# Patient Record
Sex: FEMALE | Race: WHITE | Employment: OTHER | URBAN - METROPOLITAN AREA
[De-identification: names, ages, dates, MRNs, and addresses within clinical notes are randomized per-mention and may not be internally consistent; named-entity substitution may affect disease eponyms.]

---

## 2017-09-09 ENCOUNTER — HOSPITAL ENCOUNTER (OUTPATIENT)
Dept: MRI IMAGING | Age: 82
Discharge: HOME | End: 2017-09-09
Attending: FAMILY MEDICINE
Payer: MEDICARE

## 2017-09-09 DIAGNOSIS — D49.6 BRAIN TUMOR (HCC): ICD-10-CM

## 2017-09-09 LAB — CREAT BLD-MCNC: 0.5 MG/DL (ref 0.8–1.5)

## 2017-09-09 PROCEDURE — A9577 INJ MULTIHANCE: HCPCS

## 2017-09-09 PROCEDURE — 70553 MRI BRAIN STEM W/O & W/DYE: CPT

## 2017-09-09 PROCEDURE — 82565 ASSAY OF CREATININE: CPT

## 2017-09-09 PROCEDURE — 74011250636 HC RX REV CODE- 250/636

## 2017-09-09 RX ORDER — SODIUM CHLORIDE 0.9 % (FLUSH) 0.9 %
10 SYRINGE (ML) INJECTION
Status: COMPLETED | OUTPATIENT
Start: 2017-09-09 | End: 2017-09-09

## 2017-09-09 RX ADMIN — Medication 10 ML: at 13:41

## 2017-09-09 RX ADMIN — GADOBENATE DIMEGLUMINE 20 ML: 529 INJECTION, SOLUTION INTRAVENOUS at 13:40

## 2017-09-10 NOTE — PROGRESS NOTES
No serious findings on the MRI other than brain shrinkage due to age and a tiny 9mm meningioma (benign).

## 2017-11-15 PROBLEM — F02.80 LATE ONSET ALZHEIMER'S DISEASE WITHOUT BEHAVIORAL DISTURBANCE (HCC): Chronic | Status: ACTIVE | Noted: 2017-11-15

## 2017-11-15 PROBLEM — G30.1 LATE ONSET ALZHEIMER'S DISEASE WITHOUT BEHAVIORAL DISTURBANCE (HCC): Chronic | Status: ACTIVE | Noted: 2017-11-15

## 2018-01-01 ENCOUNTER — APPOINTMENT (OUTPATIENT)
Dept: GENERAL RADIOLOGY | Age: 83
End: 2018-01-01
Attending: INTERNAL MEDICINE

## 2018-01-01 ENCOUNTER — APPOINTMENT (OUTPATIENT)
Dept: GENERAL RADIOLOGY | Age: 83
DRG: 871 | End: 2018-01-01
Attending: EMERGENCY MEDICINE
Payer: MEDICARE

## 2018-01-01 ENCOUNTER — HOSPITAL ENCOUNTER (OUTPATIENT)
Age: 83
Discharge: NURSING, REHAB OR LONG-TERM CARE | End: 2018-10-25
Attending: INTERNAL MEDICINE | Admitting: INTERNAL MEDICINE

## 2018-01-01 ENCOUNTER — HOSPICE ADMISSION (OUTPATIENT)
Dept: HOSPICE | Facility: HOSPICE | Age: 83
End: 2018-01-01

## 2018-01-01 ENCOUNTER — HOSPITAL ENCOUNTER (OUTPATIENT)
Dept: GENERAL RADIOLOGY | Age: 83
Discharge: HOME | End: 2018-10-22
Attending: INTERNAL MEDICINE

## 2018-01-01 ENCOUNTER — HOSPITAL ENCOUNTER (EMERGENCY)
Age: 83
Discharge: HOME | End: 2018-04-06
Attending: EMERGENCY MEDICINE
Payer: MEDICARE

## 2018-01-01 ENCOUNTER — HOSPITAL ENCOUNTER (OUTPATIENT)
Age: 83
Setting detail: OUTPATIENT SURGERY
Discharge: NURSING, REHAB OR LONG-TERM CARE | End: 2018-12-05
Attending: INTERNAL MEDICINE | Admitting: INTERNAL MEDICINE
Payer: OTHER MISCELLANEOUS

## 2018-01-01 ENCOUNTER — HOSPITAL ENCOUNTER (OUTPATIENT)
Age: 83
Setting detail: OBSERVATION
Discharge: NURSING, REHAB OR LONG-TERM CARE | End: 2018-11-19
Attending: EMERGENCY MEDICINE | Admitting: FAMILY MEDICINE
Payer: MEDICARE

## 2018-01-01 ENCOUNTER — HOSPITAL ENCOUNTER (OUTPATIENT)
Age: 83
Setting detail: OUTPATIENT SURGERY
Discharge: NURSING, REHAB OR LONG-TERM CARE | End: 2018-11-16
Attending: INTERNAL MEDICINE | Admitting: INTERNAL MEDICINE
Payer: MEDICARE

## 2018-01-01 ENCOUNTER — APPOINTMENT (OUTPATIENT)
Dept: CT IMAGING | Age: 83
End: 2018-01-01
Attending: INTERNAL MEDICINE

## 2018-01-01 ENCOUNTER — APPOINTMENT (OUTPATIENT)
Dept: GENERAL RADIOLOGY | Age: 83
DRG: 177 | End: 2018-01-01
Attending: INTERNAL MEDICINE
Payer: MEDICARE

## 2018-01-01 ENCOUNTER — APPOINTMENT (OUTPATIENT)
Dept: GENERAL RADIOLOGY | Age: 83
DRG: 177 | End: 2018-01-01
Attending: EMERGENCY MEDICINE
Payer: MEDICARE

## 2018-01-01 ENCOUNTER — APPOINTMENT (OUTPATIENT)
Dept: GENERAL RADIOLOGY | Age: 83
DRG: 871 | End: 2018-01-01
Attending: INTERNAL MEDICINE
Payer: MEDICARE

## 2018-01-01 ENCOUNTER — HOSPITAL ENCOUNTER (INPATIENT)
Age: 83
LOS: 10 days | Discharge: NURSING, REHAB OR LONG-TERM CARE | DRG: 177 | End: 2018-10-04
Attending: EMERGENCY MEDICINE | Admitting: HOSPITALIST
Payer: MEDICARE

## 2018-01-01 ENCOUNTER — PATIENT OUTREACH (OUTPATIENT)
Dept: CASE MANAGEMENT | Age: 83
End: 2018-01-01

## 2018-01-01 ENCOUNTER — APPOINTMENT (OUTPATIENT)
Dept: CT IMAGING | Age: 83
DRG: 177 | End: 2018-01-01
Attending: INTERNAL MEDICINE
Payer: MEDICARE

## 2018-01-01 ENCOUNTER — APPOINTMENT (OUTPATIENT)
Dept: ULTRASOUND IMAGING | Age: 83
DRG: 177 | End: 2018-01-01
Attending: INTERNAL MEDICINE
Payer: MEDICARE

## 2018-01-01 ENCOUNTER — APPOINTMENT (OUTPATIENT)
Dept: CT IMAGING | Age: 83
End: 2018-01-01
Attending: EMERGENCY MEDICINE
Payer: MEDICARE

## 2018-01-01 ENCOUNTER — HOSPITAL ENCOUNTER (OUTPATIENT)
Dept: GENERAL RADIOLOGY | Age: 83
Discharge: HOME | End: 2018-10-08
Attending: INTERNAL MEDICINE

## 2018-01-01 ENCOUNTER — HOSPITAL ENCOUNTER (INPATIENT)
Age: 83
LOS: 5 days | DRG: 871 | End: 2018-12-22
Attending: EMERGENCY MEDICINE | Admitting: INTERNAL MEDICINE
Payer: MEDICARE

## 2018-01-01 VITALS
TEMPERATURE: 96.9 F | BODY MASS INDEX: 27.82 KG/M2 | HEIGHT: 63 IN | HEART RATE: 128 BPM | RESPIRATION RATE: 22 BRPM | DIASTOLIC BLOOD PRESSURE: 48 MMHG | WEIGHT: 157 LBS | SYSTOLIC BLOOD PRESSURE: 68 MMHG | OXYGEN SATURATION: 91 %

## 2018-01-01 VITALS
WEIGHT: 149 LBS | SYSTOLIC BLOOD PRESSURE: 113 MMHG | HEIGHT: 63 IN | BODY MASS INDEX: 26.4 KG/M2 | OXYGEN SATURATION: 92 % | DIASTOLIC BLOOD PRESSURE: 52 MMHG | RESPIRATION RATE: 14 BRPM | HEART RATE: 100 BPM

## 2018-01-01 VITALS
TEMPERATURE: 98 F | RESPIRATION RATE: 20 BRPM | SYSTOLIC BLOOD PRESSURE: 96 MMHG | OXYGEN SATURATION: 90 % | DIASTOLIC BLOOD PRESSURE: 52 MMHG | HEART RATE: 82 BPM | BODY MASS INDEX: 33.47 KG/M2 | HEIGHT: 59 IN | WEIGHT: 166 LBS

## 2018-01-01 VITALS
OXYGEN SATURATION: 98 % | TEMPERATURE: 98.3 F | WEIGHT: 149.91 LBS | DIASTOLIC BLOOD PRESSURE: 61 MMHG | HEIGHT: 63 IN | RESPIRATION RATE: 18 BRPM | SYSTOLIC BLOOD PRESSURE: 106 MMHG | BODY MASS INDEX: 26.56 KG/M2 | HEART RATE: 93 BPM

## 2018-01-01 VITALS
RESPIRATION RATE: 18 BRPM | BODY MASS INDEX: 28.49 KG/M2 | HEART RATE: 90 BPM | DIASTOLIC BLOOD PRESSURE: 76 MMHG | TEMPERATURE: 98.9 F | WEIGHT: 171 LBS | HEIGHT: 65 IN | OXYGEN SATURATION: 95 % | SYSTOLIC BLOOD PRESSURE: 194 MMHG

## 2018-01-01 VITALS
RESPIRATION RATE: 20 BRPM | HEIGHT: 63 IN | OXYGEN SATURATION: 98 % | WEIGHT: 150 LBS | HEART RATE: 97 BPM | SYSTOLIC BLOOD PRESSURE: 108 MMHG | DIASTOLIC BLOOD PRESSURE: 53 MMHG | TEMPERATURE: 97.8 F | BODY MASS INDEX: 26.58 KG/M2

## 2018-01-01 DIAGNOSIS — R09.02 HYPOXIA: Chronic | ICD-10-CM

## 2018-01-01 DIAGNOSIS — G93.40 ACUTE ENCEPHALOPATHY: Primary | ICD-10-CM

## 2018-01-01 DIAGNOSIS — F02.80 LATE ONSET ALZHEIMER'S DISEASE WITHOUT BEHAVIORAL DISTURBANCE (HCC): Chronic | ICD-10-CM

## 2018-01-01 DIAGNOSIS — J44.9 CHRONIC OBSTRUCTIVE PULMONARY DISEASE, UNSPECIFIED COPD TYPE (HCC): Chronic | ICD-10-CM

## 2018-01-01 DIAGNOSIS — J90 PLEURAL EFFUSION, BILATERAL: ICD-10-CM

## 2018-01-01 DIAGNOSIS — R13.19 OTHER DYSPHAGIA: ICD-10-CM

## 2018-01-01 DIAGNOSIS — R06.02 SHORTNESS OF BREATH: ICD-10-CM

## 2018-01-01 DIAGNOSIS — J90 RECURRENT PLEURAL EFFUSION ON LEFT: ICD-10-CM

## 2018-01-01 DIAGNOSIS — R53.81 DEBILITY: ICD-10-CM

## 2018-01-01 DIAGNOSIS — G30.1 LATE ONSET ALZHEIMER'S DISEASE WITHOUT BEHAVIORAL DISTURBANCE (HCC): Chronic | ICD-10-CM

## 2018-01-01 DIAGNOSIS — J84.9 ILD (INTERSTITIAL LUNG DISEASE) (HCC): Chronic | ICD-10-CM

## 2018-01-01 DIAGNOSIS — J69.0 ASPIRATION PNEUMONIA OF LEFT LOWER LOBE DUE TO VOMIT (HCC): ICD-10-CM

## 2018-01-01 DIAGNOSIS — J96.01 ACUTE RESPIRATORY FAILURE WITH HYPOXEMIA (HCC): ICD-10-CM

## 2018-01-01 DIAGNOSIS — J96.01 ACUTE RESPIRATORY FAILURE WITH HYPOXIA (HCC): ICD-10-CM

## 2018-01-01 DIAGNOSIS — N39.0 URINARY TRACT INFECTION WITHOUT HEMATURIA, SITE UNSPECIFIED: ICD-10-CM

## 2018-01-01 DIAGNOSIS — S01.01XA LACERATION OF SCALP, INITIAL ENCOUNTER: Primary | ICD-10-CM

## 2018-01-01 DIAGNOSIS — J96.11 CHRONIC RESPIRATORY FAILURE WITH HYPOXIA (HCC): Chronic | ICD-10-CM

## 2018-01-01 DIAGNOSIS — J90 PLEURAL EFFUSION, LEFT: ICD-10-CM

## 2018-01-01 DIAGNOSIS — J96.21 ACUTE ON CHRONIC RESPIRATORY FAILURE WITH HYPOXIA (HCC): ICD-10-CM

## 2018-01-01 DIAGNOSIS — Z51.5 ENCOUNTER FOR PALLIATIVE CARE: ICD-10-CM

## 2018-01-01 DIAGNOSIS — N17.9 ACUTE RENAL INJURY (HCC): ICD-10-CM

## 2018-01-01 DIAGNOSIS — J69.0 ASPIRATION PNEUMONIA, UNSPECIFIED ASPIRATION PNEUMONIA TYPE, UNSPECIFIED LATERALITY, UNSPECIFIED PART OF LUNG (HCC): Primary | ICD-10-CM

## 2018-01-01 DIAGNOSIS — E87.5 ACUTE HYPERKALEMIA: ICD-10-CM

## 2018-01-01 DIAGNOSIS — I35.0 NONRHEUMATIC AORTIC VALVE STENOSIS: ICD-10-CM

## 2018-01-01 DIAGNOSIS — J90 RECURRENT LEFT PLEURAL EFFUSION: Chronic | ICD-10-CM

## 2018-01-01 DIAGNOSIS — R60.9 EDEMA, UNSPECIFIED TYPE: ICD-10-CM

## 2018-01-01 DIAGNOSIS — R54 FRAILTY: ICD-10-CM

## 2018-01-01 DIAGNOSIS — N17.9 AKI (ACUTE KIDNEY INJURY) (HCC): ICD-10-CM

## 2018-01-01 DIAGNOSIS — R10.84 ABDOMINAL PAIN, GENERALIZED: Primary | ICD-10-CM

## 2018-01-01 LAB
ACID FAST STN SPEC: NEGATIVE
ACID FAST STN SPEC: NEGATIVE
ALBUMIN SERPL-MCNC: 2.3 G/DL (ref 3.2–4.6)
ALBUMIN SERPL-MCNC: 2.4 G/DL (ref 3.2–4.6)
ALBUMIN SERPL-MCNC: 2.6 G/DL (ref 3.2–4.6)
ALBUMIN SERPL-MCNC: 2.7 G/DL (ref 3.2–4.6)
ALBUMIN SERPL-MCNC: 2.9 G/DL (ref 3.2–4.6)
ALBUMIN SERPL-MCNC: 3 G/DL (ref 3.2–4.6)
ALBUMIN SERPL-MCNC: 3 G/DL (ref 3.2–4.6)
ALBUMIN SERPL-MCNC: 3.3 G/DL (ref 3.2–4.6)
ALBUMIN/GLOB SERPL: 0.5 {RATIO} (ref 1.2–3.5)
ALBUMIN/GLOB SERPL: 0.5 {RATIO} (ref 1.2–3.5)
ALBUMIN/GLOB SERPL: 0.6 {RATIO}
ALBUMIN/GLOB SERPL: 0.7 {RATIO}
ALBUMIN/GLOB SERPL: 0.7 {RATIO} (ref 1.2–3.5)
ALBUMIN/GLOB SERPL: 0.8 {RATIO} (ref 1.2–3.5)
ALBUMIN/GLOB SERPL: 0.8 {RATIO} (ref 1.2–3.5)
ALBUMIN/GLOB SERPL: 0.9 {RATIO}
ALP SERPL-CCNC: 122 U/L (ref 50–136)
ALP SERPL-CCNC: 140 U/L (ref 50–136)
ALP SERPL-CCNC: 149 U/L (ref 50–130)
ALP SERPL-CCNC: 57 U/L (ref 50–136)
ALP SERPL-CCNC: 77 U/L (ref 50–136)
ALP SERPL-CCNC: 82 U/L (ref 50–136)
ALP SERPL-CCNC: 85 U/L (ref 50–136)
ALP SERPL-CCNC: 89 U/L (ref 50–136)
ALT SERPL-CCNC: 15 U/L (ref 12–65)
ALT SERPL-CCNC: 15 U/L (ref 12–65)
ALT SERPL-CCNC: 19 U/L (ref 12–65)
ALT SERPL-CCNC: 20 U/L (ref 12–65)
ALT SERPL-CCNC: 21 U/L (ref 12–65)
ALT SERPL-CCNC: 30 U/L (ref 12–65)
ALT SERPL-CCNC: 36 U/L (ref 12–65)
ALT SERPL-CCNC: 99 U/L (ref 12–65)
AMIKACIN ISLT MIC: NORMAL
AMORPH CRY URNS QL MICRO: ABNORMAL
AMOXICILLIN+CLAV ISLT MIC: NORMAL
ANION GAP SERPL CALC-SCNC: 10 MMOL/L
ANION GAP SERPL CALC-SCNC: 11 MMOL/L
ANION GAP SERPL CALC-SCNC: 13 MMOL/L
ANION GAP SERPL CALC-SCNC: 16 MMOL/L
ANION GAP SERPL CALC-SCNC: 4 MMOL/L
ANION GAP SERPL CALC-SCNC: 4 MMOL/L
ANION GAP SERPL CALC-SCNC: 4 MMOL/L (ref 7–16)
ANION GAP SERPL CALC-SCNC: 5 MMOL/L
ANION GAP SERPL CALC-SCNC: 5 MMOL/L
ANION GAP SERPL CALC-SCNC: 5 MMOL/L (ref 7–16)
ANION GAP SERPL CALC-SCNC: 6 MMOL/L
ANION GAP SERPL CALC-SCNC: 6 MMOL/L
ANION GAP SERPL CALC-SCNC: 6 MMOL/L (ref 7–16)
ANION GAP SERPL CALC-SCNC: 7 MMOL/L
ANION GAP SERPL CALC-SCNC: 7 MMOL/L (ref 7–16)
ANION GAP SERPL CALC-SCNC: 8 MMOL/L
ANION GAP SERPL CALC-SCNC: 8 MMOL/L (ref 7–16)
ANION GAP SERPL CALC-SCNC: 9 MMOL/L
ANION GAP SERPL CALC-SCNC: 9 MMOL/L (ref 7–16)
ANION GAP SERPL CALC-SCNC: 9 MMOL/L (ref 7–16)
APPEARANCE FLD: NORMAL
APPEARANCE UR: ABNORMAL
APPEARANCE UR: ABNORMAL
ARTERIAL PATENCY WRIST A: POSITIVE
ARTERIAL PATENCY WRIST A: YES
AST SERPL-CCNC: 14 U/L (ref 15–37)
AST SERPL-CCNC: 16 U/L (ref 15–37)
AST SERPL-CCNC: 19 U/L (ref 15–37)
AST SERPL-CCNC: 25 U/L (ref 15–37)
AST SERPL-CCNC: 32 U/L (ref 15–37)
AST SERPL-CCNC: 37 U/L (ref 15–37)
AST SERPL-CCNC: 38 U/L (ref 15–37)
AST SERPL-CCNC: 39 U/L (ref 15–37)
ATRIAL RATE: 102 BPM
ATRIAL RATE: 105 BPM
ATRIAL RATE: 107 BPM
ATRIAL RATE: 110 BPM
BACTERIA SPEC CULT: ABNORMAL
BACTERIA SPEC CULT: NORMAL
BACTERIA URNS QL MICRO: 0 /HPF
BACTERIA URNS QL MICRO: 0 /HPF
BACTERIA URNS QL MICRO: ABNORMAL /HPF
BACTERIA URNS QL MICRO: NORMAL /HPF
BASE EXCESS BLD CALC-SCNC: 0 MMOL/L
BASE EXCESS BLDA CALC-SCNC: 0 MMOL/L (ref 0–3)
BASOPHILS # BLD: 0 K/UL (ref 0–0.2)
BASOPHILS # BLD: 0.1 K/UL (ref 0–0.2)
BASOPHILS NFR BLD: 0 % (ref 0–2)
BASOPHILS NFR BLD: 1 % (ref 0–2)
BDY SITE: ABNORMAL
BDY SITE: NORMAL
BILIRUB SERPL-MCNC: 0.2 MG/DL (ref 0.2–1.1)
BILIRUB SERPL-MCNC: 0.3 MG/DL (ref 0.2–1.1)
BILIRUB SERPL-MCNC: 0.4 MG/DL (ref 0.2–1.1)
BILIRUB UR QL: ABNORMAL
BILIRUB UR QL: NEGATIVE
BNP SERPL-MCNC: 406 PG/ML
BNP SERPL-MCNC: 61 PG/ML
BNP SERPL-MCNC: 83 PG/ML
BNP SERPL-MCNC: 94 PG/ML
BODY TEMPERATURE: 98.6
BUN SERPL-MCNC: 16 MG/DL (ref 8–23)
BUN SERPL-MCNC: 17 MG/DL (ref 8–23)
BUN SERPL-MCNC: 17 MG/DL (ref 8–23)
BUN SERPL-MCNC: 19 MG/DL (ref 8–23)
BUN SERPL-MCNC: 20 MG/DL (ref 8–23)
BUN SERPL-MCNC: 21 MG/DL (ref 8–23)
BUN SERPL-MCNC: 23 MG/DL (ref 8–23)
BUN SERPL-MCNC: 24 MG/DL (ref 8–23)
BUN SERPL-MCNC: 24 MG/DL (ref 8–23)
BUN SERPL-MCNC: 25 MG/DL (ref 8–23)
BUN SERPL-MCNC: 25 MG/DL (ref 8–23)
BUN SERPL-MCNC: 26 MG/DL (ref 8–23)
BUN SERPL-MCNC: 26 MG/DL (ref 8–23)
BUN SERPL-MCNC: 27 MG/DL (ref 8–23)
BUN SERPL-MCNC: 27 MG/DL (ref 8–23)
BUN SERPL-MCNC: 34 MG/DL (ref 8–23)
BUN SERPL-MCNC: 36 MG/DL (ref 8–23)
BUN SERPL-MCNC: 37 MG/DL (ref 8–23)
BUN SERPL-MCNC: 38 MG/DL (ref 8–23)
BUN SERPL-MCNC: 38 MG/DL (ref 8–23)
BUN SERPL-MCNC: 39 MG/DL (ref 8–23)
BUN SERPL-MCNC: 41 MG/DL (ref 8–23)
BUN SERPL-MCNC: 41 MG/DL (ref 8–23)
BUN SERPL-MCNC: 43 MG/DL (ref 8–23)
BUN SERPL-MCNC: 44 MG/DL (ref 8–23)
BUN SERPL-MCNC: 45 MG/DL (ref 8–23)
BUN SERPL-MCNC: 50 MG/DL (ref 8–23)
CA-I BLD-MCNC: 5.52 MG/DL (ref 4–5.2)
CALCIUM SERPL-MCNC: 10 MG/DL (ref 8.3–10.4)
CALCIUM SERPL-MCNC: 10.3 MG/DL (ref 8.3–10.4)
CALCIUM SERPL-MCNC: 7.8 MG/DL (ref 8.3–10.4)
CALCIUM SERPL-MCNC: 7.9 MG/DL (ref 8.3–10.4)
CALCIUM SERPL-MCNC: 8.2 MG/DL (ref 8.3–10.4)
CALCIUM SERPL-MCNC: 8.5 MG/DL (ref 8.3–10.4)
CALCIUM SERPL-MCNC: 8.7 MG/DL (ref 8.3–10.4)
CALCIUM SERPL-MCNC: 8.7 MG/DL (ref 8.3–10.4)
CALCIUM SERPL-MCNC: 8.8 MG/DL (ref 8.3–10.4)
CALCIUM SERPL-MCNC: 8.9 MG/DL (ref 8.3–10.4)
CALCIUM SERPL-MCNC: 8.9 MG/DL (ref 8.3–10.4)
CALCIUM SERPL-MCNC: 9.1 MG/DL (ref 8.3–10.4)
CALCIUM SERPL-MCNC: 9.2 MG/DL (ref 8.3–10.4)
CALCIUM SERPL-MCNC: 9.2 MG/DL (ref 8.3–10.4)
CALCIUM SERPL-MCNC: 9.3 MG/DL (ref 8.3–10.4)
CALCIUM SERPL-MCNC: 9.4 MG/DL (ref 8.3–10.4)
CALCIUM SERPL-MCNC: 9.5 MG/DL (ref 8.3–10.4)
CALCIUM SERPL-MCNC: 9.5 MG/DL (ref 8.3–10.4)
CALCIUM SERPL-MCNC: 9.6 MG/DL (ref 8.3–10.4)
CALCIUM SERPL-MCNC: 9.7 MG/DL (ref 8.3–10.4)
CALCIUM SERPL-MCNC: 9.8 MG/DL (ref 8.3–10.4)
CALCIUM SERPL-MCNC: 9.9 MG/DL (ref 8.3–10.4)
CALCULATED P AXIS, ECG09: 46 DEGREES
CALCULATED P AXIS, ECG09: 59 DEGREES
CALCULATED P AXIS, ECG09: 62 DEGREES
CALCULATED P AXIS, ECG09: 64 DEGREES
CALCULATED R AXIS, ECG10: 25 DEGREES
CALCULATED R AXIS, ECG10: 32 DEGREES
CALCULATED R AXIS, ECG10: 39 DEGREES
CALCULATED R AXIS, ECG10: 52 DEGREES
CALCULATED T AXIS, ECG11: 43 DEGREES
CALCULATED T AXIS, ECG11: 47 DEGREES
CALCULATED T AXIS, ECG11: 48 DEGREES
CALCULATED T AXIS, ECG11: 67 DEGREES
CASTS URNS QL MICRO: 0 /LPF
CASTS URNS QL MICRO: ABNORMAL /LPF
CASTS URNS QL MICRO: ABNORMAL /LPF
CEFTRIAXONE ISLT MIC: NORMAL
CHLORIDE SERPL-SCNC: 100 MMOL/L (ref 98–107)
CHLORIDE SERPL-SCNC: 101 MMOL/L (ref 98–107)
CHLORIDE SERPL-SCNC: 101 MMOL/L (ref 98–107)
CHLORIDE SERPL-SCNC: 104 MMOL/L (ref 98–107)
CHLORIDE SERPL-SCNC: 105 MMOL/L (ref 98–107)
CHLORIDE SERPL-SCNC: 106 MMOL/L (ref 98–107)
CHLORIDE SERPL-SCNC: 107 MMOL/L (ref 98–107)
CHLORIDE SERPL-SCNC: 107 MMOL/L (ref 98–107)
CHLORIDE SERPL-SCNC: 108 MMOL/L (ref 98–107)
CHLORIDE SERPL-SCNC: 109 MMOL/L (ref 98–107)
CHLORIDE SERPL-SCNC: 109 MMOL/L (ref 98–107)
CHLORIDE SERPL-SCNC: 110 MMOL/L (ref 98–107)
CHLORIDE SERPL-SCNC: 114 MMOL/L (ref 98–107)
CHLORIDE SERPL-SCNC: 116 MMOL/L (ref 98–107)
CHLORIDE SERPL-SCNC: 119 MMOL/L (ref 98–107)
CHLORIDE SERPL-SCNC: 122 MMOL/L (ref 98–107)
CHLORIDE SERPL-SCNC: 99 MMOL/L (ref 98–107)
CHLORIDE SERPL-SCNC: 99 MMOL/L (ref 98–107)
CIPROFLOXACIN ISLT MIC: NORMAL
CLARITHRO ISLT MIC: NORMAL
CO2 BLD-SCNC: 26 MMOL/L
CO2 SERPL-SCNC: 23 MMOL/L (ref 21–32)
CO2 SERPL-SCNC: 24 MMOL/L (ref 21–32)
CO2 SERPL-SCNC: 26 MMOL/L (ref 21–32)
CO2 SERPL-SCNC: 26 MMOL/L (ref 21–32)
CO2 SERPL-SCNC: 28 MMOL/L (ref 21–32)
CO2 SERPL-SCNC: 29 MMOL/L (ref 21–32)
CO2 SERPL-SCNC: 30 MMOL/L (ref 21–32)
CO2 SERPL-SCNC: 31 MMOL/L (ref 21–32)
CO2 SERPL-SCNC: 32 MMOL/L (ref 21–32)
CO2 SERPL-SCNC: 33 MMOL/L (ref 21–32)
CO2 SERPL-SCNC: 34 MMOL/L (ref 21–32)
CO2 SERPL-SCNC: 34 MMOL/L (ref 21–32)
CO2 SERPL-SCNC: 35 MMOL/L (ref 21–32)
CO2 SERPL-SCNC: 36 MMOL/L (ref 21–32)
CO2 SERPL-SCNC: 38 MMOL/L (ref 21–32)
CO2 SERPL-SCNC: 39 MMOL/L (ref 21–32)
CO2 SERPL-SCNC: 41 MMOL/L (ref 21–32)
COHGB MFR BLD: 0.3 % (ref 0.5–1.5)
COLLECT TIME,HTIME: 1533
COLOR FLD: NORMAL
COLOR UR: ABNORMAL
COLOR UR: YELLOW
CREAT SERPL-MCNC: 0.71 MG/DL (ref 0.6–1)
CREAT SERPL-MCNC: 0.72 MG/DL (ref 0.6–1)
CREAT SERPL-MCNC: 0.81 MG/DL (ref 0.6–1)
CREAT SERPL-MCNC: 0.83 MG/DL (ref 0.6–1)
CREAT SERPL-MCNC: 0.84 MG/DL (ref 0.6–1)
CREAT SERPL-MCNC: 0.84 MG/DL (ref 0.6–1)
CREAT SERPL-MCNC: 0.87 MG/DL (ref 0.6–1)
CREAT SERPL-MCNC: 0.88 MG/DL (ref 0.6–1)
CREAT SERPL-MCNC: 0.92 MG/DL (ref 0.6–1)
CREAT SERPL-MCNC: 1 MG/DL (ref 0.6–1)
CREAT SERPL-MCNC: 1 MG/DL (ref 0.6–1)
CREAT SERPL-MCNC: 1.02 MG/DL (ref 0.6–1)
CREAT SERPL-MCNC: 1.07 MG/DL (ref 0.6–1)
CREAT SERPL-MCNC: 1.08 MG/DL (ref 0.6–1)
CREAT SERPL-MCNC: 1.09 MG/DL (ref 0.6–1)
CREAT SERPL-MCNC: 1.19 MG/DL (ref 0.6–1)
CREAT SERPL-MCNC: 1.25 MG/DL (ref 0.6–1)
CREAT SERPL-MCNC: 1.27 MG/DL (ref 0.6–1)
CREAT SERPL-MCNC: 1.3 MG/DL (ref 0.6–1)
CREAT SERPL-MCNC: 1.32 MG/DL (ref 0.6–1)
CREAT SERPL-MCNC: 1.33 MG/DL (ref 0.6–1)
CREAT SERPL-MCNC: 1.4 MG/DL (ref 0.6–1)
CREAT SERPL-MCNC: 1.43 MG/DL (ref 0.6–1)
CREAT SERPL-MCNC: 1.43 MG/DL (ref 0.6–1)
CREAT SERPL-MCNC: 1.56 MG/DL (ref 0.6–1)
CREAT SERPL-MCNC: 1.97 MG/DL (ref 0.6–1)
CREAT SERPL-MCNC: 2.1 MG/DL (ref 0.6–1)
CREAT SERPL-MCNC: 2.42 MG/DL (ref 0.6–1)
CRYSTALS URNS QL MICRO: 0 /LPF
CRYSTALS URNS QL MICRO: 0 /LPF
CRYSTALS URNS QL MICRO: ABNORMAL /LPF
DIAGNOSIS, 93000: NORMAL
DIFFERENTIAL METHOD BLD: ABNORMAL
DO-HGB BLD-MCNC: 3 % (ref 0–5)
EOSINOPHIL # BLD: 0 K/UL (ref 0–0.8)
EOSINOPHIL # BLD: 0.1 K/UL (ref 0–0.8)
EOSINOPHIL # BLD: 0.2 K/UL (ref 0–0.8)
EOSINOPHIL # BLD: 0.3 K/UL (ref 0–0.8)
EOSINOPHIL NFR BLD: 0 % (ref 0.5–7.8)
EOSINOPHIL NFR BLD: 1 % (ref 0.5–7.8)
EOSINOPHIL NFR BLD: 2 % (ref 0.5–7.8)
EOSINOPHIL NFR BLD: 3 % (ref 0.5–7.8)
EOSINOPHIL NFR BLD: 3 % (ref 0.5–7.8)
EPI CELLS #/AREA URNS HPF: ABNORMAL /HPF
EPI CELLS #/AREA URNS HPF: ABNORMAL /HPF
EPI CELLS #/AREA URNS HPF: NORMAL /HPF
ERYTHROCYTE [DISTWIDTH] IN BLOOD BY AUTOMATED COUNT: 14.6 %
ERYTHROCYTE [DISTWIDTH] IN BLOOD BY AUTOMATED COUNT: 14.6 %
ERYTHROCYTE [DISTWIDTH] IN BLOOD BY AUTOMATED COUNT: 14.7 %
ERYTHROCYTE [DISTWIDTH] IN BLOOD BY AUTOMATED COUNT: 14.7 %
ERYTHROCYTE [DISTWIDTH] IN BLOOD BY AUTOMATED COUNT: 14.8 %
ERYTHROCYTE [DISTWIDTH] IN BLOOD BY AUTOMATED COUNT: 14.9 %
ERYTHROCYTE [DISTWIDTH] IN BLOOD BY AUTOMATED COUNT: 15 %
ERYTHROCYTE [DISTWIDTH] IN BLOOD BY AUTOMATED COUNT: 15.1 %
ERYTHROCYTE [DISTWIDTH] IN BLOOD BY AUTOMATED COUNT: 15.1 % (ref 11.9–14.6)
ERYTHROCYTE [DISTWIDTH] IN BLOOD BY AUTOMATED COUNT: 15.2 %
ERYTHROCYTE [DISTWIDTH] IN BLOOD BY AUTOMATED COUNT: 15.3 %
ERYTHROCYTE [DISTWIDTH] IN BLOOD BY AUTOMATED COUNT: 15.5 %
ERYTHROCYTE [DISTWIDTH] IN BLOOD BY AUTOMATED COUNT: 15.7 %
ERYTHROCYTE [DISTWIDTH] IN BLOOD BY AUTOMATED COUNT: 16.5 % (ref 11.9–14.6)
ERYTHROCYTE [DISTWIDTH] IN BLOOD BY AUTOMATED COUNT: 16.6 %
ERYTHROCYTE [DISTWIDTH] IN BLOOD BY AUTOMATED COUNT: 16.6 %
ERYTHROCYTE [DISTWIDTH] IN BLOOD BY AUTOMATED COUNT: 16.6 % (ref 11.9–14.6)
ERYTHROCYTE [DISTWIDTH] IN BLOOD BY AUTOMATED COUNT: 16.9 % (ref 11.9–14.6)
ERYTHROCYTE [DISTWIDTH] IN BLOOD BY AUTOMATED COUNT: 17.1 % (ref 11.9–14.6)
ERYTHROCYTE [DISTWIDTH] IN BLOOD BY AUTOMATED COUNT: 17.3 % (ref 11.9–14.6)
FLOW RATE ISTAT,IFRATE: 6 L/MIN
FLUID COMMENTS, FCOM: NORMAL
FLUID COMMENTS, FCOM: NORMAL
FUNGUS CULTURE, RFCO2T: NEGATIVE
FUNGUS CULTURE, RFCO2T: NEGATIVE
FUNGUS SMEAR, RFCO1T: NORMAL
FUNGUS SMEAR, RFCO1T: NORMAL
FUNGUS SPEC CULT: NORMAL
FUNGUS SPEC CULT: NORMAL
FUNGUS STAIN, 188244: NORMAL
FUNGUS STAIN, 188244: NORMAL
GAS FLOW.O2 O2 DELIVERY SYS: NORMAL L/MIN
GLOBULIN SER CALC-MCNC: 3.4 G/DL (ref 2.3–3.5)
GLOBULIN SER CALC-MCNC: 3.5 G/DL (ref 2.3–3.5)
GLOBULIN SER CALC-MCNC: 3.9 G/DL (ref 2.3–3.5)
GLOBULIN SER CALC-MCNC: 3.9 G/DL (ref 2.3–3.5)
GLOBULIN SER CALC-MCNC: 4.2 G/DL (ref 2.3–3.5)
GLOBULIN SER CALC-MCNC: 4.4 G/DL (ref 2.3–3.5)
GLOBULIN SER CALC-MCNC: 4.6 G/DL (ref 2.3–3.5)
GLOBULIN SER CALC-MCNC: 4.9 G/DL (ref 2.3–3.5)
GLUCOSE BLD STRIP.AUTO-MCNC: 83 MG/DL (ref 65–100)
GLUCOSE FLD-MCNC: 115 MG/DL
GLUCOSE FLD-MCNC: NORMAL MG/DL
GLUCOSE FLD-MCNC: NORMAL MG/DL
GLUCOSE SERPL-MCNC: 100 MG/DL (ref 65–100)
GLUCOSE SERPL-MCNC: 102 MG/DL (ref 65–100)
GLUCOSE SERPL-MCNC: 102 MG/DL (ref 65–100)
GLUCOSE SERPL-MCNC: 106 MG/DL (ref 65–100)
GLUCOSE SERPL-MCNC: 107 MG/DL (ref 65–100)
GLUCOSE SERPL-MCNC: 109 MG/DL (ref 65–100)
GLUCOSE SERPL-MCNC: 109 MG/DL (ref 65–100)
GLUCOSE SERPL-MCNC: 110 MG/DL (ref 65–100)
GLUCOSE SERPL-MCNC: 115 MG/DL (ref 65–100)
GLUCOSE SERPL-MCNC: 134 MG/DL (ref 65–100)
GLUCOSE SERPL-MCNC: 138 MG/DL (ref 65–100)
GLUCOSE SERPL-MCNC: 142 MG/DL (ref 65–100)
GLUCOSE SERPL-MCNC: 144 MG/DL (ref 65–100)
GLUCOSE SERPL-MCNC: 149 MG/DL (ref 65–100)
GLUCOSE SERPL-MCNC: 201 MG/DL (ref 65–100)
GLUCOSE SERPL-MCNC: 78 MG/DL (ref 65–100)
GLUCOSE SERPL-MCNC: 88 MG/DL (ref 65–100)
GLUCOSE SERPL-MCNC: 90 MG/DL (ref 65–100)
GLUCOSE SERPL-MCNC: 90 MG/DL (ref 65–100)
GLUCOSE SERPL-MCNC: 92 MG/DL (ref 65–100)
GLUCOSE SERPL-MCNC: 93 MG/DL (ref 65–100)
GLUCOSE SERPL-MCNC: 94 MG/DL (ref 65–100)
GLUCOSE SERPL-MCNC: 95 MG/DL (ref 65–100)
GLUCOSE SERPL-MCNC: 96 MG/DL (ref 65–100)
GLUCOSE SERPL-MCNC: 98 MG/DL (ref 65–100)
GLUCOSE SERPL-MCNC: 99 MG/DL (ref 65–100)
GLUCOSE SERPL-MCNC: 99 MG/DL (ref 65–100)
GLUCOSE UR STRIP.AUTO-MCNC: NEGATIVE MG/DL
GLUCOSE UR STRIP.AUTO-MCNC: NEGATIVE MG/DL
GRAM STN SPEC: NORMAL
HCO3 BLD-SCNC: 24.9 MMOL/L (ref 22–26)
HCO3 BLDA-SCNC: 27 MMOL/L (ref 22–26)
HCT VFR BLD AUTO: 25.8 % (ref 35.8–46.3)
HCT VFR BLD AUTO: 30.2 % (ref 35.8–46.3)
HCT VFR BLD AUTO: 32.7 % (ref 35.8–46.3)
HCT VFR BLD AUTO: 33.1 % (ref 35.8–46.3)
HCT VFR BLD AUTO: 33.2 % (ref 35.8–46.3)
HCT VFR BLD AUTO: 33.3 % (ref 35.8–46.3)
HCT VFR BLD AUTO: 33.3 % (ref 35.8–46.3)
HCT VFR BLD AUTO: 34.4 % (ref 35.8–46.3)
HCT VFR BLD AUTO: 34.5 % (ref 35.8–46.3)
HCT VFR BLD AUTO: 35 % (ref 35.8–46.3)
HCT VFR BLD AUTO: 35.1 % (ref 35.8–46.3)
HCT VFR BLD AUTO: 35.4 % (ref 35.8–46.3)
HCT VFR BLD AUTO: 36.3 % (ref 35.8–46.3)
HCT VFR BLD AUTO: 36.3 % (ref 35.8–46.3)
HCT VFR BLD AUTO: 36.5 % (ref 35.8–46.3)
HCT VFR BLD AUTO: 36.5 % (ref 35.8–46.3)
HCT VFR BLD AUTO: 36.6 % (ref 35.8–46.3)
HCT VFR BLD AUTO: 36.6 % (ref 35.8–46.3)
HCT VFR BLD AUTO: 36.8 % (ref 35.8–46.3)
HCT VFR BLD AUTO: 37.9 % (ref 35.8–46.3)
HCT VFR BLD AUTO: 38.5 % (ref 35.8–46.3)
HCT VFR BLD AUTO: 40.1 % (ref 35.8–46.3)
HCT VFR BLD AUTO: 40.3 % (ref 35.8–46.3)
HCT VFR BLD AUTO: 42.7 % (ref 35.8–46.3)
HCT VFR BLD AUTO: 43.2 % (ref 35.8–46.3)
HGB BLD-MCNC: 10 G/DL (ref 11.7–15.4)
HGB BLD-MCNC: 10.1 G/DL (ref 11.7–15.4)
HGB BLD-MCNC: 10.2 G/DL (ref 11.7–15.4)
HGB BLD-MCNC: 10.2 G/DL (ref 11.7–15.4)
HGB BLD-MCNC: 10.3 G/DL (ref 11.7–15.4)
HGB BLD-MCNC: 10.4 G/DL (ref 11.7–15.4)
HGB BLD-MCNC: 10.4 G/DL (ref 11.7–15.4)
HGB BLD-MCNC: 10.6 G/DL (ref 11.7–15.4)
HGB BLD-MCNC: 10.7 G/DL (ref 11.7–15.4)
HGB BLD-MCNC: 10.8 G/DL (ref 11.7–15.4)
HGB BLD-MCNC: 10.8 G/DL (ref 11.7–15.4)
HGB BLD-MCNC: 10.9 G/DL (ref 11.7–15.4)
HGB BLD-MCNC: 11.2 G/DL (ref 11.7–15.4)
HGB BLD-MCNC: 11.2 G/DL (ref 11.7–15.4)
HGB BLD-MCNC: 11.7 G/DL (ref 11.7–15.4)
HGB BLD-MCNC: 11.9 G/DL (ref 11.7–15.4)
HGB BLD-MCNC: 12.5 G/DL (ref 11.7–15.4)
HGB BLD-MCNC: 12.5 G/DL (ref 11.7–15.4)
HGB BLD-MCNC: 7.5 G/DL (ref 11.7–15.4)
HGB BLD-MCNC: 8.6 G/DL (ref 11.7–15.4)
HGB BLD-MCNC: 9.3 G/DL (ref 11.7–15.4)
HGB BLD-MCNC: 9.5 G/DL (ref 11.7–15.4)
HGB BLD-MCNC: 9.6 G/DL (ref 11.7–15.4)
HGB BLD-MCNC: 9.8 G/DL (ref 11.7–15.4)
HGB BLD-MCNC: 9.8 G/DL (ref 11.7–15.4)
HGB BLDMV-MCNC: 11.4 GM/DL (ref 11.7–15)
HGB UR QL STRIP: ABNORMAL
HGB UR QL STRIP: NEGATIVE
IMIPENEM ISLT MIC: NORMAL
IMM GRANULOCYTES # BLD: 0 K/UL (ref 0–0.5)
IMM GRANULOCYTES # BLD: 0.1 K/UL (ref 0–0.5)
IMM GRANULOCYTES # BLD: 0.2 K/UL (ref 0–0.5)
IMM GRANULOCYTES # BLD: 0.2 K/UL (ref 0–0.5)
IMM GRANULOCYTES NFR BLD AUTO: 0 % (ref 0–5)
IMM GRANULOCYTES NFR BLD AUTO: 1 % (ref 0–5)
KETONES UR QL STRIP.AUTO: 15 MG/DL
KETONES UR QL STRIP.AUTO: NEGATIVE MG/DL
LACTATE BLD-SCNC: 0.93 MMOL/L (ref 0.5–1.9)
LACTATE BLD-SCNC: 1.2 MMOL/L (ref 0.5–1.9)
LACTATE BLD-SCNC: 2.36 MMOL/L (ref 0.5–1.9)
LACTATE SERPL-SCNC: 1.2 MMOL/L (ref 0.4–2)
LACTATE SERPL-SCNC: 1.3 MMOL/L (ref 0.4–2)
LACTATE SERPL-SCNC: 2.3 MMOL/L (ref 0.4–2)
LDH FLD L TO P-CCNC: 196 U/L
LDH FLD L TO P-CCNC: NORMAL U/L
LDH FLD L TO P-CCNC: NORMAL U/L
LDH SERPL L TO P-CCNC: 211 U/L (ref 110–210)
LEUKOCYTE ESTERASE UR QL STRIP.AUTO: ABNORMAL
LEUKOCYTE ESTERASE UR QL STRIP.AUTO: ABNORMAL
LINEZOLID ISLT MIC: NORMAL
LIPASE SERPL-CCNC: 68 U/L (ref 73–393)
LYMPHOCYTES # BLD: 0.2 K/UL (ref 0.5–4.6)
LYMPHOCYTES # BLD: 0.3 K/UL (ref 0.5–4.6)
LYMPHOCYTES # BLD: 0.4 K/UL (ref 0.5–4.6)
LYMPHOCYTES # BLD: 0.8 K/UL (ref 0.5–4.6)
LYMPHOCYTES # BLD: 0.9 K/UL (ref 0.5–4.6)
LYMPHOCYTES # BLD: 1.1 K/UL (ref 0.5–4.6)
LYMPHOCYTES # BLD: 1.1 K/UL (ref 0.5–4.6)
LYMPHOCYTES # BLD: 1.2 K/UL (ref 0.5–4.6)
LYMPHOCYTES # BLD: 1.3 K/UL (ref 0.5–4.6)
LYMPHOCYTES # BLD: 1.5 K/UL (ref 0.5–4.6)
LYMPHOCYTES # BLD: 1.5 K/UL (ref 0.5–4.6)
LYMPHOCYTES # BLD: 1.8 K/UL (ref 0.5–4.6)
LYMPHOCYTES # BLD: 1.8 K/UL (ref 0.5–4.6)
LYMPHOCYTES # BLD: 3.1 K/UL (ref 0.5–4.6)
LYMPHOCYTES NFR BLD MANUAL: 16 % (ref 16–44)
LYMPHOCYTES NFR BLD: 10 % (ref 13–44)
LYMPHOCYTES NFR BLD: 11 % (ref 13–44)
LYMPHOCYTES NFR BLD: 12 % (ref 13–44)
LYMPHOCYTES NFR BLD: 13 % (ref 13–44)
LYMPHOCYTES NFR BLD: 14 % (ref 13–44)
LYMPHOCYTES NFR BLD: 14 % (ref 13–44)
LYMPHOCYTES NFR BLD: 17 % (ref 13–44)
LYMPHOCYTES NFR BLD: 2 % (ref 13–44)
LYMPHOCYTES NFR BLD: 3 % (ref 13–44)
LYMPHOCYTES NFR BLD: 5 % (ref 13–44)
LYMPHOCYTES NFR BLD: 8 % (ref 13–44)
LYMPHOCYTES NFR FLD: 0 %
LYMPHOCYTES NFR FLD: 91 %
LYMPHOCYTES NFR FLD: 95 %
MAGNESIUM SERPL-MCNC: 1.9 MG/DL (ref 1.8–2.4)
MAGNESIUM SERPL-MCNC: 1.9 MG/DL (ref 1.8–2.4)
MAGNESIUM SERPL-MCNC: 2 MG/DL (ref 1.8–2.4)
MAGNESIUM SERPL-MCNC: 2.2 MG/DL (ref 1.8–2.4)
MCH RBC QN AUTO: 26.3 PG (ref 26.1–32.9)
MCH RBC QN AUTO: 26.6 PG (ref 26.1–32.9)
MCH RBC QN AUTO: 26.7 PG (ref 26.1–32.9)
MCH RBC QN AUTO: 26.8 PG (ref 26.1–32.9)
MCH RBC QN AUTO: 27 PG (ref 26.1–32.9)
MCH RBC QN AUTO: 27 PG (ref 26.1–32.9)
MCH RBC QN AUTO: 27.1 PG (ref 26.1–32.9)
MCH RBC QN AUTO: 27.2 PG (ref 26.1–32.9)
MCH RBC QN AUTO: 27.2 PG (ref 26.1–32.9)
MCH RBC QN AUTO: 27.3 PG (ref 26.1–32.9)
MCH RBC QN AUTO: 27.4 PG (ref 26.1–32.9)
MCH RBC QN AUTO: 27.5 PG (ref 26.1–32.9)
MCH RBC QN AUTO: 27.5 PG (ref 26.1–32.9)
MCH RBC QN AUTO: 27.6 PG (ref 26.1–32.9)
MCH RBC QN AUTO: 27.7 PG (ref 26.1–32.9)
MCHC RBC AUTO-ENTMCNC: 28.1 G/DL (ref 31.4–35)
MCHC RBC AUTO-ENTMCNC: 28.4 G/DL (ref 31.4–35)
MCHC RBC AUTO-ENTMCNC: 28.5 G/DL (ref 31.4–35)
MCHC RBC AUTO-ENTMCNC: 28.7 G/DL (ref 31.4–35)
MCHC RBC AUTO-ENTMCNC: 28.8 G/DL (ref 31.4–35)
MCHC RBC AUTO-ENTMCNC: 28.9 G/DL (ref 31.4–35)
MCHC RBC AUTO-ENTMCNC: 28.9 G/DL (ref 31.4–35)
MCHC RBC AUTO-ENTMCNC: 29 G/DL (ref 31.4–35)
MCHC RBC AUTO-ENTMCNC: 29.1 G/DL (ref 31.4–35)
MCHC RBC AUTO-ENTMCNC: 29.2 G/DL (ref 31.4–35)
MCHC RBC AUTO-ENTMCNC: 29.3 G/DL (ref 31.4–35)
MCHC RBC AUTO-ENTMCNC: 29.4 G/DL (ref 31.4–35)
MCHC RBC AUTO-ENTMCNC: 29.5 G/DL (ref 31.4–35)
MCHC RBC AUTO-ENTMCNC: 29.5 G/DL (ref 31.4–35)
MCHC RBC AUTO-ENTMCNC: 29.6 G/DL (ref 31.4–35)
MCHC RBC AUTO-ENTMCNC: 29.8 G/DL (ref 31.4–35)
MCHC RBC AUTO-ENTMCNC: 30 G/DL (ref 31.4–35)
MCHC RBC AUTO-ENTMCNC: 30.4 G/DL (ref 31.4–35)
MCV RBC AUTO: 89 FL (ref 79.6–97.8)
MCV RBC AUTO: 89.3 FL (ref 79.6–97.8)
MCV RBC AUTO: 90.1 FL (ref 79.6–97.8)
MCV RBC AUTO: 90.8 FL (ref 79.6–97.8)
MCV RBC AUTO: 90.8 FL (ref 79.6–97.8)
MCV RBC AUTO: 91 FL (ref 79.6–97.8)
MCV RBC AUTO: 91.1 FL (ref 79.6–97.8)
MCV RBC AUTO: 91.6 FL (ref 79.6–97.8)
MCV RBC AUTO: 91.6 FL (ref 79.6–97.8)
MCV RBC AUTO: 91.7 FL (ref 79.6–97.8)
MCV RBC AUTO: 91.7 FL (ref 79.6–97.8)
MCV RBC AUTO: 92 FL (ref 79.6–97.8)
MCV RBC AUTO: 92.4 FL (ref 79.6–97.8)
MCV RBC AUTO: 92.4 FL (ref 79.6–97.8)
MCV RBC AUTO: 92.5 FL (ref 79.6–97.8)
MCV RBC AUTO: 92.7 FL (ref 79.6–97.8)
MCV RBC AUTO: 93.1 FL (ref 79.6–97.8)
MCV RBC AUTO: 93.4 FL (ref 79.6–97.8)
MCV RBC AUTO: 93.4 FL (ref 79.6–97.8)
MCV RBC AUTO: 93.8 FL (ref 79.6–97.8)
MCV RBC AUTO: 94.1 FL (ref 79.6–97.8)
MCV RBC AUTO: 94.3 FL (ref 79.6–97.8)
MCV RBC AUTO: 94.8 FL (ref 79.6–97.8)
MCV RBC AUTO: 96.5 FL (ref 79.6–97.8)
MCV RBC AUTO: 96.6 FL (ref 79.6–97.8)
METHGB MFR BLD: 0.3 % (ref 0–1.5)
MINOCYCLINE ISLT MIC: NORMAL
MM INDURATION POC: 0 MM (ref 0–5)
MM INDURATION POC: NORMAL MM (ref 0–5)
MONOCYTES # BLD: 0.2 K/UL (ref 0.1–1.3)
MONOCYTES # BLD: 0.4 K/UL (ref 0.1–1.3)
MONOCYTES # BLD: 0.5 K/UL (ref 0.1–1.3)
MONOCYTES # BLD: 0.7 K/UL (ref 0.1–1.3)
MONOCYTES # BLD: 0.7 K/UL (ref 0.1–1.3)
MONOCYTES # BLD: 0.8 K/UL (ref 0.1–1.3)
MONOCYTES # BLD: 0.9 K/UL (ref 0.1–1.3)
MONOCYTES # BLD: 0.9 K/UL (ref 0.1–1.3)
MONOCYTES # BLD: 1 K/UL (ref 0.1–1.3)
MONOCYTES # BLD: 1.1 K/UL (ref 0.1–1.3)
MONOCYTES # BLD: 1.1 K/UL (ref 0.1–1.3)
MONOCYTES # BLD: 1.2 K/UL (ref 0.1–1.3)
MONOCYTES # BLD: 1.2 K/UL (ref 0.1–1.3)
MONOCYTES # BLD: 1.4 K/UL (ref 0.1–1.3)
MONOCYTES # BLD: 1.7 K/UL (ref 0.1–1.3)
MONOCYTES NFR BLD MANUAL: 9 % (ref 3–9)
MONOCYTES NFR BLD: 10 % (ref 4–12)
MONOCYTES NFR BLD: 10 % (ref 4–12)
MONOCYTES NFR BLD: 11 % (ref 4–12)
MONOCYTES NFR BLD: 3 % (ref 4–12)
MONOCYTES NFR BLD: 5 % (ref 4–12)
MONOCYTES NFR BLD: 6 % (ref 4–12)
MONOCYTES NFR BLD: 7 % (ref 4–12)
MONOCYTES NFR BLD: 7 % (ref 4–12)
MONOCYTES NFR BLD: 8 % (ref 4–12)
MONOCYTES NFR BLD: 8 % (ref 4–12)
MONOCYTES NFR BLD: 9 % (ref 4–12)
MONOS+MACROS NFR FLD: 3 %
MONOS+MACROS NFR FLD: 9 %
MUCOUS THREADS URNS QL MICRO: ABNORMAL /LPF
MUCOUS THREADS URNS QL MICRO: NORMAL /LPF
MYCOBACTERIUM SPEC QL CULT: NEGATIVE
MYCOBACTERIUM SPEC QL CULT: POSITIVE
MYELOCYTES NFR BLD MANUAL: 3 %
NEUTROPHILS NFR FLD: 100 %
NEUTROPHILS NFR FLD: 2 %
NEUTS SEG # BLD: 10.3 K/UL (ref 1.7–8.2)
NEUTS SEG # BLD: 10.5 K/UL (ref 1.7–8.2)
NEUTS SEG # BLD: 11.3 K/UL (ref 1.7–8.2)
NEUTS SEG # BLD: 12 K/UL (ref 1.7–8.2)
NEUTS SEG # BLD: 13.7 K/UL (ref 1.7–8.2)
NEUTS SEG # BLD: 14.5 K/UL (ref 1.7–8.2)
NEUTS SEG # BLD: 21.2 K/UL (ref 1.7–8.2)
NEUTS SEG # BLD: 5.1 K/UL (ref 1.7–8.2)
NEUTS SEG # BLD: 6.5 K/UL (ref 1.7–8.2)
NEUTS SEG # BLD: 6.6 K/UL (ref 1.7–8.2)
NEUTS SEG # BLD: 6.6 K/UL (ref 1.7–8.2)
NEUTS SEG # BLD: 6.9 K/UL (ref 1.7–8.2)
NEUTS SEG # BLD: 7.2 K/UL (ref 1.7–8.2)
NEUTS SEG # BLD: 7.8 K/UL (ref 1.7–8.2)
NEUTS SEG # BLD: 8.4 K/UL (ref 1.7–8.2)
NEUTS SEG # BLD: 8.4 K/UL (ref 1.7–8.2)
NEUTS SEG # BLD: 8.5 K/UL (ref 1.7–8.2)
NEUTS SEG # BLD: 8.7 K/UL (ref 1.7–8.2)
NEUTS SEG # BLD: 9.1 K/UL (ref 1.7–8.2)
NEUTS SEG # BLD: 9.3 K/UL (ref 1.7–8.2)
NEUTS SEG NFR BLD MANUAL: 72 % (ref 47–75)
NEUTS SEG NFR BLD: 71 % (ref 43–78)
NEUTS SEG NFR BLD: 73 % (ref 43–78)
NEUTS SEG NFR BLD: 74 % (ref 43–78)
NEUTS SEG NFR BLD: 75 % (ref 43–78)
NEUTS SEG NFR BLD: 77 % (ref 43–78)
NEUTS SEG NFR BLD: 78 % (ref 43–78)
NEUTS SEG NFR BLD: 79 % (ref 43–78)
NEUTS SEG NFR BLD: 82 % (ref 43–78)
NEUTS SEG NFR BLD: 90 % (ref 43–78)
NEUTS SEG NFR BLD: 91 % (ref 43–78)
NEUTS SEG NFR BLD: 91 % (ref 43–78)
NEUTS SEG NFR BLD: 93 % (ref 43–78)
NEUTS SEG NFR BLD: 94 % (ref 43–78)
NITRITE UR QL STRIP.AUTO: NEGATIVE
NITRITE UR QL STRIP.AUTO: POSITIVE
NOCARDIA ISLT: NORMAL
NRBC # BLD: 0 K/UL (ref 0–0.2)
NRBC # BLD: 0.04 K/UL (ref 0–0.2)
NUC CELL # FLD: 1779 /CU MM
NUC CELL # FLD: 1857 /CU MM
NUC CELL # FLD: 774 /CU MM
O2/TOTAL GAS SETTING VFR VENT: 44 %
OTHER MICROORG DNA SPEC NAA+PROBE: NORMAL
OTHER OBSERVATIONS,UCOM: ABNORMAL
OTHER OBSERVATIONS,UCOM: ABNORMAL
OTHER OBSERVATIONS,UCOM: NORMAL
OXYHGB MFR BLDA: 96.2 % (ref 94–97)
P-R INTERVAL, ECG05: 128 MS
P-R INTERVAL, ECG05: 134 MS
P-R INTERVAL, ECG05: 164 MS
P-R INTERVAL, ECG05: 168 MS
PCO2 BLD: 42.5 MMHG (ref 35–45)
PCO2 BLDA: 51 MMHG (ref 35–45)
PH BLD: 7.38 [PH] (ref 7.35–7.45)
PH BLDA: 7.33 [PH] (ref 7.35–7.45)
PH UR STRIP: 5 [PH] (ref 5–9)
PH UR STRIP: 5 [PH] (ref 5–9)
PHOSPHATE SERPL-MCNC: 3.3 MG/DL (ref 2.3–3.7)
PHOSPHATE SERPL-MCNC: 4.3 MG/DL (ref 2.3–3.7)
PLATELET # BLD AUTO: 170 K/UL (ref 150–450)
PLATELET # BLD AUTO: 173 K/UL (ref 150–450)
PLATELET # BLD AUTO: 187 K/UL (ref 150–450)
PLATELET # BLD AUTO: 194 K/UL (ref 150–450)
PLATELET # BLD AUTO: 195 K/UL (ref 150–450)
PLATELET # BLD AUTO: 198 K/UL (ref 150–450)
PLATELET # BLD AUTO: 199 K/UL (ref 150–450)
PLATELET # BLD AUTO: 208 K/UL (ref 150–450)
PLATELET # BLD AUTO: 220 K/UL (ref 150–450)
PLATELET # BLD AUTO: 222 K/UL (ref 150–450)
PLATELET # BLD AUTO: 225 K/UL (ref 150–450)
PLATELET # BLD AUTO: 227 K/UL (ref 150–450)
PLATELET # BLD AUTO: 228 K/UL (ref 150–450)
PLATELET # BLD AUTO: 228 K/UL (ref 150–450)
PLATELET # BLD AUTO: 232 K/UL (ref 150–450)
PLATELET # BLD AUTO: 247 K/UL (ref 150–450)
PLATELET # BLD AUTO: 249 K/UL (ref 150–450)
PLATELET # BLD AUTO: 253 K/UL (ref 150–450)
PLATELET # BLD AUTO: 263 K/UL (ref 150–450)
PLATELET # BLD AUTO: 276 K/UL (ref 150–450)
PLATELET # BLD AUTO: 298 K/UL (ref 150–450)
PLATELET # BLD AUTO: 349 K/UL (ref 150–450)
PLATELET # BLD AUTO: 401 K/UL (ref 150–450)
PLATELET COMMENTS,PCOM: ABNORMAL
PLATELET COMMENTS,PCOM: ADEQUATE
PMV BLD AUTO: 11.3 FL (ref 9.4–12.3)
PMV BLD AUTO: 11.5 FL (ref 9.4–12.3)
PMV BLD AUTO: 11.5 FL (ref 9.4–12.3)
PMV BLD AUTO: 11.8 FL (ref 9.4–12.3)
PMV BLD AUTO: 11.8 FL (ref 9.4–12.3)
PMV BLD AUTO: 11.9 FL (ref 10.8–14.1)
PMV BLD AUTO: 11.9 FL (ref 9.4–12.3)
PMV BLD AUTO: 12 FL (ref 9.4–12.3)
PMV BLD AUTO: 12.1 FL (ref 9.4–12.3)
PMV BLD AUTO: 12.3 FL (ref 9.4–12.3)
PMV BLD AUTO: 12.3 FL (ref 9.4–12.3)
PMV BLD AUTO: 12.4 FL (ref 9.4–12.3)
PMV BLD AUTO: 12.6 FL (ref 9.4–12.3)
PMV BLD AUTO: 12.6 FL (ref 9.4–12.3)
PMV BLD AUTO: 12.8 FL (ref 9.4–12.3)
PMV BLD AUTO: 12.9 FL (ref 9.4–12.3)
PMV BLD AUTO: 13 FL (ref 9.4–12.3)
PMV BLD AUTO: 13 FL (ref 9.4–12.3)
PMV BLD AUTO: 14.1 FL (ref 9.4–12.3)
PO2 BLD: 79 MMHG (ref 75–100)
PO2 BLDA: 101 MMHG (ref 80–105)
POTASSIUM SERPL-SCNC: 3.2 MMOL/L (ref 3.5–5.1)
POTASSIUM SERPL-SCNC: 3.3 MMOL/L (ref 3.5–5.1)
POTASSIUM SERPL-SCNC: 3.4 MMOL/L (ref 3.5–5.1)
POTASSIUM SERPL-SCNC: 3.5 MMOL/L (ref 3.5–5.1)
POTASSIUM SERPL-SCNC: 3.5 MMOL/L (ref 3.5–5.1)
POTASSIUM SERPL-SCNC: 3.6 MMOL/L (ref 3.5–5.1)
POTASSIUM SERPL-SCNC: 3.8 MMOL/L (ref 3.5–5.1)
POTASSIUM SERPL-SCNC: 4 MMOL/L (ref 3.5–5.1)
POTASSIUM SERPL-SCNC: 4.1 MMOL/L (ref 3.5–5.1)
POTASSIUM SERPL-SCNC: 4.1 MMOL/L (ref 3.5–5.1)
POTASSIUM SERPL-SCNC: 4.3 MMOL/L (ref 3.5–5.1)
POTASSIUM SERPL-SCNC: 4.4 MMOL/L (ref 3.5–5.1)
POTASSIUM SERPL-SCNC: 4.5 MMOL/L (ref 3.5–5.1)
POTASSIUM SERPL-SCNC: 4.5 MMOL/L (ref 3.5–5.1)
POTASSIUM SERPL-SCNC: 4.7 MMOL/L (ref 3.5–5.1)
POTASSIUM SERPL-SCNC: 5 MMOL/L (ref 3.5–5.1)
POTASSIUM SERPL-SCNC: 5.5 MMOL/L (ref 3.5–5.1)
POTASSIUM SERPL-SCNC: 7 MMOL/L (ref 3.5–5.1)
PPD POC: 0 NEGATIVE
PPD POC: NEGATIVE NEGATIVE
PROCALCITONIN SERPL-MCNC: 1.2 NG/ML
PROCALCITONIN SERPL-MCNC: 1.6 NG/ML
PROCALCITONIN SERPL-MCNC: 2.6 NG/ML
PROT FLD-MCNC: 2.8 G/DL
PROT FLD-MCNC: NORMAL G/DL
PROT SERPL-MCNC: 6.1 G/DL (ref 6.3–8.2)
PROT SERPL-MCNC: 6.3 G/DL
PROT SERPL-MCNC: 6.5 G/DL (ref 6.3–8.2)
PROT SERPL-MCNC: 6.6 G/DL
PROT SERPL-MCNC: 6.8 G/DL
PROT SERPL-MCNC: 6.9 G/DL (ref 6.3–8.2)
PROT SERPL-MCNC: 7.3 G/DL (ref 6.3–8.2)
PROT SERPL-MCNC: 7.5 G/DL (ref 6.3–8.2)
PROT SERPL-MCNC: 7.6 G/DL
PROT UR STRIP-MCNC: 100 MG/DL
PROT UR STRIP-MCNC: NEGATIVE MG/DL
PTH-INTACT SERPL-MCNC: 61.7 PG/ML (ref 18.5–88)
Q-T INTERVAL, ECG07: 298 MS
Q-T INTERVAL, ECG07: 312 MS
Q-T INTERVAL, ECG07: 348 MS
Q-T INTERVAL, ECG07: 358 MS
QRS DURATION, ECG06: 72 MS
QRS DURATION, ECG06: 80 MS
QRS DURATION, ECG06: 80 MS
QRS DURATION, ECG06: 82 MS
QTC CALCULATION (BEZET), ECG08: 406 MS
QTC CALCULATION (BEZET), ECG08: 447 MS
QTC CALCULATION (BEZET), ECG08: 464 MS
QTC CALCULATION (BEZET), ECG08: 473 MS
RBC # BLD AUTO: 2.75 M/UL (ref 4.05–5.2)
RBC # BLD AUTO: 3.13 M/UL (ref 4.05–5.2)
RBC # BLD AUTO: 3.5 M/UL (ref 4.05–5.2)
RBC # BLD AUTO: 3.54 M/UL (ref 4.05–5.2)
RBC # BLD AUTO: 3.57 M/UL (ref 4.05–5.2)
RBC # BLD AUTO: 3.61 M/UL (ref 4.05–5.2)
RBC # BLD AUTO: 3.65 M/UL (ref 4.05–5.2)
RBC # BLD AUTO: 3.74 M/UL (ref 4.05–5.2)
RBC # BLD AUTO: 3.74 M/UL (ref 4.05–5.2)
RBC # BLD AUTO: 3.76 M/UL (ref 4.05–5.2)
RBC # BLD AUTO: 3.82 M/UL (ref 4.05–5.2)
RBC # BLD AUTO: 3.83 M/UL (ref 4.05–5.2)
RBC # BLD AUTO: 3.86 M/UL (ref 4.05–5.2)
RBC # BLD AUTO: 3.87 M/UL (ref 4.05–5.2)
RBC # BLD AUTO: 3.92 M/UL (ref 4.05–5.2)
RBC # BLD AUTO: 3.96 M/UL (ref 4.05–5.2)
RBC # BLD AUTO: 4 M/UL (ref 4.05–5.2)
RBC # BLD AUTO: 4.03 M/UL (ref 4.05–5.2)
RBC # BLD AUTO: 4.09 M/UL (ref 4.05–5.2)
RBC # BLD AUTO: 4.12 M/UL (ref 4.05–5.25)
RBC # BLD AUTO: 4.2 M/UL (ref 4.05–5.2)
RBC # BLD AUTO: 4.4 M/UL (ref 4.05–5.2)
RBC # BLD AUTO: 4.44 M/UL (ref 4.05–5.2)
RBC # BLD AUTO: 4.66 M/UL (ref 4.05–5.2)
RBC # BLD AUTO: 4.67 M/UL (ref 4.05–5.2)
RBC # FLD: NORMAL /CU MM
RBC #/AREA URNS HPF: 0 /HPF
RBC #/AREA URNS HPF: >100 /HPF
RBC #/AREA URNS HPF: ABNORMAL /HPF
RBC #/AREA URNS HPF: NORMAL /HPF
RBC MORPH BLD: ABNORMAL
REFLEX TO ID, RFCO3T: NORMAL
REFLEX TO ID, RFCO3T: NORMAL
RESP RATE: 21
SAO2 % BLD: 95 % (ref 95–98)
SAO2 % BLD: 97 % (ref 92–98.5)
SERVICE CMNT-IMP: ABNORMAL
SERVICE CMNT-IMP: ABNORMAL
SERVICE CMNT-IMP: NORMAL
SODIUM SERPL-SCNC: 138 MMOL/L (ref 136–145)
SODIUM SERPL-SCNC: 141 MMOL/L (ref 136–145)
SODIUM SERPL-SCNC: 141 MMOL/L (ref 136–145)
SODIUM SERPL-SCNC: 142 MMOL/L (ref 136–145)
SODIUM SERPL-SCNC: 143 MMOL/L (ref 136–145)
SODIUM SERPL-SCNC: 144 MMOL/L (ref 136–145)
SODIUM SERPL-SCNC: 145 MMOL/L (ref 136–145)
SODIUM SERPL-SCNC: 146 MMOL/L (ref 136–145)
SODIUM SERPL-SCNC: 147 MMOL/L (ref 136–145)
SODIUM SERPL-SCNC: 148 MMOL/L (ref 136–145)
SODIUM SERPL-SCNC: 149 MMOL/L (ref 136–145)
SODIUM SERPL-SCNC: 150 MMOL/L (ref 136–145)
SODIUM SERPL-SCNC: 155 MMOL/L (ref 136–145)
SODIUM SERPL-SCNC: 161 MMOL/L (ref 136–145)
SP GR UR REFRACTOMETRY: 1.01 (ref 1–1.02)
SP GR UR REFRACTOMETRY: 1.03 (ref 1–1.02)
SPECIMEN PREPARATION: ABNORMAL
SPECIMEN PREPARATION: NORMAL
SPECIMEN SOURCE FLD: NORMAL
SPECIMEN SOURCE: ABNORMAL
SPECIMEN SOURCE: NORMAL
SPECIMEN TYPE: NORMAL
SUSCEPTIBILITY TESTING, RSUS1T: NORMAL
TMP SMX ISLT MIC: NORMAL
TOBRAMYCIN ISLT MIC: NORMAL
TROPONIN I BLD-MCNC: 0.02 NG/ML (ref 0.02–0.05)
TROPONIN I BLD-MCNC: 0.03 NG/ML (ref 0.02–0.05)
TROPONIN I SERPL-MCNC: <0.02 NG/ML (ref 0.02–0.05)
UROBILINOGEN UR QL STRIP.AUTO: 0.2 EU/DL (ref 0.2–1)
UROBILINOGEN UR QL STRIP.AUTO: 1 EU/DL (ref 0.2–1)
VANCOMYCIN TROUGH SERPL-MCNC: 4.6 UG/ML (ref 5–20)
VENTILATION MODE VENT: ABNORMAL
VENTRICULAR RATE, ECG03: 102 BPM
VENTRICULAR RATE, ECG03: 105 BPM
VENTRICULAR RATE, ECG03: 107 BPM
VENTRICULAR RATE, ECG03: 135 BPM
VT SETTING VENT: 505 ML
WBC # BLD AUTO: 10.1 K/UL (ref 4.3–11.1)
WBC # BLD AUTO: 10.3 K/UL (ref 4.3–11.1)
WBC # BLD AUTO: 10.7 K/UL (ref 4.3–11.1)
WBC # BLD AUTO: 10.8 K/UL (ref 4.3–11.1)
WBC # BLD AUTO: 11.1 K/UL (ref 4.3–11.1)
WBC # BLD AUTO: 11.3 K/UL (ref 4.3–11.1)
WBC # BLD AUTO: 11.7 K/UL (ref 4.3–11.1)
WBC # BLD AUTO: 11.7 K/UL (ref 4.3–11.1)
WBC # BLD AUTO: 12.2 K/UL (ref 4.3–11.1)
WBC # BLD AUTO: 12.3 K/UL (ref 4.3–11.1)
WBC # BLD AUTO: 12.5 K/UL (ref 4.3–11.1)
WBC # BLD AUTO: 12.5 K/UL (ref 4.3–11.1)
WBC # BLD AUTO: 12.8 K/UL (ref 4.3–11.1)
WBC # BLD AUTO: 12.9 K/UL (ref 4.3–11.1)
WBC # BLD AUTO: 13.2 K/UL (ref 4.3–11.1)
WBC # BLD AUTO: 13.3 K/UL (ref 4.3–11.1)
WBC # BLD AUTO: 14.8 K/UL (ref 4.3–11.1)
WBC # BLD AUTO: 19.3 K/UL (ref 4.3–11.1)
WBC # BLD AUTO: 23.3 K/UL (ref 4.3–11.1)
WBC # BLD AUTO: 6.9 K/UL (ref 4.3–11.1)
WBC # BLD AUTO: 7.3 K/UL (ref 4.3–11.1)
WBC # BLD AUTO: 8.4 K/UL (ref 4.3–11.1)
WBC # BLD AUTO: 8.7 K/UL (ref 4.3–11.1)
WBC # BLD AUTO: 8.9 K/UL (ref 4.3–11.1)
WBC # BLD AUTO: 9.1 K/UL (ref 4.3–11.1)
WBC MORPH BLD: ABNORMAL
WBC URNS QL MICRO: ABNORMAL /HPF
WBC URNS QL MICRO: NORMAL /HPF

## 2018-01-01 PROCEDURE — 74011250637 HC RX REV CODE- 250/637: Performed by: FAMILY MEDICINE

## 2018-01-01 PROCEDURE — 99218 HC RM OBSERVATION: CPT

## 2018-01-01 PROCEDURE — 74011250636 HC RX REV CODE- 250/636: Performed by: INTERNAL MEDICINE

## 2018-01-01 PROCEDURE — 80048 BASIC METABOLIC PNL TOTAL CA: CPT

## 2018-01-01 PROCEDURE — 77010033678 HC OXYGEN DAILY

## 2018-01-01 PROCEDURE — 99285 EMERGENCY DEPT VISIT HI MDM: CPT | Performed by: EMERGENCY MEDICINE

## 2018-01-01 PROCEDURE — 74011000250 HC RX REV CODE- 250: Performed by: HOSPITALIST

## 2018-01-01 PROCEDURE — 74011250637 HC RX REV CODE- 250/637: Performed by: HOSPITALIST

## 2018-01-01 PROCEDURE — 84157 ASSAY OF PROTEIN OTHER: CPT

## 2018-01-01 PROCEDURE — 77030018846 HC SOL IRR STRL H20 ICUM -A

## 2018-01-01 PROCEDURE — 99233 SBSQ HOSP IP/OBS HIGH 50: CPT | Performed by: INTERNAL MEDICINE

## 2018-01-01 PROCEDURE — 74011000258 HC RX REV CODE- 258: Performed by: INTERNAL MEDICINE

## 2018-01-01 PROCEDURE — 87116 MYCOBACTERIA CULTURE: CPT

## 2018-01-01 PROCEDURE — 65660000000 HC RM CCU STEPDOWN

## 2018-01-01 PROCEDURE — 36415 COLL VENOUS BLD VENIPUNCTURE: CPT

## 2018-01-01 PROCEDURE — 94640 AIRWAY INHALATION TREATMENT: CPT

## 2018-01-01 PROCEDURE — 74011250637 HC RX REV CODE- 250/637: Performed by: INTERNAL MEDICINE

## 2018-01-01 PROCEDURE — 74011000258 HC RX REV CODE- 258: Performed by: EMERGENCY MEDICINE

## 2018-01-01 PROCEDURE — 85025 COMPLETE CBC W/AUTO DIFF WBC: CPT

## 2018-01-01 PROCEDURE — 76450000000

## 2018-01-01 PROCEDURE — C9113 INJ PANTOPRAZOLE SODIUM, VIA: HCPCS | Performed by: INTERNAL MEDICINE

## 2018-01-01 PROCEDURE — 93971 EXTREMITY STUDY: CPT

## 2018-01-01 PROCEDURE — 0W9B3ZX DRAINAGE OF LEFT PLEURAL CAVITY, PERCUTANEOUS APPROACH, DIAGNOSTIC: ICD-10-PCS | Performed by: INTERNAL MEDICINE

## 2018-01-01 PROCEDURE — A9270 NON-COVERED ITEM OR SERVICE: HCPCS | Performed by: FAMILY MEDICINE

## 2018-01-01 PROCEDURE — 65270000029 HC RM PRIVATE

## 2018-01-01 PROCEDURE — 88185 FLOWCYTOMETRY/TC ADD-ON: CPT

## 2018-01-01 PROCEDURE — 96366 THER/PROPH/DIAG IV INF ADDON: CPT

## 2018-01-01 PROCEDURE — 99232 SBSQ HOSP IP/OBS MODERATE 35: CPT | Performed by: INTERNAL MEDICINE

## 2018-01-01 PROCEDURE — 94760 N-INVAS EAR/PLS OXIMETRY 1: CPT

## 2018-01-01 PROCEDURE — 32555 ASPIRATE PLEURA W/ IMAGING: CPT

## 2018-01-01 PROCEDURE — 85027 COMPLETE CBC AUTOMATED: CPT

## 2018-01-01 PROCEDURE — 74011250636 HC RX REV CODE- 250/636: Performed by: HOSPITALIST

## 2018-01-01 PROCEDURE — 94762 N-INVAS EAR/PLS OXIMTRY CONT: CPT

## 2018-01-01 PROCEDURE — 74011000250 HC RX REV CODE- 250: Performed by: INTERNAL MEDICINE

## 2018-01-01 PROCEDURE — 80053 COMPREHEN METABOLIC PANEL: CPT | Performed by: EMERGENCY MEDICINE

## 2018-01-01 PROCEDURE — 84484 ASSAY OF TROPONIN QUANT: CPT

## 2018-01-01 PROCEDURE — 96361 HYDRATE IV INFUSION ADD-ON: CPT

## 2018-01-01 PROCEDURE — 77010033711 HC HIGH FLOW OXYGEN

## 2018-01-01 PROCEDURE — 74011000258 HC RX REV CODE- 258: Performed by: HOSPITALIST

## 2018-01-01 PROCEDURE — 74011000258 HC RX REV CODE- 258: Performed by: FAMILY MEDICINE

## 2018-01-01 PROCEDURE — 74011250636 HC RX REV CODE- 250/636: Performed by: EMERGENCY MEDICINE

## 2018-01-01 PROCEDURE — 71045 X-RAY EXAM CHEST 1 VIEW: CPT

## 2018-01-01 PROCEDURE — 96365 THER/PROPH/DIAG IV INF INIT: CPT | Performed by: EMERGENCY MEDICINE

## 2018-01-01 PROCEDURE — 74011636320 HC RX REV CODE- 636/320: Performed by: HOSPITALIST

## 2018-01-01 PROCEDURE — 83615 LACTATE (LD) (LDH) ENZYME: CPT

## 2018-01-01 PROCEDURE — 36600 WITHDRAWAL OF ARTERIAL BLOOD: CPT

## 2018-01-01 PROCEDURE — 84155 ASSAY OF PROTEIN SERUM: CPT

## 2018-01-01 PROCEDURE — 80053 COMPREHEN METABOLIC PANEL: CPT

## 2018-01-01 PROCEDURE — 89050 BODY FLUID CELL COUNT: CPT

## 2018-01-01 PROCEDURE — 0W993ZZ DRAINAGE OF RIGHT PLEURAL CAVITY, PERCUTANEOUS APPROACH: ICD-10-PCS | Performed by: INTERNAL MEDICINE

## 2018-01-01 PROCEDURE — 71260 CT THORAX DX C+: CPT

## 2018-01-01 PROCEDURE — 83605 ASSAY OF LACTIC ACID: CPT

## 2018-01-01 PROCEDURE — 87088 URINE BACTERIA CULTURE: CPT

## 2018-01-01 PROCEDURE — 97165 OT EVAL LOW COMPLEX 30 MIN: CPT

## 2018-01-01 PROCEDURE — 97161 PT EVAL LOW COMPLEX 20 MIN: CPT

## 2018-01-01 PROCEDURE — 87086 URINE CULTURE/COLONY COUNT: CPT

## 2018-01-01 PROCEDURE — 86580 TB INTRADERMAL TEST: CPT | Performed by: FAMILY MEDICINE

## 2018-01-01 PROCEDURE — 83970 ASSAY OF PARATHORMONE: CPT

## 2018-01-01 PROCEDURE — 97530 THERAPEUTIC ACTIVITIES: CPT

## 2018-01-01 PROCEDURE — 97110 THERAPEUTIC EXERCISES: CPT

## 2018-01-01 PROCEDURE — 92610 EVALUATE SWALLOWING FUNCTION: CPT

## 2018-01-01 PROCEDURE — C1729 CATH, DRAINAGE: HCPCS | Performed by: INTERNAL MEDICINE

## 2018-01-01 PROCEDURE — 87186 SC STD MICRODIL/AGAR DIL: CPT

## 2018-01-01 PROCEDURE — 81003 URINALYSIS AUTO W/O SCOPE: CPT | Performed by: EMERGENCY MEDICINE

## 2018-01-01 PROCEDURE — 74011000250 HC RX REV CODE- 250: Performed by: EMERGENCY MEDICINE

## 2018-01-01 PROCEDURE — C8929 TTE W OR WO FOL WCON,DOPPLER: HCPCS

## 2018-01-01 PROCEDURE — 82803 BLOOD GASES ANY COMBINATION: CPT

## 2018-01-01 PROCEDURE — 99223 1ST HOSP IP/OBS HIGH 75: CPT | Performed by: NURSE PRACTITIONER

## 2018-01-01 PROCEDURE — 77030011943

## 2018-01-01 PROCEDURE — 74011250636 HC RX REV CODE- 250/636: Performed by: FAMILY MEDICINE

## 2018-01-01 PROCEDURE — 76604 US EXAM CHEST: CPT | Performed by: INTERNAL MEDICINE

## 2018-01-01 PROCEDURE — 74011000250 HC RX REV CODE- 250: Performed by: FAMILY MEDICINE

## 2018-01-01 PROCEDURE — 94660 CPAP INITIATION&MGMT: CPT

## 2018-01-01 PROCEDURE — 83880 ASSAY OF NATRIURETIC PEPTIDE: CPT

## 2018-01-01 PROCEDURE — 86580 TB INTRADERMAL TEST: CPT | Performed by: HOSPITALIST

## 2018-01-01 PROCEDURE — 74011636637 HC RX REV CODE- 636/637: Performed by: FAMILY MEDICINE

## 2018-01-01 PROCEDURE — 99356 PR PROLONGED SVC I/P OR OBS SETTING 1ST HOUR: CPT | Performed by: NURSE PRACTITIONER

## 2018-01-01 PROCEDURE — 87205 SMEAR GRAM STAIN: CPT

## 2018-01-01 PROCEDURE — 96372 THER/PROPH/DIAG INJ SC/IM: CPT

## 2018-01-01 PROCEDURE — 97535 SELF CARE MNGMENT TRAINING: CPT

## 2018-01-01 PROCEDURE — 32555 ASPIRATE PLEURA W/ IMAGING: CPT | Performed by: INTERNAL MEDICINE

## 2018-01-01 PROCEDURE — 87040 BLOOD CULTURE FOR BACTERIA: CPT

## 2018-01-01 PROCEDURE — 87102 FUNGUS ISOLATION CULTURE: CPT

## 2018-01-01 PROCEDURE — 99284 EMERGENCY DEPT VISIT MOD MDM: CPT | Performed by: EMERGENCY MEDICINE

## 2018-01-01 PROCEDURE — 77030014143 HC TY PUNC LUMBR BD -A

## 2018-01-01 PROCEDURE — 84100 ASSAY OF PHOSPHORUS: CPT

## 2018-01-01 PROCEDURE — 83735 ASSAY OF MAGNESIUM: CPT

## 2018-01-01 PROCEDURE — 74220 X-RAY XM ESOPHAGUS 1CNTRST: CPT

## 2018-01-01 PROCEDURE — 93005 ELECTROCARDIOGRAM TRACING: CPT | Performed by: EMERGENCY MEDICINE

## 2018-01-01 PROCEDURE — 81001 URINALYSIS AUTO W/SCOPE: CPT

## 2018-01-01 PROCEDURE — 96367 TX/PROPH/DG ADDL SEQ IV INF: CPT | Performed by: EMERGENCY MEDICINE

## 2018-01-01 PROCEDURE — 84145 PROCALCITONIN (PCT): CPT

## 2018-01-01 PROCEDURE — 77030020263 HC SOL INJ SOD CL0.9% LFCR 1000ML

## 2018-01-01 PROCEDURE — 99223 1ST HOSP IP/OBS HIGH 75: CPT | Performed by: INTERNAL MEDICINE

## 2018-01-01 PROCEDURE — 65610000001 HC ROOM ICU GENERAL

## 2018-01-01 PROCEDURE — 77030020256 HC SOL INJ NACL 0.9%  500ML

## 2018-01-01 PROCEDURE — 77030021668 HC NEB PREFIL KT VYRM -A

## 2018-01-01 PROCEDURE — 82962 GLUCOSE BLOOD TEST: CPT

## 2018-01-01 PROCEDURE — 82945 GLUCOSE OTHER FLUID: CPT

## 2018-01-01 PROCEDURE — P9047 ALBUMIN (HUMAN), 25%, 50ML: HCPCS | Performed by: INTERNAL MEDICINE

## 2018-01-01 PROCEDURE — 74011636637 HC RX REV CODE- 636/637: Performed by: EMERGENCY MEDICINE

## 2018-01-01 PROCEDURE — 88305 TISSUE EXAM BY PATHOLOGIST: CPT

## 2018-01-01 PROCEDURE — 74011636320 HC RX REV CODE- 636/320: Performed by: INTERNAL MEDICINE

## 2018-01-01 PROCEDURE — 77030008462 HC STPLR SKN PROX J&J -A

## 2018-01-01 PROCEDURE — 82330 ASSAY OF CALCIUM: CPT

## 2018-01-01 PROCEDURE — 92526 ORAL FUNCTION THERAPY: CPT

## 2018-01-01 PROCEDURE — 71250 CT THORAX DX C-: CPT

## 2018-01-01 PROCEDURE — 77030020257 HC SOL INJ SOD CL 0.45% 1000ML BG

## 2018-01-01 PROCEDURE — 88112 CYTOPATH CELL ENHANCE TECH: CPT

## 2018-01-01 PROCEDURE — 75810000293 HC SIMP/SUPERF WND  RPR: Performed by: EMERGENCY MEDICINE

## 2018-01-01 PROCEDURE — 83690 ASSAY OF LIPASE: CPT | Performed by: EMERGENCY MEDICINE

## 2018-01-01 PROCEDURE — 81015 MICROSCOPIC EXAM OF URINE: CPT

## 2018-01-01 PROCEDURE — 74011000255 HC RX REV CODE- 255: Performed by: HOSPITALIST

## 2018-01-01 PROCEDURE — 81015 MICROSCOPIC EXAM OF URINE: CPT | Performed by: EMERGENCY MEDICINE

## 2018-01-01 PROCEDURE — 74011000302 HC RX REV CODE- 302: Performed by: FAMILY MEDICINE

## 2018-01-01 PROCEDURE — 88184 FLOWCYTOMETRY/ TC 1 MARKER: CPT

## 2018-01-01 PROCEDURE — 70450 CT HEAD/BRAIN W/O DYE: CPT

## 2018-01-01 PROCEDURE — 77030013140 HC MSK NEB VYRM -A

## 2018-01-01 PROCEDURE — 77030013032 HC MSK BPAP/CPAP FISP -B

## 2018-01-01 PROCEDURE — 80202 ASSAY OF VANCOMYCIN: CPT

## 2018-01-01 PROCEDURE — 77030011256 HC DRSG MEPILEX <16IN NO BORD MOLN -A

## 2018-01-01 PROCEDURE — 76040000019: Performed by: INTERNAL MEDICINE

## 2018-01-01 PROCEDURE — 74011000302 HC RX REV CODE- 302: Performed by: HOSPITALIST

## 2018-01-01 PROCEDURE — 77030012341 HC CHMB SPCR OPTC MDI VYRM -A

## 2018-01-01 PROCEDURE — 77030019605

## 2018-01-01 PROCEDURE — 87153 DNA/RNA SEQUENCING: CPT

## 2018-01-01 PROCEDURE — 85025 COMPLETE CBC W/AUTO DIFF WBC: CPT | Performed by: EMERGENCY MEDICINE

## 2018-01-01 PROCEDURE — 65610000006 HC RM INTENSIVE CARE

## 2018-01-01 PROCEDURE — 74011250636 HC RX REV CODE- 250/636

## 2018-01-01 PROCEDURE — 97116 GAIT TRAINING THERAPY: CPT

## 2018-01-01 RX ORDER — SODIUM CHLORIDE 0.9 % (FLUSH) 0.9 %
5-10 SYRINGE (ML) INJECTION AS NEEDED
Status: DISCONTINUED | OUTPATIENT
Start: 2018-01-01 | End: 2018-01-01 | Stop reason: HOSPADM

## 2018-01-01 RX ORDER — SODIUM CHLORIDE 9 MG/ML
75 INJECTION, SOLUTION INTRAVENOUS CONTINUOUS
Status: DISCONTINUED | OUTPATIENT
Start: 2018-01-01 | End: 2018-01-01

## 2018-01-01 RX ORDER — SAME BUTANEDISULFONATE/BETAINE 400-600 MG
250 POWDER IN PACKET (EA) ORAL 2 TIMES DAILY
Qty: 14 CAP | Refills: 0 | Status: SHIPPED | OUTPATIENT
Start: 2018-01-01 | End: 2018-01-01

## 2018-01-01 RX ORDER — HYDRALAZINE HYDROCHLORIDE 20 MG/ML
10 INJECTION INTRAMUSCULAR; INTRAVENOUS ONCE
Status: DISCONTINUED | OUTPATIENT
Start: 2018-01-01 | End: 2018-01-01

## 2018-01-01 RX ORDER — FENTANYL CITRATE 50 UG/ML
25-100 INJECTION, SOLUTION INTRAMUSCULAR; INTRAVENOUS
Status: DISCONTINUED | OUTPATIENT
Start: 2018-01-01 | End: 2018-01-01 | Stop reason: HOSPADM

## 2018-01-01 RX ORDER — PREDNISONE 10 MG/1
10 TABLET ORAL
Status: CANCELLED | OUTPATIENT
Start: 2018-01-01

## 2018-01-01 RX ORDER — BISACODYL 5 MG
5 TABLET, DELAYED RELEASE (ENTERIC COATED) ORAL DAILY PRN
Status: DISCONTINUED | OUTPATIENT
Start: 2018-01-01 | End: 2018-01-01 | Stop reason: HOSPADM

## 2018-01-01 RX ORDER — LISINOPRIL 20 MG/1
20 TABLET ORAL DAILY
Qty: 30 TAB | Refills: 0 | Status: SHIPPED | OUTPATIENT
Start: 2018-01-01

## 2018-01-01 RX ORDER — FUROSEMIDE 10 MG/ML
20 INJECTION INTRAMUSCULAR; INTRAVENOUS EVERY 12 HOURS
Status: DISCONTINUED | OUTPATIENT
Start: 2018-01-01 | End: 2018-01-01

## 2018-01-01 RX ORDER — BUDESONIDE 0.5 MG/2ML
500 INHALANT ORAL
Status: DISCONTINUED | OUTPATIENT
Start: 2018-01-01 | End: 2018-01-01 | Stop reason: HOSPADM

## 2018-01-01 RX ORDER — HALOPERIDOL 5 MG/ML
2 INJECTION INTRAMUSCULAR ONCE
Status: COMPLETED | OUTPATIENT
Start: 2018-01-01 | End: 2018-01-01

## 2018-01-01 RX ORDER — METOPROLOL TARTRATE 25 MG/1
12.5 TABLET, FILM COATED ORAL 2 TIMES DAILY
Qty: 30 TAB | Refills: 0 | Status: SHIPPED | OUTPATIENT
Start: 2018-01-01 | End: 2018-01-01 | Stop reason: ALTCHOICE

## 2018-01-01 RX ORDER — SODIUM CHLORIDE 9 MG/ML
50 INJECTION, SOLUTION INTRAVENOUS CONTINUOUS
Status: DISPENSED | OUTPATIENT
Start: 2018-01-01 | End: 2018-01-01

## 2018-01-01 RX ORDER — POLYETHYLENE GLYCOL 3350 17 G/17G
17 POWDER, FOR SOLUTION ORAL DAILY
Status: DISCONTINUED | OUTPATIENT
Start: 2018-01-01 | End: 2018-01-01 | Stop reason: HOSPADM

## 2018-01-01 RX ORDER — ALBUTEROL SULFATE 0.83 MG/ML
2.5 SOLUTION RESPIRATORY (INHALATION)
Status: DISCONTINUED | OUTPATIENT
Start: 2018-01-01 | End: 2018-01-01 | Stop reason: HOSPADM

## 2018-01-01 RX ORDER — HALOPERIDOL 5 MG/ML
4 INJECTION INTRAMUSCULAR ONCE
Status: COMPLETED | OUTPATIENT
Start: 2018-01-01 | End: 2018-01-01

## 2018-01-01 RX ORDER — NOREPINEPHRINE BITARTRATE/D5W 4MG/250ML
0-16 PLASTIC BAG, INJECTION (ML) INTRAVENOUS
Status: DISCONTINUED | OUTPATIENT
Start: 2018-01-01 | End: 2018-01-01

## 2018-01-01 RX ORDER — SERTRALINE HYDROCHLORIDE 50 MG/1
50 TABLET, FILM COATED ORAL
Status: DISCONTINUED | OUTPATIENT
Start: 2018-01-01 | End: 2018-01-01 | Stop reason: HOSPADM

## 2018-01-01 RX ORDER — FERROUS SULFATE, DRIED 160(50) MG
1 TABLET, EXTENDED RELEASE ORAL 2 TIMES DAILY
Status: DISCONTINUED | OUTPATIENT
Start: 2018-01-01 | End: 2018-01-01

## 2018-01-01 RX ORDER — BISACODYL 5 MG
5 TABLET, DELAYED RELEASE (ENTERIC COATED) ORAL
Status: CANCELLED | OUTPATIENT
Start: 2018-01-01

## 2018-01-01 RX ORDER — ENOXAPARIN SODIUM 100 MG/ML
40 INJECTION SUBCUTANEOUS EVERY 24 HOURS
Status: DISCONTINUED | OUTPATIENT
Start: 2018-01-01 | End: 2018-01-01

## 2018-01-01 RX ORDER — HALOPERIDOL 5 MG/ML
4 INJECTION INTRAMUSCULAR
Status: DISCONTINUED | OUTPATIENT
Start: 2018-01-01 | End: 2018-01-01

## 2018-01-01 RX ORDER — LORAZEPAM 2 MG/ML
1 INJECTION INTRAMUSCULAR
Status: COMPLETED | OUTPATIENT
Start: 2018-01-01 | End: 2018-01-01

## 2018-01-01 RX ORDER — ALBUTEROL SULFATE 0.83 MG/ML
2.5 SOLUTION RESPIRATORY (INHALATION)
Status: DISCONTINUED | OUTPATIENT
Start: 2018-01-01 | End: 2018-01-01

## 2018-01-01 RX ORDER — OLANZAPINE 2.5 MG/1
2.5 TABLET ORAL EVERY EVENING
Qty: 30 TAB | Refills: 0 | Status: SHIPPED | OUTPATIENT
Start: 2018-01-01 | End: 2018-01-01

## 2018-01-01 RX ORDER — ONDANSETRON 2 MG/ML
4 INJECTION INTRAMUSCULAR; INTRAVENOUS
Status: COMPLETED | OUTPATIENT
Start: 2018-01-01 | End: 2018-01-01

## 2018-01-01 RX ORDER — BISACODYL 5 MG
5 TABLET, DELAYED RELEASE (ENTERIC COATED) ORAL
Qty: 30 TAB | Refills: 0 | Status: SHIPPED
Start: 2018-01-01 | End: 2018-01-01

## 2018-01-01 RX ORDER — IPRATROPIUM BROMIDE AND ALBUTEROL SULFATE 2.5; .5 MG/3ML; MG/3ML
3 SOLUTION RESPIRATORY (INHALATION)
Status: DISCONTINUED | OUTPATIENT
Start: 2018-01-01 | End: 2018-01-01

## 2018-01-01 RX ORDER — SODIUM CHLORIDE 9 MG/ML
1 INJECTION, SOLUTION INTRAVENOUS AS NEEDED
Status: DISPENSED | OUTPATIENT
Start: 2018-01-01 | End: 2018-01-01

## 2018-01-01 RX ORDER — PRAVASTATIN SODIUM 20 MG/1
20 TABLET ORAL
Status: CANCELLED | OUTPATIENT
Start: 2018-01-01

## 2018-01-01 RX ORDER — ENOXAPARIN SODIUM 100 MG/ML
30 INJECTION SUBCUTANEOUS EVERY 24 HOURS
Qty: 30 SYRINGE | Refills: 0 | Status: SHIPPED | OUTPATIENT
Start: 2018-01-01 | End: 2018-01-01

## 2018-01-01 RX ORDER — DIVALPROEX SODIUM 125 MG/1
125 CAPSULE, COATED PELLETS ORAL 2 TIMES DAILY
Status: DISCONTINUED | OUTPATIENT
Start: 2018-01-01 | End: 2018-01-01 | Stop reason: HOSPADM

## 2018-01-01 RX ORDER — ALBUTEROL SULFATE 0.83 MG/ML
2.5 SOLUTION RESPIRATORY (INHALATION)
Status: CANCELLED | OUTPATIENT
Start: 2018-01-01

## 2018-01-01 RX ORDER — FLUTICASONE PROPIONATE 50 MCG
2 SPRAY, SUSPENSION (ML) NASAL DAILY
Status: DISCONTINUED | OUTPATIENT
Start: 2018-01-01 | End: 2018-01-01 | Stop reason: HOSPADM

## 2018-01-01 RX ORDER — ACETAMINOPHEN 325 MG/1
650 TABLET ORAL
Status: DISCONTINUED | OUTPATIENT
Start: 2018-01-01 | End: 2018-01-01 | Stop reason: HOSPADM

## 2018-01-01 RX ORDER — PANTOPRAZOLE SODIUM 40 MG/1
40 TABLET, DELAYED RELEASE ORAL DAILY
Status: DISCONTINUED | OUTPATIENT
Start: 2018-01-01 | End: 2018-01-01

## 2018-01-01 RX ORDER — AMOXICILLIN AND CLAVULANATE POTASSIUM 875; 125 MG/1; MG/1
1 TABLET, FILM COATED ORAL 2 TIMES DAILY WITH MEALS
Status: DISCONTINUED | OUTPATIENT
Start: 2018-01-01 | End: 2018-01-01 | Stop reason: DRUGHIGH

## 2018-01-01 RX ORDER — DONEPEZIL HYDROCHLORIDE 5 MG/1
5 TABLET, FILM COATED ORAL
Status: DISCONTINUED | OUTPATIENT
Start: 2018-01-01 | End: 2018-01-01 | Stop reason: HOSPADM

## 2018-01-01 RX ORDER — METOPROLOL TARTRATE 25 MG/1
12.5 TABLET, FILM COATED ORAL 2 TIMES DAILY
Status: DISCONTINUED | OUTPATIENT
Start: 2018-01-01 | End: 2018-01-01 | Stop reason: HOSPADM

## 2018-01-01 RX ORDER — PANTOPRAZOLE SODIUM 40 MG/1
40 TABLET, DELAYED RELEASE ORAL
Status: DISCONTINUED | OUTPATIENT
Start: 2018-01-01 | End: 2018-01-01 | Stop reason: HOSPADM

## 2018-01-01 RX ORDER — OLANZAPINE 2.5 MG/1
2.5 TABLET ORAL EVERY EVENING
Status: CANCELLED | OUTPATIENT
Start: 2018-01-01

## 2018-01-01 RX ORDER — DONEPEZIL HYDROCHLORIDE 5 MG/1
5 TABLET, FILM COATED ORAL
Status: CANCELLED | OUTPATIENT
Start: 2018-01-01

## 2018-01-01 RX ORDER — LISINOPRIL 5 MG/1
5 TABLET ORAL DAILY
Status: DISCONTINUED | OUTPATIENT
Start: 2018-01-01 | End: 2018-01-01

## 2018-01-01 RX ORDER — POTASSIUM CHLORIDE 20 MEQ/1
40 TABLET, EXTENDED RELEASE ORAL 2 TIMES DAILY
Status: COMPLETED | OUTPATIENT
Start: 2018-01-01 | End: 2018-01-01

## 2018-01-01 RX ORDER — FUROSEMIDE 10 MG/ML
20 INJECTION INTRAMUSCULAR; INTRAVENOUS DAILY
Status: DISCONTINUED | OUTPATIENT
Start: 2018-01-01 | End: 2018-01-01 | Stop reason: HOSPADM

## 2018-01-01 RX ORDER — HYDROCORTISONE SODIUM SUCCINATE 100 MG/2ML
50 INJECTION, POWDER, FOR SOLUTION INTRAMUSCULAR; INTRAVENOUS EVERY 8 HOURS
Status: DISCONTINUED | OUTPATIENT
Start: 2018-01-01 | End: 2018-01-01

## 2018-01-01 RX ORDER — KETOROLAC TROMETHAMINE 30 MG/ML
15 INJECTION, SOLUTION INTRAMUSCULAR; INTRAVENOUS
Status: COMPLETED | OUTPATIENT
Start: 2018-01-01 | End: 2018-01-01

## 2018-01-01 RX ORDER — HYDRALAZINE HYDROCHLORIDE 20 MG/ML
10 INJECTION INTRAMUSCULAR; INTRAVENOUS ONCE
Status: COMPLETED | OUTPATIENT
Start: 2018-01-01 | End: 2018-01-01

## 2018-01-01 RX ORDER — SODIUM CHLORIDE 0.9 % (FLUSH) 0.9 %
5-10 SYRINGE (ML) INJECTION EVERY 8 HOURS
Status: DISCONTINUED | OUTPATIENT
Start: 2018-01-01 | End: 2018-01-01 | Stop reason: HOSPADM

## 2018-01-01 RX ORDER — HYDROCORTISONE SODIUM SUCCINATE 100 MG/2ML
50 INJECTION, POWDER, FOR SOLUTION INTRAMUSCULAR; INTRAVENOUS EVERY 12 HOURS
Status: DISCONTINUED | OUTPATIENT
Start: 2018-01-01 | End: 2018-01-01

## 2018-01-01 RX ORDER — ALBUTEROL SULFATE 0.83 MG/ML
2.5 SOLUTION RESPIRATORY (INHALATION)
Qty: 24 EACH | Refills: 0 | Status: SHIPPED
Start: 2018-01-01

## 2018-01-01 RX ORDER — SODIUM CHLORIDE 0.9 % (FLUSH) 0.9 %
10 SYRINGE (ML) INJECTION
Status: COMPLETED | OUTPATIENT
Start: 2018-01-01 | End: 2018-01-01

## 2018-01-01 RX ORDER — LISINOPRIL 20 MG/1
20 TABLET ORAL DAILY
Status: DISCONTINUED | OUTPATIENT
Start: 2018-01-01 | End: 2018-01-01 | Stop reason: HOSPADM

## 2018-01-01 RX ORDER — ONDANSETRON 2 MG/ML
4 INJECTION INTRAMUSCULAR; INTRAVENOUS
Status: DISCONTINUED | OUTPATIENT
Start: 2018-01-01 | End: 2018-01-01 | Stop reason: HOSPADM

## 2018-01-01 RX ORDER — ENOXAPARIN SODIUM 100 MG/ML
30 INJECTION SUBCUTANEOUS EVERY 24 HOURS
Status: DISCONTINUED | OUTPATIENT
Start: 2018-01-01 | End: 2018-01-01 | Stop reason: HOSPADM

## 2018-01-01 RX ORDER — BUDESONIDE 0.5 MG/2ML
500 INHALANT ORAL
Status: DISCONTINUED | OUTPATIENT
Start: 2018-01-01 | End: 2018-01-01

## 2018-01-01 RX ORDER — ALBUTEROL SULFATE 0.83 MG/ML
2.5 SOLUTION RESPIRATORY (INHALATION) EVERY 4 HOURS
Status: DISCONTINUED | OUTPATIENT
Start: 2018-01-01 | End: 2018-01-01

## 2018-01-01 RX ORDER — ONDANSETRON 4 MG/1
4 TABLET, ORALLY DISINTEGRATING ORAL
Status: DISCONTINUED | OUTPATIENT
Start: 2018-01-01 | End: 2018-01-01 | Stop reason: HOSPADM

## 2018-01-01 RX ORDER — BUDESONIDE 0.5 MG/2ML
500 INHALANT ORAL 2 TIMES DAILY
Qty: 1 EACH | Refills: 1 | Status: SHIPPED | OUTPATIENT
Start: 2018-01-01

## 2018-01-01 RX ORDER — GUAIFENESIN/DEXTROMETHORPHAN 100-10MG/5
10 SYRUP ORAL
Status: DISCONTINUED | OUTPATIENT
Start: 2018-01-01 | End: 2018-01-01 | Stop reason: HOSPADM

## 2018-01-01 RX ORDER — LANOLIN ALCOHOL/MO/W.PET/CERES
325 CREAM (GRAM) TOPICAL
COMMUNITY

## 2018-01-01 RX ORDER — LISINOPRIL 5 MG/1
10 TABLET ORAL ONCE
Status: ACTIVE | OUTPATIENT
Start: 2018-01-01 | End: 2018-01-01

## 2018-01-01 RX ORDER — BUSPIRONE HYDROCHLORIDE 5 MG/1
10 TABLET ORAL 2 TIMES DAILY
Status: DISCONTINUED | OUTPATIENT
Start: 2018-01-01 | End: 2018-01-01 | Stop reason: SDUPTHER

## 2018-01-01 RX ORDER — VANCOMYCIN HYDROCHLORIDE
1250 ONCE
Status: COMPLETED | OUTPATIENT
Start: 2018-01-01 | End: 2018-01-01

## 2018-01-01 RX ORDER — SAME BUTANEDISULFONATE/BETAINE 400-600 MG
250 POWDER IN PACKET (EA) ORAL 2 TIMES DAILY
Status: DISCONTINUED | OUTPATIENT
Start: 2018-01-01 | End: 2018-01-01 | Stop reason: HOSPADM

## 2018-01-01 RX ORDER — SODIUM CHLORIDE 9 MG/ML
3 INJECTION, SOLUTION INTRAVENOUS ONCE
Status: COMPLETED | OUTPATIENT
Start: 2018-01-01 | End: 2018-01-01

## 2018-01-01 RX ORDER — ACETAMINOPHEN 325 MG/1
325 TABLET ORAL
Status: CANCELLED | OUTPATIENT
Start: 2018-01-01

## 2018-01-01 RX ORDER — MELATONIN
1000 DAILY
Status: DISCONTINUED | OUTPATIENT
Start: 2018-01-01 | End: 2018-01-01

## 2018-01-01 RX ORDER — PANTOPRAZOLE SODIUM 40 MG/1
40 TABLET, DELAYED RELEASE ORAL
Status: CANCELLED | OUTPATIENT
Start: 2018-01-01

## 2018-01-01 RX ORDER — DIVALPROEX SODIUM 125 MG/1
125 CAPSULE, COATED PELLETS ORAL 2 TIMES DAILY
Status: CANCELLED | OUTPATIENT
Start: 2018-01-01

## 2018-01-01 RX ORDER — POLYETHYLENE GLYCOL 3350 17 G/17G
17 POWDER, FOR SOLUTION ORAL DAILY
Qty: 1 PACKET | Refills: 0 | Status: SHIPPED | OUTPATIENT
Start: 2018-01-01

## 2018-01-01 RX ORDER — HALOPERIDOL 5 MG/ML
2 INJECTION INTRAMUSCULAR
Status: DISCONTINUED | OUTPATIENT
Start: 2018-01-01 | End: 2018-01-01 | Stop reason: HOSPADM

## 2018-01-01 RX ORDER — BUDESONIDE 0.5 MG/2ML
500 INHALANT ORAL 2 TIMES DAILY
Status: DISCONTINUED | OUTPATIENT
Start: 2018-01-01 | End: 2018-01-01

## 2018-01-01 RX ORDER — MIDAZOLAM HYDROCHLORIDE 1 MG/ML
.25-5 INJECTION, SOLUTION INTRAMUSCULAR; INTRAVENOUS
Status: DISCONTINUED | OUTPATIENT
Start: 2018-01-01 | End: 2018-01-01 | Stop reason: HOSPADM

## 2018-01-01 RX ORDER — BUDESONIDE 0.5 MG/2ML
500 INHALANT ORAL 2 TIMES DAILY
Status: DISCONTINUED | OUTPATIENT
Start: 2018-01-01 | End: 2018-01-01 | Stop reason: HOSPADM

## 2018-01-01 RX ORDER — ERYTHROMYCIN 5 MG/G
OINTMENT OPHTHALMIC
Qty: 1 TUBE | Refills: 0 | Status: SHIPPED
Start: 2018-01-01

## 2018-01-01 RX ORDER — DIVALPROEX SODIUM 125 MG/1
125 CAPSULE, COATED PELLETS ORAL 2 TIMES DAILY
Qty: 60 CAP | Refills: 0 | Status: SHIPPED | OUTPATIENT
Start: 2018-01-01 | End: 2018-01-01

## 2018-01-01 RX ORDER — MORPHINE SULFATE 10 MG/ML
5 INJECTION, SOLUTION INTRAMUSCULAR; INTRAVENOUS
Status: DISCONTINUED | OUTPATIENT
Start: 2018-01-01 | End: 2018-01-01 | Stop reason: HOSPADM

## 2018-01-01 RX ORDER — SODIUM CHLORIDE 9 MG/ML
1000 INJECTION, SOLUTION INTRAVENOUS CONTINUOUS
Status: DISCONTINUED | OUTPATIENT
Start: 2018-01-01 | End: 2018-01-01 | Stop reason: HOSPADM

## 2018-01-01 RX ORDER — PREDNISONE 10 MG/1
10 TABLET ORAL
Status: DISCONTINUED | OUTPATIENT
Start: 2018-01-01 | End: 2018-01-01 | Stop reason: HOSPADM

## 2018-01-01 RX ORDER — HALOPERIDOL 5 MG/ML
2 INJECTION INTRAMUSCULAR
Qty: 1 VIAL | Refills: 0 | Status: SHIPPED
Start: 2018-01-01 | End: 2018-01-01

## 2018-01-01 RX ORDER — SODIUM CHLORIDE 9 MG/ML
500 INJECTION, SOLUTION INTRAVENOUS ONCE
Status: COMPLETED | OUTPATIENT
Start: 2018-01-01 | End: 2018-01-01

## 2018-01-01 RX ORDER — DEXTROSE 50 % IN WATER (D50W) INTRAVENOUS SYRINGE
50
Status: COMPLETED | OUTPATIENT
Start: 2018-01-01 | End: 2018-01-01

## 2018-01-01 RX ORDER — ALBUTEROL SULFATE 90 UG/1
2 AEROSOL, METERED RESPIRATORY (INHALATION)
Status: CANCELLED | OUTPATIENT
Start: 2018-01-01

## 2018-01-01 RX ORDER — HYDRALAZINE HYDROCHLORIDE 20 MG/ML
10 INJECTION INTRAMUSCULAR; INTRAVENOUS
Qty: 10 ML | Refills: 1 | Status: SHIPPED | OUTPATIENT
Start: 2018-01-01 | End: 2018-01-01

## 2018-01-01 RX ORDER — ALBUMIN HUMAN 250 G/1000ML
12.5 SOLUTION INTRAVENOUS EVERY 12 HOURS
Status: DISCONTINUED | OUTPATIENT
Start: 2018-01-01 | End: 2018-01-01

## 2018-01-01 RX ORDER — HYDROCORTISONE SODIUM SUCCINATE 100 MG/2ML
25 INJECTION, POWDER, FOR SOLUTION INTRAMUSCULAR; INTRAVENOUS EVERY 12 HOURS
Status: DISCONTINUED | OUTPATIENT
Start: 2018-01-01 | End: 2018-01-01 | Stop reason: HOSPADM

## 2018-01-01 RX ORDER — SERTRALINE HYDROCHLORIDE 50 MG/1
100 TABLET, FILM COATED ORAL
Status: DISCONTINUED | OUTPATIENT
Start: 2018-01-01 | End: 2018-01-01

## 2018-01-01 RX ORDER — SODIUM CHLORIDE 0.9 % (FLUSH) 0.9 %
5 SYRINGE (ML) INJECTION EVERY 8 HOURS
Status: DISCONTINUED | OUTPATIENT
Start: 2018-01-01 | End: 2018-01-01 | Stop reason: HOSPADM

## 2018-01-01 RX ORDER — AZELASTINE HCL 205.5 UG/1
1 SPRAY NASAL 2 TIMES DAILY
Status: DISCONTINUED | OUTPATIENT
Start: 2018-01-01 | End: 2018-01-01 | Stop reason: HOSPADM

## 2018-01-01 RX ORDER — SODIUM CHLORIDE 9 MG/ML
75 INJECTION, SOLUTION INTRAVENOUS CONTINUOUS
Status: DISCONTINUED | OUTPATIENT
Start: 2018-01-01 | End: 2018-01-01 | Stop reason: HOSPADM

## 2018-01-01 RX ORDER — NALOXONE HYDROCHLORIDE 0.4 MG/ML
0.4 INJECTION, SOLUTION INTRAMUSCULAR; INTRAVENOUS; SUBCUTANEOUS AS NEEDED
Status: DISCONTINUED | OUTPATIENT
Start: 2018-01-01 | End: 2018-01-01 | Stop reason: HOSPADM

## 2018-01-01 RX ORDER — ACETAMINOPHEN 325 MG/1
325 TABLET ORAL
Status: DISCONTINUED | OUTPATIENT
Start: 2018-01-01 | End: 2018-01-01 | Stop reason: HOSPADM

## 2018-01-01 RX ORDER — SODIUM CHLORIDE 0.9 % (FLUSH) 0.9 %
5 SYRINGE (ML) INJECTION AS NEEDED
Status: DISCONTINUED | OUTPATIENT
Start: 2018-01-01 | End: 2018-01-01 | Stop reason: HOSPADM

## 2018-01-01 RX ORDER — MORPHINE SULFATE 2 MG/ML
1 INJECTION, SOLUTION INTRAMUSCULAR; INTRAVENOUS
Status: DISCONTINUED | OUTPATIENT
Start: 2018-01-01 | End: 2018-01-01 | Stop reason: HOSPADM

## 2018-01-01 RX ORDER — CALCIUM GLUCONATE 94 MG/ML
1 INJECTION, SOLUTION INTRAVENOUS
Status: DISCONTINUED | OUTPATIENT
Start: 2018-01-01 | End: 2018-01-01 | Stop reason: SDUPTHER

## 2018-01-01 RX ORDER — GUAIFENESIN/DEXTROMETHORPHAN 100-10MG/5
10 SYRUP ORAL
Qty: 1 BOTTLE | Refills: 1 | Status: SHIPPED | OUTPATIENT
Start: 2018-01-01 | End: 2018-01-01

## 2018-01-01 RX ORDER — METOPROLOL TARTRATE 25 MG/1
12.5 TABLET, FILM COATED ORAL 2 TIMES DAILY
Status: CANCELLED | OUTPATIENT
Start: 2018-01-01

## 2018-01-01 RX ORDER — BUDESONIDE 0.5 MG/2ML
500 INHALANT ORAL 2 TIMES DAILY
Status: CANCELLED | OUTPATIENT
Start: 2018-01-01

## 2018-01-01 RX ORDER — NITROFURANTOIN 25; 75 MG/1; MG/1
100 CAPSULE ORAL 2 TIMES DAILY
Qty: 6 CAP | Refills: 0 | Status: SHIPPED
Start: 2018-01-01 | End: 2018-01-01

## 2018-01-01 RX ORDER — FLUMAZENIL 0.1 MG/ML
0.2 INJECTION INTRAVENOUS
Status: DISCONTINUED | OUTPATIENT
Start: 2018-01-01 | End: 2018-01-01 | Stop reason: HOSPADM

## 2018-01-01 RX ORDER — LORAZEPAM 2 MG/ML
0.5 INJECTION INTRAMUSCULAR
Status: DISCONTINUED | OUTPATIENT
Start: 2018-01-01 | End: 2018-01-01 | Stop reason: HOSPADM

## 2018-01-01 RX ORDER — POTASSIUM CHLORIDE 14.9 MG/ML
20 INJECTION INTRAVENOUS
Status: COMPLETED | OUTPATIENT
Start: 2018-01-01 | End: 2018-01-01

## 2018-01-01 RX ORDER — BUDESONIDE 0.5 MG/2ML
500 INHALANT ORAL
Status: DISCONTINUED | OUTPATIENT
Start: 2018-01-01 | End: 2018-01-01 | Stop reason: ALTCHOICE

## 2018-01-01 RX ORDER — SODIUM CHLORIDE 0.9 % (FLUSH) 0.9 %
10 SYRINGE (ML) INJECTION
Status: ACTIVE | OUTPATIENT
Start: 2018-01-01 | End: 2018-01-01

## 2018-01-01 RX ORDER — AMOXICILLIN AND CLAVULANATE POTASSIUM 500; 125 MG/1; MG/1
1 TABLET, FILM COATED ORAL 2 TIMES DAILY WITH MEALS
Status: COMPLETED | OUTPATIENT
Start: 2018-01-01 | End: 2018-01-01

## 2018-01-01 RX ORDER — BUSPIRONE HYDROCHLORIDE 5 MG/1
10 TABLET ORAL 2 TIMES DAILY
Status: CANCELLED | OUTPATIENT
Start: 2018-01-01

## 2018-01-01 RX ORDER — ASCORBIC ACID 500 MG
500 TABLET ORAL DAILY
Status: DISCONTINUED | OUTPATIENT
Start: 2018-01-01 | End: 2018-01-01 | Stop reason: HOSPADM

## 2018-01-01 RX ORDER — OLANZAPINE 2.5 MG/1
2.5 TABLET ORAL EVERY EVENING
Status: DISCONTINUED | OUTPATIENT
Start: 2018-01-01 | End: 2018-01-01 | Stop reason: HOSPADM

## 2018-01-01 RX ORDER — SODIUM CHLORIDE 9 MG/ML
100 INJECTION, SOLUTION INTRAVENOUS CONTINUOUS
Status: DISCONTINUED | OUTPATIENT
Start: 2018-01-01 | End: 2018-01-01

## 2018-01-01 RX ORDER — HYDRALAZINE HYDROCHLORIDE 20 MG/ML
20 INJECTION INTRAMUSCULAR; INTRAVENOUS ONCE
Status: COMPLETED | OUTPATIENT
Start: 2018-01-01 | End: 2018-01-01

## 2018-01-01 RX ORDER — OLANZAPINE 5 MG/1
5 TABLET, ORALLY DISINTEGRATING ORAL
Status: DISCONTINUED | OUTPATIENT
Start: 2018-01-01 | End: 2018-01-01

## 2018-01-01 RX ORDER — ACETAMINOPHEN 325 MG/1
325 TABLET ORAL
COMMUNITY

## 2018-01-01 RX ORDER — PRAVASTATIN SODIUM 20 MG/1
20 TABLET ORAL
Status: DISCONTINUED | OUTPATIENT
Start: 2018-01-01 | End: 2018-01-01

## 2018-01-01 RX ORDER — BUSPIRONE HYDROCHLORIDE 10 MG/1
10 TABLET ORAL 2 TIMES DAILY
Status: DISCONTINUED | OUTPATIENT
Start: 2018-01-01 | End: 2018-01-01 | Stop reason: HOSPADM

## 2018-01-01 RX ORDER — HYDROCORTISONE SODIUM SUCCINATE 100 MG/2ML
100 INJECTION, POWDER, FOR SOLUTION INTRAMUSCULAR; INTRAVENOUS EVERY 8 HOURS
Status: DISCONTINUED | OUTPATIENT
Start: 2018-01-01 | End: 2018-01-01

## 2018-01-01 RX ORDER — SERTRALINE HYDROCHLORIDE 50 MG/1
100 TABLET, FILM COATED ORAL
Status: CANCELLED | OUTPATIENT
Start: 2018-01-01

## 2018-01-01 RX ORDER — PANTOPRAZOLE SODIUM 40 MG/1
40 TABLET, DELAYED RELEASE ORAL
Status: DISCONTINUED | OUTPATIENT
Start: 2018-01-01 | End: 2018-01-01

## 2018-01-01 RX ORDER — HEPARIN SODIUM 5000 [USP'U]/ML
5000 INJECTION, SOLUTION INTRAVENOUS; SUBCUTANEOUS EVERY 8 HOURS
Status: DISCONTINUED | OUTPATIENT
Start: 2018-01-01 | End: 2018-01-01 | Stop reason: HOSPADM

## 2018-01-01 RX ORDER — ALBUMIN HUMAN 250 G/1000ML
12.5 SOLUTION INTRAVENOUS EVERY 12 HOURS
Status: COMPLETED | OUTPATIENT
Start: 2018-01-01 | End: 2018-01-01

## 2018-01-01 RX ORDER — IPRATROPIUM BROMIDE AND ALBUTEROL SULFATE 2.5; .5 MG/3ML; MG/3ML
3 SOLUTION RESPIRATORY (INHALATION)
Status: COMPLETED | OUTPATIENT
Start: 2018-01-01 | End: 2018-01-01

## 2018-01-01 RX ORDER — TRAMADOL HYDROCHLORIDE 50 MG/1
50 TABLET ORAL
Status: DISCONTINUED | OUTPATIENT
Start: 2018-01-01 | End: 2018-01-01 | Stop reason: HOSPADM

## 2018-01-01 RX ORDER — FUROSEMIDE 10 MG/ML
40 INJECTION INTRAMUSCULAR; INTRAVENOUS EVERY 12 HOURS
Status: DISCONTINUED | OUTPATIENT
Start: 2018-01-01 | End: 2018-01-01

## 2018-01-01 RX ORDER — ONDANSETRON 2 MG/ML
4 INJECTION INTRAMUSCULAR; INTRAVENOUS
Qty: 30 VIAL | Refills: 0 | Status: SHIPPED | OUTPATIENT
Start: 2018-01-01 | End: 2018-01-01

## 2018-01-01 RX ORDER — ASCORBIC ACID 500 MG
500 TABLET ORAL DAILY
Status: CANCELLED | OUTPATIENT
Start: 2018-01-01

## 2018-01-01 RX ORDER — HYDRALAZINE HYDROCHLORIDE 20 MG/ML
10 INJECTION INTRAMUSCULAR; INTRAVENOUS
Status: DISCONTINUED | OUTPATIENT
Start: 2018-01-01 | End: 2018-01-01 | Stop reason: HOSPADM

## 2018-01-01 RX ORDER — NALOXONE HYDROCHLORIDE 0.4 MG/ML
0.4 INJECTION, SOLUTION INTRAMUSCULAR; INTRAVENOUS; SUBCUTANEOUS
Status: DISCONTINUED | OUTPATIENT
Start: 2018-01-01 | End: 2018-01-01 | Stop reason: HOSPADM

## 2018-01-01 RX ORDER — ENOXAPARIN SODIUM 100 MG/ML
30 INJECTION SUBCUTANEOUS EVERY 24 HOURS
Status: CANCELLED | OUTPATIENT
Start: 2018-01-01

## 2018-01-01 RX ADMIN — ALBUTEROL SULFATE 2.5 MG: 2.5 SOLUTION RESPIRATORY (INHALATION) at 08:08

## 2018-01-01 RX ADMIN — BUDESONIDE 500 MCG: 0.5 INHALANT RESPIRATORY (INHALATION) at 21:24

## 2018-01-01 RX ADMIN — PIPERACILLIN SODIUM,TAZOBACTAM SODIUM 4.5 G: 4; .5 INJECTION, POWDER, FOR SOLUTION INTRAVENOUS at 06:33

## 2018-01-01 RX ADMIN — LISINOPRIL 20 MG: 20 TABLET ORAL at 08:31

## 2018-01-01 RX ADMIN — SODIUM CHLORIDE 75 ML/HR: 900 INJECTION, SOLUTION INTRAVENOUS at 14:32

## 2018-01-01 RX ADMIN — ALBUTEROL SULFATE 2.5 MG: 2.5 SOLUTION RESPIRATORY (INHALATION) at 07:46

## 2018-01-01 RX ADMIN — ONDANSETRON 4 MG: 4 TABLET, ORALLY DISINTEGRATING ORAL at 22:55

## 2018-01-01 RX ADMIN — POTASSIUM CHLORIDE 40 MEQ: 20 TABLET, EXTENDED RELEASE ORAL at 16:52

## 2018-01-01 RX ADMIN — METHYLPREDNISOLONE SODIUM SUCCINATE 40 MG: 125 INJECTION, POWDER, FOR SOLUTION INTRAMUSCULAR; INTRAVENOUS at 21:34

## 2018-01-01 RX ADMIN — IOPAMIDOL 100 ML: 755 INJECTION, SOLUTION INTRAVENOUS at 03:12

## 2018-01-01 RX ADMIN — SODIUM CHLORIDE 100 ML/HR: 900 INJECTION, SOLUTION INTRAVENOUS at 13:32

## 2018-01-01 RX ADMIN — FUROSEMIDE 20 MG: 10 INJECTION, SOLUTION INTRAMUSCULAR; INTRAVENOUS at 08:47

## 2018-01-01 RX ADMIN — AMOXICILLIN AND CLAVULANATE POTASSIUM 1 TABLET: 875; 125 TABLET, FILM COATED ORAL at 09:19

## 2018-01-01 RX ADMIN — BUDESONIDE 500 MCG: 0.5 INHALANT RESPIRATORY (INHALATION) at 07:26

## 2018-01-01 RX ADMIN — METOPROLOL TARTRATE 12.5 MG: 25 TABLET, FILM COATED ORAL at 09:24

## 2018-01-01 RX ADMIN — HYDROCORTISONE SODIUM SUCCINATE 25 MG: 100 INJECTION, POWDER, FOR SOLUTION INTRAMUSCULAR; INTRAVENOUS at 08:51

## 2018-01-01 RX ADMIN — Medication 5 ML: at 14:59

## 2018-01-01 RX ADMIN — Medication 250 MG: at 21:18

## 2018-01-01 RX ADMIN — ENOXAPARIN SODIUM 40 MG: 40 INJECTION, SOLUTION INTRAVENOUS; SUBCUTANEOUS at 04:10

## 2018-01-01 RX ADMIN — OLANZAPINE 2.5 MG: 2.5 TABLET, FILM COATED ORAL at 21:18

## 2018-01-01 RX ADMIN — ALBUTEROL SULFATE 2.5 MG: 2.5 SOLUTION RESPIRATORY (INHALATION) at 12:00

## 2018-01-01 RX ADMIN — DONEPEZIL HYDROCHLORIDE 5 MG: 5 TABLET, FILM COATED ORAL at 21:16

## 2018-01-01 RX ADMIN — TRAMADOL HYDROCHLORIDE 50 MG: 50 TABLET, FILM COATED ORAL at 05:01

## 2018-01-01 RX ADMIN — SERTRALINE 50 MG: 50 TABLET, FILM COATED ORAL at 21:26

## 2018-01-01 RX ADMIN — ALBUTEROL SULFATE 2.5 MG: 2.5 SOLUTION RESPIRATORY (INHALATION) at 21:47

## 2018-01-01 RX ADMIN — ALBUTEROL SULFATE 2.5 MG: 2.5 SOLUTION RESPIRATORY (INHALATION) at 15:32

## 2018-01-01 RX ADMIN — DIVALPROEX SODIUM 125 MG: 125 CAPSULE, COATED PELLETS ORAL at 17:11

## 2018-01-01 RX ADMIN — Medication 5 ML: at 22:03

## 2018-01-01 RX ADMIN — METHYLPREDNISOLONE SODIUM SUCCINATE 20 MG: 40 INJECTION, POWDER, FOR SOLUTION INTRAMUSCULAR; INTRAVENOUS at 10:25

## 2018-01-01 RX ADMIN — BUDESONIDE 500 MCG: 0.5 INHALANT RESPIRATORY (INHALATION) at 07:45

## 2018-01-01 RX ADMIN — AMOXICILLIN AND CLAVULANATE POTASSIUM 1 TABLET: 500; 125 TABLET, FILM COATED ORAL at 16:55

## 2018-01-01 RX ADMIN — PIPERACILLIN SODIUM,TAZOBACTAM SODIUM 4.5 G: 4; .5 INJECTION, POWDER, FOR SOLUTION INTRAVENOUS at 20:38

## 2018-01-01 RX ADMIN — METOPROLOL TARTRATE 12.5 MG: 25 TABLET, FILM COATED ORAL at 12:06

## 2018-01-01 RX ADMIN — HYDROCORTISONE SODIUM SUCCINATE 50 MG: 100 INJECTION, POWDER, FOR SOLUTION INTRAMUSCULAR; INTRAVENOUS at 21:24

## 2018-01-01 RX ADMIN — METHYLPREDNISOLONE SODIUM SUCCINATE 40 MG: 125 INJECTION, POWDER, FOR SOLUTION INTRAMUSCULAR; INTRAVENOUS at 12:22

## 2018-01-01 RX ADMIN — DIVALPROEX SODIUM 125 MG: 125 CAPSULE, COATED PELLETS ORAL at 17:26

## 2018-01-01 RX ADMIN — DIVALPROEX SODIUM 125 MG: 125 CAPSULE, COATED PELLETS ORAL at 08:24

## 2018-01-01 RX ADMIN — AZELASTINE HCL 1 SPRAY: 205.5 SPRAY NASAL at 18:05

## 2018-01-01 RX ADMIN — ALBUTEROL SULFATE 2.5 MG: 2.5 SOLUTION RESPIRATORY (INHALATION) at 13:09

## 2018-01-01 RX ADMIN — PANTOPRAZOLE SODIUM 40 MG: 40 TABLET, DELAYED RELEASE ORAL at 08:24

## 2018-01-01 RX ADMIN — SODIUM CHLORIDE 100 ML/HR: 900 INJECTION, SOLUTION INTRAVENOUS at 03:41

## 2018-01-01 RX ADMIN — TUBERCULIN PURIFIED PROTEIN DERIVATIVE 5 UNITS: 5 INJECTION, SOLUTION INTRADERMAL at 12:23

## 2018-01-01 RX ADMIN — OLANZAPINE 2.5 MG: 2.5 TABLET, FILM COATED ORAL at 20:11

## 2018-01-01 RX ADMIN — AZELASTINE HCL 1 SPRAY: 205.5 SPRAY NASAL at 18:00

## 2018-01-01 RX ADMIN — FUROSEMIDE 20 MG: 10 INJECTION, SOLUTION INTRAMUSCULAR; INTRAVENOUS at 10:25

## 2018-01-01 RX ADMIN — BUSPIRONE HYDROCHLORIDE 10 MG: 10 TABLET ORAL at 16:53

## 2018-01-01 RX ADMIN — ALBUTEROL SULFATE 2.5 MG: 2.5 SOLUTION RESPIRATORY (INHALATION) at 11:10

## 2018-01-01 RX ADMIN — OLANZAPINE 5 MG: 5 TABLET, ORALLY DISINTEGRATING ORAL at 20:14

## 2018-01-01 RX ADMIN — BUDESONIDE 500 MCG: 0.5 INHALANT RESPIRATORY (INHALATION) at 07:44

## 2018-01-01 RX ADMIN — AMOXICILLIN AND CLAVULANATE POTASSIUM 1 TABLET: 875; 125 TABLET, FILM COATED ORAL at 17:28

## 2018-01-01 RX ADMIN — BUDESONIDE 500 MCG: 0.5 INHALANT RESPIRATORY (INHALATION) at 20:00

## 2018-01-01 RX ADMIN — DIVALPROEX SODIUM 125 MG: 125 CAPSULE, COATED PELLETS ORAL at 09:26

## 2018-01-01 RX ADMIN — PIPERACILLIN SODIUM,TAZOBACTAM SODIUM 4.5 G: 4; .5 INJECTION, POWDER, FOR SOLUTION INTRAVENOUS at 06:19

## 2018-01-01 RX ADMIN — SODIUM CHLORIDE 40 MG: 9 INJECTION, SOLUTION INTRAMUSCULAR; INTRAVENOUS; SUBCUTANEOUS at 08:26

## 2018-01-01 RX ADMIN — ALBUTEROL SULFATE 2.5 MG: 2.5 SOLUTION RESPIRATORY (INHALATION) at 21:08

## 2018-01-01 RX ADMIN — BUDESONIDE 500 MCG: 0.5 INHALANT RESPIRATORY (INHALATION) at 20:52

## 2018-01-01 RX ADMIN — BUDESONIDE 500 MCG: 0.5 INHALANT RESPIRATORY (INHALATION) at 08:34

## 2018-01-01 RX ADMIN — PANTOPRAZOLE SODIUM 40 MG: 40 TABLET, DELAYED RELEASE ORAL at 10:24

## 2018-01-01 RX ADMIN — BUDESONIDE 500 MCG: 0.5 INHALANT RESPIRATORY (INHALATION) at 21:34

## 2018-01-01 RX ADMIN — METOPROLOL TARTRATE 12.5 MG: 25 TABLET, FILM COATED ORAL at 09:10

## 2018-01-01 RX ADMIN — ALBUTEROL SULFATE 2.5 MG: 2.5 SOLUTION RESPIRATORY (INHALATION) at 07:29

## 2018-01-01 RX ADMIN — ENOXAPARIN SODIUM 40 MG: 40 INJECTION, SOLUTION INTRAVENOUS; SUBCUTANEOUS at 08:32

## 2018-01-01 RX ADMIN — ALBUTEROL SULFATE 2.5 MG: 2.5 SOLUTION RESPIRATORY (INHALATION) at 02:31

## 2018-01-01 RX ADMIN — METOPROLOL TARTRATE 12.5 MG: 25 TABLET, FILM COATED ORAL at 22:11

## 2018-01-01 RX ADMIN — ALBUTEROL SULFATE 2.5 MG: 2.5 SOLUTION RESPIRATORY (INHALATION) at 15:37

## 2018-01-01 RX ADMIN — METOPROLOL TARTRATE 12.5 MG: 25 TABLET, FILM COATED ORAL at 21:26

## 2018-01-01 RX ADMIN — VALPROATE SODIUM 250 MG: 100 INJECTION, SOLUTION INTRAVENOUS at 22:12

## 2018-01-01 RX ADMIN — ALBUTEROL SULFATE 2.5 MG: 2.5 SOLUTION RESPIRATORY (INHALATION) at 15:57

## 2018-01-01 RX ADMIN — SODIUM CHLORIDE 40 MG: 9 INJECTION, SOLUTION INTRAMUSCULAR; INTRAVENOUS; SUBCUTANEOUS at 10:13

## 2018-01-01 RX ADMIN — HYDROCORTISONE SODIUM SUCCINATE 25 MG: 100 INJECTION, POWDER, FOR SOLUTION INTRAMUSCULAR; INTRAVENOUS at 22:41

## 2018-01-01 RX ADMIN — Medication 10 ML: at 23:18

## 2018-01-01 RX ADMIN — POLYETHYLENE GLYCOL (3350) 17 G: 17 POWDER, FOR SOLUTION ORAL at 10:27

## 2018-01-01 RX ADMIN — ALBUTEROL SULFATE 2.5 MG: 2.5 SOLUTION RESPIRATORY (INHALATION) at 19:28

## 2018-01-01 RX ADMIN — FUROSEMIDE 20 MG: 10 INJECTION, SOLUTION INTRAMUSCULAR; INTRAVENOUS at 23:20

## 2018-01-01 RX ADMIN — ENOXAPARIN SODIUM 40 MG: 40 INJECTION, SOLUTION INTRAVENOUS; SUBCUTANEOUS at 09:09

## 2018-01-01 RX ADMIN — ALBUMIN (HUMAN) 12.5 G: 0.25 INJECTION, SOLUTION INTRAVENOUS at 01:23

## 2018-01-01 RX ADMIN — PIPERACILLIN SODIUM,TAZOBACTAM SODIUM 4.5 G: 4; .5 INJECTION, POWDER, FOR SOLUTION INTRAVENOUS at 13:21

## 2018-01-01 RX ADMIN — CEFTRIAXONE SODIUM 2 G: 2 INJECTION, POWDER, FOR SOLUTION INTRAMUSCULAR; INTRAVENOUS at 08:52

## 2018-01-01 RX ADMIN — SODIUM CHLORIDE 75 ML/HR: 900 INJECTION, SOLUTION INTRAVENOUS at 03:43

## 2018-01-01 RX ADMIN — POTASSIUM CHLORIDE 20 MEQ: 14.9 INJECTION, SOLUTION INTRAVENOUS at 08:49

## 2018-01-01 RX ADMIN — Medication 250 MG: at 10:24

## 2018-01-01 RX ADMIN — AMOXICILLIN AND CLAVULANATE POTASSIUM 1 TABLET: 875; 125 TABLET, FILM COATED ORAL at 09:04

## 2018-01-01 RX ADMIN — HYDROCORTISONE SODIUM SUCCINATE 25 MG: 100 INJECTION, POWDER, FOR SOLUTION INTRAMUSCULAR; INTRAVENOUS at 08:27

## 2018-01-01 RX ADMIN — CEFTRIAXONE 1 G: 1 INJECTION, POWDER, FOR SOLUTION INTRAMUSCULAR; INTRAVENOUS at 16:23

## 2018-01-01 RX ADMIN — ALBUTEROL SULFATE 2.5 MG: 2.5 SOLUTION RESPIRATORY (INHALATION) at 20:38

## 2018-01-01 RX ADMIN — METHYLPREDNISOLONE SODIUM SUCCINATE 40 MG: 125 INJECTION, POWDER, FOR SOLUTION INTRAMUSCULAR; INTRAVENOUS at 08:16

## 2018-01-01 RX ADMIN — VALPROATE SODIUM 250 MG: 100 INJECTION, SOLUTION INTRAVENOUS at 21:19

## 2018-01-01 RX ADMIN — TUBERCULIN PURIFIED PROTEIN DERIVATIVE 5 UNITS: 5 INJECTION INTRADERMAL at 15:00

## 2018-01-01 RX ADMIN — DONEPEZIL HYDROCHLORIDE 5 MG: 5 TABLET, FILM COATED ORAL at 22:12

## 2018-01-01 RX ADMIN — DIVALPROEX SODIUM 125 MG: 125 CAPSULE, COATED PELLETS ORAL at 17:28

## 2018-01-01 RX ADMIN — VALPROATE SODIUM 250 MG: 100 INJECTION, SOLUTION INTRAVENOUS at 05:17

## 2018-01-01 RX ADMIN — BUSPIRONE HYDROCHLORIDE 10 MG: 10 TABLET ORAL at 17:30

## 2018-01-01 RX ADMIN — FLUTICASONE PROPIONATE 2 SPRAY: 50 SPRAY, METERED NASAL at 09:21

## 2018-01-01 RX ADMIN — ENOXAPARIN SODIUM 30 MG: 30 INJECTION SUBCUTANEOUS at 10:26

## 2018-01-01 RX ADMIN — Medication: at 13:25

## 2018-01-01 RX ADMIN — BUDESONIDE 500 MCG: 0.5 INHALANT RESPIRATORY (INHALATION) at 20:38

## 2018-01-01 RX ADMIN — DONEPEZIL HYDROCHLORIDE 5 MG: 5 TABLET, FILM COATED ORAL at 21:26

## 2018-01-01 RX ADMIN — MORPHINE SULFATE 1 MG: 2 INJECTION, SOLUTION INTRAMUSCULAR; INTRAVENOUS at 10:04

## 2018-01-01 RX ADMIN — LORAZEPAM 0.5 MG: 2 INJECTION INTRAMUSCULAR; INTRAVENOUS at 19:38

## 2018-01-01 RX ADMIN — HYDRALAZINE HYDROCHLORIDE 20 MG: 20 INJECTION INTRAMUSCULAR; INTRAVENOUS at 10:32

## 2018-01-01 RX ADMIN — FLUTICASONE PROPIONATE 2 SPRAY: 50 SPRAY, METERED NASAL at 08:15

## 2018-01-01 RX ADMIN — ALBUTEROL SULFATE 2.5 MG: 2.5 SOLUTION RESPIRATORY (INHALATION) at 07:40

## 2018-01-01 RX ADMIN — ALBUTEROL SULFATE 2.5 MG: 2.5 SOLUTION RESPIRATORY (INHALATION) at 20:07

## 2018-01-01 RX ADMIN — FLUTICASONE PROPIONATE 2 SPRAY: 50 SPRAY, METERED NASAL at 09:09

## 2018-01-01 RX ADMIN — ENOXAPARIN SODIUM 40 MG: 40 INJECTION, SOLUTION INTRAVENOUS; SUBCUTANEOUS at 08:15

## 2018-01-01 RX ADMIN — PIPERACILLIN SODIUM,TAZOBACTAM SODIUM 4.5 G: 4; .5 INJECTION, POWDER, FOR SOLUTION INTRAVENOUS at 08:56

## 2018-01-01 RX ADMIN — BUDESONIDE 500 MCG: 0.5 INHALANT RESPIRATORY (INHALATION) at 07:52

## 2018-01-01 RX ADMIN — HALOPERIDOL LACTATE 2 MG: 5 INJECTION, SOLUTION INTRAMUSCULAR at 15:29

## 2018-01-01 RX ADMIN — BUDESONIDE 500 MCG: 0.5 INHALANT RESPIRATORY (INHALATION) at 07:40

## 2018-01-01 RX ADMIN — ENOXAPARIN SODIUM 30 MG: 30 INJECTION SUBCUTANEOUS at 08:22

## 2018-01-01 RX ADMIN — ACETAMINOPHEN 650 MG: 325 TABLET ORAL at 21:48

## 2018-01-01 RX ADMIN — ALBUTEROL SULFATE 2.5 MG: 2.5 SOLUTION RESPIRATORY (INHALATION) at 14:17

## 2018-01-01 RX ADMIN — FUROSEMIDE 20 MG: 10 INJECTION, SOLUTION INTRAMUSCULAR; INTRAVENOUS at 10:37

## 2018-01-01 RX ADMIN — FLUTICASONE PROPIONATE 2 SPRAY: 50 SPRAY, METERED NASAL at 08:32

## 2018-01-01 RX ADMIN — TRAMADOL HYDROCHLORIDE 50 MG: 50 TABLET, FILM COATED ORAL at 22:54

## 2018-01-01 RX ADMIN — LISINOPRIL 5 MG: 5 TABLET ORAL at 08:30

## 2018-01-01 RX ADMIN — POLYETHYLENE GLYCOL (3350) 17 G: 17 POWDER, FOR SOLUTION ORAL at 08:23

## 2018-01-01 RX ADMIN — ACETAMINOPHEN 650 MG: 325 TABLET ORAL at 10:25

## 2018-01-01 RX ADMIN — ALBUTEROL SULFATE 2.5 MG: 2.5 SOLUTION RESPIRATORY (INHALATION) at 09:12

## 2018-01-01 RX ADMIN — METHYLPREDNISOLONE SODIUM SUCCINATE 20 MG: 125 INJECTION, POWDER, FOR SOLUTION INTRAMUSCULAR; INTRAVENOUS at 16:53

## 2018-01-01 RX ADMIN — KETOROLAC TROMETHAMINE 15 MG: 30 INJECTION, SOLUTION INTRAMUSCULAR at 14:46

## 2018-01-01 RX ADMIN — PANTOPRAZOLE SODIUM 40 MG: 40 TABLET, DELAYED RELEASE ORAL at 16:52

## 2018-01-01 RX ADMIN — LORAZEPAM 1 MG: 2 INJECTION INTRAMUSCULAR; INTRAVENOUS at 14:46

## 2018-01-01 RX ADMIN — ALBUTEROL SULFATE 2.5 MG: 2.5 SOLUTION RESPIRATORY (INHALATION) at 20:41

## 2018-01-01 RX ADMIN — POLYETHYLENE GLYCOL (3350) 17 G: 17 POWDER, FOR SOLUTION ORAL at 09:11

## 2018-01-01 RX ADMIN — LISINOPRIL 20 MG: 20 TABLET ORAL at 09:19

## 2018-01-01 RX ADMIN — CEFTRIAXONE SODIUM 2 G: 2 INJECTION, POWDER, FOR SOLUTION INTRAMUSCULAR; INTRAVENOUS at 12:50

## 2018-01-01 RX ADMIN — POTASSIUM CHLORIDE 40 MEQ: 20 TABLET, EXTENDED RELEASE ORAL at 08:46

## 2018-01-01 RX ADMIN — VALPROATE SODIUM 250 MG: 100 INJECTION, SOLUTION INTRAVENOUS at 05:06

## 2018-01-01 RX ADMIN — BUDESONIDE 500 MCG: 0.5 INHALANT RESPIRATORY (INHALATION) at 19:24

## 2018-01-01 RX ADMIN — METOPROLOL TARTRATE 12.5 MG: 25 TABLET, FILM COATED ORAL at 08:24

## 2018-01-01 RX ADMIN — CEFTRIAXONE SODIUM 2 G: 2 INJECTION, POWDER, FOR SOLUTION INTRAMUSCULAR; INTRAVENOUS at 10:34

## 2018-01-01 RX ADMIN — LISINOPRIL 20 MG: 20 TABLET ORAL at 10:24

## 2018-01-01 RX ADMIN — Medication 10 ML: at 14:16

## 2018-01-01 RX ADMIN — Medication 10 ML: at 03:12

## 2018-01-01 RX ADMIN — METOPROLOL TARTRATE 12.5 MG: 25 TABLET, FILM COATED ORAL at 12:54

## 2018-01-01 RX ADMIN — PIPERACILLIN SODIUM,TAZOBACTAM SODIUM 4.5 G: 4; .5 INJECTION, POWDER, FOR SOLUTION INTRAVENOUS at 14:58

## 2018-01-01 RX ADMIN — SODIUM CHLORIDE 40 MG: 9 INJECTION, SOLUTION INTRAMUSCULAR; INTRAVENOUS; SUBCUTANEOUS at 10:24

## 2018-01-01 RX ADMIN — Medication 10 ML: at 05:39

## 2018-01-01 RX ADMIN — BUDESONIDE 500 MCG: 0.5 INHALANT RESPIRATORY (INHALATION) at 21:28

## 2018-01-01 RX ADMIN — ACETAMINOPHEN 325 MG: 325 TABLET, FILM COATED ORAL at 21:17

## 2018-01-01 RX ADMIN — SODIUM CHLORIDE 500 ML: 900 INJECTION, SOLUTION INTRAVENOUS at 17:41

## 2018-01-01 RX ADMIN — PANTOPRAZOLE SODIUM 40 MG: 40 TABLET, DELAYED RELEASE ORAL at 09:19

## 2018-01-01 RX ADMIN — Medication 5 ML: at 06:47

## 2018-01-01 RX ADMIN — Medication 10 ML: at 05:30

## 2018-01-01 RX ADMIN — AMOXICILLIN AND CLAVULANATE POTASSIUM 1 TABLET: 500; 125 TABLET, FILM COATED ORAL at 17:33

## 2018-01-01 RX ADMIN — CALCIUM GLUCONATE 1 G: 98 INJECTION, SOLUTION INTRAVENOUS at 17:03

## 2018-01-01 RX ADMIN — SERTRALINE 50 MG: 50 TABLET, FILM COATED ORAL at 22:12

## 2018-01-01 RX ADMIN — AZELASTINE HCL 1 SPRAY: 205.5 SPRAY NASAL at 09:10

## 2018-01-01 RX ADMIN — Medication 5 ML: at 05:40

## 2018-01-01 RX ADMIN — ALBUTEROL SULFATE 2.5 MG: 2.5 SOLUTION RESPIRATORY (INHALATION) at 02:14

## 2018-01-01 RX ADMIN — Medication 250 MG: at 17:33

## 2018-01-01 RX ADMIN — ALBUTEROL SULFATE 2.5 MG: 2.5 SOLUTION RESPIRATORY (INHALATION) at 15:14

## 2018-01-01 RX ADMIN — FLUTICASONE PROPIONATE 2 SPRAY: 50 SPRAY, METERED NASAL at 09:00

## 2018-01-01 RX ADMIN — VANCOMYCIN HYDROCHLORIDE 1250 MG: 10 INJECTION, POWDER, LYOPHILIZED, FOR SOLUTION INTRAVENOUS at 20:37

## 2018-01-01 RX ADMIN — BUDESONIDE 500 MCG: 0.5 INHALANT RESPIRATORY (INHALATION) at 21:06

## 2018-01-01 RX ADMIN — SODIUM CHLORIDE 100 ML/HR: 900 INJECTION, SOLUTION INTRAVENOUS at 19:30

## 2018-01-01 RX ADMIN — VALPROATE SODIUM 250 MG: 100 INJECTION, SOLUTION INTRAVENOUS at 14:58

## 2018-01-01 RX ADMIN — DIVALPROEX SODIUM 125 MG: 125 CAPSULE, COATED PELLETS ORAL at 16:51

## 2018-01-01 RX ADMIN — CEFTRIAXONE 1 G: 1 INJECTION, POWDER, FOR SOLUTION INTRAMUSCULAR; INTRAVENOUS at 10:55

## 2018-01-01 RX ADMIN — SODIUM CHLORIDE 100 ML: 900 INJECTION, SOLUTION INTRAVENOUS at 03:12

## 2018-01-01 RX ADMIN — PIPERACILLIN SODIUM,TAZOBACTAM SODIUM 4.5 G: 4; .5 INJECTION, POWDER, FOR SOLUTION INTRAVENOUS at 05:20

## 2018-01-01 RX ADMIN — ALBUTEROL SULFATE 2.5 MG: 2.5 SOLUTION RESPIRATORY (INHALATION) at 07:52

## 2018-01-01 RX ADMIN — PIPERACILLIN SODIUM,TAZOBACTAM SODIUM 4.5 G: 4; .5 INJECTION, POWDER, FOR SOLUTION INTRAVENOUS at 19:38

## 2018-01-01 RX ADMIN — MORPHINE SULFATE 5 MG: 10 INJECTION, SOLUTION INTRAMUSCULAR; INTRAVENOUS at 13:44

## 2018-01-01 RX ADMIN — PIPERACILLIN SODIUM,TAZOBACTAM SODIUM 4.5 G: 4; .5 INJECTION, POWDER, FOR SOLUTION INTRAVENOUS at 14:49

## 2018-01-01 RX ADMIN — ALBUTEROL SULFATE 2.5 MG: 2.5 SOLUTION RESPIRATORY (INHALATION) at 23:23

## 2018-01-01 RX ADMIN — ALBUTEROL SULFATE 2.5 MG: 2.5 SOLUTION RESPIRATORY (INHALATION) at 11:33

## 2018-01-01 RX ADMIN — ALBUTEROL SULFATE 2.5 MG: 2.5 SOLUTION RESPIRATORY (INHALATION) at 21:34

## 2018-01-01 RX ADMIN — POLYETHYLENE GLYCOL (3350) 17 G: 17 POWDER, FOR SOLUTION ORAL at 08:32

## 2018-01-01 RX ADMIN — HALOPERIDOL LACTATE 4 MG: 5 INJECTION, SOLUTION INTRAMUSCULAR at 01:23

## 2018-01-01 RX ADMIN — BUDESONIDE 500 MCG: 0.5 INHALANT RESPIRATORY (INHALATION) at 21:08

## 2018-01-01 RX ADMIN — PIPERACILLIN SODIUM,TAZOBACTAM SODIUM 4.5 G: 4; .5 INJECTION, POWDER, FOR SOLUTION INTRAVENOUS at 21:33

## 2018-01-01 RX ADMIN — PANTOPRAZOLE SODIUM 40 MG: 40 TABLET, DELAYED RELEASE ORAL at 17:33

## 2018-01-01 RX ADMIN — SODIUM CHLORIDE 40 MG: 9 INJECTION, SOLUTION INTRAMUSCULAR; INTRAVENOUS; SUBCUTANEOUS at 08:57

## 2018-01-01 RX ADMIN — Medication 5 ML: at 06:03

## 2018-01-01 RX ADMIN — ALBUTEROL SULFATE 2.5 MG: 2.5 SOLUTION RESPIRATORY (INHALATION) at 12:09

## 2018-01-01 RX ADMIN — Medication 5 ML: at 22:41

## 2018-01-01 RX ADMIN — IOPAMIDOL 100 ML: 755 INJECTION, SOLUTION INTRAVENOUS at 16:54

## 2018-01-01 RX ADMIN — ALBUTEROL SULFATE 2.5 MG: 2.5 SOLUTION RESPIRATORY (INHALATION) at 05:21

## 2018-01-01 RX ADMIN — VANCOMYCIN HYDROCHLORIDE 1000 MG: 1 INJECTION, POWDER, LYOPHILIZED, FOR SOLUTION INTRAVENOUS at 08:23

## 2018-01-01 RX ADMIN — ALBUTEROL SULFATE 2.5 MG: 2.5 SOLUTION RESPIRATORY (INHALATION) at 21:28

## 2018-01-01 RX ADMIN — Medication 10 ML: at 13:26

## 2018-01-01 RX ADMIN — ALBUTEROL SULFATE 2.5 MG: 2.5 SOLUTION RESPIRATORY (INHALATION) at 19:37

## 2018-01-01 RX ADMIN — Medication 5 ML: at 21:24

## 2018-01-01 RX ADMIN — Medication 10 ML: at 05:32

## 2018-01-01 RX ADMIN — LORAZEPAM 0.5 MG: 2 INJECTION INTRAMUSCULAR; INTRAVENOUS at 22:03

## 2018-01-01 RX ADMIN — Medication 10 ML: at 17:25

## 2018-01-01 RX ADMIN — SERTRALINE 50 MG: 50 TABLET, FILM COATED ORAL at 22:21

## 2018-01-01 RX ADMIN — HYDROCORTISONE SODIUM SUCCINATE 100 MG: 100 INJECTION, POWDER, FOR SOLUTION INTRAMUSCULAR; INTRAVENOUS at 05:52

## 2018-01-01 RX ADMIN — FUROSEMIDE 20 MG: 10 INJECTION, SOLUTION INTRAMUSCULAR; INTRAVENOUS at 14:18

## 2018-01-01 RX ADMIN — BUDESONIDE 500 MCG: 0.5 INHALANT RESPIRATORY (INHALATION) at 21:47

## 2018-01-01 RX ADMIN — BUSPIRONE HYDROCHLORIDE 10 MG: 10 TABLET ORAL at 18:14

## 2018-01-01 RX ADMIN — METOPROLOL TARTRATE 12.5 MG: 25 TABLET, FILM COATED ORAL at 21:32

## 2018-01-01 RX ADMIN — PIPERACILLIN SODIUM,TAZOBACTAM SODIUM 4.5 G: 4; .5 INJECTION, POWDER, FOR SOLUTION INTRAVENOUS at 19:28

## 2018-01-01 RX ADMIN — BUSPIRONE HYDROCHLORIDE 10 MG: 10 TABLET ORAL at 17:13

## 2018-01-01 RX ADMIN — SODIUM CHLORIDE 1000 ML: 900 INJECTION, SOLUTION INTRAVENOUS at 12:53

## 2018-01-01 RX ADMIN — BUSPIRONE HYDROCHLORIDE 10 MG: 10 TABLET ORAL at 09:08

## 2018-01-01 RX ADMIN — Medication 10 ML: at 05:29

## 2018-01-01 RX ADMIN — DONEPEZIL HYDROCHLORIDE 5 MG: 5 TABLET, FILM COATED ORAL at 21:32

## 2018-01-01 RX ADMIN — Medication 10 ML: at 21:23

## 2018-01-01 RX ADMIN — METOPROLOL TARTRATE 12.5 MG: 25 TABLET, FILM COATED ORAL at 08:46

## 2018-01-01 RX ADMIN — PIPERACILLIN SODIUM,TAZOBACTAM SODIUM 4.5 G: 4; .5 INJECTION, POWDER, FOR SOLUTION INTRAVENOUS at 21:42

## 2018-01-01 RX ADMIN — POLYETHYLENE GLYCOL (3350) 17 G: 17 POWDER, FOR SOLUTION ORAL at 07:59

## 2018-01-01 RX ADMIN — HYDROCORTISONE SODIUM SUCCINATE 50 MG: 100 INJECTION, POWDER, FOR SOLUTION INTRAMUSCULAR; INTRAVENOUS at 05:54

## 2018-01-01 RX ADMIN — ENOXAPARIN SODIUM 40 MG: 40 INJECTION, SOLUTION INTRAVENOUS; SUBCUTANEOUS at 08:46

## 2018-01-01 RX ADMIN — LISINOPRIL 5 MG: 5 TABLET ORAL at 09:04

## 2018-01-01 RX ADMIN — PIPERACILLIN SODIUM,TAZOBACTAM SODIUM 4.5 G: 4; .5 INJECTION, POWDER, FOR SOLUTION INTRAVENOUS at 05:29

## 2018-01-01 RX ADMIN — ALBUTEROL SULFATE 2.5 MG: 2.5 SOLUTION RESPIRATORY (INHALATION) at 08:34

## 2018-01-01 RX ADMIN — BUDESONIDE 500 MCG: 0.5 INHALANT RESPIRATORY (INHALATION) at 08:08

## 2018-01-01 RX ADMIN — Medication 10 ML: at 05:06

## 2018-01-01 RX ADMIN — ALBUTEROL SULFATE 2.5 MG: 2.5 SOLUTION RESPIRATORY (INHALATION) at 16:00

## 2018-01-01 RX ADMIN — Medication 5 ML: at 22:15

## 2018-01-01 RX ADMIN — PANTOPRAZOLE SODIUM 40 MG: 40 TABLET, DELAYED RELEASE ORAL at 08:30

## 2018-01-01 RX ADMIN — ENOXAPARIN SODIUM 40 MG: 40 INJECTION, SOLUTION INTRAVENOUS; SUBCUTANEOUS at 09:10

## 2018-01-01 RX ADMIN — AMOXICILLIN AND CLAVULANATE POTASSIUM 1 TABLET: 500; 125 TABLET, FILM COATED ORAL at 10:24

## 2018-01-01 RX ADMIN — ALBUTEROL SULFATE 2.5 MG: 2.5 SOLUTION RESPIRATORY (INHALATION) at 20:52

## 2018-01-01 RX ADMIN — VANCOMYCIN HYDROCHLORIDE 1000 MG: 1 INJECTION, POWDER, LYOPHILIZED, FOR SOLUTION INTRAVENOUS at 22:11

## 2018-01-01 RX ADMIN — BUSPIRONE HYDROCHLORIDE 10 MG: 10 TABLET ORAL at 08:35

## 2018-01-01 RX ADMIN — HALOPERIDOL LACTATE 4 MG: 5 INJECTION INTRAMUSCULAR at 23:19

## 2018-01-01 RX ADMIN — PANTOPRAZOLE SODIUM 40 MG: 40 TABLET, DELAYED RELEASE ORAL at 17:28

## 2018-01-01 RX ADMIN — BUDESONIDE 500 MCG: 0.5 INHALANT RESPIRATORY (INHALATION) at 07:29

## 2018-01-01 RX ADMIN — ALBUTEROL SULFATE 2.5 MG: 2.5 SOLUTION RESPIRATORY (INHALATION) at 15:26

## 2018-01-01 RX ADMIN — CEFTRIAXONE 1 G: 1 INJECTION, POWDER, FOR SOLUTION INTRAMUSCULAR; INTRAVENOUS at 08:32

## 2018-01-01 RX ADMIN — MORPHINE SULFATE 1 MG: 2 INJECTION, SOLUTION INTRAMUSCULAR; INTRAVENOUS at 11:05

## 2018-01-01 RX ADMIN — ALBUTEROL SULFATE 2.5 MG: 2.5 SOLUTION RESPIRATORY (INHALATION) at 15:48

## 2018-01-01 RX ADMIN — PIPERACILLIN SODIUM,TAZOBACTAM SODIUM 4.5 G: 4; .5 INJECTION, POWDER, FOR SOLUTION INTRAVENOUS at 08:23

## 2018-01-01 RX ADMIN — SERTRALINE HYDROCHLORIDE 50 MG: 50 TABLET ORAL at 21:16

## 2018-01-01 RX ADMIN — BISACODYL 5 MG: 5 TABLET, COATED ORAL at 11:18

## 2018-01-01 RX ADMIN — ENOXAPARIN SODIUM 40 MG: 40 INJECTION, SOLUTION INTRAVENOUS; SUBCUTANEOUS at 09:25

## 2018-01-01 RX ADMIN — FUROSEMIDE 20 MG: 10 INJECTION, SOLUTION INTRAMUSCULAR; INTRAVENOUS at 08:26

## 2018-01-01 RX ADMIN — Medication 10 ML: at 08:28

## 2018-01-01 RX ADMIN — FUROSEMIDE 20 MG: 10 INJECTION, SOLUTION INTRAMUSCULAR; INTRAVENOUS at 00:08

## 2018-01-01 RX ADMIN — SODIUM CHLORIDE 500 ML: 900 INJECTION, SOLUTION INTRAVENOUS at 00:45

## 2018-01-01 RX ADMIN — AMOXICILLIN AND CLAVULANATE POTASSIUM 1 TABLET: 875; 125 TABLET, FILM COATED ORAL at 17:26

## 2018-01-01 RX ADMIN — BUDESONIDE 500 MCG: 0.5 INHALANT RESPIRATORY (INHALATION) at 09:12

## 2018-01-01 RX ADMIN — Medication 250 MG: at 08:24

## 2018-01-01 RX ADMIN — FLUTICASONE PROPIONATE 2 SPRAY: 50 SPRAY, METERED NASAL at 10:41

## 2018-01-01 RX ADMIN — NOREPINEPHRINE-DEXTROSE IV SOLUTION 4 MG/250ML-5% 3 MCG/MIN: 4-5/250 SOLUTION at 20:39

## 2018-01-01 RX ADMIN — ALBUTEROL SULFATE 2.5 MG: 2.5 SOLUTION RESPIRATORY (INHALATION) at 11:37

## 2018-01-01 RX ADMIN — Medication 10 ML: at 23:21

## 2018-01-01 RX ADMIN — Medication 10 ML: at 21:21

## 2018-01-01 RX ADMIN — METOPROLOL TARTRATE 12.5 MG: 25 TABLET, FILM COATED ORAL at 10:26

## 2018-01-01 RX ADMIN — ALBUTEROL SULFATE 2.5 MG: 2.5 SOLUTION RESPIRATORY (INHALATION) at 08:42

## 2018-01-01 RX ADMIN — BUDESONIDE 500 MCG: 0.5 INHALANT RESPIRATORY (INHALATION) at 07:46

## 2018-01-01 RX ADMIN — BUDESONIDE 500 MCG: 0.5 INHALANT RESPIRATORY (INHALATION) at 07:20

## 2018-01-01 RX ADMIN — ACETAMINOPHEN 650 MG: 325 TABLET ORAL at 16:51

## 2018-01-01 RX ADMIN — TUBERCULIN PURIFIED PROTEIN DERIVATIVE 5 UNITS: 5 INJECTION, SOLUTION INTRADERMAL at 13:38

## 2018-01-01 RX ADMIN — Medication 5 ML: at 05:26

## 2018-01-01 RX ADMIN — PANTOPRAZOLE SODIUM 40 MG: 40 TABLET, DELAYED RELEASE ORAL at 07:58

## 2018-01-01 RX ADMIN — METHYLPREDNISOLONE SODIUM SUCCINATE 20 MG: 40 INJECTION, POWDER, FOR SOLUTION INTRAMUSCULAR; INTRAVENOUS at 09:22

## 2018-01-01 RX ADMIN — DIVALPROEX SODIUM 125 MG: 125 CAPSULE, COATED PELLETS ORAL at 08:46

## 2018-01-01 RX ADMIN — DONEPEZIL HYDROCHLORIDE 5 MG: 5 TABLET, FILM COATED ORAL at 21:18

## 2018-01-01 RX ADMIN — ALBUTEROL SULFATE 2.5 MG: 2.5 SOLUTION RESPIRATORY (INHALATION) at 11:35

## 2018-01-01 RX ADMIN — ONDANSETRON 4 MG: 4 TABLET, ORALLY DISINTEGRATING ORAL at 05:01

## 2018-01-01 RX ADMIN — SERTRALINE 50 MG: 50 TABLET, FILM COATED ORAL at 21:32

## 2018-01-01 RX ADMIN — FLUTICASONE PROPIONATE 2 SPRAY: 50 SPRAY, METERED NASAL at 09:25

## 2018-01-01 RX ADMIN — DEXTROSE MONOHYDRATE 25 G: 25 INJECTION, SOLUTION INTRAVENOUS at 16:24

## 2018-01-01 RX ADMIN — BUDESONIDE 500 MCG: 0.5 INHALANT RESPIRATORY (INHALATION) at 07:37

## 2018-01-01 RX ADMIN — ALBUTEROL SULFATE 2.5 MG: 2.5 SOLUTION RESPIRATORY (INHALATION) at 22:29

## 2018-01-01 RX ADMIN — FLUTICASONE PROPIONATE 2 SPRAY: 50 SPRAY, METERED NASAL at 12:21

## 2018-01-01 RX ADMIN — FUROSEMIDE 40 MG: 10 INJECTION, SOLUTION INTRAMUSCULAR; INTRAVENOUS at 12:04

## 2018-01-01 RX ADMIN — LORAZEPAM 0.5 MG: 2 INJECTION INTRAMUSCULAR; INTRAVENOUS at 22:10

## 2018-01-01 RX ADMIN — DONEPEZIL HYDROCHLORIDE 5 MG: 5 TABLET, FILM COATED ORAL at 21:19

## 2018-01-01 RX ADMIN — HYDROCORTISONE SODIUM SUCCINATE 50 MG: 100 INJECTION, POWDER, FOR SOLUTION INTRAMUSCULAR; INTRAVENOUS at 13:26

## 2018-01-01 RX ADMIN — ALBUTEROL SULFATE 2.5 MG: 2.5 SOLUTION RESPIRATORY (INHALATION) at 07:44

## 2018-01-01 RX ADMIN — DIVALPROEX SODIUM 125 MG: 125 CAPSULE, COATED PELLETS ORAL at 10:24

## 2018-01-01 RX ADMIN — AMOXICILLIN AND CLAVULANATE POTASSIUM 1 TABLET: 500; 125 TABLET, FILM COATED ORAL at 08:23

## 2018-01-01 RX ADMIN — BUDESONIDE 500 MCG: 0.5 INHALANT RESPIRATORY (INHALATION) at 22:29

## 2018-01-01 RX ADMIN — POLYETHYLENE GLYCOL (3350) 17 G: 17 POWDER, FOR SOLUTION ORAL at 09:18

## 2018-01-01 RX ADMIN — ALBUTEROL SULFATE 2.5 MG: 2.5 SOLUTION RESPIRATORY (INHALATION) at 14:32

## 2018-01-01 RX ADMIN — METHYLPREDNISOLONE SODIUM SUCCINATE 20 MG: 40 INJECTION, POWDER, FOR SOLUTION INTRAMUSCULAR; INTRAVENOUS at 08:46

## 2018-01-01 RX ADMIN — HEPARIN SODIUM 5000 UNITS: 5000 INJECTION INTRAVENOUS; SUBCUTANEOUS at 14:34

## 2018-01-01 RX ADMIN — VALPROATE SODIUM 250 MG: 100 INJECTION, SOLUTION INTRAVENOUS at 17:15

## 2018-01-01 RX ADMIN — ALBUTEROL SULFATE 2.5 MG: 2.5 SOLUTION RESPIRATORY (INHALATION) at 19:24

## 2018-01-01 RX ADMIN — FUROSEMIDE 20 MG: 10 INJECTION, SOLUTION INTRAMUSCULAR; INTRAVENOUS at 11:56

## 2018-01-01 RX ADMIN — ALBUTEROL SULFATE 2.5 MG: 2.5 SOLUTION RESPIRATORY (INHALATION) at 07:26

## 2018-01-01 RX ADMIN — ACETAMINOPHEN 325 MG: 325 TABLET, FILM COATED ORAL at 03:36

## 2018-01-01 RX ADMIN — DIVALPROEX SODIUM 125 MG: 125 CAPSULE, COATED PELLETS ORAL at 17:33

## 2018-01-01 RX ADMIN — PANTOPRAZOLE SODIUM 40 MG: 40 TABLET, DELAYED RELEASE ORAL at 16:55

## 2018-01-01 RX ADMIN — BUSPIRONE HYDROCHLORIDE 10 MG: 10 TABLET ORAL at 09:10

## 2018-01-01 RX ADMIN — ALBUTEROL SULFATE 2.5 MG: 2.5 SOLUTION RESPIRATORY (INHALATION) at 15:07

## 2018-01-01 RX ADMIN — SODIUM CHLORIDE 40 MG: 9 INJECTION, SOLUTION INTRAMUSCULAR; INTRAVENOUS; SUBCUTANEOUS at 21:05

## 2018-01-01 RX ADMIN — METHYLPREDNISOLONE SODIUM SUCCINATE 20 MG: 40 INJECTION, POWDER, FOR SOLUTION INTRAMUSCULAR; INTRAVENOUS at 08:33

## 2018-01-01 RX ADMIN — LISINOPRIL 20 MG: 20 TABLET ORAL at 10:33

## 2018-01-01 RX ADMIN — INSULIN HUMAN 10 UNITS: 100 INJECTION, SOLUTION PARENTERAL at 16:23

## 2018-01-01 RX ADMIN — DIVALPROEX SODIUM 125 MG: 125 CAPSULE, COATED PELLETS ORAL at 09:04

## 2018-01-01 RX ADMIN — PIPERACILLIN SODIUM,TAZOBACTAM SODIUM 4.5 G: 4; .5 INJECTION, POWDER, FOR SOLUTION INTRAVENOUS at 22:24

## 2018-01-01 RX ADMIN — ALBUTEROL SULFATE 2.5 MG: 2.5 SOLUTION RESPIRATORY (INHALATION) at 07:20

## 2018-01-01 RX ADMIN — Medication 10 ML: at 21:30

## 2018-01-01 RX ADMIN — FLUTICASONE PROPIONATE 2 SPRAY: 50 SPRAY, METERED NASAL at 08:23

## 2018-01-01 RX ADMIN — ENOXAPARIN SODIUM 40 MG: 40 INJECTION, SOLUTION INTRAVENOUS; SUBCUTANEOUS at 09:18

## 2018-01-01 RX ADMIN — ALBUTEROL SULFATE 2.5 MG: 2.5 SOLUTION RESPIRATORY (INHALATION) at 14:48

## 2018-01-01 RX ADMIN — PANTOPRAZOLE SODIUM 40 MG: 40 TABLET, DELAYED RELEASE ORAL at 09:04

## 2018-01-01 RX ADMIN — PREDNISONE 10 MG: 10 TABLET ORAL at 07:59

## 2018-01-01 RX ADMIN — POTASSIUM CHLORIDE 20 MEQ: 14.9 INJECTION, SOLUTION INTRAVENOUS at 12:53

## 2018-01-01 RX ADMIN — OXYCODONE HYDROCHLORIDE AND ACETAMINOPHEN 500 MG: 500 TABLET ORAL at 07:59

## 2018-01-01 RX ADMIN — PANTOPRAZOLE SODIUM 40 MG: 40 TABLET, DELAYED RELEASE ORAL at 17:25

## 2018-01-01 RX ADMIN — PERFLUTREN 1 ML: 6.52 INJECTION, SUSPENSION INTRAVENOUS at 11:00

## 2018-01-01 RX ADMIN — HYDROCORTISONE SODIUM SUCCINATE 100 MG: 100 INJECTION, POWDER, FOR SOLUTION INTRAMUSCULAR; INTRAVENOUS at 21:06

## 2018-01-01 RX ADMIN — ALBUTEROL SULFATE 2.5 MG: 2.5 SOLUTION RESPIRATORY (INHALATION) at 08:00

## 2018-01-01 RX ADMIN — METOPROLOL TARTRATE 12.5 MG: 25 TABLET, FILM COATED ORAL at 09:19

## 2018-01-01 RX ADMIN — ALBUTEROL SULFATE 2.5 MG: 2.5 SOLUTION RESPIRATORY (INHALATION) at 01:39

## 2018-01-01 RX ADMIN — BUDESONIDE 500 MCG: 0.5 INHALANT RESPIRATORY (INHALATION) at 20:07

## 2018-01-01 RX ADMIN — PIPERACILLIN SODIUM,TAZOBACTAM SODIUM 4.5 G: 4; .5 INJECTION, POWDER, FOR SOLUTION INTRAVENOUS at 21:25

## 2018-01-01 RX ADMIN — Medication 10 ML: at 22:24

## 2018-01-01 RX ADMIN — BUDESONIDE 500 MCG: 0.5 INHALANT RESPIRATORY (INHALATION) at 07:28

## 2018-01-01 RX ADMIN — ALBUTEROL SULFATE 2.5 MG: 2.5 SOLUTION RESPIRATORY (INHALATION) at 15:34

## 2018-01-01 RX ADMIN — SODIUM CHLORIDE 1000 ML: 900 INJECTION, SOLUTION INTRAVENOUS at 08:50

## 2018-01-01 RX ADMIN — LISINOPRIL 5 MG: 5 TABLET ORAL at 09:24

## 2018-01-01 RX ADMIN — METHYLPREDNISOLONE SODIUM SUCCINATE 40 MG: 125 INJECTION, POWDER, FOR SOLUTION INTRAMUSCULAR; INTRAVENOUS at 04:10

## 2018-01-01 RX ADMIN — PIPERACILLIN SODIUM,TAZOBACTAM SODIUM 4.5 G: 4; .5 INJECTION, POWDER, FOR SOLUTION INTRAVENOUS at 14:08

## 2018-01-01 RX ADMIN — Medication 5 ML: at 15:15

## 2018-01-01 RX ADMIN — SODIUM CHLORIDE 1000 ML: 900 INJECTION, SOLUTION INTRAVENOUS at 16:00

## 2018-01-01 RX ADMIN — SODIUM CHLORIDE 40 MG: 9 INJECTION, SOLUTION INTRAMUSCULAR; INTRAVENOUS; SUBCUTANEOUS at 08:34

## 2018-01-01 RX ADMIN — LISINOPRIL 5 MG: 5 TABLET ORAL at 09:08

## 2018-01-01 RX ADMIN — PIPERACILLIN SODIUM,TAZOBACTAM SODIUM 4.5 G: 4; .5 INJECTION, POWDER, FOR SOLUTION INTRAVENOUS at 14:18

## 2018-01-01 RX ADMIN — BUDESONIDE 500 MCG: 0.5 INHALANT RESPIRATORY (INHALATION) at 08:42

## 2018-01-01 RX ADMIN — HALOPERIDOL LACTATE 4 MG: 5 INJECTION, SOLUTION INTRAMUSCULAR at 17:00

## 2018-01-01 RX ADMIN — HYDROCORTISONE SODIUM SUCCINATE 25 MG: 100 INJECTION, POWDER, FOR SOLUTION INTRAMUSCULAR; INTRAVENOUS at 10:23

## 2018-01-01 RX ADMIN — HYDROCORTISONE SODIUM SUCCINATE 50 MG: 100 INJECTION, POWDER, FOR SOLUTION INTRAMUSCULAR; INTRAVENOUS at 22:03

## 2018-01-01 RX ADMIN — METHYLPREDNISOLONE SODIUM SUCCINATE 20 MG: 125 INJECTION, POWDER, FOR SOLUTION INTRAMUSCULAR; INTRAVENOUS at 09:10

## 2018-01-01 RX ADMIN — OLANZAPINE 2.5 MG: 2.5 TABLET, FILM COATED ORAL at 19:34

## 2018-01-01 RX ADMIN — BUDESONIDE 500 MCG: 0.5 INHALANT RESPIRATORY (INHALATION) at 19:37

## 2018-01-01 RX ADMIN — ALBUTEROL SULFATE 2.5 MG: 2.5 SOLUTION RESPIRATORY (INHALATION) at 07:45

## 2018-01-01 RX ADMIN — HALOPERIDOL LACTATE 2 MG: 5 INJECTION INTRAMUSCULAR at 21:56

## 2018-01-01 RX ADMIN — ALBUTEROL SULFATE 2.5 MG: 2.5 SOLUTION RESPIRATORY (INHALATION) at 11:13

## 2018-01-01 RX ADMIN — MORPHINE SULFATE 1 MG: 2 INJECTION, SOLUTION INTRAMUSCULAR; INTRAVENOUS at 22:07

## 2018-01-01 RX ADMIN — ALBUTEROL SULFATE 2.5 MG: 2.5 SOLUTION RESPIRATORY (INHALATION) at 01:40

## 2018-01-01 RX ADMIN — Medication 10 ML: at 05:18

## 2018-01-01 RX ADMIN — PIPERACILLIN SODIUM,TAZOBACTAM SODIUM 3.38 G: 3; .375 INJECTION, POWDER, FOR SOLUTION INTRAVENOUS at 22:11

## 2018-01-01 RX ADMIN — PIPERACILLIN SODIUM,TAZOBACTAM SODIUM 4.5 G: 4; .5 INJECTION, POWDER, FOR SOLUTION INTRAVENOUS at 08:00

## 2018-01-01 RX ADMIN — ALBUTEROL SULFATE 2.5 MG: 2.5 SOLUTION RESPIRATORY (INHALATION) at 21:06

## 2018-01-01 RX ADMIN — HYDROCORTISONE SODIUM SUCCINATE 25 MG: 100 INJECTION, POWDER, FOR SOLUTION INTRAMUSCULAR; INTRAVENOUS at 21:56

## 2018-01-01 RX ADMIN — METHYLPREDNISOLONE SODIUM SUCCINATE 20 MG: 40 INJECTION, POWDER, FOR SOLUTION INTRAMUSCULAR; INTRAVENOUS at 09:09

## 2018-01-01 RX ADMIN — METOPROLOL TARTRATE 12.5 MG: 25 TABLET, FILM COATED ORAL at 08:31

## 2018-01-01 RX ADMIN — ALBUTEROL SULFATE 2.5 MG: 2.5 SOLUTION RESPIRATORY (INHALATION) at 13:28

## 2018-01-01 RX ADMIN — BUDESONIDE 500 MCG: 0.5 INHALANT RESPIRATORY (INHALATION) at 20:41

## 2018-01-01 RX ADMIN — FUROSEMIDE 40 MG: 10 INJECTION, SOLUTION INTRAMUSCULAR; INTRAVENOUS at 23:39

## 2018-01-01 RX ADMIN — BARIUM SULFATE 355 ML: 0.6 SUSPENSION ORAL at 08:21

## 2018-01-01 RX ADMIN — ONDANSETRON 4 MG: 2 INJECTION INTRAMUSCULAR; INTRAVENOUS at 23:21

## 2018-01-01 RX ADMIN — IPRATROPIUM BROMIDE AND ALBUTEROL SULFATE 3 ML: .5; 3 SOLUTION RESPIRATORY (INHALATION) at 16:26

## 2018-01-01 RX ADMIN — ALBUTEROL SULFATE 2.5 MG: 2.5 SOLUTION RESPIRATORY (INHALATION) at 21:31

## 2018-01-01 RX ADMIN — BUSPIRONE HYDROCHLORIDE 10 MG: 10 TABLET ORAL at 17:28

## 2018-01-01 RX ADMIN — ACETAMINOPHEN 650 MG: 325 TABLET ORAL at 03:44

## 2018-01-01 RX ADMIN — PANTOPRAZOLE SODIUM 40 MG: 40 TABLET, DELAYED RELEASE ORAL at 08:46

## 2018-01-01 RX ADMIN — HYDRALAZINE HYDROCHLORIDE 10 MG: 20 INJECTION INTRAMUSCULAR; INTRAVENOUS at 00:21

## 2018-01-01 RX ADMIN — MORPHINE SULFATE 1 MG: 2 INJECTION, SOLUTION INTRAMUSCULAR; INTRAVENOUS at 05:48

## 2018-01-01 RX ADMIN — Medication 10 ML: at 05:20

## 2018-01-01 RX ADMIN — BUDESONIDE 500 MCG: 0.5 INHALANT RESPIRATORY (INHALATION) at 21:31

## 2018-01-01 RX ADMIN — MORPHINE SULFATE 1 MG: 2 INJECTION, SOLUTION INTRAMUSCULAR; INTRAVENOUS at 22:40

## 2018-01-01 RX ADMIN — METOPROLOL TARTRATE: 25 TABLET, FILM COATED ORAL at 22:22

## 2018-01-01 RX ADMIN — BUSPIRONE HYDROCHLORIDE 10 MG: 10 TABLET ORAL at 09:24

## 2018-01-01 RX ADMIN — LISINOPRIL 5 MG: 5 TABLET ORAL at 08:46

## 2018-01-01 RX ADMIN — FUROSEMIDE 20 MG: 10 INJECTION, SOLUTION INTRAMUSCULAR; INTRAVENOUS at 22:12

## 2018-01-01 RX ADMIN — SERTRALINE 50 MG: 50 TABLET, FILM COATED ORAL at 21:18

## 2018-01-01 RX ADMIN — ALBUMIN (HUMAN) 12.5 G: 0.25 INJECTION, SOLUTION INTRAVENOUS at 16:20

## 2018-01-01 RX ADMIN — POTASSIUM CHLORIDE 40 MEQ: 20 TABLET, EXTENDED RELEASE ORAL at 09:04

## 2018-01-01 RX ADMIN — Medication 10 ML: at 14:20

## 2018-01-01 RX ADMIN — Medication 5 ML: at 16:32

## 2018-01-01 RX ADMIN — PIPERACILLIN SODIUM,TAZOBACTAM SODIUM 4.5 G: 4; .5 INJECTION, POWDER, FOR SOLUTION INTRAVENOUS at 05:52

## 2018-01-01 RX ADMIN — MORPHINE SULFATE 1 MG: 2 INJECTION, SOLUTION INTRAMUSCULAR; INTRAVENOUS at 15:10

## 2018-01-01 RX ADMIN — PANTOPRAZOLE SODIUM 40 MG: 40 TABLET, DELAYED RELEASE ORAL at 09:08

## 2018-01-01 RX ADMIN — SERTRALINE 50 MG: 50 TABLET, FILM COATED ORAL at 21:19

## 2018-01-01 RX ADMIN — METOPROLOL TARTRATE 12.5 MG: 25 TABLET, FILM COATED ORAL at 21:48

## 2018-01-01 RX ADMIN — ALBUTEROL SULFATE 2.5 MG: 2.5 SOLUTION RESPIRATORY (INHALATION) at 20:00

## 2018-01-01 RX ADMIN — ACETAMINOPHEN 650 MG: 325 TABLET ORAL at 09:18

## 2018-01-01 RX ADMIN — POTASSIUM CHLORIDE 40 MEQ: 20 TABLET, EXTENDED RELEASE ORAL at 17:25

## 2018-01-01 RX ADMIN — ALBUTEROL SULFATE 2.5 MG: 2.5 SOLUTION RESPIRATORY (INHALATION) at 02:42

## 2018-01-01 RX ADMIN — ALBUTEROL SULFATE 2.5 MG: 2.5 SOLUTION RESPIRATORY (INHALATION) at 11:56

## 2018-01-01 RX ADMIN — SODIUM CHLORIDE 50 ML/HR: 900 INJECTION, SOLUTION INTRAVENOUS at 12:22

## 2018-01-01 RX ADMIN — ALBUTEROL SULFATE 2.5 MG: 2.5 SOLUTION RESPIRATORY (INHALATION) at 07:28

## 2018-01-01 RX ADMIN — SODIUM CHLORIDE 100 ML: 900 INJECTION, SOLUTION INTRAVENOUS at 16:54

## 2018-01-01 RX ADMIN — METOPROLOL TARTRATE 12.5 MG: 25 TABLET, FILM COATED ORAL at 20:15

## 2018-01-01 RX ADMIN — SODIUM CHLORIDE 3 ML/KG/HR: 900 INJECTION, SOLUTION INTRAVENOUS at 13:37

## 2018-01-01 RX ADMIN — SALINE NASAL SPRAY 2 SPRAY: 1.5 SOLUTION NASAL at 17:24

## 2018-01-01 RX ADMIN — PIPERACILLIN SODIUM,TAZOBACTAM SODIUM 4.5 G: 4; .5 INJECTION, POWDER, FOR SOLUTION INTRAVENOUS at 22:57

## 2018-01-01 RX ADMIN — ALBUTEROL SULFATE 2.5 MG: 2.5 SOLUTION RESPIRATORY (INHALATION) at 12:05

## 2018-01-01 RX ADMIN — DONEPEZIL HYDROCHLORIDE 5 MG: 5 TABLET, FILM COATED ORAL at 22:23

## 2018-01-01 RX ADMIN — ACETAMINOPHEN 650 MG: 325 TABLET ORAL at 05:27

## 2018-01-15 PROBLEM — Z86.018 HISTORY OF MENINGIOMA: Status: ACTIVE | Noted: 2018-01-01

## 2018-04-06 NOTE — ED PROVIDER NOTES
HPI Comments: Patient is an 80-year-old female who arrives in the emergency department today via EMS from her assisted care facility for evaluation of lower abdominal pain which began after lunch around noon today. She admits to some crampy pain. She denies fevers or chills. She denies nausea or vomiting. She denies diarrhea. She denies any urinary symptoms. Patient is a 80 y.o. female presenting with abdominal pain. The history is provided by the patient. Abdominal Pain    This is a new problem. The current episode started 6 to 12 hours ago. The problem occurs constantly. The problem has not changed since onset. The pain is located in the LLQ. The quality of the pain is cramping. Pertinent negatives include no fever, no diarrhea, no melena, no nausea, no vomiting, no constipation and no trauma. The patient's surgical history includes appendectomy and hysterectomy.        Past Medical History:   Diagnosis Date    Arthropathy, unspecified, site unspecified     Benign paroxysmal positional vertigo     Cardiac dysrhythmia, unspecified     Debility, unspecified     Facet syndrome ([address]     GIB (gastrointestinal bleeding) 1/31/2014    History of peptic ulcer disease 01/2014    Hypertension     IBS (irritable bowel syndrome)     Impaired fasting glucose     Lumbago     Memory loss     Meningioma (HCC)     removed [facility] 1998    Musculoskeletal pain     OA (osteoarthritis) of knee     Palliative care encounter 5/8/2014    Rhinorrhea     Scoliosis     Sensorineural hearing loss     Spondylolisthesis     Lumbar    TMJ dysfunction     Varicella     Vasomotor rhinitis     Vitamin D deficiency        Past Surgical History:   Procedure Laterality Date    HX APPENDECTOMY      HX CATARACT REMOVAL  2003    X2 WITH LENSE IMPLANT     HX COLONOSCOPY      HX KNEE ARTHROSCOPY      left knee    HX KNEE REPLACEMENT Left 2007    HX OTHER SURGICAL  1998    Meningioma Rt brain    HX JOSE MIGUEL AND BSO  1974    HX TONSILLECTOMY           Family History:   Problem Relation Age of Onset    Cancer Mother      Pancreatic    Cancer Father      Ear       Social History     Social History    Marital status:      Spouse name: N/A    Number of children: N/A    Years of education: karthikeyan     Occupational History     Retired     Social History Main Topics    Smoking status: Former Smoker     Packs/day: 1.50     Years: 45.00     Types: Cigarettes     Quit date: 1/1/1991    Smokeless tobacco: Never Used    Alcohol use 1.8 oz/week     1 Glasses of wine, 1 Shots of liquor, 1 Standard drinks or equivalent per week      Comment: OCCASIONAL    Drug use: No    Sexual activity: Not on file     Other Topics Concern    Not on file     Social History Narrative    Lives alone. Ambulates with walker/cane, has supportive daughters         ALLERGIES: Bactroban [mupirocin calcium] and Sulfa (sulfonamide antibiotics)    Review of Systems   Constitutional: Negative for fever. HENT: Negative. Eyes: Negative. Respiratory: Negative. Gastrointestinal: Positive for abdominal pain. Negative for constipation, diarrhea, melena, nausea and vomiting. Endocrine: Negative. Genitourinary: Negative. Musculoskeletal: Negative. Skin: Negative. Vitals:    04/06/18 1758 04/06/18 1800   BP: (!) 211/82 194/76   Pulse: 90    Resp: 18    Temp: 98.9 °F (37.2 °C)    SpO2: 92% 95%   Weight: 77.6 kg (171 lb)    Height: 5' 5\" (1.651 m)             Physical Exam   Constitutional: She appears well-developed and well-nourished. HENT:   Head: Normocephalic and atraumatic. Neck: Normal range of motion. Neck supple. Cardiovascular: Normal rate and regular rhythm. Pulmonary/Chest: Effort normal and breath sounds normal.   Abdominal: Soft. There is tenderness. There is no rebound and no guarding. Musculoskeletal: Normal range of motion. She exhibits no edema. Lymphadenopathy:     She has no cervical adenopathy. Skin: Skin is warm and dry. No rash noted. Nursing note and vitals reviewed. MDM  Number of Diagnoses or Management Options  Diagnosis management comments: Differential diagnosis includes gastritis, pancreatitis, irritable bowel syndrome, UTI, colitis, diverticulitis       Amount and/or Complexity of Data Reviewed  Clinical lab tests: ordered and reviewed  Review and summarize past medical records: yes  Independent visualization of images, tracings, or specimens: yes    Risk of Complications, Morbidity, and/or Mortality  Presenting problems: moderate  Diagnostic procedures: low  Management options: low    Patient Progress  Patient progress: improved        ED Course     7:41 PM  Blood Work is unremarkable. She is feeling better. repeat abdominal examination is nontender. She wants to go home. Voice dictation software was used during the making of this note. This software is not perfect and grammatical and other typographical errors may be present. This note has been proofread, but may still contain errors.   Nina Barnes MD; 4/6/2018 @7:41 PM   ===================================================================      Procedures

## 2018-04-06 NOTE — DISCHARGE INSTRUCTIONS
Abdominal Pain: Care Instructions  Your Care Instructions    Abdominal pain has many possible causes. Some aren't serious and get better on their own in a few days. Others need more testing and treatment. If your pain continues or gets worse, you need to be rechecked and may need more tests to find out what is wrong. You may need surgery to correct the problem. Don't ignore new symptoms, such as fever, nausea and vomiting, urination problems, pain that gets worse, and dizziness. These may be signs of a more serious problem. Your doctor may have recommended a follow-up visit in the next 8 to 12 hours. If you are not getting better, you may need more tests or treatment. The doctor has checked you carefully, but problems can develop later. If you notice any problems or new symptoms, get medical treatment right away. Follow-up care is a key part of your treatment and safety. Be sure to make and go to all appointments, and call your doctor if you are having problems. It's also a good idea to know your test results and keep a list of the medicines you take. How can you care for yourself at home? · Rest until you feel better. · To prevent dehydration, drink plenty of fluids, enough so that your urine is light yellow or clear like water. Choose water and other caffeine-free clear liquids until you feel better. If you have kidney, heart, or liver disease and have to limit fluids, talk with your doctor before you increase the amount of fluids you drink. · If your stomach is upset, eat mild foods, such as rice, dry toast or crackers, bananas, and applesauce. Try eating several small meals instead of two or three large ones. · Wait until 48 hours after all symptoms have gone away before you have spicy foods, alcohol, and drinks that contain caffeine. · Do not eat foods that are high in fat. · Avoid anti-inflammatory medicines such as aspirin, ibuprofen (Advil, Motrin), and naproxen (Aleve).  These can cause stomach upset. Talk to your doctor if you take daily aspirin for another health problem. When should you call for help? Call 911 anytime you think you may need emergency care. For example, call if:  ? · You passed out (lost consciousness). ? · You pass maroon or very bloody stools. ? · You vomit blood or what looks like coffee grounds. ? · You have new, severe belly pain. ?Call your doctor now or seek immediate medical care if:  ? · Your pain gets worse, especially if it becomes focused in one area of your belly. ? · You have a new or higher fever. ? · Your stools are black and look like tar, or they have streaks of blood. ? · You have unexpected vaginal bleeding. ? · You have symptoms of a urinary tract infection. These may include:  ¨ Pain when you urinate. ¨ Urinating more often than usual.  ¨ Blood in your urine. ? · You are dizzy or lightheaded, or you feel like you may faint. ? Watch closely for changes in your health, and be sure to contact your doctor if:  ? · You are not getting better after 1 day (24 hours). Where can you learn more? Go to http://isidoro-iliana.info/. Enter L268 in the search box to learn more about \"Abdominal Pain: Care Instructions. \"  Current as of: March 20, 2017  Content Version: 11.4  © 0286-3311 [employer]. Care instructions adapted under license by [employer] (which disclaims liability or warranty for this information). If you have questions about a medical condition or this instruction, always ask your healthcare professional. [place] [address] any warranty or liability for your use of this information.

## 2018-04-06 NOTE — ED TRIAGE NOTES
Pt in via [facility] from [facility] c/o lower abdominal pain starting at 1230 today. States no n/v/d/fever. Pt on 4 liters nc at home. Denies sob. denies urinary symptoms.

## 2018-04-06 NOTE — ED NOTES
I have reviewed discharge instructions with the patient. The patient verbalized understanding. Patient left ED via Discharge Method: ambulatory to Nursing Home with family. Opportunity for questions and clarification provided. Patient given 0 scripts. To continue your aftercare when you leave the hospital, you may receive an automated call from our care team to check in on how you are doing. This is a free service and part of our promise to provide the best care and service to meet your aftercare needs.  If you have questions, or wish to unsubscribe from this service please call [phone]. Thank you for Choosing our [facility] Emergency Department.

## 2018-09-24 PROBLEM — J69.0 ASPIRATION PNEUMONIA (HCC): Status: ACTIVE | Noted: 2018-01-01

## 2018-09-24 PROBLEM — J96.01 ACUTE RESPIRATORY FAILURE WITH HYPOXEMIA (HCC): Status: ACTIVE | Noted: 2018-01-01

## 2018-09-25 NOTE — H&P
Hospitalist H&P/Consult Note Admit Date:  2018  9:50 PM  
Name:  Sharath Gan Age:  80 y.o. 
:  1929 MRN:  [id number] PCP:  Marcelina Payne MD 
Treatment Team: Attending Provider: Jason Valentin MD; Primary Nurse: Ace Coburn RN 
 
HPI:  
79 y/o female with O2 dependent COPD and ILD presents to the ED in acute respiratory distress. She resides at [facility] and went to bed at 8 pm. She is usually on 6L O2 at night. Later developed acute vomiting and her O2 sats dropped. EMS alerted and administered albuterol nebulizer. Concern is that she may have aspirated. She was tachycardic, tachypneic and distressed. Placed on BIPAP and started on antibiotics. Initial lactic acid 1.1, troponin negative and chest xray decreased lung volumes. Will admit to ICU for further treatment. 10 systems reviewed and negative except as noted in HPI. Past Medical History:  
Diagnosis Date  Arthropathy, unspecified, site unspecified  Benign paroxysmal positional vertigo  Cardiac dysrhythmia, unspecified  Debility, unspecified  Facet syndrome ([facility]  GIB (gastrointestinal bleeding) 2014  History of peptic ulcer disease 2014  Hypertension  IBS (irritable bowel syndrome)  Impaired fasting glucose  Lumbago  Memory loss  Meningioma ([facility] removed in [facility] about   Musculoskeletal pain  OA (osteoarthritis) of knee  Palliative care encounter 2014  Rhinorrhea  Scoliosis  Sensorineural hearing loss  Spondylolisthesis Lumbar  TMJ dysfunction  Varicella  Vasomotor rhinitis  Vitamin D deficiency Past Surgical History:  
Procedure Laterality Date  HX APPENDECTOMY  HX CATARACT REMOVAL  2003 X2 WITH LENSE IMPLANT  HX COLONOSCOPY    
 HX KNEE ARTHROSCOPY    
 left knee  HX KNEE REPLACEMENT Left 2007 [address] Meningioma Rt brain [facility]  HX TONSILLECTOMY Prior to Admission Medications Prescriptions Last Dose Informant Patient Reported? Taking? Azelastine (ASTEPRO) 0.15 % (205.5 mcg) nasal spray   No No  
Si Roca by Both Nostrils route two (2) times a day. Cholecalciferol, Vitamin D3, (VITAMIN D3) 1,000 unit cap   No No  
Sig: Take 1,000 Units by mouth daily. Compression Socks, Medium misc   No No  
Sig: WEAR DAILY DISABLED PLACARD (DISABLED PLACARD) DMV   No No  
Sig: Use prn HYDROcodone-acetaminophen (NORCO) 5-325 mg per tablet   No No  
Sig: Take 1 Tab by mouth every six (6) hours as needed. As needed for headache OXYGEN-AIR DELIVERY SYSTEMS   Yes No  
Si L by Nasal route nightly. Continuous at 2 L, nights time at 6 L Walker (ULTRA-LIGHT ROLLATOR) misc   No No  
Sig: Use daily  
acetaminophen (TYLENOL) 325 mg tablet   Yes No  
Sig: Take  by mouth every six (6) hours as needed for Pain. albuterol (PROAIR HFA) 90 mcg/actuation inhaler   No No  
Si puffs qid prn  Indications: Chronic Obstructive Pulmonary Disease  
budesonide-formoterol (SYMBICORT) 160-4.5 mcg/actuation HFA inhaler   No No  
Si puffs bid, rinse mouth after use. busPIRone (BUSPAR) 10 mg tablet   No No  
Sig: Take 1 Tab by mouth two (2) times a day. calcium carbonate-vitamin D3 600 mg(1,500mg) -500 unit cap   No No  
Sig: Take 1 Tab by mouth two (2) times a day. donepezil (ARICEPT) 5 mg tablet   No No  
Sig: Take 1 Tab by mouth nightly. fluticasone (FLONASE) 50 mcg/actuation nasal spray   No No  
Si Sprays by Both Nostrils route daily. 2 sprays each nostril prn  
furosemide (LASIX) 40 mg tablet   No No  
Sig: Take 1 Tab by mouth daily. As needed for swelling Patient taking differently: Take 20 mg by mouth daily. As needed for swelling  
guaiFENesin (ROBITUSSIN) 100 mg/5 mL liquid   No No  
Sig: Take 10 mL by mouth three (3) times daily as needed for Cough. multivit-min-FA-lycopen-lutein (CERTAVITE SENIOR-ANTIOXIDANT) 0.4-300-250 mg-mcg-mcg tab   Yes No  
Sig: Take  by mouth daily. ondansetron (ZOFRAN ODT) 4 mg disintegrating tablet   Yes No  
Sig: Take 4 mg by mouth every eight (8) hours as needed for Nausea. pantoprazole (PROTONIX) 40 mg tablet   No No  
Sig: Take 1 Tab by mouth daily. potassium chloride SR (KLOR-CON 10) 10 mEq tablet   Yes No  
pravastatin (PRAVACHOL) 20 mg tablet   No No  
Sig: Take 1 Tab by mouth nightly. sertraline (ZOLOFT) 100 mg tablet   No No  
Sig: Take 1 Tab by mouth nightly. Facility-Administered Medications: None Allergies Allergen Reactions  Bactroban [Mupirocin Calcium] Rash  Mupirocin Other (comments)  Sulfa (Sulfonamide Antibiotics) Unknown (comments)  Sulfamethoxazole-Trimethoprim Other (comments) Social History Substance Use Topics  Smoking status: Former Smoker Packs/day: 1.50 Years: 45.00 Types: Cigarettes Quit date: 1/1/1991  Smokeless tobacco: Never Used  Alcohol use 1.8 oz/week 1 Glasses of wine, 1 Shots of liquor, 1 Standard drinks or equivalent per week Comment: OCCASIONAL Family History Problem Relation Age of Onset  Cancer Mother Pancreatic  Cancer Father Ear Immunization History Administered Date(s) Administered  Influenza High Dose Vaccine PF 10/17/2016, 10/01/2017  Influenza Vaccine 10/01/2013, 10/01/2014, 01/01/2015  Pneumococcal Conjugate (PCV-13) 07/30/2015  Pneumococcal Polysaccharide (PPSV-23) 01/29/2014  Pneumococcal Vaccine (Unspecified Type) 10/29/2014  TB Skin Test (PPD) Intradermal 01/26/2014 Objective:  
Patient Vitals for the past 24 hrs: 
 Temp Pulse Resp BP SpO2  
09/24/18 2236 - - - - 99 % 09/24/18 2215 - (!) 102 21 - 99 % 09/24/18 2214 - - - 161/65 100 % 09/24/18 2203 - (!) 103 17 - 99 % 09/24/18 2155 - (!) 111 - - -  
09/24/18 2154 98.4 °F (36.9 °C) (!) 110 20 171/75 98 % 09/24/18 2153 - (!) 110 - - - Oxygen Therapy O2 Sat (%): 99 % (09/24/18 2236) Pulse via Oximetry: 107 beats per minute (09/24/18 2236) O2 Device: BIPAP (09/24/18 2236) O2 Flow Rate (L/min): 6 l/min (09/24/18 2154) O2 Temperature: 78.3 °F (25.7 °C) (09/24/18 2236) FIO2 (%): 50 % (09/24/18 2236) No intake or output data in the 24 hours ending 09/25/18 0101 Physical Exam: 
General:    Well nourished. Alert. On BIPAP Eyes:   Normal sclera. Extraocular movements intact. ENT:  Normocephalic, atraumatic. Moist mucous membranes CV:   tachycardic. No murmur, rub, or gallop. Lungs:  Coarse breath sounds bilaterally Abdomen: Soft, nontender, nondistended. Bowel sounds normal.  
Extremities: Warm and dry. No cyanosis Neurologic: CN II-XII grossly intact. Sensation intact. Skin:     No rashes or jaundice. No wounds. Psych:  Normal mood and affect. I reviewed the labs, imaging, EKGs, telemetry, and other studies done this admission. Data Review:  
Recent Results (from the past 24 hour(s)) EKG, 12 LEAD, INITIAL Collection Time: 09/24/18  9:55 PM  
Result Value Ref Range Ventricular Rate 107 BPM  
 Atrial Rate 107 BPM  
 P-R Interval 164 ms QRS Duration 80 ms  
 Q-T Interval 348 ms QTC Calculation (Bezet) 464 ms Calculated P Axis 64 degrees Calculated R Axis 52 degrees Calculated T Axis 48 degrees Diagnosis Sinus tachycardia Possible Left atrial enlargement Borderline ECG When compared with ECG of 01-SEP-2015 15:36, No significant change was found CBC WITH AUTOMATED DIFF Collection Time: 09/24/18  9:59 PM  
Result Value Ref Range WBC 10.1 4.3 - 11.1 K/uL  
 RBC 4.20 4.05 - 5.2 M/uL  
 HGB 11.2 (L) 11.7 - 15.4 g/dL HCT 38.5 35.8 - 46.3 % MCV 91.7 79.6 - 97.8 FL  
 MCH 26.7 26.1 - 32.9 PG  
 MCHC 29.1 (L) 31.4 - 35.0 g/dL  
 RDW 14.7 % PLATELET 617 828 - 751 K/uL MPV 12.1 9.4 - 12.3 FL ABSOLUTE NRBC 0.00 0.0 - 0.2 K/uL DF AUTOMATED NEUTROPHILS 71 43 - 78 % LYMPHOCYTES 17 13 - 44 % MONOCYTES 8 4.0 - 12.0 % EOSINOPHILS 3 0.5 - 7.8 % BASOPHILS 1 0.0 - 2.0 % IMMATURE GRANULOCYTES 0 0.0 - 5.0 %  
 ABS. NEUTROPHILS 7.2 1.7 - 8.2 K/UL  
 ABS. LYMPHOCYTES 1.8 0.5 - 4.6 K/UL  
 ABS. MONOCYTES 0.8 0.1 - 1.3 K/UL  
 ABS. EOSINOPHILS 0.3 0.0 - 0.8 K/UL  
 ABS. BASOPHILS 0.1 0.0 - 0.2 K/UL  
 ABS. IMM. GRANS. 0.0 0.0 - 0.5 K/UL METABOLIC PANEL, COMPREHENSIVE Collection Time: 09/24/18  9:59 PM  
Result Value Ref Range Sodium 143 136 - 145 mmol/L Potassium 4.5 3.5 - 5.1 mmol/L Chloride 108 (H) 98 - 107 mmol/L  
 CO2 29 21 - 32 mmol/L Anion gap 6 mmol/L Glucose 115 (H) 65 - 100 mg/dL BUN 27 (H) 8 - 23 MG/DL Creatinine 1.00 0.6 - 1.0 MG/DL  
 GFR est AA >60 >60 ml/min/1.73m2 GFR est non-AA 55 ml/min/1.73m2 Calcium 8.9 8.3 - 10.4 MG/DL Bilirubin, total 0.2 0.2 - 1.1 MG/DL  
 ALT (SGPT) 21 12 - 65 U/L  
 AST (SGOT) 38 (H) 15 - 37 U/L Alk. phosphatase 82 50 - 136 U/L Protein, total 6.8 g/dL Albumin 3.3 3.2 - 4.6 g/dL Globulin 3.5 2.3 - 3.5 g/dL A-G Ratio 0.9 BNP Collection Time: 09/24/18  9:59 PM  
Result Value Ref Range BNP 83 pg/mL POC TROPONIN-I Collection Time: 09/24/18 10:01 PM  
Result Value Ref Range Troponin-I (POC) 0.02 0.02 - 0.05 ng/ml POC LACTIC ACID Collection Time: 09/24/18 10:03 PM  
Result Value Ref Range Lactic Acid (POC) 1.2 0.5 - 1.9 mmol/L  
BLOOD GAS, ARTERIAL Collection Time: 09/24/18 10:46 PM  
Result Value Ref Range pH 7.33 (L) 7.35 - 7.45    
 PCO2 51 (H) 35 - 45 mmHg PO2 101 80 - 105 mmHg BICARBONATE 27 (H) 22 - 26 mmol/L  
 BASE EXCESS 0.0 0 - 3 mmol/L  
 TOTAL HEMOGLOBIN 11.4 (L) 11.7 - 15.0 GM/DL  
 O2 SAT 97 92 - 98.5 % Arterial O2 Hgb 96.2 94 - 97 % CARBOXYHEMOGLOBIN 0.3 (L) 0.5 - 1.5 % METHEMOGLOBIN 0.3 0.0 - 1.5 % DEOXYHEMOGLOBIN 3 0.0 - 5.0 % SITE RR   
 ALLENS TEST POSITIVE    
 MODE BIPAP Tidal volume 505.0    
 RATE 21.0 Imaging Studies: CXR Results  (Last 48 hours) 09/24/18 2210  XR CHEST PORT Final result Impression:  Impression:  Decreased lung volumes. Narrative:  AP chest radiograph History: sob, 80 years Female  Patients daughter sts patient is having trouble  
breathing, he has COPD, also has been vomiting Comparison: Chest radiograph September 01, 2015 Findings:   Normal cardiomediastinal silhouette. Persistent nonspecific mild  
diffuse interstitial prominence. Probable trace bilateral pleural effusions  
unchanged. Decreased lung volumes. Mild subsegmental atelectasis bilateral  
lung bases. No evidence of pneumothorax. Visualized soft tissue and osseous  
structures otherwise unremarkable. CT Results  (Last 48 hours) None Assessment and Plan:  
 
Hospital Problems as of 9/25/2018  Date Reviewed: 6/26/2018 Codes Class Noted - Resolved POA * (Principal)Acute respiratory failure with hypoxemia ([facility] ICD-10-CM: J96.01 
ICD-9-CM: 518.81  9/24/2018 - Present Yes [facility] ICD-10-CM: J69.0 ICD-9-CM: 507.0  9/24/2018 - Present Yes COPD (chronic obstructive pulmonary disease) (HCC) (Chronic) ICD-10-CM: J44.9 ICD-9-CM: 720  9/1/2015 - Present Yes Late onset Alzheimer's disease without behavioral disturbance (Chronic) ICD-10-CM: G30.1, F02.80 ICD-9-CM: 331.0, 294.10  11/15/2017 - Present Yes Debility ICD-10-CM: R53.81 ICD-9-CM: 799.3  11/30/2015 - Present Yes ILD (interstitial lung disease) (HCC) (Chronic) ICD-10-CM: J84.9 ICD-9-CM: 055  9/1/2015 - Present Yes HTN (hypertension) (Chronic) ICD-10-CM: I10 
ICD-9-CM: 401.9  1/22/2014 - Present Yes Hyperlipidemia (Chronic) ICD-10-CM: M42.1 ICD-9-CM: 272.4  1/22/2014 - Present Yes PLAN: 
· Admit to ICU, serious condition · Continue BIPAP with supplemental O2. Hopefully avoid ventilator · Continue broad-spectrum antibiotics, Vancomycin and Zosyn · duonebs Q 4 hrs prn. Inhaled pulmicort · IV solumedrol. Flutter valve, mucolytics · Bedrest, elevate head of bed · NPO while on BIPAP except for necessary medications · Consult Pulmonary in am 
· Place PPD in case requires STR · lovenox for DVT prophylaxis · Discussed with family member at bedside Estimated LOS:  3-4 midnights Signed: 
Hammad Purdy MD

## 2018-09-25 NOTE — ED NOTES
Patient is currently having the Echo preformed in the room. Pharmacy called in regards to the Buspar and the Flonase that have been ordered. Pharmacy states that both [facility] and [facility] locations are out of Buspar and that it is not available at this time.

## 2018-09-25 NOTE — ED PROVIDER NOTES
HPI: 
80 female history of COPD, dementia brought in from assisted living facility with difficulty breathing. Patient apparently had vomited. Then became short of breath and difficulty breathing. Only a 2 L nasal cannula during the day and 6 L at night for sleep. On medic arrival she was on 6 L and at 90%. Albuterol given. She has significant wheezing, coarse breath sound with difficulty breathing. Was placed on a nonrebreather. Patient complaint of shortness of breath but denies any abdominal pain. No other falls, trauma reported Paramedic reported at facility SPO2 of 77% on 6 L 
 
ROS Constitutional: No fever, no chills Skin: no rash Eye:  
ENMT:  
Respiratory: + shortness of breath, + cough Cardiovascular: No chest pain, no palpitations Gastrointestinal: + vomiting, no nausea, no diarrhea, no abdominal pain : No dysuria MSK: No back pain, no muscle pain Neuro: No headache, no change in mental status All other review of systems positive per history of present illness and the above otherwise negative or noncontributory. Visit Vitals  /65  Pulse (!) 102  Temp 98.4 °F (36.9 °C)  Resp 21  
 Ht 4' 11\" (1.499 m)  Wt 75.3 kg (166 lb)  SpO2 99%  BMI 33.53 kg/m2 Past Medical History:  
Diagnosis Date  Arthropathy, unspecified, site unspecified  Benign paroxysmal positional vertigo  Cardiac dysrhythmia, unspecified  Debility, unspecified  Facet syndrome ([address]  GIB (gastrointestinal bleeding) 1/31/2014  History of peptic ulcer disease 01/2014  Hypertension  IBS (irritable bowel syndrome)  Impaired fasting glucose  Lumbago  Memory loss  Meningioma ([address] removed in [place] about 1998  Musculoskeletal pain  OA (osteoarthritis) of knee  Palliative care encounter 5/8/2014  Rhinorrhea  Scoliosis  Sensorineural hearing loss  Spondylolisthesis Lumbar  TMJ dysfunction  Varicella  Vasomotor rhinitis  Vitamin D deficiency Past Surgical History:  
Procedure Laterality Date  HX APPENDECTOMY  HX CATARACT REMOVAL  2003 X2 WITH LENSE IMPLANT  HX COLONOSCOPY    
 HX KNEE ARTHROSCOPY    
 left knee  HX KNEE REPLACEMENT Left 2007 [address] brain [facility]  HX TONSILLECTOMY Prior to Admission Medications Prescriptions Last Dose Informant Patient Reported? Taking? Azelastine (ASTEPRO) 0.15 % (205.5 mcg) nasal spray   No No  
Si [place] by Both Nostrils route two (2) times a day. Cholecalciferol, Vitamin D3, (VITAMIN D3) 1,000 unit cap   No No  
Sig: Take 1,000 Units by mouth daily. Compression Socks, Medium misc   No No  
Sig: WEAR DAILY DISABLED PLACARD (DISABLED PLACARD) DMV   No No  
Sig: Use prn HYDROcodone-acetaminophen (NORCO) 5-325 mg per tablet   No No  
Sig: Take 1 Tab by mouth every six (6) hours as needed. As needed for headache OXYGEN-AIR DELIVERY SYSTEMS   Yes No  
Si L by Nasal route nightly. Continuous at 2 L, nights time at 6 L Walker (ULTRA-LIGHT ROLLATOR) misc   No No  
Sig: Use daily  
acetaminophen (TYLENOL) 325 mg tablet   Yes No  
Sig: Take  by mouth every six (6) hours as needed for Pain. albuterol (PROAIR HFA) 90 mcg/actuation inhaler   No No  
Si puffs qid prn  Indications: Chronic Obstructive Pulmonary Disease  
budesonide-formoterol (SYMBICORT) 160-4.5 mcg/actuation HFA inhaler   No No  
Si puffs bid, rinse mouth after use. busPIRone (BUSPAR) 10 mg tablet   No No  
Sig: Take 1 Tab by mouth two (2) times a day. calcium carbonate-vitamin D3 600 mg(1,500mg) -500 unit cap   No No  
Sig: Take 1 Tab by mouth two (2) times a day. donepezil (ARICEPT) 5 mg tablet   No No  
Sig: Take 1 Tab by mouth nightly. fluticasone (FLONASE) 50 mcg/actuation nasal spray   No No  
Si Sprays by Both Nostrils route daily.  2 sprays each nostril prn  
 furosemide (LASIX) 40 mg tablet   No No  
Sig: Take 1 Tab by mouth daily. As needed for swelling Patient taking differently: Take 20 mg by mouth daily. As needed for swelling  
guaiFENesin (ROBITUSSIN) 100 mg/5 mL liquid   No No  
Sig: Take 10 mL by mouth three (3) times daily as needed for Cough.  
multivit-min-FA-lycopen-lutein (CERTAVITE SENIOR-ANTIOXIDANT) 0.4-300-250 mg-mcg-mcg tab   Yes No  
Sig: Take  by mouth daily. ondansetron (ZOFRAN ODT) 4 mg disintegrating tablet   Yes No  
Sig: Take 4 mg by mouth every eight (8) hours as needed for Nausea. pantoprazole (PROTONIX) 40 mg tablet   No No  
Sig: Take 1 Tab by mouth daily. potassium chloride SR (KLOR-CON 10) 10 mEq tablet   Yes No  
pravastatin (PRAVACHOL) 20 mg tablet   No No  
Sig: Take 1 Tab by mouth nightly. sertraline (ZOLOFT) 100 mg tablet   No No  
Sig: Take 1 Tab by mouth nightly. Facility-Administered Medications: None Adult Exam  
General: Patient seen while still on EMS stretcher immediately. He is short of breath. On nonrebreather. Head: normocephalic, atraumatic ENT: dry mucous membranes Neck: supple, non-tender; full range of motion Cardiovascular: Regular but tachycardic. Equal radial pulses. capillary refill less than 2 seconds Respiratory:  Respiratory distress. Retractions noted. Significant coarse breath sounds noted bilateral lung bases with significant wheezing throughout Gastrointestinal: soft, non-tender; no rebound or guarding, no peritoneal signs, no distension Back: non-tender, full range of motion Musculoskeletal: normal range of motion, normal strength, no gross deformities Neurological:  no gross focal deficits; normal speech Psychiatric: MDM: Respiratory therapist call. Patient placed on BiPAP. He more comfortable. EKG sinus tachycardia 107 with normal axis and interval.  No STEMI, ischemic changes noted. No ectopy or Brugada noted Concern for aspiration pneumonia, COPD exacerbation, community-acquired pneumonia, fluid overload. Although she has no signs of lower extremity edema. She has when necessary Lasix but according to her daughter is for ankle swelling and not 2/2 to congestive heart. Chest x-ray without any obvious focal infiltrate or given her history, her lung exam concerning for Aspiration pneumonia. Patient given vancomycin and Zosyn. Much more tolerable on BiPAP. Respiration improved. Repeat abdominal exam without any pain. Do not suspect any acute intra-abdominal pathology at this time. No ICU bed available at [facility] or here. Patient will be admitted here by hospitalist service. 
 
=================================================================== This patient is critically ill and there is a high probability of of imminent or life threatening deterioration in the patient's condition without immediate management. The nature of the patient's clinical problem is: Suspicious for aspiration pneumonia, respiratory failure with hypoxia I have spent at least 35 minutes in direct patient care, documentation, review of labs/xrays/old records, discussion with family and Hospitalist  
 
The time involved in the performance of separately reportable procedures was not counted toward critical care time. Jemma Tellez MD; 9/25/2018 @12:20 AM 
=================================================================== 
 
 
 
 
ED Course Xr Chest [facility] Result Date: 9/24/2018 AP chest radiograph History: sob, 80 years Female  Patients daughter sts patient is having trouble breathing, he has COPD, also has been vomiting Comparison: Chest radiograph September 01, 2015 Findings:   Normal cardiomediastinal silhouette. Persistent nonspecific mild diffuse interstitial prominence. Probable trace bilateral pleural effusions unchanged. Decreased lung volumes.   Mild subsegmental atelectasis bilateral lung bases. No evidence of pneumothorax. Visualized soft tissue and osseous structures otherwise unremarkable. Impression:  Decreased lung volumes. Recent Results (from the past 24 hour(s)) EKG, 12 LEAD, INITIAL Collection Time: 09/24/18  9:55 PM  
Result Value Ref Range Ventricular Rate 107 BPM  
 Atrial Rate 107 BPM  
 P-R Interval 164 ms QRS Duration 80 ms  
 Q-T Interval 348 ms QTC Calculation (Bezet) 464 ms Calculated P Axis 64 degrees Calculated R Axis 52 degrees Calculated T Axis 48 degrees Diagnosis Sinus tachycardia Possible Left atrial enlargement Borderline ECG When compared with ECG of 01-SEP-2015 15:36, No significant change was found CBC WITH AUTOMATED DIFF Collection Time: 09/24/18  9:59 PM  
Result Value Ref Range WBC 10.1 4.3 - 11.1 K/uL  
 RBC 4.20 4.05 - 5.2 M/uL  
 HGB 11.2 (L) 11.7 - 15.4 g/dL HCT 38.5 35.8 - 46.3 % MCV 91.7 79.6 - 97.8 FL  
 MCH 26.7 26.1 - 32.9 PG  
 MCHC 29.1 (L) 31.4 - 35.0 g/dL  
 RDW 14.7 % PLATELET 374 925 - 051 K/uL MPV 12.1 9.4 - 12.3 FL ABSOLUTE NRBC 0.00 0.0 - 0.2 K/uL  
 DF AUTOMATED NEUTROPHILS 71 43 - 78 % LYMPHOCYTES 17 13 - 44 % MONOCYTES 8 4.0 - 12.0 % EOSINOPHILS 3 0.5 - 7.8 % BASOPHILS 1 0.0 - 2.0 % IMMATURE GRANULOCYTES 0 0.0 - 5.0 %  
 ABS. NEUTROPHILS 7.2 1.7 - 8.2 K/UL  
 ABS. LYMPHOCYTES 1.8 0.5 - 4.6 K/UL  
 ABS. MONOCYTES 0.8 0.1 - 1.3 K/UL  
 ABS. EOSINOPHILS 0.3 0.0 - 0.8 K/UL  
 ABS. BASOPHILS 0.1 0.0 - 0.2 K/UL  
 ABS. IMM. GRANS. 0.0 0.0 - 0.5 K/UL METABOLIC PANEL, COMPREHENSIVE Collection Time: 09/24/18  9:59 PM  
Result Value Ref Range Sodium 143 136 - 145 mmol/L Potassium 4.5 3.5 - 5.1 mmol/L Chloride 108 (H) 98 - 107 mmol/L  
 CO2 29 21 - 32 mmol/L Anion gap 6 mmol/L Glucose 115 (H) 65 - 100 mg/dL BUN 27 (H) 8 - 23 MG/DL Creatinine 1.00 0.6 - 1.0 MG/DL  
 GFR est AA >60 >60 ml/min/1.73m2 GFR est non-AA 55 ml/min/1.73m2 Calcium 8.9 8.3 - 10.4 MG/DL Bilirubin, total 0.2 0.2 - 1.1 MG/DL  
 ALT (SGPT) 21 12 - 65 U/L  
 AST (SGOT) 38 (H) 15 - 37 U/L Alk. phosphatase 82 50 - 136 U/L Protein, total 6.8 g/dL Albumin 3.3 3.2 - 4.6 g/dL Globulin 3.5 2.3 - 3.5 g/dL A-G Ratio 0.9 BNP Collection Time: 09/24/18  9:59 PM  
Result Value Ref Range BNP 83 pg/mL POC TROPONIN-I Collection Time: 09/24/18 10:01 PM  
Result Value Ref Range Troponin-I (POC) 0.02 0.02 - 0.05 ng/ml POC LACTIC ACID Collection Time: 09/24/18 10:03 PM  
Result Value Ref Range Lactic Acid (POC) 1.2 0.5 - 1.9 mmol/L  
BLOOD GAS, ARTERIAL Collection Time: 09/24/18 10:46 PM  
Result Value Ref Range pH 7.33 (L) 7.35 - 7.45    
 PCO2 51 (H) 35 - 45 mmHg PO2 101 80 - 105 mmHg BICARBONATE 27 (H) 22 - 26 mmol/L  
 BASE EXCESS 0.0 0 - 3 mmol/L  
 TOTAL HEMOGLOBIN 11.4 (L) 11.7 - 15.0 GM/DL  
 O2 SAT 97 92 - 98.5 % Arterial O2 Hgb 96.2 94 - 97 % CARBOXYHEMOGLOBIN 0.3 (L) 0.5 - 1.5 % METHEMOGLOBIN 0.3 0.0 - 1.5 % DEOXYHEMOGLOBIN 3 0.0 - 5.0 % SITE RR   
 ALLENS TEST POSITIVE    
 MODE BIPAP Tidal volume 505.0    
 RATE 21.0 Dragon voice recognition software was used to create this note. Although the note has been reviewed and corrected where necessary, additional errors may have been overlooked and remain in the text.

## 2018-09-25 NOTE — PROGRESS NOTES
Patient taken off Bipap and placed on NC at 4 L. Patient wears 2 L during the day and 6 L at night per home settings. Patients sats are 93% HR 94, patient stable at this time, no distress noted.

## 2018-09-25 NOTE — PROGRESS NOTES
Patient continues to be fairly compliant with BIPAP/AVAPS. Other than removing her lower lip from the mask her Vt's have been >500. FiO2 was weaned from 50% to 40%. BBS diminished.

## 2018-09-25 NOTE — PROGRESS NOTES
Pharmacokinetic Consult to Pharmacist 
 
Jordi Swenson is a 80 y.o. female being treated for HAP with Zosyn and vancomycin. Height: 4' 11\" (149.9 cm)  Weight: 75.3 kg (166 lb) Lab Results Component Value Date/Time BUN 27 (H) 09/24/2018 09:59 PM  
 Creatinine 1.00 09/24/2018 09:59 PM  
 WBC 10.1 09/24/2018 09:59 PM  
 Procalcitonin 0.2 02/02/2014 11:57 AM  
 Lactic acid 1.7 01/31/2014 06:01 AM  
 Lactic Acid (POC) 1.2 09/24/2018 10:03 PM  
  
Estimated Creatinine Clearance: 35.8 mL/min (based on Cr of 1). CULTURES: 
All Micro Results Procedure Component Value Units Date/Time CULTURE, BLOOD [[id number]] Collected:  09/24/18 2159 Order Status:  Completed Specimen:  Blood from Blood Updated:  09/24/18 2209 CULTURE, BLOOD [[id number]] Collected:  09/24/18 2159 Order Status:  Completed Specimen:  Blood from Blood Updated:  09/24/18 2209 Day 1 of vancomycin. Goal trough is 15-20. Vancomycin dose initiated at 1000 mg x 1 then 750 mg Q 18 hrs. The calculated trough is 18.4 and a trough will be drawn prior to the 3rd dose. Will continue to follow patient. Thank you, PHILL! Dieter, Pharm D, [facility] Clinical Pharmacist

## 2018-09-25 NOTE — CONSULTS
CONSULT NOTE Murphy Miller 9/25/2018 Date of Admission:  9/24/2018 The patient's chart is reviewed and the patient is discussed with the staff. Subjective:  
 
Patient is a 80 y.o.  female seen and evaluated at the request of Dr. Aparna Yang. 81 y/o female with O2 dependent COPD (6L QHS and 4 L AM) and ? ILD/dementia/jt;tu presents to the ED in acute respiratory distress with vomiting. She lives in assisted living at the [facility] living and able to do most ADLs (eating, restroom use, but not dressing) and apparently had been having multiple bouts of vomiting and abdominal discomfort and sats were dropping. She reported did expectorate all contents and did not aspirate. No diarrhea. No fevers, no prior episodes. She was placed on BIPAP for worsening hypoxemia and concern for ?aspiration. Called to assist with care. Since consult called, just went to see patient and taken off BIPAP to oxygen at 5 LPM. Currently no dyspnea. No vomiting since last night. Abdomen still slightly upset. Daughter is with here and reports patient appears to be getting back to baseline. lly on 6L O2 at night. Later developed acute vomiting and her O2 sats dropped. EMS alerted and administered albuterol nebulizer. Concern is that she may have aspirated. She was tachycardic, tachypneic and distressed. Placed on BIPAP and started on antibiotics. Initial lactic acid 1.1, troponin negative and chest xray decreased lung volumes. Will admit to ICU for further treatment. Review of Systems: 
-Fever 
-Headaches 
-Chest pain +Dyspnea, -wheezing,+ cough +Abdominal pain, -constipation +N/V (none right now) -Leg swelling All other organ systems grossly normal. 
 
 
Patient Active Problem List  
Diagnosis Code  Hypoxia R09.02  
 HTN (hypertension) I10  
 Hyperlipidemia E78.5  Iron deficiency anemia D50.9  Unspecified hearing loss H91.90  
 Urinary incontinence R32  Unspecified vitamin D deficiency E55.9  COPD (chronic obstructive pulmonary disease) ([facility]) J44.9  DDD (degenerative disc disease), lumbar M51.36  Scoliosis M41.9  Debility, unspecified R53.81  
 ILD (interstitial lung disease) ([facility] J84.9  Edema R60.9  Anxiety F41.9  Vitamin D deficiency E55.9  Debility R53.81  Late onset Alzheimer's disease without behavioral disturbance G30.1, F02.80  
 History of meningioma Z86.018  
 Aspiration pneumonia ([facility]) J69.0  Acute respiratory failure with hypoxemia ([facility]) J96.01 Prior to Admission Medications Prescriptions Last Dose Informant Patient Reported? Taking? Azelastine (ASTEPRO) 0.15 % (205.5 mcg) nasal spray   No No  
Si [place] by Both Nostrils route two (2) times a day. Cholecalciferol, Vitamin D3, (VITAMIN D3) 1,000 unit cap   No No  
Sig: Take 1,000 Units by mouth daily. Compression Socks, Medium misc   No No  
Sig: WEAR DAILY DISABLED PLACARD (DISABLED PLACARD) DMV   No No  
Sig: Use prn HYDROcodone-acetaminophen (NORCO) 5-325 mg per tablet   No No  
Sig: Take 1 Tab by mouth every six (6) hours as needed. As needed for headache OXYGEN-AIR DELIVERY SYSTEMS   Yes No  
Si L by Nasal route nightly. Continuous at 2 L, nights time at 6 L Walker (ULTRA-LIGHT ROLLATOR) misc   No No  
Sig: Use daily  
acetaminophen (TYLENOL) 325 mg tablet   Yes No  
Sig: Take  by mouth every six (6) hours as needed for Pain. albuterol (PROAIR HFA) 90 mcg/actuation inhaler   No No  
Si puffs qid prn  Indications: Chronic Obstructive Pulmonary Disease  
budesonide-formoterol (SYMBICORT) 160-4.5 mcg/actuation HFA inhaler   No No  
Si puffs bid, rinse mouth after use. busPIRone (BUSPAR) 10 mg tablet   No No  
Sig: Take 1 Tab by mouth two (2) times a day. calcium carbonate-vitamin D3 600 mg(1,500mg) -500 unit cap   No No  
Sig: Take 1 Tab by mouth two (2) times a day.   
donepezil (ARICEPT) 5 mg tablet   No No  
 Sig: Take 1 Tab by mouth nightly. fluticasone (FLONASE) 50 mcg/actuation nasal spray   No No  
Si Spr[place] by Both Nostrils route daily. 2 sprays each nostril prn  
furosemide (LASIX) 40 mg tablet   No No  
Sig: Take 1 Tab by mouth daily. As needed for swelling Patient taking differently: Take 20 mg by mouth daily. As needed for swelling  
guaiFENesin (ROBITUSSIN) 100 mg/5 mL liquid   No No  
Sig: Take 10 mL by mouth three (3) times daily as needed for Cough.  
multivit-min-FA-lycopen-lutein (CERTAVITE SENIOR-ANTIOXIDANT) 0.4-300-250 mg-mcg-mcg tab   Yes No  
Sig: Take  by mouth daily. ondansetron (ZOFRAN ODT) 4 mg disintegrating tablet   Yes No  
Sig: Take 4 mg by mouth every eight (8) hours as needed for Nausea. pantoprazole (PROTONIX) 40 mg tablet   No No  
Sig: Take 1 Tab by mouth daily. potassium chloride SR (KLOR-CON 10) 10 mEq tablet   Yes No  
pravastatin (PRAVACHOL) 20 mg tablet   No No  
Sig: Take 1 Tab by mouth nightly. sertraline (ZOLOFT) 100 mg tablet   No No  
Sig: Take 1 Tab by mouth nightly. Facility-Administered Medications: None Past Medical History:  
Diagnosis Date  Arthropathy, unspecified, site unspecified  Benign paroxysmal positional vertigo  Cardiac dysrhythmia, unspecified  Debility, unspecified  Facet syndrome ([address]  GIB (gastrointestinal bleeding) 2014  History of peptic ulcer disease 2014  Hypertension  IBS (irritable bowel syndrome)  Impaired fasting glucose  Lumbago  Memory loss  Meningioma ([address] removed in [facility] about   Musculoskeletal pain  OA (osteoarthritis) of knee  Palliative care encounter 2014  Rhinorrhea  Scoliosis  Sensorineural hearing loss  Spondylolisthesis Lumbar  TMJ dysfunction  Varicella  Vasomotor rhinitis  Vitamin D deficiency Past Surgical History:  
Procedure Laterality Date  HX APPENDECTOMY  HX CATARACT REMOVAL  2003 X2 WITH LENSE IMPLANT  HX COLONOSCOPY    
 HX KNEE ARTHROSCOPY    
 left knee  HX KNEE REPLACEMENT Left 2007 [address] brain [address]  HX TONSILLECTOMY Social History Social History  Marital status:  Spouse name: N/A  
 Number of children: N/A  
 Years of education: [employer] Occupational History   Retired Social History Main Topics  Smoking status: Former Smoker Packs/day: 1.50 Years: 45.00 Types: Cigarettes Quit date: 1/1/1991  Smokeless tobacco: Never Used  Alcohol use 1.8 oz/week 1 Glasses of wine, 1 Shots of liquor, 1 Standard drinks or equivalent per week Comment: OCCASIONAL  
 Drug use: No  
 Sexual activity: Not on file Other Topics Concern  Not on file Social History Narrative Lives alone. Ambulates with walker/cane, has supportive daughters Family History Problem Relation Age of Onset  Cancer Mother Pancreatic  Cancer Father Ear Allergies Allergen Reactions  Bactroban [Mupirocin Calcium] Rash  Mupirocin Other (comments)  Sulfa (Sulfonamide Antibiotics) Unknown (comments)  Sulfamethoxazole-Trimethoprim Other (comments) Current Facility-Administered Medications Medication Dose Route Frequency  busPIRone (BUSPAR) tablet 10 mg  10 mg Oral BID  Azelastine (ASTEPRO) 0.15 % (205.5 mcg) nasal spray 1 Spray (Patient Supplied)  1 Spray Both Nostrils BID  
 donepezil (ARICEPT) tablet 5 mg  5 mg Oral QHS  cholecalciferol (VITAMIN D3) tablet 1,000 Units  1,000 Units Oral DAILY  calcium-vitamin D (OS-INDIO) 500 mg-200 unit tablet  1 Tab Oral BID  pantoprazole (PROTONIX) tablet 40 mg  40 mg Oral DAILY  pravastatin (PRAVACHOL) tablet 20 mg  20 mg Oral QHS  sertraline (ZOLOFT) tablet 100 mg  100 mg Oral QHS  fluticasone (FLONASE) 50 mcg/actuation nasal spray 2 Spray  2 Spray Both Nostrils DAILY  sodium chloride (NS) flush 5-10 mL  5-10 mL IntraVENous Q8H  
 sodium chloride (NS) flush 5-10 mL  5-10 mL IntraVENous PRN  
 methylPREDNISolone (PF) (SOLU-MEDROL) injection 40 mg  40 mg IntraVENous Q12H  
 naloxone (NARCAN) injection 0.4 mg  0.4 mg IntraVENous PRN  
 acetaminophen (TYLENOL) tablet 650 mg  650 mg Oral Q4H PRN  
 enoxaparin (LOVENOX) injection 40 mg  40 mg SubCUTAneous Q24H  
 bisacodyl (DULCOLAX) tablet 5 mg  5 mg Oral DAILY PRN  
 ondansetron (ZOFRAN) injection 4 mg  4 mg IntraVENous Q4H PRN  
 guaiFENesin-dextromethorphan (ROBITUSSIN DM) 100-10 mg/5 mL syrup 10 mL  10 mL Oral Q6H PRN  
 albuterol-ipratropium (DUO-NEB) 2.5 MG-0.5 MG/3 ML  3 mL Nebulization Q4H PRN  
 budesonide (PULMICORT) 500 mcg/2 ml nebulizer suspension  500 mcg Nebulization BID RT And  
 albuterol (PROVENTIL VENTOLIN) nebulizer solution 2.5 mg  2.5 mg Nebulization Q6HWA RT  
 piperacillin-tazobactam (ZOSYN) 4.5 g in 0.9% sodium chloride (MBP/ADV) 100 mL  4.5 g IntraVENous Q8H  
 tuberculin injection 5 Units  5 Units IntraDERMal ONCE  
 vancomycin (VANCOCIN) 1,000 mg in 0.9% sodium chloride (MBP/ADV) 250 mL  1 g IntraVENous Q18H Current Outpatient Prescriptions Medication Sig  
 HYDROcodone-acetaminophen (NORCO) 5-325 mg per tablet Take 1 Tab by mouth every six (6) hours as needed. As needed for headache  
 guaiFENesin (ROBITUSSIN) 100 mg/5 mL liquid Take 10 mL by mouth three (3) times daily as needed for Cough.  Azelastine (ASTEPRO) 0.15 % (205.5 mcg) nasal spray 1 [place] by Both Nostrils route two (2) times a day.  fluticasone (FLONASE) 50 mcg/actuation nasal spray 2 Sprays by Both Nostrils route daily. 2 sprays each nostril prn  ondansetron (ZOFRAN ODT) 4 mg disintegrating tablet Take 4 mg by mouth every eight (8) hours as needed for Nausea.  calcium carbonate-vitamin D3 600 mg(1,500mg) -500 unit cap Take 1 Tab by mouth two (2) times a day.  Walker (ULTRA-LIGHT ROLLATOR) misc Use daily  busPIRone (BUSPAR) 10 mg tablet Take 1 Tab by mouth two (2) times a day.  potassium chloride SR (KLOR-CON 10) 10 mEq tablet  donepezil (ARICEPT) 5 mg tablet Take 1 Tab by mouth nightly.  sertraline (ZOLOFT) 100 mg tablet Take 1 Tab by mouth nightly.  pantoprazole (PROTONIX) 40 mg tablet Take 1 Tab by mouth daily.  pravastatin (PRAVACHOL) 20 mg tablet Take 1 Tab by mouth nightly.  furosemide (LASIX) 40 mg tablet Take 1 Tab by mouth daily. As needed for swelling (Patient taking differently: Take 20 mg by mouth daily. As needed for swelling)  albuterol (PROAIR HFA) 90 mcg/actuation inhaler 2 puffs qid prn  Indications: Chronic Obstructive Pulmonary Disease  budesonide-formoterol (SYMBICORT) 160-4.5 mcg/actuation HFA inhaler 2 puffs bid, rinse mouth after use.  Compression Socks, Medium misc WEAR DAILY  DISABLED PLACARD (DISABLED PLACARD) DMV Use prn  Cholecalciferol, Vitamin D3, (VITAMIN D3) 1,000 unit cap Take 1,000 Units by mouth daily.  multivit-min-FA-lycopen-lutein (CERTAVITE SENIOR-ANTIOXIDANT) 0.4-300-250 mg-mcg-mcg tab Take  by mouth daily.  acetaminophen (TYLENOL) 325 mg tablet Take  by mouth every six (6) hours as needed for Pain.  OXYGEN-AIR DELIVERY SYSTEMS 6 L by Nasal route nightly. Continuous at 2 L, nights time at 6 L Objective:  
 
Vitals:  
 09/25/18 0700 09/25/18 0708 09/25/18 9828 09/25/18 0720 BP: 155/68 Pulse:  89 Resp:      
Temp:      
SpO2:   98% 97% Weight:      
Height:      
 
 
Blood pressure 155/68, pulse 89, temperature 98.6 °F (37 °C), resp. rate 22, height 4' 11\" (1.499 m), weight 166 lb (75.3 kg), SpO2 93 %. PHYSICAL EXAM  
 
Constitutional:  the patient is well developed and in no acute distress on 5 LPM 
EENMT:  Sclera clear, pupils equal, oral mucosa moist 
Respiratory: few crackles noted. No wheezing, no rhonchi Cardiovascular:  RRR with noted murmur 3/6 to LSB. Gastrointestinal: soft and ?tenderness diffuse and mostly upper quadrants. No rebound and no guarding. with positive but low bowel sounds. Musculoskeletal: warm without cyanosis. There is no lower leg edema. Skin:  no jaundice or rashes, no wounds Neurologic: no gross neuro deficits Psychiatric:  alert and oriented x 3 CXR:  Mild chronic changes vs atelectasis in the bases left > right Recent Labs  
   09/25/18 
 0435  09/24/18 
 2159 WBC  8.4  10.1 HGB  10.1*  11.2* HCT  34.4*  38.5 PLT  170  195 Recent Labs  
   09/25/18 
 0435  09/24/18 
 2159 NA  145  143  
K  4.0  4.5  
CL  108*  108* GLU  107*  115* CO2  33*  29 BUN  25*  27* CREA  0.88  1.00  
MG  2.0   --   
PHOS  4.3*   --   
CA  8.8  8.9 ALB   --   3.3 TBILI   --   0.2 ALT   --   21 SGOT   --   38* Recent Labs  
   09/24/18 
 2246 PH  7.33* PCO2  51* PO2  101 HCO3  27* No results for input(s): LCAD, LAC in the last 72 hours. Assessment:  (Medical Decision Making) Hospital Problems  Date Reviewed: 6/26/2018 Codes Class Noted POA Aspiration pneumonia ([facility] ICD-10-CM: J69.0 ICD-9-CM: 507.0  9/24/2018 Yes * (Principal)Acute respiratory failure with hypoxemia ([facility] ICD-10-CM: J96.01 
ICD-9-CM: 518.81  9/24/2018 Yes Late onset Alzheimer's disease without behavioral disturbance (Chronic) ICD-10-CM: G30.1, F02.80 ICD-9-CM: 331.0, 294.10  11/15/2017 Yes Debility ICD-10-CM: R53.81 ICD-9-CM: 799.3  11/30/2015 Yes COPD (chronic obstructive pulmonary disease) ([facility]) (Chronic) ICD-10-CM: J44.9 ICD-9-CM: 597  9/1/2015 Yes ILD (interstitial lung disease) ([facility]) (Chronic) ICD-10-CM: J84.9 ICD-9-CM: 601  9/1/2015 Yes HTN (hypertension) (Chronic) ICD-10-CM: I10 
ICD-9-CM: 401.9  1/22/2014 Yes Hyperlipidemia (Chronic) ICD-10-CM: W34.6 ICD-9-CM: 272.4  1/22/2014 Yes Cardiac murmur --likely AS, check severity Plan:  (Medical Decision Making) -- breathing appears to be getting back to her baseline. Would continue to taper back to 4 LPM at rest and 6 LPM QHS. Hold off BIPAP for now 
-- agree with nebs -- get new CT as baseline for ? ILD and check if aspirated -- continue abx for now and antiemetics --not wheezing and taper steroids to 40 q12 and rapidly taper as tolerated 
--agree with speech evaluation to r/o aspirating, since does cough occasionally with PO intake --echo to check valvular heart disease. Currently loud on examination 
--f/u with pmd regarding vomiting/abdomainl issues. --continue remaining treatment. More than 50% of the time documented was spent in face-to-face contact with the patient and in the care of the patient on the floor/unit where the patient is located. Thank you very much for this referral.  We appreciate the opportunity to participate in this patient's care. Will follow along with above stated plan.  
 
Madhavi Israel MD

## 2018-09-25 NOTE — PROGRESS NOTES
Ct noted with small effusion in left chest. No overt infiltrates likely atelectasis. No ILD noted. Esophagus appears to have fluid ?food in it.  Will get GI since ?achalsia or cyst 
 
Vera Burt MD

## 2018-09-25 NOTE — PROGRESS NOTES
Speech language pathology: bedside swallow note: iNITIAL ASSESSMENT AND dISCHARGE 
 
NAME/AGE/GENDER: Luep Babb is a 80 y.o. female DATE: 9/25/2018 PRIMARY DIAGNOSIS: Acute respiratory failure with hypoxemia ([facility] Aspiration pneumonia ([facility] ICD-10: Treatment Diagnosis: Oropharyngeal dysphagia (R13.12) INTERDISCIPLINARY COLLABORATION: Registered Nurse PRECAUTIONS/ALLERGIES: Bactroban [mupirocin calcium]; Mupirocin; Sulfa (sulfonamide antibiotics); and Sulfamethoxazole-trimethoprim ASSESSMENT:Based on the objective data described below, Ms. Rashad Martinez presents with oral and pharyngeal phase of swallow within functional limits. There was a single instance of throat clearing with thin liquids that was not replicated with additional thin liquid trials. Otherwise, no overt signs or symptoms of aspiration observed with any other consistency assessed. Swallows timely, clear to cervical auscultation and with adequate laryngeal excursion. Voice clear after swallow. No oral residue observed. Conversational speech clear and appropriate. Recommend continue current regular texture diet and thin liquids. Give meds whole with applesauce. If chronic aspiration is a concern, consider further objective assessment of pharyngeal phase of swallow via Modified Barium Swallow. MD to order if that is desired. Otherwise, no further skilled speech/swallow intervention currently indicated. PLAN OF CARE:  
Patient will benefit from skilled intervention to address the following impairments. RECOMMENDATIONS AND PLANNED INTERVENTIONS (Benefits and precautions of therapy have been discussed with the patient.): 
· continue prescribed diet MEDICATIONS: 
· Whole in puree COMPENSATORY STRATEGIES/MODIFICATIONS INCLUDING: 
· Upright for all PO · Reflux precautions RECOMMENDED REHABILITATION/EQUIPMENT: (at time of discharge pending progress): Due to the probability of continued deficits (see above) this patient will not likely need continued skilled speech therapy after discharge. SUBJECTIVE:  
\"I did not breathe in when I was throwing up. I did not breathe my vomit. \" History of Present Injury/Illness: Ms. Khai Leger  has a past medical history of Arthropathy, unspecified, site unspecified; Benign paroxysmal positional vertigo; Cardiac dysrhythmia, unspecified; Debility, unspecified; Facet syndrome ([facility] GIB (gastrointestinal bleeding) (1/31/2014); History of peptic ulcer disease (01/2014); Hypertension; IBS (irritable bowel syndrome); Impaired fasting glucose; Lumbago; Memory loss; Meningioma ([facility] Musculoskeletal pain; OA (osteoarthritis) of knee; Palliative care encounter (5/8/2014); Rhinorrhea; Scoliosis; Sensorineural hearing loss; Spondylolisthesis; TMJ dysfunction; Varicella; Vasomotor rhinitis; and Vitamin D deficiency. . She also  has a past surgical history that includes hx [place] and bso (1974); hx knee arthroscopy; hx appendectomy; hx other surgical (1998); hx knee replacement (Left, 2007); hx colonoscopy; hx tonsillectomy; and hx cataract removal (2003). Present Symptoms: aspiration Pain Intensity 1: 0 Current Medications: No current facility-administered medications on file prior to encounter. Current Outpatient Prescriptions on File Prior to Encounter Medication Sig Dispense Refill  
 HYDROcodone-acetaminophen (NORCO) 5-325 mg per tablet Take 1 Tab by mouth every six (6) hours as needed. As needed for headache 30 Tab 0  
 guaiFENesin (ROBITUSSIN) 100 mg/5 mL liquid Take 10 mL by mouth three (3) times daily as needed for Cough. 473 mL 2  
 Azelastine (ASTEPRO) 0.15 % (205.5 mcg) nasal spray 1 [place] by Both Nostrils route two (2) times a day. 1 Bottle 12  
 fluticasone (FLONASE) 50 mcg/actuation nasal spray 2 Sprays by Both Nostrils route daily.  2 sprays each nostril prn 1 Bottle 11  
 ondansetron (ZOFRAN ODT) 4 mg disintegrating tablet Take 4 mg by mouth every eight (8) hours as needed for Nausea.  calcium carbonate-vitamin D3 600 mg(1,500mg) -500 unit cap Take 1 Tab by mouth two (2) times a day. 60 Cap 12  Walker (ULTRA-LIGHT ROLLATOR) misc Use daily 1 Each 0  
 busPIRone (BUSPAR) 10 mg tablet Take 1 Tab by mouth two (2) times a day. 60 Tab 3  potassium chloride SR (KLOR-CON 10) 10 mEq tablet  donepezil (ARICEPT) 5 mg tablet Take 1 Tab by mouth nightly. 90 Tab 3  
 sertraline (ZOLOFT) 100 mg tablet Take 1 Tab by mouth nightly. 30 Tab 3  pantoprazole (PROTONIX) 40 mg tablet Take 1 Tab by mouth daily. 180 Tab 4  pravastatin (PRAVACHOL) 20 mg tablet Take 1 Tab by mouth nightly. 90 Tab 4  furosemide (LASIX) 40 mg tablet Take 1 Tab by mouth daily. As needed for swelling (Patient taking differently: Take 20 mg by mouth daily. As needed for swelling) 90 Tab 3  
 albuterol (PROAIR HFA) 90 mcg/actuation inhaler 2 puffs qid prn  Indications: Chronic Obstructive Pulmonary Disease 1 Inhaler 11  
 budesonide-formoterol (SYMBICORT) 160-4.5 mcg/actuation HFA inhaler 2 puffs bid, rinse mouth after use. 1 Inhaler 11  Compression Socks, Medium misc WEAR DAILY 2 Each 12  
 DISABLED PLACARD (DISABLED PLACARD) DMV Use prn 1 Each 0  
 Cholecalciferol, Vitamin D3, (VITAMIN D3) 1,000 unit cap Take 1,000 Units by mouth daily. 90 Cap 3  
 multivit-min-FA-lycopen-lutein (CERTAVITE SENIOR-ANTIOXIDANT) 0.4-300-250 mg-mcg-mcg tab Take  by mouth daily.  acetaminophen (TYLENOL) 325 mg tablet Take  by mouth every six (6) hours as needed for Pain.  OXYGEN-AIR DELIVERY SYSTEMS 6 L by Nasal route nightly. Continuous at 2 L, nights time at 6 L Current Dietary Status:  Regular textures, thin liquids OBJECTIVE:  
Respiratory Status:  Nasal cannula  5 l/min CXR Results:Impression:  Decreased lung volumes.  
Chest CT Results:IMPRESSION: No convincing interstitial lung disease seen but there is bilateral 
 effusions and overlying atelectasis or infiltrates larger on the left than the right. Fluid filling esophagus could be reflux, increasing risk of aspiration but no material seen in the trachea or bronchi at this time. Probable simple 
cysts and nonobstructing left kidney stone. Cognitive and Communication Status: 
Neurologic State: Alert Orientation Level: Disoriented to place; Disoriented to time;Oriented to person Cognition: Follows commands Perception: Appears intact Perseveration: Perseverates during conversation Safety/Judgement: Awareness of environment BEDSIDE SWALLOW EVALUATION Oral Assessment: 
Oral Assessment Labial: No impairment Oral Hygiene: adequate Lingual: No impairment Velum: No impairment P.O. Trials: 
Patient Position: upright in bed The patient was given the following:  
Consistency Presented: Thin liquid; Solid;Puree;Mixed consistency How Presented: Self-fed/presented;SLP-fed/presented;Cup/sip;Cup/gulp; Spoon;Straw;Successive swallows ORAL PHASE: 
Bolus Acceptance: No impairment Bolus Formation/Control: No impairment Propulsion: No impairment Oral Residue: None PHARYNGEAL PHASE: 
Initiation of Swallow: No impairment Laryngeal Elevation: Functional 
Aspiration Signs/Symptoms: Delayed cough/throat clear Vocal Quality: No impairment Effective Modifications: None Pharyngeal Phase Characteristics: No impairment, issues, or problems OTHER OBSERVATIONS: 
Rate/bite size: WNL Endurance: WNL Tool Used: Dysphagia Outcome and Severity Scale (CHILANGO) Score Comments Normal Diet  [] 7 With no strategies or extra time needed Functional Swallow  [x] 6 May have mild oral or pharyngeal delay Mild Dysphagia 
  [] 5 Which may require one diet consistency restricted (those who demonstrate penetration which is entirely cleared on MBS would be included) Mild-Moderate Dysphagia  [] 4 With 1-2 diet consistencies restricted Moderate Dysphagia  [] 3 With 2 or more diet consistencies restricted Moderately Severe Dysphagia  [] 2 With partial PO strategies (trials with ST only) Severe Dysphagia  [] 1 With inability to tolerate any PO safely Score:  Initial: 6 Most Recent: X (Date: -- ) Interpretation of Tool: The Dysphagia Outcome and Severity Scale (CHILANGO) is a simple, easy-to-use, 7-point scale developed to systematically rate the functional severity of dysphagia based on objective assessment and make recommendations for diet level, independence level, and type of nutrition. Score 7 6 5 4 3 2 1 Modifier CH CI CJ CK CL CM CN ? Swallowing:  
  - CURRENT STATUS: CI - 1%-19% impaired, limited or restricted  - GOAL STATUS:  CI - 1%-19% impaired, limited or restricted  - D/C STATUS:  CI - 1%-19% impaired, limited or restricted Payor: Daniella Robertson / Plan: Esmer Adler / Product Type: Commerical /  
 
TREATMENT:  
 (In addition to Assessment/Re-Assessment sessions the following treatments were rendered) Assessment/Reassessment only, no treatment provided today. Safety: After treatment position/precautions: 
· RN notified · Family at bedside · Upright in Bed Treatment Assessment:  No further skilled speech/swallow intervention currently recommended. Total Treatment Duration: 
Time In: 1300 Time Out: 1330 Sabrina Smith MA, CCC-SLP

## 2018-09-25 NOTE — PROGRESS NOTES
Hospitalist   
 
Admit Date:  2018  9:50 PM  
Name:  Lupe Babb Age:  80 y.o. 
:  1929 MRN:  [id number] PCP:  Jonas Barnes MD 
Treatment Team: Attending Provider: Misti Pringle MD; Primary Nurse: Lucie Can RN; Consulting Provider: Meghana Mack MD 
 
Subj  
81 y/o female with O2 dependent COPD and ILD presents to the ED in acute respiratory distress. She resides at [facility] and went to bed at 8 pm. She is usually on 6L O2 at night. Later developed acute vomiting and her O2 sats dropped. EMS alerted and administered albuterol nebulizer. Concern is that she may have aspirated. She was tachycardic, tachypneic and distressed. Placed on BIPAP and started on antibiotics. Initial lactic acid 1.1, troponin negative and chest xray decreased lung volumes. Will admit to ICU for further treatment. Today, feels better, on 5L O2. Did well w/ beside testing for swallowing. Objective:  
 
Patient Vitals for the past 24 hrs: 
 Temp Pulse Resp BP SpO2  
18 0755 - - - - 93 % 18 0720 - - - - 97 % 18 0709 - - - - 98 %  
18 0708 - 89 - - -  
18 0700 - - - 155/68 -  
18 0631 - 93 - - -  
18 0630 - - - 157/70 97 % 18 0615 - - - 155/69 97 % 18 0608 - 90 - - 97 % 18 0600 - - - 157/67 98 %  
18 0545 - 88 - 154/68 98 %  
18 0530 - - - 162/70 98 %  
18 0519 - 89 - - -  
18 0515 - - - 167/73 98 %  
18 0507 - 89 - - -  
18 0503 - - - 157/67 99 % 18 0502 - - - - 99 % 18 0500 - - - 157/67 99 % 18 0452 - 90 - - -  
18 0445 - - - 152/69 99 % 18 0439 - 85 22 - 99 % 18 0412 98.6 °F (37 °C) 85 18 165/70 99 % 18 0409 - 88 - - -  
18 0400 - - - 168/74 -  
18 0345 - - - 154/70 -  
18 0330 - - - 152/67 -  
18 0315 - - - 151/65 -  
18 0300 - - - 155/70 99 % 18 0256 -  - - -  
 09/25/18 0245 - 87 - 149/66 99 % 09/25/18 0230 - - - 144/65 99 % 09/25/18 0216 - - - - 100 % 09/25/18 0215 - - - 145/62 99 % 09/25/18 0210 - 88 - - -  
09/25/18 0200 - - - 158/67 98 %  
09/25/18 0156 - 87 - - -  
09/24/18 2236 - - - - 99 % 09/24/18 2215 - (!) 102 21 - 99 % 09/24/18 2214 - - - 161/65 100 % 09/24/18 2203 - (!) 103 17 - 99 % 09/24/18 2155 - (!) 111 - - -  
09/24/18 2154 98.4 °F (36.9 °C) (!) 110 20 171/75 98 %  
09/24/18 2153 - (!) 110 - - - Oxygen Therapy O2 Sat (%): 93 % (09/25/18 0755) Pulse via Oximetry: 94 beats per minute (09/25/18 0755) O2 Device: Nasal cannula (09/25/18 0755) O2 Flow Rate (L/min): 4 l/min (09/25/18 0755) O2 Temperature: 87.6 °F (30.9 °C) (09/25/18 0720) FIO2 (%): 40 % (09/25/18 0720) No intake or output data in the 24 hours ending 09/25/18 1217 Physical Exam: 
General:    Well nourished. Alert. CV:   RRR. No murmur, rub, or gallop. Lungs:  Diminished bibasilar Abdomen: Soft, nontender, nondistended. Bowel sounds normal.  
Extremities: Warm and dry. No cyanosis Neurologic:  grossly intact. Skin:     No rashes or jaundice. No wounds. Psych:  Normal mood and affect. I reviewed the labs, imaging, EKGs, telemetry, and other studies done this admission. Data Review:  
Recent Results (from the past 24 hour(s)) EKG, 12 LEAD, INITIAL Collection Time: 09/24/18  9:55 PM  
Result Value Ref Range Ventricular Rate 107 BPM  
 Atrial Rate 107 BPM  
 P-R Interval 164 ms QRS Duration 80 ms  
 Q-T Interval 348 ms QTC Calculation (Bezet) 464 ms Calculated P Axis 64 degrees Calculated R Axis 52 degrees Calculated T Axis 48 degrees Diagnosis Sinus tachycardia Possible Left atrial enlargement Borderline ECG When compared with ECG of 01-SEP-2015 15:36, No significant change was found Confirmed by VALERIY WALTER (), Zabrina Wilson ([id number]) on 9/25/2018 8:18:15 AM 
  
CBC WITH AUTOMATED DIFF  Collection Time: 09/24/18  9:59 PM  
 Result Value Ref Range WBC 10.1 4.3 - 11.1 K/uL  
 RBC 4.20 4.05 - 5.2 M/uL  
 HGB 11.2 (L) 11.7 - 15.4 g/dL HCT 38.5 35.8 - 46.3 % MCV 91.7 79.6 - 97.8 FL  
 MCH 26.7 26.1 - 32.9 PG  
 MCHC 29.1 (L) 31.4 - 35.0 g/dL  
 RDW 14.7 % PLATELET 381 708 - 830 K/uL MPV 12.1 9.4 - 12.3 FL ABSOLUTE NRBC 0.00 0.0 - 0.2 K/uL  
 DF AUTOMATED NEUTROPHILS 71 43 - 78 % LYMPHOCYTES 17 13 - 44 % MONOCYTES 8 4.0 - 12.0 % EOSINOPHILS 3 0.5 - 7.8 % BASOPHILS 1 0.0 - 2.0 % IMMATURE GRANULOCYTES 0 0.0 - 5.0 %  
 ABS. NEUTROPHILS 7.2 1.7 - 8.2 K/UL  
 ABS. LYMPHOCYTES 1.8 0.5 - 4.6 K/UL  
 ABS. MONOCYTES 0.8 0.1 - 1.3 K/UL  
 ABS. EOSINOPHILS 0.3 0.0 - 0.8 K/UL  
 ABS. BASOPHILS 0.1 0.0 - 0.2 K/UL  
 ABS. IMM. GRANS. 0.0 0.0 - 0.5 K/UL METABOLIC PANEL, COMPREHENSIVE Collection Time: 09/24/18  9:59 PM  
Result Value Ref Range Sodium 143 136 - 145 mmol/L Potassium 4.5 3.5 - 5.1 mmol/L Chloride 108 (H) 98 - 107 mmol/L  
 CO2 29 21 - 32 mmol/L Anion gap 6 mmol/L Glucose 115 (H) 65 - 100 mg/dL BUN 27 (H) 8 - 23 MG/DL Creatinine 1.00 0.6 - 1.0 MG/DL  
 GFR est AA >60 >60 ml/min/1.73m2 GFR est non-AA 55 ml/min/1.73m2 Calcium 8.9 8.3 - 10.4 MG/DL Bilirubin, total 0.2 0.2 - 1.1 MG/DL  
 ALT (SGPT) 21 12 - 65 U/L  
 AST (SGOT) 38 (H) 15 - 37 U/L Alk. phosphatase 82 50 - 136 U/L Protein, total 6.8 g/dL Albumin 3.3 3.2 - 4.6 g/dL Globulin 3.5 2.3 - 3.5 g/dL A-G Ratio 0.9 BNP Collection Time: 09/24/18  9:59 PM  
Result Value Ref Range BNP 83 pg/mL CULTURE, BLOOD Collection Time: 09/24/18  9:59 PM  
Result Value Ref Range Special Requests: RIGHT 
ARM Culture result: NO GROWTH AFTER 9 HOURS    
CULTURE, BLOOD Collection Time: 09/24/18  9:59 PM  
Result Value Ref Range Special Requests: RIGHT 
HAND Culture result: NO GROWTH AFTER 9 HOURS    
POC TROPONIN-I Collection Time: 09/24/18 10:01 PM  
Result Value Ref Range Troponin-I (POC) 0.02 0.02 - 0.05 ng/ml POC LACTIC ACID Collection Time: 09/24/18 10:03 PM  
Result Value Ref Range Lactic Acid (POC) 1.2 0.5 - 1.9 mmol/L  
BLOOD GAS, ARTERIAL Collection Time: 09/24/18 10:46 PM  
Result Value Ref Range pH 7.33 (L) 7.35 - 7.45    
 PCO2 51 (H) 35 - 45 mmHg PO2 101 80 - 105 mmHg BICARBONATE 27 (H) 22 - 26 mmol/L  
 BASE EXCESS 0.0 0 - 3 mmol/L  
 TOTAL HEMOGLOBIN 11.4 (L) 11.7 - 15.0 GM/DL  
 O2 SAT 97 92 - 98.5 % Arterial O2 Hgb 96.2 94 - 97 % CARBOXYHEMOGLOBIN 0.3 (L) 0.5 - 1.5 % METHEMOGLOBIN 0.3 0.0 - 1.5 % DEOXYHEMOGLOBIN 3 0.0 - 5.0 % SITE RR   
 ALLENS TEST POSITIVE    
 MODE BIPAP Tidal volume 505.0    
 RATE 21.0    
CBC WITH AUTOMATED DIFF Collection Time: 09/25/18  4:35 AM  
Result Value Ref Range WBC 8.4 4.3 - 11.1 K/uL  
 RBC 3.74 (L) 4.05 - 5.2 M/uL  
 HGB 10.1 (L) 11.7 - 15.4 g/dL HCT 34.4 (L) 35.8 - 46.3 % MCV 92.0 79.6 - 97.8 FL  
 MCH 27.0 26.1 - 32.9 PG  
 MCHC 29.4 (L) 31.4 - 35.0 g/dL  
 RDW 14.6 % PLATELET 605 752 - 425 K/uL MPV 11.8 9.4 - 12.3 FL ABSOLUTE NRBC 0.00 0.0 - 0.2 K/uL  
 DF AUTOMATED NEUTROPHILS 79 (H) 43 - 78 % LYMPHOCYTES 11 (L) 13 - 44 % MONOCYTES 9 4.0 - 12.0 % EOSINOPHILS 1 0.5 - 7.8 % BASOPHILS 1 0.0 - 2.0 % IMMATURE GRANULOCYTES 0 0.0 - 5.0 %  
 ABS. NEUTROPHILS 6.6 1.7 - 8.2 K/UL  
 ABS. LYMPHOCYTES 0.9 0.5 - 4.6 K/UL  
 ABS. MONOCYTES 0.8 0.1 - 1.3 K/UL  
 ABS. EOSINOPHILS 0.1 0.0 - 0.8 K/UL  
 ABS. BASOPHILS 0.0 0.0 - 0.2 K/UL  
 ABS. IMM. GRANS. 0.0 0.0 - 0.5 K/UL PHOSPHORUS Collection Time: 09/25/18  4:35 AM  
Result Value Ref Range Phosphorus 4.3 (H) 2.3 - 3.7 MG/DL MAGNESIUM Collection Time: 09/25/18  4:35 AM  
Result Value Ref Range Magnesium 2.0 1.8 - 2.4 mg/dL METABOLIC PANEL, BASIC Collection Time: 09/25/18  4:35 AM  
Result Value Ref Range Sodium 145 136 - 145 mmol/L  Potassium 4.0 3.5 - 5.1 mmol/L  
 Chloride 108 (H) 98 - 107 mmol/L  
 CO2 33 (H) 21 - 32 mmol/L Anion gap 4 mmol/L Glucose 107 (H) 65 - 100 mg/dL BUN 25 (H) 8 - 23 MG/DL Creatinine 0.88 0.6 - 1.0 MG/DL  
 GFR est AA >60 >60 ml/min/1.73m2 GFR est non-AA >60 ml/min/1.73m2 Calcium 8.8 8.3 - 10.4 MG/DL Imaging Studies: CXR Results  (Last 48 hours) 09/24/18 2210  XR CHEST PORT Final result Impression:  Impression:  Decreased lung volumes. Narrative:  AP chest radiograph History: sob, 80 years Female  Patients daughter sts patient is having trouble  
breathing, he has COPD, also has been vomiting Comparison: Chest radiograph September 01, 2015 Findings:   Normal cardiomediastinal silhouette. Persistent nonspecific mild  
diffuse interstitial prominence. Probable trace bilateral pleural effusions  
unchanged. Decreased lung volumes. Mild subsegmental atelectasis bilateral  
lung bases. No evidence of pneumothorax. Visualized soft tissue and osseous  
structures otherwise unremarkable. CT Results  (Last 48 hours) 09/25/18 1048  CT CHEST WO CONT Final result Impression:  IMPRESSION: No convincing interstitial lung disease seen but there is bilateral  
effusions and overlying atelectasis or infiltrates larger on the left than the  
right. Fluid filling esophagus could be reflux, increasing risk of aspiration  
but no material seen in the trachea or bronchi at this time. Probable simple  
cysts and nonobstructing left kidney stone. Narrative:  CT chest without IV contrast with high-resolution imaging September 25, 2018 Reference exam: January 13, 2014 Indication: COPD, dementia with difficulty breathing, interstitial lung disease  
and infiltrates Technique: Radiation dose reduction techniques were used for this study using  
one or more of the following: automated exposure control; adjustment of mA and/or kV (according to patient size);  iterative reconstruction. 5 mm axial  
images were taken through the chest without IV contrast with high-resolution  
images obtained as well. FINDINGS: The esophagus is somewhat distended, with some fluid present and  
mildly thickened wall but no focal lesion seen. Imaging through the upper  
abdomen including adrenal glands shows probable cysts and non obstructing  
calcifications in the left kidney. There is a left-sided pleural effusion not  
present on the previous study with a much smaller right effusion with overlying  
atelectasis or infiltrates in the dependent portions of both lungs. There is no  
aspirated debris seen within the trachea or bronchi at this time. Assessment and Plan:  
 
Hospital Problems as of 9/25/2018  Date Reviewed: 6/26/2018 Codes Class Noted - Resolved POA Aspiration pneumonia ([facility] ICD-10-CM: J69.0 ICD-9-CM: 507.0  9/24/2018 - Present Yes * (Principal)Acute respiratory failure with hypoxemia ([facility] ICD-10-CM: J96.01 
ICD-9-CM: 518.81  9/24/2018 - Present Yes Late onset Alzheimer's disease without behavioral disturbance (Chronic) ICD-10-CM: G30.1, F02.80 ICD-9-CM: 331.0, 294.10  11/15/2017 - Present Yes Debility ICD-10-CM: R53.81 ICD-9-CM: 799.3  11/30/2015 - Present Yes COPD (chronic obstructive pulmonary disease) (HCC) (Chronic) ICD-10-CM: J44.9 ICD-9-CM: 448  9/1/2015 - Present Yes ILD (interstitial lung disease) (HCC) (Chronic) ICD-10-CM: J84.9 ICD-9-CM: 125  9/1/2015 - Present Yes HTN (hypertension) (Chronic) ICD-10-CM: I10 
ICD-9-CM: 401.9  1/22/2014 - Present Yes Hyperlipidemia (Chronic) ICD-10-CM: H06.6 ICD-9-CM: 272.4  1/22/2014 - Present Yes PLAN: 
 
Pt is in ac hypoxic resp failure w/ hx of late COPD and O2 dependence, improving. Continue steroids and Zosyn DC Vanco 
Follow speech Rx eval 
Seen by pulmonology Dispo: DC back to nursing home home in 1-2 days Signed: 
Angelica Lima MD

## 2018-09-25 NOTE — ED TRIAGE NOTES
Pt to er c/o vomiting, pt from [facility], pt on 6L n/c at night, ems reports 93% is her normal sat. Pt received 5mg albuterol by ems. .. Ems sts pt vomited approx one hr ago, ems sts pt was her normal self all day until she vomited.

## 2018-09-25 NOTE — PROGRESS NOTES
09/25/18 1700 Dual Skin Pressure Injury Assessment Dual Skin Pressure Injury Assessment WDL Second Care Provider (Based on [address] [place] [address]) Deepthi Joe RN Skin Integumentary Skin Integumentary (WDL) WDL Pressure  Injury Documentation No Pressure Injury Noted-Pressure Ulcer Prevention Initiated

## 2018-09-25 NOTE — PROGRESS NOTES
Called to ER Bed #7 for BIPAP set-up on patient with a chief concern of vomiting and possible aspiration. I expressed my concern to Gabe Osullivan MD about BIPAP and vomiting. He assured me that the vomiting was earlier and had ceased. Patient was placed on V60 using the AVAPS mode with the target Vt of 450 and an FiO2 of 50%. Follow up ABG is pending.

## 2018-09-25 NOTE — PROGRESS NOTES
Visited with patient. Received resting in bed. Daughter Roshan Leggett at bedside. Daughter states she lives in [facility] but she has a sister Pawan Red [phone]) that lives locally and another sister, Teagan Arevalo, in [place]. Patient lives at the [facility]. States she was in independent living when she moved there but has been in the assisted living for the last 3 years. States has 'extra help' 4hrs/day 4d/week. Patient states she is independent in ADLs but daughter states she needs help dressing and bathing. States she uses a walker for ambulation and has an electric wheelchair for 'distances'. States she is current with Dr Terri Brown and had an appointment today that they had to cancel. Care Management Interventions PCP Verified by CM: Yes 
Palliative Care Criteria Met (RRAT>21 & CHF Dx)?: No 
Mode of Transport at Discharge: Other (see comment) (daughter) Transition of Care Consult (CM Consult): Discharge Planning Current Support Network: Assisted Living Confirm Follow Up Transport: Family Discharge Location Discharge Placement: Unable to determine at this time

## 2018-09-25 NOTE — PROGRESS NOTES
Admit from ER. Pt resting in bed and is drowsy and oriented to self. She denies pain and is on 6 L NC. IVF infusing. RR even and unlabored. Oriented to call light, room, and staff and instructed to call for assistance if needed. Will monitor. Bed alarm on. Tele in place. Family at bedside.

## 2018-09-25 NOTE — ED NOTES
TRANSFER - OUT REPORT: 
 
Verbal report given to RN Link on Pillo Deep  being transferred to [facility] for routine progression of care Report consisted of patients Situation, Background, Assessment and  
Recommendations(SBAR). Information from the following report(s) SBAR, ED Summary, MAR, Recent Results and Cardiac Rhythm Sinus Tach was reviewed with the receiving nurse. Lines:  
Peripheral IV 09/24/18 Right Forearm (Active) Peripheral IV 09/24/18 Left Wrist (Active) Opportunity for questions and clarification was provided. Patient transported with: 
 O2 @ 6 liters

## 2018-09-26 PROBLEM — I35.0 NONRHEUMATIC AORTIC VALVE STENOSIS: Status: ACTIVE | Noted: 2018-01-01

## 2018-09-26 PROBLEM — J90 PLEURAL EFFUSION, BILATERAL: Status: ACTIVE | Noted: 2018-01-01

## 2018-09-26 NOTE — CONSULTS
[facility] Consult Note Primary GI Physician: Juany Vargas MD? 
Consulting GI Physician: Vincent Hodge MD 
Referring Physician:  Karin Casper MD 
 
Consult Date:  9/26/2018 Admit Date:  9/24/2018 Chief Complaint:  Fluid in esophagus ?achalasia. Please evaluate and for possible treatment options. Subjective:  
 
History of Present Illness:  Patient is a 80 y.o. F with h/o O2 dependent COPD (6 L qhs and 2 L during the day) and ILD who presented to the ED in acute respiratory distress. She resides at [facility]. Patient developed acute vomiting and her O2 sats dropped. EMS alerted and was brought in to the ED and subsequently admitted to the ICU. There is concerned that she may have aspirated, placed on BIPAP, started on antibiotics, & has been treated with IV steroids. She has improved with return to O2 NC, but noted to have sat down to the 80s on 4L O2 during my interview. Echo was obtained due to murmur which showed EF 65-70%. CT scan of chest showed fluid in esophagus and GI consulted for further evaluation. CT also showed bilateral pleural effusions. BNP was 82. SLP evaluation negative for aspiration. Patient reports intermittent episodes of vomiting food stuff and mucous several hours after eating. Episodes may occur 2-3x a week. She further describes a tight sensation in the back of her throat after eating, no solid or liquid food dysphagia described, but tight sensation goes away after patient places her head under the table for a few minutes. She report a hx of mild GERD. She states that her Protonix 40mg was increased to bid due to the vomiting episodes, but can not tell if it improved her symptoms. Her last EGD in 2014 with Dr. Gila Andrew revealed gastritis (H pylori negative), HH, and candidal esophagitis confirmed on esophageal bxs. She has had no further episodes of vomiting since admission. No abdominal pain. No rectal bleeding or melena.   
 
 
PMH: 
 Past Medical History:  
Diagnosis Date  Arthropathy, unspecified, site unspecified  Benign paroxysmal positional vertigo  Cardiac dysrhythmia, unspecified  Debility, unspecified  Facet syndrome ([facility] [address]  GIB (gastrointestinal bleeding) 1/31/2014  History of peptic ulcer disease 01/2014  Hypertension  IBS (irritable bowel syndrome)  Impaired fasting glucose  Lumbago  Memory loss  Meningioma ([facility] [address] removed in [facility] about 1998  Musculoskeletal pain  OA (osteoarthritis) of knee  Palliative care encounter 5/8/2014  Rhinorrhea  Scoliosis  Sensorineural hearing loss  Spondylolisthesis Lumbar  TMJ dysfunction  Varicella  Vasomotor rhinitis  Vitamin D deficiency PSH: 
Past Surgical History:  
Procedure Laterality Date  HX APPENDECTOMY  HX CATARACT REMOVAL  2003 X2 WITH LENSE IMPLANT  HX COLONOSCOPY    
 HX KNEE ARTHROSCOPY    
 left knee  HX KNEE REPLACEMENT Left 2007 [address] [facility]  HX TONSILLECTOMY Allergies: Allergies Allergen Reactions  Bactroban [Mupirocin Calcium] Rash  Mupirocin Other (comments)  Sulfa (Sulfonamide Antibiotics) Unknown (comments)  Sulfamethoxazole-Trimethoprim Other (comments) Home Medications: 
Prior to Admission medications Medication Sig Start Date End Date Taking? Authorizing Provider HYDROcodone-acetaminophen (NORCO) 5-325 mg per tablet Take 1 Tab by mouth every six (6) hours as needed. As needed for headache 4/23/18   Luis Hannah MD  
guaiFENesin (ROBITUSSIN) 100 mg/5 mL liquid Take 10 mL by mouth three (3) times daily as needed for Cough. 3/29/18   Luis Hannah MD  
Azelastine (ASTEPRO) 0.15 % (205.5 mcg) nasal spray 1 [place] by Both Nostrils route two (2) times a day.  2/13/18   Luis Hannah MD  
fluticasone (FLONASE) 50 mcg/actuation nasal spray 2 Sprays by Both Nostrils route daily. 2 sprays each nostril prn 2/13/18   Hilaria Mayo MD  
ondansetron (ZOFRAN ODT) 4 mg disintegrating tablet Take 4 mg by mouth every eight (8) hours as needed for Nausea. Historical Provider  
calcium carbonate-vitamin D3 600 mg(1,500mg) -500 unit cap Take 1 Tab by mouth two (2) times a day. 11/15/17   MD Olu Leong Late (ULTRA-LIGHT ROLLATOR) misc Use daily 11/15/17   Hilaria Mayo MD  
busPIRone (BUSPAR) 10 mg tablet Take 1 Tab by mouth two (2) times a day. 11/2/17   Hilaria Mayo MD  
potassium chloride SR (KLOR-CON 10) 10 mEq tablet  7/26/17   Historical Provider  
donepezil (ARICEPT) 5 mg tablet Take 1 Tab by mouth nightly. 7/24/17   Hilaria Mayo MD  
sertraline (ZOLOFT) 100 mg tablet Take 1 Tab by mouth nightly. 6/28/17   Hilaria Mayo MD  
pantoprazole (PROTONIX) 40 mg tablet Take 1 Tab by mouth daily. 6/28/17   Hilaria Mayo MD  
pravastatin (PRAVACHOL) 20 mg tablet Take 1 Tab by mouth nightly. 6/28/17   Hilaria Mayo MD  
furosemide (LASIX) 40 mg tablet Take 1 Tab by mouth daily. As needed for swelling Patient taking differently: Take 20 mg by mouth daily. As needed for swelling 6/28/17   Hilaria Mayo MD  
albuterol [facility] HFA) 90 mcg/actuation inhaler 2 puffs qid prn  Indications: Chronic Obstructive Pulmonary Disease 5/4/17   Sissy Pham NP  
budesonide-formoterol (SYMBICORT) 160-4.5 mcg/actuation HFA inhaler 2 puffs bid, rinse mouth after use. 5/4/17   Sissy Pham NP Compression Socks, Medium misc WEAR DAILY 8/31/16   Hilaria Mayo MD  
DISABLED PLACARD (DISABLED PLACARD) DMV Use prn 5/12/16   Hilaria Mayo MD  
Cholecalciferol, Vitamin D3, (VITAMIN D3) 1,000 unit cap Take 1,000 Units by mouth daily. 11/30/15   Krish Cortez MD  
multivit-min-FA-lycopen-lutein (CERTAVITE SENIOR-ANTIOXIDANT) 0.4-300-250 mg-mcg-mcg tab Take  by mouth daily. Historical Provider acetaminophen (TYLENOL) 325 mg tablet Take  by mouth every six (6) hours as needed for Pain. Historical Provider OXYGEN-AIR DELIVERY SYSTEMS 6 L by Nasal route nightly. Continuous at 2 L, nights time at 6 L    Historical Provider Hospital Medications: 
Current Facility-Administered Medications Medication Dose Route Frequency  albuterol (PROVENTIL VENTOLIN) nebulizer solution 2.5 mg  2.5 mg Nebulization QID RT And  
 budesonide (PULMICORT) 500 mcg/2 ml nebulizer suspension  500 mcg Nebulization BID RT  
 methylPREDNISolone (PF) (SOLU-MEDROL) injection 20 mg  20 mg IntraVENous BID  metoprolol tartrate (LOPRESSOR) tablet 12.5 mg  12.5 mg Oral BID  busPIRone (BUSPAR) tablet 10 mg (Patient Supplied)  10 mg Oral BID  Azelastine (ASTEPRO) 0.15 % (205.5 mcg) nasal spray 1 Spray (Patient Supplied)  1 Spray Both Nostrils BID  
 donepezil (ARICEPT) tablet 5 mg  5 mg Oral QHS  fluticasone (FLONASE) 50 mcg/actuation nasal spray 2 Spray  2 Spray Both Nostrils DAILY  sodium chloride (NS) flush 5-10 mL  5-10 mL IntraVENous Q8H  
 sodium chloride (NS) flush 5-10 mL  5-10 mL IntraVENous PRN  
 naloxone (NARCAN) injection 0.4 mg  0.4 mg IntraVENous PRN  
 acetaminophen (TYLENOL) tablet 650 mg  650 mg Oral Q4H PRN  
 enoxaparin (LOVENOX) injection 40 mg  40 mg SubCUTAneous Q24H  
 bisacodyl (DULCOLAX) tablet 5 mg  5 mg Oral DAILY PRN  
 ondansetron (ZOFRAN) injection 4 mg  4 mg IntraVENous Q4H PRN  
 guaiFENesin-dextromethorphan (ROBITUSSIN DM) 100-10 mg/5 mL syrup 10 mL  10 mL Oral Q6H PRN  piperacillin-tazobactam (ZOSYN) 4.5 g in 0.9% sodium chloride (MBP/ADV) 100 mL  4.5 g IntraVENous Q8H  
 tuberculin injection 5 Units  5 Units IntraDERMal ONCE  
 sertraline (ZOLOFT) tablet 50 mg  50 mg Oral QHS Social History: 
Social History Substance Use Topics  Smoking status: Former Smoker Packs/day: 1.50 Years: 45.00 Types: Cigarettes Quit date: 1/1/1991  Smokeless tobacco: Never Used  Alcohol use 1.8 oz/week 1 Glasses of wine, 1 Shots of liquor, 1 Standard drinks or equivalent per week Comment: OCCASIONAL Pt denies any history of drug use, blood transfusions, or tattoos. Family History: 
Family History Problem Relation Age of Onset  Cancer Mother Pancreatic  Cancer Father Ear Review of Systems: A detailed 10 system ROS is obtained, with pertinent positives as listed above. All others are negative. Diet:  NPO Objective:  
 
Physical Exam: 
Vitals: 
Visit Vitals  /67 (BP Patient Position: At rest)  Pulse 81  Temp 98.3 °F (36.8 °C)  Resp 20  
 Ht 4' 11\" (1.499 m)  Wt 75.3 kg (166 lb)  SpO2 94%  Breastfeeding No  
 BMI 33.53 kg/m2 Gen:  Pt is alert, cooperative, no acute distress Skin:  Extremities and face reveal no rashes. HEENT: Sclerae anicteric. Extra-occular muscles are intact. No oral ulcers. No abnormal pigmentation of the lips. The neck is supple. Cardiovascular: Regular rate and rhythm. No murmurs, gallops, or rubs. Respiratory:  DECREASED breath sounds. GI:  Abdomen nondistended, soft, and nontender. Normal active bowel sounds. No enlargement of the liver or spleen. No masses palpable. Rectal:  Deferred Musculoskeletal:  No pitting edema of the lower legs. Neurological:  Gross memory appears intact. Patient is alert and oriented. Psychiatric:  Mood appears appropriate with judgement intact. Lymphatic:  No cervical or supraclavicular adenopathy. Laboratory:   
Recent Labs  
   09/25/18 
 0435  09/24/18 
 2159 WBC  8.4  10.1 HGB  10.1*  11.2* HCT  34.4*  38.5 PLT  170  195 MCV  92.0  91.7 NA  145  143  
K  4.0  4.5  
CL  108*  108* CO2  33*  29 BUN  25*  27* CREA  0.88  1.00  
CA  8.8  8.9 MG  2.0   --   
GLU  107*  115* AP   --   82 SGOT   --   38* ALT   --   21  
TBILI   --   0.2 ALB   --   3.3 TP   --   6.8 Assessment:  
 
Principal Problem: 
Acute respiratory failure with hypoxemia ([facility] [address] (9/24/2018) Active Problems: 
HTN (hypertension) (1/22/2014) Hyperlipidemia (1/22/2014) COPD (chronic obstructive pulmonary disease) ([facility] [address] (9/1/2015) Debility (11/30/2015) Late onset Alzheimer's disease without behavioral disturbance (11/15/2017) Aspiration pneumonia ([facility] [address] (9/24/2018) Pleural effusion, bilateral (9/26/2018) Nonrheumatic aortic valve stenosis (9/26/2018) Chest CT scan 9/25 IMPRESSION: No convincing interstitial lung disease seen but there is bilateral 
effusions and overlying atelectasis or infiltrates larger on the left than the 
right. Fluid filling esophagus could be reflux, increasing risk of aspiration 
but no material seen in the trachea or bronchi at this time. Probable simple 
cysts and nonobstructing left kidney stone. 81 y/o F with PMHx to include but not limited to ILD and  O2 dependent COPD (6 L qhs and 2 L during the day) and ILD who presented to the ED in acute respiratory distress. She resides at the [facility] and was noted to have an acute episode of vomiting followed by drop in her O2 sats. Patient was admitted to the ICU and tx with IV steroids, BiPaP, and abx. Respiratory status improved, back on O2 NC at 4L of oxygen, but noted sats acutely dropped in the mid 80s during my interview. CT scan of the chest revealed fluid filled esophagus, possible reflux related, and increased risk for aspiration. CT scan also revealed bilateral pleural effusions. She describes intermittent episodes of food stuff emesis at least 2-3x a week for \"awhile. \" She does not endorse true solid food or liquid dysphagia to suggest achalasia. Recent SLP evaluation negative for aspiration. Last EGD in 2014 with HH, gastritis, and candidal esophagitis.  Suspect probable dysmotility disorder +/- peptic etiology given her GERD hx.  
 
Plan:  
 
Patient is at significant risk for sedation and procedure given her recent acute respiratory failure and high oxygen requirement to maintain sats at this time. Carefully consider EGD once respiratory status improves. Consider Barium study with tablet, but patient  at risk for aspiration. Maximize GERD therapy. Patient is seen and examined in collaboration with Dr. Colin Cali. Assessment and plan as per Dr. Bela Ayers.  
 
Tom Purdy PA-C

## 2018-09-26 NOTE — PROGRESS NOTES
I have seen and examined this patient, and agree with above assessment and plan Per [place] Sadie, PA. Resp status not good for EGD at this time Will plan to check Barium swallow in AM 
Maintain NPO until then Consider EGD +/- dilation when able to wean down O2 Marlene Hernandez MD

## 2018-09-26 NOTE — PROGRESS NOTES
Shift assessment completed. Pt alert and oriented to person, place and situation. Reoriented to day. Lung sounds coarse and diminished in bases. Even and unlabored respirations. Pt on 6L NC. Heart sounds regular. Pt on telemetry. Active bowel sounds with soft and non-tender abdomen. Skin is warm and dry. IV c/d. Nelson care completed and draining. Pt reports no pain or other needs at this time.

## 2018-09-26 NOTE — PROGRESS NOTES
Spiritual Care initial visit made by [facility] XING. Support given. Rubi rudd. MANOJ Lemos Div / Bereavement Coordinator

## 2018-09-26 NOTE — PROGRESS NOTES
Hospitalist Progress Note Admit Date:  2018  9:50 PM  
Name:  Sandra Gutierrez Age:  80 y.o. 
:  1929 MRN:  [id number] PCP:  Yoselin Gonzalez MD 
Treatment Team: Attending Provider: Skip Galeano MD; Consulting Provider: Doroteo Roper MD; Utilization Review: Terrell Mckeon RN; Consulting Provider: Clifford Guerrero MD; Consulting Provider: Keturah Flowers, DO Subj As Per Prior Documentation: 
 
\"79 y/o female with O2 dependent COPD and ILD presents to the ED in acute respiratory distress. She resides at [facility] and went to bed at 8 pm. She is usually on 6L O2 at night. Later developed acute vomiting and her O2 sats dropped. EMS alerted and administered albuterol nebulizer. Concern is that she may have aspirated. She was tachycardic, tachypneic and distressed. Placed on BIPAP and started on antibiotics. Initial lactic acid 1.1, troponin negative and chest xray decreased lung volumes. Will admit to ICU for further treatment. Today, feels better, on 5L O2. Did well w/ beside testing for swallowing. \" 
 
 
2018- seen requesting ice cubes. Her breathing is about the same if not better than when she was at the [facility]. She cant recall all of what happened but remembers vomiting. Objective:  
 
Patient Vitals for the past 24 hrs: 
 Temp Pulse Resp BP SpO2  
18 0846 - - - - 94 % 18 0830 97.6 °F (36.4 °C) 82 20 137/68 91 %  
18 0459 - - - - 92 %  
18 0327 98.3 °F (36.8 °C) 91 20 148/67 90 % 18 2335 98.3 °F (36.8 °C) 95 20 129/68 95 % 18 2243 - - - - 93 % 18 - - - - 91 %  
18 1945 97.8 °F (36.6 °C) 90 22 119/54 93 % 18 1745 - - - - 94 % 18 1645 98.9 °F (37.2 °C) (!) 111 26 127/62 98 %  
18 1500 - (!) 103 26 142/65 93 % 18 1430 - (!) 104 (!) 37 116/58 (!) 85 %  
18 1400 - 99 (!) 40 121/57 90 % 18 1330 - 100 (!) 60 142/63 91 % 09/25/18 1309 - - - - 92 %  
09/25/18 1300 - 92 29 137/63 92 %  
09/25/18 1230 - 84 (!) 78 119/58 94 % 09/25/18 1200 - 85 (!) 41 134/79 91 %  
09/25/18 1130 - 88 (!) 32 145/56 94 % Oxygen Therapy O2 Sat (%): 94 % (09/26/18 0846) Pulse via Oximetry: 79 beats per minute (09/26/18 0846) O2 Device: Nasal cannula (09/26/18 0846) O2 Flow Rate (L/min): 7 l/min (decreased to 4 lpm.  SP02 is 93%) (09/26/18 0846) O2 Temperature: 87.6 °F (30.9 °C) (09/25/18 0720) FIO2 (%): 40 % (09/25/18 2243) Intake/Output Summary (Last 24 hours) at 09/26/18 2841 Last data filed at 09/26/18 2401 Gross per 24 hour Intake              990 ml Output             1150 ml Net             -160 ml Physical Exam: 
General:    Well nourished. Alert. CV:   RRR. No murmur, rub, or gallop. Lungs:  Diminished bibasilar Abdomen: Soft, nontender, nondistended. Bowel sounds normal.  
Extremities: Warm and dry. No cyanosis; no edema Neurologic:  grossly intact. Skin:     No rashes or jaundice. No wounds. Psych:  Normal mood and affect. I reviewed the labs, imaging, EKGs, telemetry, and other studies done this admission. Data Review:  
Recent Results (from the past 24 hour(s)) EKG, 12 LEAD, INITIAL Collection Time: 09/25/18  2:19 PM  
Result Value Ref Range Ventricular Rate 105 BPM  
 Atrial Rate 105 BPM  
 P-R Interval 168 ms QRS Duration 80 ms  
 Q-T Interval 358 ms QTC Calculation (Bezet) 473 ms Calculated P Axis 59 degrees Calculated R Axis 32 degrees Calculated T Axis 67 degrees Diagnosis Sinus tachycardia with Premature atrial complexes with Aberrant conduction Possible Left atrial enlargement Borderline ECG When compared with ECG of 24-SEP-2018 21:55, Aberrant conduction is now Present Nonspecific T wave abnormality now evident in Lateral leads Confirmed by Ivana Lacey ([id number]) on 9/26/2018 7:19:27 AM 
  
 
 
Imaging Studies: CXR Results  (Last 48 hours) 09/24/18 2210  XR CHEST PORT Final result Impression:  Impression:  Decreased lung volumes. Narrative:  AP chest radiograph History: sob, 80 years Female  Patients daughter sts patient is having trouble  
breathing, he has COPD, also has been vomiting Comparison: Chest radiograph September 01, 2015 Findings:   Normal cardiomediastinal silhouette. Persistent nonspecific mild  
diffuse interstitial prominence. Probable trace bilateral pleural effusions  
unchanged. Decreased lung volumes. Mild subsegmental atelectasis bilateral  
lung bases. No evidence of pneumothorax. Visualized soft tissue and osseous  
structures otherwise unremarkable. CT Results  (Last 48 hours) 09/25/18 1048  CT CHEST WO CONT Final result Impression:  IMPRESSION: No convincing interstitial lung disease seen but there is bilateral  
effusions and overlying atelectasis or infiltrates larger on the left than the  
right. Fluid filling esophagus could be reflux, increasing risk of aspiration  
but no material seen in the trachea or bronchi at this time. Probable simple  
cysts and nonobstructing left kidney stone. Narrative:  CT chest without IV contrast with high-resolution imaging September 25, 2018 Reference exam: January 13, 2014 Indication: COPD, dementia with difficulty breathing, interstitial lung disease  
and infiltrates Technique: Radiation dose reduction techniques were used for this study using  
one or more of the following: automated exposure control; adjustment of mA  
and/or kV (according to patient size);  iterative reconstruction. 5 mm axial  
images were taken through the chest without IV contrast with high-resolution  
images obtained as well. FINDINGS: The esophagus is somewhat distended, with some fluid present and  
mildly thickened wall but no focal lesion seen. Imaging through the upper abdomen including adrenal glands shows probable cysts and non obstructing  
calcifications in the left kidney. There is a left-sided pleural effusion not  
present on the previous study with a much smaller right effusion with overlying  
atelectasis or infiltrates in the dependent portions of both lungs. There is no  
aspirated debris seen within the trachea or bronchi at this time. Assessment and Plan:  
 
Hospital Problems as of 9/26/2018  Date Reviewed: 6/26/2018 Codes Class Noted - Resolved POA Pleural effusion, bilateral ICD-10-CM: J90 ICD-9-CM: 511.9  9/26/2018 - Present Unknown Nonrheumatic aortic valve stenosis ICD-10-CM: I35.0 ICD-9-CM: 424.1  9/26/2018 - Present Unknown Aspiration pneumonia ([facility] [address] ICD-10-CM: J69.0 ICD-9-CM: 507.0  9/24/2018 - Present Yes * (Principal)Acute respiratory failure with hypoxemia ([facility] [address] ICD-10-CM: J96.01 
ICD-9-CM: 518.81  9/24/2018 - Present Yes Late onset Alzheimer's disease without behavioral disturbance (Chronic) ICD-10-CM: G30.1, F02.80 ICD-9-CM: 331.0, 294.10  11/15/2017 - Present Yes Debility ICD-10-CM: R53.81 ICD-9-CM: 799.3  11/30/2015 - Present Yes COPD (chronic obstructive pulmonary disease) (HCC) (Chronic) ICD-10-CM: J44.9 ICD-9-CM: 055  9/1/2015 - Present Yes HTN (hypertension) (Chronic) ICD-10-CM: I10 
ICD-9-CM: 401.9  1/22/2014 - Present Yes Hyperlipidemia (Chronic) ICD-10-CM: B35.4 ICD-9-CM: 272.4  1/22/2014 - Present Yes RESOLVED: ILD (interstitial lung disease) (HCC) (Chronic) ICD-10-CM: J84.9 ICD-9-CM: 687  9/1/2015 - 9/26/2018 Yes PLAN: 
 
Acute on chronic hypoxic resp failure w/ hx of late COPD and O2 dependence- resolving Aortic stenosis, AKILA and bl pleural effusions- cardiology input Concern for aspiration pneumonia- Continue Zosyn & wean steroids Follow up gi eval 
Pulmonology input appreciated Dispo: DC back to nursing home in the next 1-2 days Signed: 
Baljinder Do MD

## 2018-09-26 NOTE — CONSULTS
[facility] Cardiology Consultation Date of  Admission: 9/24/2018  9:50 PM  
 
Primary Care Physician: Dr. Merna Hernandez Primary Cardiologist: 
Referring Physician: Dr. Conor Rodrigues Consulting Physician: Dr. Manav Parekh CC/Reason for consult: AS and systolic anterior motion of mitral valve (AKILA) HPI:  Hubert Benton is a 80 y.o. female with h/o O2 dependent COPD (6 L qhs and 4 L during the day) and ILD presents to the ED in acute respiratory distress. She resides at [facility] and she developed acute vomiting and her O2 sats dropped. EMS alerted and administered albuterol nebulizer. Concern is that she may have aspirated. She was tachycardic, tachypneic and distressed. Placed on BIPAP and started on antibiotics. She was admitted by the hospitalist with pulmonary consult. She has been treated with IV steroids, duonebs and antibiotics. She has improved with return to O2 NC. Echo was obtained due to murmur which showed EF 65-70%, no WMA, dynamic obstruction likely contributed by the hyperdynamic function with chordal AKILA, mild to moderate AS with REINA 1.36 cm2, mean pressure gradient 22 mm Hg/peak 38 mmHg and mild MR. CT scan of chest showed fluid in esophagus and GI was consulted. CT also showed bilateral pleural effusions and BNP was 83. [facility]iology was consulted due to AS and AKILA. Pt denies CP, h/o CAD, MI or arrhythmia. Past Medical History:  
Diagnosis Date  Arthropathy, unspecified, site unspecified  Benign paroxysmal positional vertigo  Cardiac dysrhythmia, unspecified  Debility, unspecified  Facet syndrome ([address]  GIB (gastrointestinal bleeding) 1/31/2014  History of peptic ulcer disease 01/2014  Hypertension  IBS (irritable bowel syndrome)  Impaired fasting glucose  Lumbago  Memory loss  Meningioma ([address] removed in [facility] about 1998  Musculoskeletal pain  OA (osteoarthritis) of knee  Palliative care encounter 5/8/2014  Rhinorrhea  Scoliosis  Sensorineural hearing loss  Spondylolisthesis Lumbar  TMJ dysfunction  Varicella  Vasomotor rhinitis  Vitamin D deficiency Past Surgical History:  
Procedure Laterality Date  HX APPENDECTOMY  HX CATARACT REMOVAL  2003 X2 WITH LENSE IMPLANT  HX COLONOSCOPY    
 HX KNEE ARTHROSCOPY    
 left knee  HX KNEE REPLACEMENT Left 2007 [address] Meningioma Rt brain [address] [place]  HX TONSILLECTOMY Allergies Allergen Reactions  Bactroban [Mupirocin Calcium] Rash  Mupirocin Other (comments)  Sulfa (Sulfonamide Antibiotics) Unknown (comments)  Sulfamethoxazole-Trimethoprim Other (comments) Social History Social History  Marital status:  Spouse name: N/A  
 Number of children: N/A  
 Years of education: [employer] Occupational History   Retired Social History Main Topics  Smoking status: Former Smoker Packs/day: 1.50 Years: 45.00 Types: Cigarettes Quit date: 1/1/1991  Smokeless tobacco: Never Used  Alcohol use 1.8 oz/week 1 Glasses of wine, 1 Shots of liquor, 1 Standard drinks or equivalent per week Comment: OCCASIONAL  
 Drug use: No  
 Sexual activity: Not on file Other Topics Concern  Not on file Social History Narrative Lives alone. Ambulates with walker/cane, has supportive daughters Family History Problem Relation Age of Onset  Cancer Mother Pancreatic  Cancer Father Ear Current Facility-Administered Medications Medication Dose Route Frequency  albuterol (PROVENTIL VENTOLIN) nebulizer solution 2.5 mg  2.5 mg Nebulization QID RT  And  
 budesonide (PULMICORT) 500 mcg/2 ml nebulizer suspension  500 mcg Nebulization BID RT  
 methylPREDNISolone (PF) (SOLU-MEDROL) injection 20 mg  20 mg IntraVENous BID  
  busPIRone (BUSPAR) tablet 10 mg  10 mg Oral BID  Azelastine (ASTEPRO) 0.15 % (205.5 mcg) nasal spray 1 Spray (Patient Supplied)  1 Spray Both Nostrils BID  
 donepezil (ARICEPT) tablet 5 mg  5 mg Oral QHS  fluticasone (FLONASE) 50 mcg/actuation nasal spray 2 Spray  2 Spray Both Nostrils DAILY  sodium chloride (NS) flush 5-10 mL  5-10 mL IntraVENous Q8H  
 sodium chloride (NS) flush 5-10 mL  5-10 mL IntraVENous PRN  
 naloxone (NARCAN) injection 0.4 mg  0.4 mg IntraVENous PRN  
 acetaminophen (TYLENOL) tablet 650 mg  650 mg Oral Q4H PRN  
 enoxaparin (LOVENOX) injection 40 mg  40 mg SubCUTAneous Q24H  
 bisacodyl (DULCOLAX) tablet 5 mg  5 mg Oral DAILY PRN  
 ondansetron (ZOFRAN) injection 4 mg  4 mg IntraVENous Q4H PRN  
 guaiFENesin-dextromethorphan (ROBITUSSIN DM) 100-10 mg/5 mL syrup 10 mL  10 mL Oral Q6H PRN  piperacillin-tazobactam (ZOSYN) 4.5 g in 0.9% sodium chloride (MBP/ADV) 100 mL  4.5 g IntraVENous Q8H  
 tuberculin injection 5 Units  5 Units IntraDERMal ONCE  
 sertraline (ZOLOFT) tablet 50 mg  50 mg Oral QHS Review of symptoms: 
General: no recent weight loss/gain, weakness,+ fatigue, fever or chills Skin: no rashes, lumps, or other skin changes HEENT: no headache, dizziness, lightheadedness, vision changes, hearing changes, tinnitus, vertigo, sinus pressure/pain, bleeding gums, sore throat, or hoarseness Neck: no swollen glands, goiter, pain or stiffness Respiratory: no cough, sputum, hemoptysis, +dyspnea, wheezing Cardiovascular: no chest pain or discomfort, palpitations, +dyspnea, orthopnea, paroxysmal nocturnal dyspnea, peripheral edema Gastrointestinal: no trouble swallowing, heartburn, change of appetite, +nausea/vomiting, change in bowel habits, pain with defecation, rectal bleeding or black/tarry stools, hemorrhoids, constipation, diarrhea, abdominal pain, jaundice, liver or gallbladder problems Urinary: no frequency, urgency , hematuria, burning/pain with urination, recent flank pain, polyuria, nocturia, or difficulty urinating Genital: no vaginal or pelvic infections Peripheral Vascular: no claudication, leg cramps, prior DVTs, swelling of calves, legs, or feet, color change, or swelling with redness or tenderness Musculoskeletal: no muscle or joint pain/stiffness, joint swelling, erythema of joints, or back pain Psychiatric: no depression, mental disorders, or excessive stress Neurological: no history of CVA, dizziness, no sensory or motor loss, seizures, syncope, tremors, numbness, tingling, no changes in mood, attention, or speech, no changes in orientation, memory, insight, or judgment. no headache, vertigo. Hematologic: no anemia, easy bruising or bleeding Endocrine: no diabetes, thyroid problems, heat or cold intolerance, excessive sweating, polyuria, polydipsia Subjective:  
Physical Exam 
 
Visit Vitals  /68  Pulse 82  Temp 97.6 °F (36.4 °C)  Resp 20  
 Ht 4' 11\" (1.499 m)  Wt 75.3 kg (166 lb)  SpO2 94%  Breastfeeding No  
 BMI 33.53 kg/m2 General Appearance:  Well developed, well nourished,alert and oriented x 3, and individual in no acute distress. Ears/Nose/Mouth/Throat:   Hearing grossly normal. 
  
    Neck: Supple. Chest:   Lungs diminished BS bilaterally. Cardiovascular:  Regular rate and rhythm, S1, S2, no 2/6 AS M Abdomen:   Soft, non-tender, bowel sounds are active. Extremities: No edema bilaterally. Skin: Warm and dry. Cardiographics Telemetry: normal sinus rhythm ECG: sinus tachycardia Echocardiogram: as above Labs:  
Recent Results (from the past 24 hour(s)) EKG, 12 LEAD, INITIAL Collection Time: 09/25/18  2:19 PM  
Result Value Ref Range Ventricular Rate 105 BPM  
 Atrial Rate 105 BPM  
 P-R Interval 168 ms QRS Duration 80 ms  
 Q-T Interval 358 ms QTC Calculation (Bezet) 473 ms Calculated P Axis 59 degrees Calculated R Axis 32 degrees Calculated T Axis 67 degrees Diagnosis Sinus tachycardia with Premature atrial complexes with Aberrant conduction Possible Left atrial enlargement Borderline ECG When compared with ECG of 24-SEP-2018 21:55, Aberrant conduction is now Present Nonspecific T wave abnormality now evident in Lateral leads Confirmed by Sharyn Carpenter ([id number]) on 9/26/2018 7:19:27 AM 
  
 
 
Pt has been seen and examined by Dr. Gi López. He agrees with the following assessment and plan. Assessment/Plan:  
   
 Diagnosis  Systolic anterior motion of mitral valve- add BB  Pleural effusion, bilateral  
 Nonrheumatic aortic valve stenosis- mild to moderate- no intervention needed  Aspiration pneumonia ([address]- ? GI consulted due to CT findings  Acute respiratory failure with hypoxemia ([address]- improving per pulmonary  Late onset Alzheimer's disease without behavioral disturbance  COPD (chronic obstructive pulmonary disease) (HCC)  Debility, unspecified  DDD (degenerative disc disease), lumbar  HTN (hypertension)- controlled, continue meds, adding BB- monitor Thank you for consulting [facility] and allowing us to participate in the care of this patient. We will continue to follow along with you.  
 
Dulce Arenas PA-C

## 2018-09-26 NOTE — PROGRESS NOTES
Problem: Interdisciplinary Rounds Goal: Interdisciplinary Rounds Interdisciplinary team rounds were held 9/26/2018 with the following team members:Care Management, Nursing, Nutrition, Pharmacy, Physical Therapy and Physician and the patient. Plan of care discussed. See clinical pathway and/or care plan for interventions and desired outcomes.

## 2018-09-26 NOTE — PROGRESS NOTES
09/25/18 2243 Oxygen Therapy O2 Sat (%) 93 % Pulse via Oximetry 100 beats per minute O2 Device BIPAP 
(AVAPS) FIO2 (%) 40 % Respiratory Respiratory (WDL) WDL Breath Sounds Bilateral Diminished CPAP/BIPAP  
CPAP/BIPAP Start/Stop On Device Mode AVAP  
$$ Bipap Daily Yes AVAP Set Resp Rate 12 AVAP Set VT (ml) 450 Mask Type and Size Medium; Full face EPAP (cm H2O) 6 cm H2O Inspiratory Time (sec) 0.9 seconds Vt Spont (ml) 525 ml Ve Observed (l/min) 8.2 l/min Total RR (Spontaneous) 19 breaths per minute Insp Rise Time (sec) 3 Leak (Estimated) 25 L/min Alarm Settings High Pressure 30 Low Pressure 2 Low Ve 2 High Rate 60 Low Rate 5 Patient placed on continuous sat monitor # 12

## 2018-09-26 NOTE — PROGRESS NOTES
Patrisha Bamberger Admission Date: 9/24/2018 Daily Progress Note: 9/26/2018 The patient's chart is reviewed and the patient is discussed with the staff. 
 
  
79 y/o female with O2 dependent COPD (6L QHS and 4 L AM) and ? ILD/dementia/jt;tu presents to the ED in acute respiratory distress with vomiting. She lives in assisted living at the [facility] living and able to do most ADLs (eating, restroom use, but not dressing) and apparently had been having multiple bouts of vomiting and abdominal discomfort and sats were dropping. She reported did expectorate all contents and did not aspirate. No diarrhea. No fevers, no prior episodes. She was placed on BIPAP for worsening hypoxemia and concern for ?aspiration but able to be weaned back to NC.   
 
 
Subjective: On BIPAP transiently last night but now back on 6L. Some confusion evident. No overt distress. C/O dry mouth. Current Facility-Administered Medications Medication Dose Route Frequency  busPIRone (BUSPAR) tablet 10 mg  10 mg Oral BID  Azelastine (ASTEPRO) 0.15 % (205.5 mcg) nasal spray 1 Spray (Patient Supplied)  1 Spray Both Nostrils BID  
 donepezil (ARICEPT) tablet 5 mg  5 mg Oral QHS  fluticasone (FLONASE) 50 mcg/actuation nasal spray 2 Spray  2 Spray Both Nostrils DAILY  sodium chloride (NS) flush 5-10 mL  5-10 mL IntraVENous Q8H  
 sodium chloride (NS) flush 5-10 mL  5-10 mL IntraVENous PRN  
 naloxone (NARCAN) injection 0.4 mg  0.4 mg IntraVENous PRN  
 acetaminophen (TYLENOL) tablet 650 mg  650 mg Oral Q4H PRN  
 enoxaparin (LOVENOX) injection 40 mg  40 mg SubCUTAneous Q24H  
 bisacodyl (DULCOLAX) tablet 5 mg  5 mg Oral DAILY PRN  
 ondansetron (ZOFRAN) injection 4 mg  4 mg IntraVENous Q4H PRN  
 guaiFENesin-dextromethorphan (ROBITUSSIN DM) 100-10 mg/5 mL syrup 10 mL  10 mL Oral Q6H PRN  
 albuterol-ipratropium (DUO-NEB) 2.5 MG-0.5 MG/3 ML  3 mL Nebulization Q4H PRN  
  budesonide (PULMICORT) 500 mcg/2 ml nebulizer suspension  500 mcg Nebulization BID RT And  
 albuterol (PROVENTIL VENTOLIN) nebulizer solution 2.5 mg  2.5 mg Nebulization Q6HWA RT  
 piperacillin-tazobactam (ZOSYN) 4.5 g in 0.9% sodium chloride (MBP/ADV) 100 mL  4.5 g IntraVENous Q8H  
 tuberculin injection 5 Units  5 Units IntraDERMal ONCE  
 sertraline (ZOLOFT) tablet 50 mg  50 mg Oral QHS  methylPREDNISolone (PF) (SOLU-MEDROL) injection 40 mg  40 mg IntraVENous Q12H Review of Systems Constitutional: negative for fever, chills, sweats Cardiovascular: negative for chest pain, palpitations, syncope, edema Gastrointestinal:  negative for dysphagia, reflux, vomiting, diarrhea, abdominal pain, or melena Neurologic:  negative for focal weakness, numbness, headache Objective:  
 
Vitals:  
 18 2243 18 1887 18 0327 18 9952 BP:  129/68 148/67 Pulse:  95 91 Resp:  20 20 Temp:  98.3 °F (36.8 °C) 98.3 °F (36.8 °C) SpO2: 93% 95% 90% 92% Weight:      
Height:      
 
Intake and Output:  
 1901 -  0700 In: 990 [I.V.:990] Out: 1150 [OPGI] Physical Exam:  
Constitution:  the patient is well developed and in no acute distress on 6L EENMT:  Sclera clear, pupils equal, oral mucosa moist 
Respiratory: decreased BS bilaterally. Cardiovascular:  RRR with mild SM Gastrointestinal: soft and non-tender; with positive bowel sounds. Musculoskeletal: warm without cyanosis. There is trace lower leg edema. Skin:  no jaundice or rashes Neurologic: no gross neuro deficits Psychiatric:  Awake and FC 
 
CXR:  
 
: 
 
 
 
Chest CT : 
 
 
 
 
IMPRESSION: No convincing interstitial lung disease seen but there is bilateral 
effusions and overlying atelectasis or infiltrates larger on the left than the 
right. Fluid filling esophagus could be reflux, increasing risk of aspiration but no material seen in the trachea or bronchi at this time. Probable simple 
cysts and nonobstructing left kidney stone. Echo: SUMMARY: 
 
-  Left ventricle: Systolic function was hyperdynamic. Ejection fraction was 
estimated in the range of 65 % to 70 %. There were no regional wall motion 
abnormalities. There was dynamic obstruction likely contributed by the 
hyperdynamic function with chordal AKILA, with a peak velocity of 3.6 cm/s, a 
peak gradient of 53 mmHg, and a mean gradient of 15 mmHg. There was minimal 
increase in gradients with Valsalva. -  Aortic valve: There was mild to moderate stenosis (mildly elevated Gradients with the hyperdynamic function; DI at 0.43; mostly restriction of the non 
coronary cusp). LAB No results for input(s): GLUCPOC in the last 72 hours. No lab exists for component: [place] Recent Labs  
   09/25/18 0435 09/24/18 
 2159 WBC  8.4  10.1 HGB  10.1*  11.2* HCT  34.4*  38.5 PLT  170  195 Recent Labs  
   09/25/18 0435  09/24/18 
 2159 NA  145  143  
K  4.0  4.5  
CL  108*  108* CO2  33*  29 GLU  107*  115* BUN  25*  27* CREA  0.88  1.00  
MG  2.0   --   
CA  8.8  8.9 PHOS  4.3*   --   
ALB   --   3.3 TBILI   --   0.2 ALT   --   21 SGOT   --   38* Recent Labs  
   09/24/18 
 2246 PH  7.33* PCO2  51* PO2  101 HCO3  27* No results for input(s): LCAD, LAC in the last 72 hours. Assessment:  (Medical Decision Making) Hospital Problems  Date Reviewed: 6/26/2018 Codes Class Noted POA Pleural effusion, bilateral ICD-10-CM: J90 ICD-9-CM: 511.9  9/26/2018 Unknown Likely due to heart failure. Has AS and AKILA. BNP was low. Nonrheumatic aortic valve stenosis ICD-10-CM: I35.0 ICD-9-CM: 424.1  9/26/2018 Unknown Aspiration pneumonia ([facility] ICD-10-CM: J69.0 ICD-9-CM: 507.0  9/24/2018 Yes On Abx.    
 * (Principal)Acute respiratory failure with hypoxemia (HCC) ICD-10-CM: J96.01 
 ICD-9-CM: 518.81  9/24/2018 Yes Also has chronic hypoxemia RF. Late onset Alzheimer's disease without behavioral disturbance (Chronic) ICD-10-CM: G30.1, F02.80 ICD-9-CM: 331.0, 294.10  11/15/2017 Yes Debility ICD-10-CM: R53.81 ICD-9-CM: 799.3  11/30/2015 Yes Severe COPD (chronic obstructive pulmonary disease) (HCC) (Chronic) ICD-10-CM: J44.9 ICD-9-CM: 540  9/1/2015 Yes No wheezing HTN (hypertension) (Chronic) ICD-10-CM: I10 
ICD-9-CM: 401.9  1/22/2014 Yes Hyperlipidemia (Chronic) ICD-10-CM: H94.9 ICD-9-CM: 272.4  1/22/2014 Yes Plan:  (Medical Decision Making) Consider cardiology eval to define optimal management for AS and AKILA. Diuresis may worsen dynamic obstruction. ? Needs beta blocker/ 
 
Albuterol via IPPB. Mobilize if possible. Wean oxygen as tolerated- on 4L by day and 6L at night at baseline. -- 
 
More than 50% of the time documented was spent in face-to-face contact with the patient and in the care of the patient on the floor/unit where the patient is located.  
 
David Poon MD

## 2018-09-27 NOTE — PROGRESS NOTES
Patient's daughter states that patient's urine has bllod bin it and wants physician notified. Contacted Dr. Earlean Severs. New orders received. UA collected and sent to lab.

## 2018-09-27 NOTE — PROGRESS NOTES
Hospitalist Progress Note Admit Date:  2018  9:50 PM  
Name:  Hubert Benton Age:  80 y.o. 
:  1929 MRN:  [id number] PCP:  Jericho Mendez MD 
Treatment Team: Attending Provider: Dorenda Oppenheim, MD; Consulting Provider: Jamie Clark MD; Utilization Review: Juany Macedo RN; Consulting Provider: MD Fransisca Johnston As Per Prior Documentation: 
 
\"79 y/o female with O2 dependent COPD and ILD presents to the ED in acute respiratory distress. She resides at [facility] and went to bed at 8 pm. She is usually on 6L O2 at night. Later developed acute vomiting and her O2 sats dropped. EMS alerted and administered albuterol nebulizer. Concern is that she may have aspirated. She was tachycardic, tachypneic and distressed. Placed on BIPAP and started on antibiotics. Initial lactic acid 1.1, troponin negative and chest xray decreased lung volumes. Will admit to ICU for further treatment. Today, feels better, on 5L O2. Did well w/ beside testing for swallowing. \" 
 
 
2018- seen requesting ice cubes. Her breathing is about the same if not better than when she was at the [facility]. She cant recall all of what happened but remembers vomiting. 2018- seen - daughter at bedside very agitated about moms condition- long discussion re- vomiting possibly esophagus issue, chronic lung issues and cardiac issues and general decline. ...- pt inability to have egd or other procedures given high risk with high o2 requirements- pt was also agitated overnight; unable to tolerate bipap/cpap or apap. Right now she does not even want oxygen via nc. Objective:  
 
Patient Vitals for the past 24 hrs: 
 Temp Pulse Resp BP SpO2  
18 1205 97 °F (36.1 °C) 65 18 157/58 94 % 18 1154 - - - - 95 % 18 0756 96.1 °F (35.6 °C) 70 18 186/65 96 %  
18 0745 - - - - 94 % 18 0316 98.2 °F (36.8 °C) 76 18 174/74 93 % 18 2222 - 80 - 160/70 -  
 09/26/18 2016 - - - - 92 %  
09/26/18 1925 97.6 °F (36.4 °C) 76 16 162/70 (!) 84 %  
09/26/18 1648 95.5 °F (35.3 °C) 94 18 (!) 144/91 -  
09/26/18 1550 - - - - 92 % Oxygen Therapy O2 Sat (%): 94 % (09/27/18 1205) Pulse via Oximetry: 78 beats per minute (09/27/18 1154) O2 Device: Nasal cannula (09/27/18 1154) O2 Flow Rate (L/min): 6 l/min (09/27/18 1154) O2 Temperature: 87.6 °F (30.9 °C) (09/25/18 0720) FIO2 (%): 36 % (09/26/18 2016) Intake/Output Summary (Last 24 hours) at 09/27/18 1416 Last data filed at 09/26/18 2224 Gross per 24 hour Intake                0 ml Output              200 ml Net             -200 ml Physical Exam: 
General:    Well nourished. Alert. CV:   RRR. No murmur, rub, or gallop. Lungs:  Diminished bibasilar Abdomen: Soft, nontender, nondistended. Bowel sounds normal.  
Extremities: Warm and dry. No cyanosis; no edema Neurologic:  grossly intact. Skin:     No rashes or jaundice. No wounds. Psych:  Normal mood and affect. I reviewed the labs, imaging, EKGs, telemetry, and other studies done this admission. Data Review:  
Recent Results (from the past 24 hour(s)) URINALYSIS W/ RFLX MICROSCOPIC Collection Time: 09/26/18  8:25 PM  
Result Value Ref Range Color RED Appearance TURBID Specific gravity 1.028 (H) 1.001 - 1.023    
 pH (UA) 5.0 5.0 - 9.0 Protein 100 (A) NEG mg/dL Glucose NEGATIVE  mg/dL Ketone 15 (A) NEG mg/dL Bilirubin LARGE (A) NEG Blood LARGE (A) NEG Urobilinogen 1.0 0.2 - 1.0 EU/dL Nitrites POSITIVE (A) NEG Leukocyte Esterase MODERATE (A) NEG    
 WBC 5-10 0 /hpf  
 RBC >100 0 /hpf Bacteria 0 0 /hpf  
 Crystals, urine OCCASIONAL 0 /LPF Other observations MICROSCOPIC PERFORMED ON UNSPUN URINE DUE TO TOO MANY CELLS  
GLUCOSE, POC Collection Time: 09/27/18  7:20 AM  
Result Value Ref Range Glucose (POC) 83 65 - 100 mg/dL Imaging Studies: CXR Results  (Last 48 hours) None  
  
 
CT Results  (Last 48 hours) None Assessment and Plan:  
 
Hospital Problems as of 9/27/2018  Date Reviewed: 6/26/2018 Codes Class Noted - Resolved POA Pleural effusion, bilateral ICD-10-CM: J90 ICD-9-CM: 511.9  9/26/2018 - Present Unknown Nonrheumatic aortic valve stenosis ICD-10-CM: I35.0 ICD-9-CM: 424.1  9/26/2018 - Present Unknown Aspiration pneumonia ([facility] ICD-10-CM: J69.0 ICD-9-CM: 507.0  9/24/2018 - Present Yes * (Principal)Acute respiratory failure with hypoxemia ([facility] ICD-10-CM: J96.01 
ICD-9-CM: 518.81  9/24/2018 - Present Yes Late onset Alzheimer's disease without behavioral disturbance (Chronic) ICD-10-CM: G30.1, F02.80 ICD-9-CM: 331.0, 294.10  11/15/2017 - Present Yes Debility ICD-10-CM: R53.81 ICD-9-CM: 799.3  11/30/2015 - Present Yes COPD (chronic obstructive pulmonary disease) (HCC) (Chronic) ICD-10-CM: J44.9 ICD-9-CM: 409  9/1/2015 - Present Yes HTN (hypertension) (Chronic) ICD-10-CM: I10 
ICD-9-CM: 401.9  1/22/2014 - Present Yes Hyperlipidemia (Chronic) ICD-10-CM: O27.7 ICD-9-CM: 272.4  1/22/2014 - Present Yes RESOLVED: ILD (interstitial lung disease) (HCC) (Chronic) ICD-10-CM: J84.9 ICD-9-CM: 679  9/1/2015 - 9/26/2018 Yes PLAN: 
 
Acute on chronic hypoxic resp failure w/ hx of late COPD and O2 dependence- resolving-  Pulmonology input appreciated Aortic stenosis, AKILA and bl pleural effusions- cardiology input appreciated Concern for aspiration pneumonia- Continue Zosyn & wean steroids Confusion- multifactorial from sundowning, meds, infection- prn meds as needed BA swallow ok- gi planning to try liquid diet- input appreciated Pt also needs to have a bm prior to leaving Will monitor Dispo: DC back to nursing home in the next 1-2 days Signed: 
Jamey Kulkarni MD

## 2018-09-27 NOTE — PROGRESS NOTES
Hubert Benton Admission Date: 9/24/2018 Daily Progress Note: 9/27/2018 The patient's chart is reviewed and the patient is discussed with the staff. 
 
  
81 y/o female with O2 dependent COPD (6L QHS and 4 L AM) and ? ILD/dementia/jt;tu presents to the ED in acute respiratory distress with vomiting. She lives in assisted living at the [facility] living and able to do most ADLs (eating, restroom use, but not dressing) and apparently had been having multiple bouts of vomiting and abdominal discomfort and sats were dropping. She reported did expectorate all contents and did not aspirate. No diarrhea. No fevers, no prior episodes. She was placed on BIPAP for worsening hypoxemia and concern for ?aspiration but able to be weaned back to [place].   
 
 
Subjective:  
 
Appreciate cardiology input for AS/AKILA issues. Now on BB. Was weaned to 3L at one point yesterday but FIO2 increased for sleep. Now on 6L with sat 94. Bad night with lots of confusion and agitation. Got haldol. Pt having obvious obstructive apneas. Daughter at bedside who is HCPOA. Pt with marked dementia and poor prognosis. Family desires DNR status which is appropriate. Current Facility-Administered Medications Medication Dose Route Frequency  lisinopril (PRINIVIL, ZESTRIL) tablet 5 mg  5 mg Oral DAILY  barium sulfate (EZ PAQUE) 60 % (w/v) contrast susp 355 mL  355 mL Oral RAD ONCE  
 albuterol (PROVENTIL VENTOLIN) nebulizer solution 2.5 mg  2.5 mg Nebulization QID RT And  
 budesonide (PULMICORT) 500 mcg/2 ml nebulizer suspension  500 mcg Nebulization BID RT  
 methylPREDNISolone (PF) (SOLU-MEDROL) injection 20 mg  20 mg IntraVENous BID  metoprolol tartrate (LOPRESSOR) tablet 12.5 mg  12.5 mg Oral BID  polyethylene glycol (MIRALAX) packet 17 g  17 g Oral DAILY  busPIRone (BUSPAR) tablet 10 mg (Patient Supplied)  10 mg Oral BID  
  Azelastine (ASTEPRO) 0.15 % (205.5 mcg) nasal spray 1 Spray (Patient Supplied)  1 Spray Both Nostrils BID  
 donepezil (ARICEPT) tablet 5 mg  5 mg Oral QHS  fluticasone (FLONASE) 50 mcg/actuation nasal spray 2 Spray  2 Spray Both Nostrils DAILY  sodium chloride (NS) flush 5-10 mL  5-10 mL IntraVENous Q8H  
 sodium chloride (NS) flush 5-10 mL  5-10 mL IntraVENous PRN  
 naloxone (NARCAN) injection 0.4 mg  0.4 mg IntraVENous PRN  
 acetaminophen (TYLENOL) tablet 650 mg  650 mg Oral Q4H PRN  
 enoxaparin (LOVENOX) injection 40 mg  40 mg SubCUTAneous Q24H  
 bisacodyl (DULCOLAX) tablet 5 mg  5 mg Oral DAILY PRN  
 ondansetron (ZOFRAN) injection 4 mg  4 mg IntraVENous Q4H PRN  
 guaiFENesin-dextromethorphan (ROBITUSSIN DM) 100-10 mg/5 mL syrup 10 mL  10 mL Oral Q6H PRN  piperacillin-tazobactam (ZOSYN) 4.5 g in 0.9% sodium chloride (MBP/ADV) 100 mL  4.5 g IntraVENous Q8H  
 sertraline (ZOLOFT) tablet 50 mg  50 mg Oral QHS Review of Systems Constitutional: negative for fever, chills, sweats Cardiovascular: negative for chest pain, palpitations, syncope, edema Gastrointestinal:  negative for dysphagia, reflux, vomiting, diarrhea, abdominal pain, or melena Neurologic:  negative for focal weakness, numbness, headache Objective:  
 
Vitals:  
 09/26/18 2016 09/26/18 2222 09/27/18 2316 09/27/18 0745 BP:  160/70 174/74 Pulse:  80 76 Resp:   18 Temp:   98.2 °F (36.8 °C) SpO2: 92%  93% 94% Weight:      
Height:      
 
Intake and Output:  
09/25 1901 - 09/27 0700 In: 990 [I.V.:990] Out: 800 [Urine:800] Physical Exam:  
Constitution:  the patient is well developed and in no acute distress on 6L. Having OA's with desats. EENMT:  Sclera clear, pupils equal, oral mucosa moist 
Respiratory: decreased BS bilaterally. Cardiovascular:  RRR with mild SM Gastrointestinal: soft and non-tender; with positive bowel sounds. Musculoskeletal: warm without cyanosis. There is trace lower leg edema. Skin:  no jaundice or rashes Neurologic: no gross neuro deficits Psychiatric:  Awake and confused; falls asleep quickly CXR:  
 
9/24: 
 
 
 
Chest CT 9/25: 
 
 
 
 
IMPRESSION: No convincing interstitial lung disease seen but there is bilateral 
effusions and overlying atelectasis or infiltrates larger on the left than the 
right. Fluid filling esophagus could be reflux, increasing risk of aspiration 
but no material seen in the trachea or bronchi at this time. Probable simple 
cysts and nonobstructing left kidney stone. Echo: SUMMARY: 
 
-  Left ventricle: Systolic function was hyperdynamic. Ejection fraction was 
estimated in the range of 65 % to 70 %. There were no regional wall motion 
abnormalities. There was dynamic obstruction likely contributed by the 
hyperdynamic function with chordal AKILA, with a peak velocity of 3.6 cm/s, a 
peak gradient of 53 mmHg, and a mean gradient of 15 mmHg. There was minimal 
increase in gradients with Valsalva. -  Aortic valve: There was mild to moderate stenosis (mildly elevated Gradients with the hyperdynamic function; DI at 0.43; mostly restriction of the non 
coronary cusp). LAB Recent Labs  
   09/27/18 
 0720 GLUCPOC  83 Recent Labs  
   09/25/18 
 0435  09/24/18 
 2159 WBC  8.4  10.1 HGB  10.1*  11.2* HCT  34.4*  38.5 PLT  170  195 Recent Labs  
   09/25/18 
 0435  09/24/18 
 2159 NA  145  143  
K  4.0  4.5  
CL  108*  108* CO2  33*  29 GLU  107*  115* BUN  25*  27* CREA  0.88  1.00  
MG  2.0   --   
CA  8.8  8.9 PHOS  4.3*   --   
ALB   --   3.3 TBILI   --   0.2 ALT   --   21 SGOT   --   38* Recent Labs  
   09/24/18 
 2246 PH  7.33* PCO2  51* PO2  101 HCO3  27* No results for input(s): LCAD, LAC in the last 72 hours. Assessment:  (Medical Decision Making) Hospital Problems  Date Reviewed: 6/26/2018 Codes Class Noted POA Pleural effusion, bilateral ICD-10-CM: J90 ICD-9-CM: 511.9  9/26/2018 Unknown Likely due to heart failure. Has AS and AKILA. BNP was low. Nonrheumatic aortic valve stenosis ICD-10-CM: I35.0 ICD-9-CM: 424.1  9/26/2018 Unknown Mild to moderate Aspiration pneumonia ([address] ICD-10-CM: J69.0 ICD-9-CM: 507.0  9/24/2018 Yes On Abx. * (Principal)Acute respiratory failure with hypoxemia ([address] ICD-10-CM: J96.01 
ICD-9-CM: 518.81  9/24/2018 Yes Also has chronic hypoxemia RF. Late onset Alzheimer's disease without behavioral disturbance (Chronic) ICD-10-CM: G30.1, F02.80 ICD-9-CM: 331.0, 294.10  11/15/2017 Yes Seems worse. Being in a new environment not helping. Debility ICD-10-CM: R53.81 ICD-9-CM: 799.3  11/30/2015 Yes Severe COPD (chronic obstructive pulmonary disease) (HCC) (Chronic) ICD-10-CM: J44.9 ICD-9-CM: 625  9/1/2015 Yes No wheezing HTN (hypertension) (Chronic) ICD-10-CM: I10 
ICD-9-CM: 401.9  1/22/2014 Yes Hyperlipidemia (Chronic) ICD-10-CM: H05.9 ICD-9-CM: 272.4  1/22/2014 Yes FLAVIA- having witnessed apneas. Has been intolerant of BIPAP Plan:  (Medical Decision Making) DNR given poor prognosis and per family wishes. Try on APAP for FLAVIA. Wean oxygen as tolerated. Wean steroids- may be contributing to AMS. Prognosis poor. -- 
 
More than 50% of the time documented was spent in face-to-face contact with the patient and in the care of the patient on the floor/unit where the patient is located.  
 
Frankey Kerbs, MD

## 2018-09-27 NOTE — PROGRESS NOTES
Problem: Self Care Deficits Care Plan (Adult) Goal: *Acute Goals and Plan of Care (Insert Text) 1. Patient will perform grooming with stand by assistance in sitting with verbal cues. 2. Patient will perform Upper body dressing with stand by assistance in sitting with verbal cues. 3. Patient will perform upper and lower body bathing with contact guard assistance seated on shower chair. 5. Patient will perform toilet transfers with moderate assistance with elevated seat. 6. Patient will perform shower transfer with moderated assistance with shower chair. 7. Patient will participate in 30 + minutes of ADL/ therapeutic exercise/therapeutic activity with min rest breaks to increase activity tolerance for self care. 8. Patient will perform ADL functional mobility in room with minimal assistance. Goals to be achieved in 7 days. OCCUPATIONAL THERAPY: Initial Assessment 9/27/2018 INPATIENT: Hospital Day: 4 [place] / Product Type: Medicare /  
  
NAME/AGE/GENDER: Zo Farrell is a 80 y.o. female PRIMARY DIAGNOSIS:  Acute respiratory failure with hypoxemia ([facility] Aspiration pneumonia ([facility] Acute respiratory failure with hypoxemia (HCC) Acute respiratory failure with hypoxemia (HCC) ICD-10: Treatment Diagnosis:  
 · Generalized Muscle Weakness (M62.81) · Other lack of cordination (R27.8) Precautions/Allergies: 
   Bactroban [mupirocin calcium]; Mupirocin; Sulfa (sulfonamide antibiotics); and Sulfamethoxazole-trimethoprim ASSESSMENT:  
 
Ms. Eder Mauro presents with above diagnosis and was seen in room with daughter and caregiver present. Pt lives at [facility] and get some assistance with self care to include shower transfer and lower body dressing. Pt was noted to have moderate generalized weakness and would benefit from OT to maximize safety and independence with self care and functional mobility. Pt might benefit from continued rehab after discharge is she is not at baseline level of function. This section established at most recent assessment PROBLEM LIST (Impairments causing functional limitations): 1. Decreased Strength 2. Decreased ADL/Functional Activities 3. Decreased Transfer Abilities 4. Decreased Activity Tolerance INTERVENTIONS PLANNED: (Benefits and precautions of occupational therapy have been discussed with the patient.) 1. Activities of daily living training 2. Adaptive equipment training 3. Therapeutic activity 4. Therapeutic exercise TREATMENT PLAN: Frequency/Duration: Follow patient 3 times per week to address above goals. Rehabilitation Potential For Stated Goals: Good RECOMMENDED REHABILITATION/EQUIPMENT: (at time of discharge pending progress): Due to the probability of continued deficits (see above) this patient will likely need continued skilled occupational therapy after discharge. Equipment:  
? None at this time OCCUPATIONAL PROFILE AND HISTORY:  
History of Present Injury/Illness (Reason for Referral): 
weakness Past Medical History/Comorbidities: Ms. Fabio Lechuga  has a past medical history of Arthropathy, unspecified, site unspecified; Benign paroxysmal positional vertigo; Cardiac dysrhythmia, unspecified; Debility, unspecified; Facet syndrome ([address] GIB (gastrointestinal bleeding) (1/31/2014); History of peptic ulcer disease (01/2014); Hypertension; IBS (irritable bowel syndrome); Impaired fasting glucose; Lumbago; Memory loss; Meningioma ([address] Musculoskeletal pain; OA (osteoarthritis) of knee; Palliative care encounter (5/8/2014); Rhinorrhea; Scoliosis; Sensorineural hearing loss; Spondylolisthesis; TMJ dysfunction; Varicella; Vasomotor rhinitis; and Vitamin D deficiency.   Ms. Fabio Lechuga  has a past surgical history that includes hx [place] and bso (1974); hx knee arthroscopy; hx appendectomy; hx other surgical (1998); hx knee replacement (Left, 2007); hx colonoscopy; hx tonsillectomy; and hx cataract removal (2003). Social History/Living Environment:  
Home Environment: Assisted living Care [facility]: [facility] One[facility] Story Residence: One story Living Alone: No 
Support Systems: Family member(s) Patient Expects to be Discharged to[de-identified] Assisted living Current DME Used/Available at Home: Nikolas Barba, [address] chair, Wheelchair, power Prior Level of Function/Work/Activity: 
Gets assistance with shower transfer and lower body dressing. Number of Personal Factors/Comorbidities that affect the Plan of Care: Brief history (0):  LOW COMPLEXITY ASSESSMENT OF OCCUPATIONAL PERFORMANCE[de-identified]  
Activities of Daily Living:  
Basic ADLs (From Assessment) Complex ADLs (From Assessment) Feeding: Setup Oral Facial Hygiene/Grooming: Minimum assistance Bathing: Moderate assistance Upper Body Dressing: Minimum assistance Lower Body Dressing: Maximum assistance Toileting: Maximum assistance Grooming/Bathing/Dressing Activities of Daily Living Cognitive Retraining Safety/Judgement: Awareness of environment Functional Transfers Toilet Transfer : Maximum assistance Shower Transfer: Maximum assistance Bed/Mat Mobility Supine to Sit: Moderate assistance Sit to Stand: Moderate assistance Bed to Chair: Moderate assistance Scooting: Additional time;Minimum assistance Most Recent Physical Functioning:  
Gross Assessment: 
  
         
  
Posture: 
  
Balance: 
Sitting: Intact Standing: Pull to stand; With support Bed Mobility: 
Supine to Sit: Moderate assistance Scooting: Additional time;Minimum assistance Wheelchair Mobility: 
  
Transfers: 
Sit to Stand: Moderate assistance Stand to Sit: Moderate assistance Bed to Chair: Moderate assistance Patient Vitals for the past 6 hrs: 
 BP BP Patient Position SpO2 O2 Flow Rate (L/min) Pulse  
09/27/18 0745 - - 94 % 6 l/min -  
 09/27/18 0756 186/65 At rest;Head of bed elevated (Comment degrees) 96 % - 70 Mental Status Neurologic State: Alert Orientation Level: Oriented to time, Oriented to person, Oriented to place Cognition: Appropriate for age attention/concentration Perception: Appears intact Perseveration: No perseveration noted Safety/Judgement: Awareness of environment Physical Skills Involved: 
1. Balance 2. Strength 3. Activity Tolerance Cognitive Skills Affected (resulting in the inability to perform in a timely and safe manner): 1. Short Term Recall Psychosocial Skills Affected: 1. Environmental Adaptation Number of elements that affect the Plan of Care: 3-5:  MODERATE COMPLEXITY CLINICAL DECISION MAKING:  
[facility] AM-PAC 6 Clicks Daily Activity Inpatient Short Form How much help from another person does the patient currently need. .. Total A Lot A Little None 1. Putting on and taking off regular lower body clothing? [] 1   [x] 2   [] 3   [] 4  
2. Bathing (including washing, rinsing, drying)? [] 1   [x] 2   [] 3   [] 4  
3. Toileting, which includes using toilet, bedpan or urinal?   [] 1   [x] 2   [] 3   [] 4  
4. Putting on and taking off regular upper body clothing? [] 1   [] 2   [x] 3   [] 4  
5. Taking care of personal grooming such as brushing teeth? [] 1   [] 2   [x] 3   [] 4  
6. Eating meals? [] 1   [] 2   [x] 3   [] 4  
© 2007, Trustees of [facility], under license to [employer]. All rights reserved Score:  Initial:15 Most Recent: X (Date: -- ) Interpretation of Tool:  Represents activities that are increasingly more difficult (i.e. Bed mobility, Transfers, Gait). Score 24 23 22-20 19-15 14-10 9-7 6 Modifier CH CI CJ CK CL CM CN   
 
? Self Care:  
  - CURRENT STATUS: CK - 40%-59% impaired, limited or restricted  - GOAL STATUS: CJ - 20%-39% impaired, limited or restricted  - D/C STATUS:  ---------------To be determined--------------- [place] / Product Type: Medicare /   
 
Medical Necessity:    
· Patient is expected to demonstrate progress in strength, balance and functional technique to increase independence with self care. Reason for Services/Other Comments: 
· Patient would benefit from OT to maximize safety and independence with self care and functional mobility. Moisés Wilkerson Use of outcome tool(s) and clinical judgement create a POC that gives a: LOW COMPLEXITY  
 
 
 
TREATMENT:  
(In addition to Assessment/Re-Assessment sessions the following treatments were rendered) Pre-treatment Symptoms/Complaints:   
Pain: Initial:  
Pain Intensity 1: 0 0 Post Session:  0 Assessment/Reassessment only, no treatment provided today Braces/Orthotics/Lines/Etc:  
· IV 
· O2 Device: Nasal cannula Treatment/Session Assessment:   
· Response to Treatment:  Pt up to chair tolerated fair. · Interdisciplinary Collaboration:  
o Physical Therapist 
o Occupational Therapist 
o Registered Nurse · After treatment position/precautions:  
o Up in chair 
o Bed/Chair-wheels locked 
o Call light within reach 
o RN notified 
o Family at bedside · Compliance with Program/Exercises: compliant all of the time. · Recommendations/Intent for next treatment session: \"Next visit will focus on advancements to more challenging activities and reduction in assistance provided\". Total Treatment Duration: OT Patient Time In/Time Out Time In: 1000 Time Out: 1010 No Almazan OT

## 2018-09-27 NOTE — PROGRESS NOTES
Problem: Nutrition Deficit Goal: *Optimize nutritional status Nutrition Reason for assessment:  Consult received for diet education r/t pureed diet; have patient sit up right for 2 hours past eating. Assessment:  
Diet order(s): 9/27:  Pureed effective 1615, 9/26-27:  NPO; 9/25:  Cardiac, 2 gm Na Food/Nutrition Patient History:  Pt presented from [place] with acute respiratory distress; past medical history notable for advanced dementia, ILD 02 dependent COPD; pt with possible aspiration pneumonia. Pt currently sleeping; recently received Haldol;  pt has not yet tried pureed diet. Daughter states she gave her mother a sip of water earlier this evening and she started coughing and spit it out. Anthropometrics:Height: 4' 11\" (149.9 cm),  Weight: 75.3 kg (166 lb), Weight source not specified, Body mass index is 33.53 kg/(m^2). BMI class of Overweight for Older American Women. Macronutrient needs: EER:  0942-6860 kcal /day (15-20 kcal/kg BW) EPR:  52-60 grams protein/day (1.2-1.4 grams/kg IBW) GFR > 60 Intake/Comparative Standards: No recorded intake; insufficient data to determine % estimated kcal and protein needs met at this time. Nutrition Diagnosis:  Swallowing difficulty possibly related to advanced demential as evidenced by dysphagia. Intervention: 
Meals and snacks: Continue current diet. Spoke with patient's daughter and discussed importance of having patient sit upright at meals and to remain sitting upright for 2 hours after eating. Daughter had questions r/t need for beverages to be thickened. Currently patient is on thin liquids. Nutrition Supplement Therapy:  Initiated Ensure Enlive on all meal trays. Discharge nutrition plan: Too soon to determine. Brandan Hoff, MPH, [address], [place] 
[phone]

## 2018-09-27 NOTE — PROGRESS NOTES
Pt states that this is a real inconvenience to be given treatments at this time of night. Pt states she wants to talk to the Dr that ordered these. Pt not happy. Pt somewhat confused, does not know why she's even here. Explained to pt several times, but pt is upset and repeating her questions. Pt upset about having to spend the night here. She has a nice bed at her home with nice pillows. RN notified.

## 2018-09-27 NOTE — PROGRESS NOTES
Problem: Interdisciplinary Rounds Goal: Interdisciplinary Rounds Interdisciplinary team rounds were held 9/27/2018 with the following team members:Care Management, Nursing, Nutrition, Pharmacy, Physical Therapy and Physician and the patient. Plan of care discussed. See clinical pathway and/or care plan for interventions and desired outcomes.

## 2018-09-27 NOTE — PROGRESS NOTES
Shift assessment completed. Urine in catheter bag is bloody. Patient states she needs to have a bowel movement. Patient assisted to [facility].

## 2018-09-27 NOTE — PROGRESS NOTES
Shift assessment complete. Respirations present. Even and unlabored. No s/s of distress. Zero c/o pain at this time. Call light within reach. Encouraged patient to call for assistance. Patient verbalizes understanding. See Doc Flowsheet for assessment details. Patient resting in bed. Bed alarm in progress. Door open for observation. Family at bedside. Patient confused. Patient demanding to go home. Patient States, How can you incarcerate people? \"   Assisted to bedside commode with max assistance to void.

## 2018-09-27 NOTE — PROGRESS NOTES
GI DAILY PROGRESS NOTE Admit Date:  9/24/2018 Today's Date:  9/27/2018 CC:  Esophageal dysmotility Subjective:  
 
Patient remains on 6L O2. Daughter at bedside and reports as medical POA, she has made decision for DNR for her mother. She has had increased agitation at night with severe dementia at baseline which daughter feels has worsened. Medications:  
Current Facility-Administered Medications Medication Dose Route Frequency  lisinopril (PRINIVIL, ZESTRIL) tablet 5 mg  5 mg Oral DAILY  busPIRone (BUSPAR) tablet 10 mg (Patient Supplied)  10 mg Oral BID  [START ON 9/28/2018] methylPREDNISolone (PF) (SOLU-MEDROL) injection 20 mg  20 mg IntraVENous DAILY  albuterol (PROVENTIL VENTOLIN) nebulizer solution 2.5 mg  2.5 mg Nebulization QID RT And  
 budesonide (PULMICORT) 500 mcg/2 ml nebulizer suspension  500 mcg Nebulization BID RT  
 metoprolol tartrate (LOPRESSOR) tablet 12.5 mg  12.5 mg Oral BID  polyethylene glycol (MIRALAX) packet 17 g  17 g Oral DAILY  Azelastine (ASTEPRO) 0.15 % (205.5 mcg) nasal spray 1 Spray (Patient Supplied)  1 Spray Both Nostrils BID  
 donepezil (ARICEPT) tablet 5 mg  5 mg Oral QHS  fluticasone (FLONASE) 50 mcg/actuation nasal spray 2 Spray  2 Spray Both Nostrils DAILY  sodium chloride (NS) flush 5-10 mL  5-10 mL IntraVENous Q8H  
 sodium chloride (NS) flush 5-10 mL  5-10 mL IntraVENous PRN  
 naloxone (NARCAN) injection 0.4 mg  0.4 mg IntraVENous PRN  
 acetaminophen (TYLENOL) tablet 650 mg  650 mg Oral Q4H PRN  
 enoxaparin (LOVENOX) injection 40 mg  40 mg SubCUTAneous Q24H  
 bisacodyl (DULCOLAX) tablet 5 mg  5 mg Oral DAILY PRN  
 ondansetron (ZOFRAN) injection 4 mg  4 mg IntraVENous Q4H PRN  
 guaiFENesin-dextromethorphan (ROBITUSSIN DM) 100-10 mg/5 mL syrup 10 mL  10 mL Oral Q6H PRN  piperacillin-tazobactam (ZOSYN) 4.5 g in 0.9% sodium chloride (MBP/ADV) 100 mL  4.5 g IntraVENous Q8H  
  sertraline (ZOLOFT) tablet 50 mg  50 mg Oral QHS Review of Systems: ROS was obtained, with pertinent positives as listed above. No chest pain or SOB. Diet:  npo Objective:  
Vitals: 
Visit Vitals  /65 (BP 1 Location: Left arm, BP Patient Position: At rest;Head of bed elevated (Comment degrees))  Pulse 70  Temp 96.1 °F (35.6 °C)  Resp 18  Ht 4' 11\" (1.499 m)  Wt 75.3 kg (166 lb)  SpO2 95%  Breastfeeding No  
 BMI 33.53 kg/m2 Intake/Output: 
  
09/25 1901 - 09/27 0700 In: 990 [I.V.:990] Out: 800 [Urine:800] Exam: 
General appearance: groggy, no distress Lungs: decreased to auscultation bilaterally anteriorly; O2 at 6L per nasal cannula Heart: regular rate and rhythm Abdomen: soft, non-tender. Bowel sounds normal. No masses, no organomegaly Extremities: extremities normal, atraumatic, no cyanosis or edema Neuro:  confused Data Review (Labs):   
Recent Labs  
   09/25/18 
 0435  09/24/18 
 2159 WBC  8.4  10.1 HGB  10.1*  11.2* HCT  34.4*  38.5 PLT  170  195 MCV  92.0  91.7 NA  145  143  
K  4.0  4.5  
CL  108*  108* CO2  33*  29 BUN  25*  27* CREA  0.88  1.00  
CA  8.8  8.9 MG  2.0   --   
GLU  107*  115* AP   --   82 SGOT   --   38* ALT   --   21  
TBILI   --   0.2 ALB   --   3.3 TP   --   6.8 Esophagram dated 9/27/2018  
CLINICAL INFORMATION: Dysphagia  
Fluoroscopy time 1.5 minutes  
22 spot films.  
Swallowing appears normal. There is a generalized decrease in esophageal 
motility. Scattered tertiary contractions are noted in the distal esophagus. No 
mass or ulceration. There is a moderate delay in emptying. When emptying did 
occur gastroesophageal junction was unremarkable in appearance. No reflux was 
seen during the exam.  IMPRESSION IMPRESSION: Decreased esophageal motility Assessment:  
 
Principal Problem: 
  Acute respiratory failure with hypoxemia ([address] (9/24/2018) Active Problems: HTN (hypertension) (1/22/2014) Hyperlipidemia (1/22/2014) COPD (chronic obstructive pulmonary disease) ([facility] [address] (9/1/2015) Debility (11/30/2015) Late onset Alzheimer's disease without behavioral disturbance (11/15/2017) Aspiration pneumonia ([facility] [address] (9/24/2018) Pleural effusion, bilateral (9/26/2018) Nonrheumatic aortic valve stenosis (9/26/2018) Plan:  
 
  
79 yo F with PMHx of ILD and  O2 dependent COPD (6 L qhs and 2 L during the day) presented to the ED in acute respiratory distress. She resides at the [facility] and was noted to have an acute episode of vomiting followed by drop in her O2 sats. Patient was admitted to the ICU and tx with IV steroids, BiPaP, and abx. CT scan of the chest revealed fluid filled esophagus, possible reflux related, and increased risk for aspiration. CT scan also revealed bilateral pleural effusions. She describes intermittent episodes of food stuff emesis at least 2-3x a week for \"awhile. \" She does not endorse true solid food or liquid dysphagia to suggest achalasia. Recent SLP evaluation negative for aspiration. Last EGD in 2014 with HH, gastritis, and candidal esophagitis. Barium swallow with no narrowing/stricture but suspicion for dysmotility with moderate delay in emptying. 1.  NPO for now 2. Could consider EGD for further evaluation but is at high risk for sedation given COPD 3. Daughter at bedside, has now made patient DNR Patient is seen and examined in collaboration with Dr. Joaquin More. Assessment and plan as per Dr. Joaquin More. Gene Cobian NP I have seen and examined this patient, and agree with above assessment and plan. Recommend a trial of full liq and purred diet Avoid sedation for procedure if possible, with severe frailty and advanced COPD, advanced dementia, etc 
Sit upright w/ meals and do not lay down 2 hrs after meals Daily PPI Jeanette Bennett MD

## 2018-09-27 NOTE — PROGRESS NOTES
Visited with pt regarding plans for discharge, pt is from [facility], has extra caregivers 4hrs/day x 4 days/wk. PT/OT ordered to help devise d/c plans. PPD placed. Pt may need SNF and would like to go to [place] for SNF if possible. Dtr at bedside and states that pt has recently changed her code status to a DNR. Will continue to follow until d/c.

## 2018-09-27 NOTE — PROGRESS NOTES
Patient combative, attempting to hit staff with hand held call light and remote telemetry box. Order received to discontinue telemetry. Patient refuses to wear oxygen, SpO2 79% on room air. Patient attempting to get out of bed. Multiple staff members at bedside to maintain patient safety. Spoke with Dr. Lenka Magana, order received see MAR.

## 2018-09-27 NOTE — PROGRESS NOTES
Problem: Mobility Impaired (Adult and Pediatric) Goal: *Acute Goals and Plan of Care (Insert Text) STG: 
(1.)Ms. Deal will move from supine to sit and sit to supine  with MINIMAL ASSIST within 3 treatment day(s). (2.)Ms. Deal will transfer from bed to chair and chair to bed with MINIMAL ASSIST using the least restrictive device within 3 treatment day(s). (3.)Ms. Deal will ambulate with MINIMAL ASSIST for 25 feet with the least restrictive device within 3 treatment day(s). LTG: 
(1.)Ms. Deal will move from supine to sit and sit to supine  in bed with STAND BY ASSIST within 7 treatment day(s). (2.)Ms. Deal will transfer from bed to chair and chair to bed with STAND BY ASSIST using the least restrictive device within 7 treatment day(s). (3.)Ms. Deal will ambulate with CONTACT GUARD ASSIST for  feet with the least restrictive device within 7 treatment day(s). ________________________________________________________________________________________________ PHYSICAL THERAPY: Initial Assessment 9/27/2018 INPATIENT: Hospital Day: 4 [place] / Product Type: Medicare /  
  
NAME/AGE/GENDER: Clarence Cameron is a 80 y.o. female PRIMARY DIAGNOSIS: Acute respiratory failure with hypoxemia ([facility] [address] Aspiration pneumonia ([facility] [address] Acute respiratory failure with hypoxemia (HCC) Acute respiratory failure with hypoxemia (HCC) ICD-10: Treatment Diagnosis:  
 · Generalized Muscle Weakness (M62.81) · Difficulty in walking, Not elsewhere classified (R26.2) Precaution/Allergies: 
Bactroban [mupirocin calcium]; Mupirocin; Sulfa (sulfonamide antibiotics); and Sulfamethoxazole-trimethoprim ASSESSMENT:  
 
Ms. Aniyah Jean presents with limited bed mobility, transfers and gait due to weakness. She would benefit from PT to increase her functional independence.  We feel she would benefit from a stay at Rehab prior to returning to the [facility]. She transferred out of bed and ambulated 5 feet before going to the bedside chair. Then performed LE therex sitting in the chair. Daughter and caregiver at bedside. This section established at most recent assessment PROBLEM LIST (Impairments causing functional limitations): 1. Decreased Strength 2. Decreased Transfer Abilities 3. Decreased Ambulation Ability/Technique 4. Decreased Balance 5. Decreased Activity Tolerance INTERVENTIONS PLANNED: (Benefits and precautions of physical therapy have been discussed with the patient.) 1. Bed Mobility 2. Gait Training 3. Therapeutic Exercise/Strengthening 4. Transfer Training TREATMENT PLAN: Frequency/Duration: daily for duration of hospital stay Rehabilitation Potential For Stated Goals: Good RECOMMENDED REHABILITATION/EQUIPMENT: (at time of discharge pending progress): Due to the probability of continued deficits (see above) this patient will likely need continued skilled physical therapy after discharge. Equipment:  
? None at this time HISTORY:  
History of Present Injury/Illness (Reason for Referral): 
81 y/o female with O2 dependent COPD and ILD presents to the ED in acute respiratory distress. She resides at [facility] and went to bed at 8 pm. She is usually on 6L O2 at night. Later developed acute vomiting and her O2 sats dropped. EMS alerted and administered albuterol nebulizer. Concern is that she may have aspirated. She was tachycardic, tachypneic and distressed. Placed on BIPAP and started on antibiotics. Initial lactic acid 1.1, troponin negative and chest xray decreased lung volumes. Will admit to ICU for further treatment. Past Medical History/Comorbidities: Ms. Kemar Osorio  has a past medical history of Arthropathy, unspecified, site unspecified; Benign paroxysmal positional vertigo; Cardiac dysrhythmia, unspecified; Debility, unspecified;  Facet syndrome ([facility] [address] GIB (gastrointestinal bleeding) (1/31/2014); History of peptic ulcer disease (01/2014); Hypertension; IBS (irritable bowel syndrome); Impaired fasting glucose; Lumbago; Memory loss; Meningioma ([address] Musculoskeletal pain; OA (osteoarthritis) of knee; Palliative care encounter (5/8/2014); Rhinorrhea; Scoliosis; Sensorineural hearing loss; Spondylolisthesis; TMJ dysfunction; Varicella; Vasomotor rhinitis; and Vitamin D deficiency. Ms. Myrna Caro  has a past surgical history that includes hx [place] and bso (1974); hx knee arthroscopy; hx appendectomy; hx other surgical (1998); hx knee replacement (Left, 2007); hx colonoscopy; hx tonsillectomy; and hx cataract removal (2003). Social History/Living Environment:  
Home Environment: Assisted living Care Facility Name: [facility] Story Residence: One story Living Alone: No 
Support Systems: Family member(s) Patient Expects to be Discharged to[de-identified] Assisted living Current DME Used/Available at Home: Carlos Fields, [address] [facility] chair, Wheelchair, power Prior Level of Function/Work/Activity: 
Living at [facility]. Walks very short distances with rolling walker but uses a power chair for further distances. Has assist for dressing and bathing. Number of Personal Factors/Comorbidities that affect the Plan of Care: 0: LOW COMPLEXITY EXAMINATION:  
Most Recent Physical Functioning:  
Gross Assessment: 
AROM: Generally decreased, functional 
Strength: Generally decreased, functional 
         
  
Posture: 
  
Balance: 
Sitting: Intact Standing: Pull to stand; With support Bed Mobility: 
Supine to Sit: Moderate assistance Wheelchair Mobility: 
  
Transfers: 
Sit to Stand: Moderate assistance Stand to Sit: Moderate assistance Bed to Chair: Moderate assistance Gait: 
  
Base of Support: Narrowed Step Length: Left shortened;Right shortened Gait Abnormalities: Antalgic;Decreased step clearance;Shuffling gait Distance (ft): 5 Feet (ft) Assistive Device: Walker, rolling Ambulation - Level of Assistance: Minimal assistance;Assist x1; Moderate assistance Interventions: Verbal cues; Safety awareness training Body Structures Involved: 1. Muscles Body Functions Affected: 1. Movement Related Activities and Participation Affected: 1. Mobility Number of elements that affect the Plan of Care: 3: MODERATE COMPLEXITY CLINICAL PRESENTATION:  
Presentation: Stable and uncomplicated: LOW COMPLEXITY CLINICAL DECISION MAKING:  
[facility] AM-PAC 6 Clicks Basic Mobility Inpatient Short Form How much difficulty does the patient currently have. .. Unable A Lot A Little None 1. Turning over in bed (including adjusting bedclothes, sheets and blankets)? [] 1   [] 2   [x] 3   [] 4  
2. Sitting down on and standing up from a chair with arms ( e.g., wheelchair, bedside commode, etc.)   [] 1   [x] 2   [] 3   [] 4  
3. Moving from lying on back to sitting on the side of the bed? [] 1   [x] 2   [] 3   [] 4 How much help from another person does the patient currently need. .. Total A Lot A Little None 4. Moving to and from a bed to a chair (including a wheelchair)? [] 1   [x] 2   [] 3   [] 4  
5. Need to walk in hospital room? [] 1   [x] 2   [] 3   [] 4  
6. Climbing 3-5 steps with a railing? [] 1   [x] 2   [] 3   [] 4  
© 2007, Trustees of [facility] [employer], under license to [employer]. All rights reserved Score:  Initial: 13 Most Recent: X (Date: -- ) Interpretation of Tool:  Represents activities that are increasingly more difficult (i.e. Bed mobility, Transfers, Gait). Score 24 23 22-20 19-15 14-10 9-7 6 Modifier CH CI CJ CK CL CM CN   
 
? Mobility - Walking and Moving Around:  
  - CURRENT STATUS: CL - 60%-79% impaired, limited or restricted  - GOAL STATUS: CK - 40%-59% impaired, limited or restricted   - D/C STATUS:  ---------------To be determined--------------- 
 [place] / [place] / Product Type: Medicare /   
 
Medical Necessity:    
· Patient is expected to demonstrate progress in strength and range of motion to increase independence with gait and transfers. Reason for Services/Other Comments: 
· Patient continues to require skilled intervention due to limited functional independence. Use of outcome tool(s) and clinical judgement create a POC that gives a: Clear prediction of patient's progress: LOW COMPLEXITY  
  
 
 
 
TREATMENT:  
(In addition to Assessment/Re-Assessment sessions the following treatments were rendered) Pre-treatment Symptoms/Complaints:  none Pain: Initial: 0 Post Session:  0 Therapeutic Exercise: (10 Minutes):  Exercises per grid below to improve mobility and strength. Required minimal verbal cues to promote proper body alignment. Date: 
9/27 Date: 
 Date: Activity/Exercise Parameters Parameters Parameters Ankle pumps 10 LAQ 10 Hip flexion 10 Hip abduction 10 Braces/Orthotics/Lines/Etc:  
· IV 
· O2 Device: Nasal cannula Treatment/Session Assessment:   
· Response to Treatment:  Tolerated fair but very weak · Interdisciplinary Collaboration:  
o Physical Therapist 
o Occupational Therapist 
o Registered Nurse · After treatment position/precautions:  
o Up in chair 
o RN notified 
o Family at bedside · Compliance with Program/Exercises: compliant all of the time. · Recommendations/Intent for next treatment session: \"Next visit will focus on advancements to more challenging activities\". Total Treatment Duration: PT Patient Time In/Time Out Time In: 1010 Time Out: 1030 Eliseo Mathews PT

## 2018-09-27 NOTE — PROGRESS NOTES
Shift assessment complete. Pt resting quietly in bed. Pt is confused at this time. No signs of acute distress. Resp even and unlabored. Pulse ox in place. Family at the bedside. Call light within reach. Pt instructed to call for assistance.

## 2018-09-28 NOTE — PROGRESS NOTES
Shift assessment complete. Pt in bed/family at bedside. Pt alert to time and person, follows commands. Respirations present, even, unlabored. HR regular, S1&S2 auscultated. No pain at this time. IV infusing without difficulty. Bed alarm in place, bed in lowest position, call light within reach, side rails x3. Will continue to monitor throughout the shift.

## 2018-09-28 NOTE — PROGRESS NOTES
GI DAILY PROGRESS NOTE Admit Date:  9/24/2018 Today's Date:  9/28/2018 CC:  Esophageal dysmotility Subjective:  
 
Patient still requiring high oxygen demand. Daughter at bedside. Hospice vs rehab at the [facility] is being discussed. Per daughter, patient is tolerating full liquid / pureed diet. Medications:  
Current Facility-Administered Medications Medication Dose Route Frequency  lisinopril (PRINIVIL, ZESTRIL) tablet 5 mg  5 mg Oral DAILY  busPIRone (BUSPAR) tablet 10 mg (Patient Supplied)  10 mg Oral BID  methylPREDNISolone (PF) (SOLU-MEDROL) injection 20 mg  20 mg IntraVENous DAILY  valproate (DEPACON) 250 mg in 0.9% sodium chloride 50 mL IVPB  250 mg IntraVENous Q8H  
 pantoprazole (PROTONIX) tablet 40 mg  40 mg Oral ACB  haloperidol lactate (HALDOL) injection 4 mg  4 mg IntraVENous Q6H PRN  
 albuterol (PROVENTIL VENTOLIN) nebulizer solution 2.5 mg  2.5 mg Nebulization QID RT And  
 budesonide (PULMICORT) 500 mcg/2 ml nebulizer suspension  500 mcg Nebulization BID RT  
 metoprolol tartrate (LOPRESSOR) tablet 12.5 mg  12.5 mg Oral BID  polyethylene glycol (MIRALAX) packet 17 g  17 g Oral DAILY  Azelastine (ASTEPRO) 0.15 % (205.5 mcg) nasal spray 1 Spray (Patient Supplied)  1 Spray Both Nostrils BID  
 donepezil (ARICEPT) tablet 5 mg  5 mg Oral QHS  fluticasone (FLONASE) 50 mcg/actuation nasal spray 2 Spray  2 Spray Both Nostrils DAILY  sodium chloride (NS) flush 5-10 mL  5-10 mL IntraVENous Q8H  
 sodium chloride (NS) flush 5-10 mL  5-10 mL IntraVENous PRN  
 naloxone (NARCAN) injection 0.4 mg  0.4 mg IntraVENous PRN  
 acetaminophen (TYLENOL) tablet 650 mg  650 mg Oral Q4H PRN  
 enoxaparin (LOVENOX) injection 40 mg  40 mg SubCUTAneous Q24H  
 bisacodyl (DULCOLAX) tablet 5 mg  5 mg Oral DAILY PRN  
 ondansetron (ZOFRAN) injection 4 mg  4 mg IntraVENous Q4H PRN  
 guaiFENesin-dextromethorphan (ROBITUSSIN DM) 100-10 mg/5 mL syrup 10 mL 10 mL Oral Q6H PRN  piperacillin-tazobactam (ZOSYN) 4.5 g in 0.9% sodium chloride (MBP/ADV) 100 mL  4.5 g IntraVENous Q8H  
 sertraline (ZOLOFT) tablet 50 mg  50 mg Oral QHS Review of Systems: ROS was obtained, with pertinent positives as listed above. No chest pain or SOB. Diet:  Pureed diet. Objective:  
Vitals: 
Visit Vitals  /70 (BP Patient Position: At rest)  Pulse 68  Temp 96.6 °F (35.9 °C)  Resp 24  
 Ht 4' 11\" (1.499 m)  Wt 75.3 kg (166 lb)  SpO2 90%  Breastfeeding No  
 BMI 33.53 kg/m2 Intake/Output: 
  
09/26 1901 - 09/28 0700 In: 862 [I.V.:862] Out: 200 [Urine:200] Exam: 
General appearance: groggy, no distress Lungs: decreased to auscultation bilaterally anteriorly; O2 at 6L per nasal cannula Heart: regular rate and rhythm Abdomen: soft, non-tender. Bowel sounds normal. No masses, no organomegaly Extremities: extremities normal, atraumatic, no cyanosis or edema Neuro:  confused Data Review (Labs): No results for input(s): WBC, HGB, HCT, PLT, MCV, NA, K, CL, CO2, BUN, CREA, CA, MG, GLU, AP, SGOT, GPT, ALT, TBIL, TBILI, CBIL, ALB, TP, AML, LPSE, PTP, INR, APTT, HGBEXT, HCTEXT, PLTEXT, HGBEXT, HCTEXT, PLTEXT in the last 72 hours. No lab exists for component: DBIL, INREXT, INREXT Esophagram dated 9/27/2018  
CLINICAL INFORMATION: Dysphagia  
Fluoroscopy time 1.5 minutes  
22 spot films.  
Swallowing appears normal. There is a generalized decrease in esophageal 
motility. Scattered tertiary contractions are noted in the distal esophagus. No 
mass or ulceration. There is a moderate delay in emptying. When emptying did 
occur gastroesophageal junction was unremarkable in appearance. No reflux was 
seen during the exam.  IMPRESSION IMPRESSION: Decreased esophageal motility Assessment:  
 
Principal Problem: 
Acute respiratory failure with hypoxemia ([address] (9/24/2018) Active Problems: 
HTN (hypertension) (1/22/2014) Hyperlipidemia (1/22/2014) COPD (chronic obstructive pulmonary disease) ([facility] [address] (9/1/2015) Debility (11/30/2015) Late onset Alzheimer's disease without behavioral disturbance (11/15/2017) Aspiration pneumonia ([facility] [address] (9/24/2018) Pleural effusion, bilateral (9/26/2018) Nonrheumatic aortic valve stenosis (9/26/2018) 79 yo F with PMHx of ILD and  O2 dependent COPD (6 L qhs and 2 L during the day) presented to the ED in acute respiratory distress. She resides at the [place] and was noted to have an acute episode of vomiting followed by drop in her O2 sats. Patient was admitted to the ICU and tx with IV steroids, BiPaP, and abx. CT scan of the chest revealed fluid filled esophagus, possible reflux related, and increased risk for aspiration. CT scan also revealed bilateral pleural effusions. She describes intermittent episodes of food stuff emesis at least 2-3x a week for \"awhile. \" She does not endorse true solid food or liquid dysphagia to suggest achalasia. Recent SLP evaluation negative for aspiration. Last EGD in 2014 with HH, gastritis, and candidal esophagitis. Barium swallow with no narrowing/stricture but suspicion for dysmotility with moderate delay in emptying. Plan:  
 
Continue full liquid / pureed diet. No plans for EGD since she is at high risk for sedation given COPD, dementia, and frailty. Continue PPI. Would discharge her on PPI po bid x 1 month, then every day thereafter. Sit upright w/ meals and do not lay down 2 hrs after meals. Daughter at bedside, hospive vs rehab at [place] is being discussed. Will sign-off. Pls call if we can be of any further assistance.   
 
Kat Owen PA-C

## 2018-09-28 NOTE — CONSULTS
Palliative Care Patient: Tova David MRN: [id number]  SSN: [id number] YOB: 1929  Age: 80 y.o. Sex: female Date of Request: 9/28/2018 Date of Consult:  9/28/2018 Reason for Consult:  goals of care Requesting Physician: Dr. Jemma Stokes Assessment/Plan:  
 
Principal Diagnosis:   
Debility, Unspecified  R53.81 Additional Diagnoses:  
· Failure to Thrive  R62.7 · Fatigue, Lethargy  R53.83 
· Frailty  R54 · Respiratory Failure, Acute on Chronic  J96.20 · Counseling, Encounter for Medical Advice  Z71.9 
· Encounter for Palliative Care  Z51.5 Palliative Performance Scale (PPS): PPS: 40 Medical Decision Making:  
Reviewed and summarized chart from admission to present. Discussed case with appropriate providers: Dr. Jemma Stokes Reviewed laboratory and x-ray data. Patient sitting in recliner at bedside. Asleep, awakens briefly and says she feels sleepy, falls back asleep, and remains asleep throughout visit. Daughter/HCPOA Starlene Hodgkins at the bedside. Introduced the role of palliative care. Long discussion about patient's condition, her chronic illnesses (mainly COPD and dementia) which are chronic, progressive, and irreversible. Starlene Hodgkins has good understanding. Starlene Hodgkins is worried about patient's tolerance of STR. There have been discussions regarding STR vs hospice support. Starlene Hodgkins states that she and her two sisters have always been on the same page regarding their mother's healthcare, and she wants them to agree on a plan that is best for their mother. Right now, decision for STR is being pursued. Starlene Hodgkins shares that staff has expressed concern that patient will \"be right back\" in the hospital.  Confirmed that this is not uncommon given patient's frailty and multiple co morbidities. Reviewed options for approach to care if patient declines at STR: returning to hospital vs engaging hospice at [facility].   Answered questions about what DNR order means, and that this can be active with or without a plan to avoid hospital readmissions. She voiced appreciation of information. Encouraged her to speak with her sisters about these options. Call placed to patient's daughter, Tania Dobbins. Tania Dobbins states \"we've already made the decision to go to rehab and not palliative care\". Counseled on role of palliative care. Tania Dobbins voices that she has understanding of her mother's conditions as she has be the patient's primary caregiver for some time. She was not receptive to conversation at this point and ended the phone call. Discussed above conversations with Dr. Conor Rodrigues. The patient has been in BIB at the [facility] for 3 years. Daughters have hired private sitter several mornings/week. Prior to this, she was in independent living at [facility]. Will discuss findings with members of the interdisciplinary team.   
 
Thank you for this referral.    
 
  
. 
 
Subjective:  
 
History obtained from:  Family, Care Provider and Chart Chief Complaint: Debility, acute on chronic respiratory failure. History of Present Illness:  Ms. Iveth Richter is an 79 yo female with PMH of COPD, dementia, HTN, IBS, meningioma removed 1998, and other history as listed below. She presented to [facility] from [facility] on 9/24 with reports of vomiting and subsequent hypoxia. Patient was tachycardic, tachypneic upon arrival and was placed on BiPAP. She was started on treatment for possible aspiration pneumonia and admitted to ICU for further management. Cardiology was consulted for new AS and AKILA, now on BB. GI consulted for fluid in esophagus. Patient poor candidate for EGD due to pulmonary/cardiology baseline. She has had MBS and on modified diet. She is currently on 4-6L of O2, has needed CPAP intermittently, which she was not using at home. Advance Directive: Yes- Declaration for Natural Death visualized by me on Yancy's phone Code Status:  DNR Health Care Power of : Yes - Sabra Barbosa states she is patient's medical POA. Past Medical History:  
Diagnosis Date  Arthropathy, unspecified, site unspecified  Benign paroxysmal positional vertigo  Cardiac dysrhythmia, unspecified  Debility, unspecified  Facet syndrome ([address]  GIB (gastrointestinal bleeding) 1/31/2014  History of peptic ulcer disease 01/2014  Hypertension  IBS (irritable bowel syndrome)  Impaired fasting glucose  Lumbago  Memory loss  M[address]ioma ([facility] [address] removed in [facility] about 1998  Musculoskeletal pain  OA (osteoarthritis) of knee  Palliative care encounter 5/8/2014  Rhinorrhea  Scoliosis  Sensorineural hearing loss  Spondylolisthesis Lumbar  TMJ dysfunction  Varicella  Vasomotor rhinitis  Vitamin D deficiency Past Surgical History:  
Procedure Laterality Date  HX APPENDECTOMY  HX CATARACT REMOVAL  2003 X2 WITH LENSE IMPLANT  HX COLONOSCOPY    
 HX KNEE ARTHROSCOPY    
 left knee  HX KNEE REPLACEMENT Left 2007 [address] Meningi[address] Rt brain [facility]  HX TONSILLECTOMY Family History Problem Relation Age of Onset  Cancer Mother Pancreatic  Cancer Father Ear Social History Substance Use Topics  Smoking status: Former Smoker Packs/day: 1.50 Years: 45.00 Types: Cigarettes Quit date: 1/1/1991  Smokeless tobacco: Never Used  Alcohol use 1.8 oz/week 1 Glasses of wine, 1 Shots of liquor, 1 Standard drinks or equivalent per week Comment: OCCASIONAL Prior to Admission medications Medication Sig Start Date End Date Taking? Authorizing Provider HYDROcodone-acetaminophen (NORCO) 5-325 mg per tablet Take 1 Tab by mouth every six (6) hours as needed.  As needed for headache 4/23/18   Shaye Silvestre MD  
 guaiFENesin (ROBITUSSIN) 100 mg/5 mL liquid Take 10 mL by mouth three (3) times daily as needed for Cough. 3/29/18   Carri Woods MD  
Azelastine (ASTEPRO) 0.15 % (205.5 mcg) nasal spray 1 [place] by Both Nostrils route two (2) times a day. 2/13/18   Carri Woods MD  
fluticasone (FLONASE) 50 mcg/actuation nasal spray 2 Sprays by Both Nostrils route daily. 2 sprays each nostril prn 2/13/18   Carri Woods MD  
ondansetron (ZOFRAN ODT) 4 mg disintegrating tablet Take 4 mg by mouth every eight (8) hours as needed for Nausea. Historical Provider  
calcium carbonate-vitamin D3 600 mg(1,500mg) -500 unit cap Take 1 Tab by mouth two (2) times a day. 11/15/17   Carri Woods MD  
Levorn Michael (ULTRA-LIGHT ROLLATOR) misc Use daily 11/15/17   Carri Woods MD  
busPIRone (BUSPAR) 10 mg tablet Take 1 Tab by mouth two (2) times a day. 11/2/17   Carri Woods MD  
potassium chloride SR (KLOR-CON 10) 10 mEq tablet  7/26/17   Historical Provider  
donepezil (ARICEPT) 5 mg tablet Take 1 Tab by mouth nightly. 7/24/17   Carri Woods MD  
sertraline (ZOLOFT) 100 mg tablet Take 1 Tab by mouth nightly. 6/28/17   Carri Woods MD  
pantoprazole (PROTONIX) 40 mg tablet Take 1 Tab by mouth daily. 6/28/17   Carri Woods MD  
pravastatin (PRAVACHOL) 20 mg tablet Take 1 Tab by mouth nightly. 6/28/17   Carri Woods MD  
furosemide (LASIX) 40 mg tablet Take 1 Tab by mouth daily. As needed for swelling Patient taking differently: Take 20 mg by mouth daily. As needed for swelling 6/28/17   Carri Woods MD  
albuterol [facility] HFA) 90 mcg/actuation inhaler 2 puffs qid prn  Indications: Chronic Obstructive Pulmonary Disease 5/4/17   Adria Cobb NP  
budesonide-formoterol (SYMBICORT) 160-4.5 mcg/actuation HFA inhaler 2 puffs bid, rinse mouth after use. 5/4/17   Adria Cobb NP Compression Socks, Medium misc WEAR DAILY 8/31/16   Carri Woods MD  
 DISABLED PLACARD (DISABLED PLACARD) DMV Use prn 5/12/16   Luis Hannah MD  
Cholecalciferol, Vitamin D3, (VITAMIN D3) 1,000 unit cap Take 1,000 Units by mouth daily. 11/30/15   Juan J Quiñonez MD  
multivit-min-FA-lycopen-lutein (CERTAVITE SENIOR-ANTIOXIDANT) 0.4-300-250 mg-mcg-mcg tab Take  by mouth daily. Historical Provider  
acetaminophen (TYLENOL) 325 mg tablet Take  by mouth every six (6) hours as needed for Pain. Historical Provider OXYGEN-AIR DELIVERY SYSTEMS 6 L by Nasal route nightly. Continuous at 2 L, nights time at 6 L    Historical Provider Allergies Allergen Reactions  Bactroban [Mupirocin Calcium] Rash  Mupirocin Other (comments)  Sulfa (Sulfonamide Antibiotics) Unknown (comments)  Sulfamethoxazole-Trimethoprim Other (comments) Review of Systems: 
Review of systems not obtained due to patient factors: fatigue/lethargy Objective:  
 
Visit Vitals  /70 (BP Patient Position: At rest)  Pulse 68  Temp 96.6 °F (35.9 °C)  Resp 24  
 Ht 4' 11\" (1.499 m)  Wt 166 lb (75.3 kg)  SpO2 95%  Breastfeeding No  
 BMI 33.53 kg/m2 Physical Exam: 
 
General:  Elderly and debilitated appearing. No acute distress. Eyes:  Conjunctivae/corneas clear. Nose: Nares normal. Septum midline. O2 via HFNC. Neck: Supple, symmetrical, trachea midline. Lungs:   Unlabored. Heart:  Regular rate and rhythm. Abdomen:   Soft, non-tender, non-distended. Extremities: Normal, atraumatic, no cyanosis or edema. Skin: Skin color, texture, turgor normal. No rash. Neurologic: Asleep. Psych: Asleep, awakens easily, but falls back to sleep. Assessment:  
 
Hospital Problems  Date Reviewed: 6/26/2018 Codes Class Noted POA Pleural effusion, bilateral ICD-10-CM: J90 ICD-9-CM: 511.9  9/26/2018 Unknown Nonrheumatic aortic valve stenosis ICD-10-CM: I35.0 ICD-9-CM: 424.1  9/26/2018 Unknown Aspiration pneumonia ([facility] ICD-10-CM: J69.0 ICD-9-CM: 507.0  9/24/2018 Yes * (Principal)Acute respiratory failure with hypoxemia ([facility] ICD-10-CM: J96.01 
ICD-9-CM: 518.81  9/24/2018 Yes Late onset Alzheimer's disease without behavioral disturbance (Chronic) ICD-10-CM: G30.1, F02.80 ICD-9-CM: 331.0, 294.10  11/15/2017 Yes Debility ICD-10-CM: R53.81 ICD-9-CM: 799.3  11/30/2015 Yes COPD (chronic obstructive pulmonary disease) (HCC) (Chronic) ICD-10-CM: J44.9 ICD-9-CM: 299  9/1/2015 Yes HTN (hypertension) (Chronic) ICD-10-CM: I10 
ICD-9-CM: 401.9  1/22/2014 Yes Hyperlipidemia (Chronic) ICD-10-CM: G36.0 ICD-9-CM: 272.4  1/22/2014 Yes Signed By: Johnathan Chawla NP September 28, 2018

## 2018-09-28 NOTE — PROGRESS NOTES
09/28/18 1649 Oxygen Therapy O2 Sat (%) 94 % Pulse via Oximetry 63 beats per minute O2 Device Hi flow nasal cannula O2 Flow Rate (L/min) 12 l/min Increased pt from 4L to 12L due to desaturation. Patient would not stay above 90%.

## 2018-09-28 NOTE — PROGRESS NOTES
Unable to keep O2 sat above 90%. Pt sound asleep. Daughter at bedside waking pt up to take deep breath. Changed to HFNC and increased flow up to 12 lpm with pt still desaturating 85-89%. Placed pt on Nasal CPAP via our [employer] with settings at 8-20 cm H2o Press and 8lpm O2 inline. Pt seems to be gen[place] well. O2 sat 92%.

## 2018-09-28 NOTE — PROGRESS NOTES
Problem: Mobility Impaired (Adult and Pediatric) Goal: *Acute Goals and Plan of Care (Insert Text) STG: 
(1.)Ms. Deal will move from supine to sit and sit to supine  with MINIMAL ASSIST within 3 treatment day(s). (2.)Ms. Deal will transfer from bed to chair and chair to bed with MINIMAL ASSIST using the least restrictive device within 3 treatment day(s). (3.)Ms. Dael will ambulate with MINIMAL ASSIST for 25 feet with the least restrictive device within 3 treatment day(s). LTG: 
(1.)Ms. Deal will move from supine to sit and sit to supine  in bed with STAND BY ASSIST within 7 treatment day(s). (2.)Ms. Deal will transfer from bed to chair and chair to bed with STAND BY ASSIST using the least restrictive device within 7 treatment day(s). (3.)Ms. Deal will ambulate with CONTACT GUARD ASSIST for  feet with the least restrictive device within 7 treatment day(s). ________________________________________________________________________________________________ PHYSICAL THERAPY: Daily Note, Treatment Day: 1st, AM 9/28/2018 INPATIENT: Hospital Day: 5 [place] / Product Type: Medicare /  
  
NAME/AGE/GENDER: Ju Davidson is a 80 y.o. female PRIMARY DIAGNOSIS: Acute respiratory failure with hypoxemia ([facility] Aspiration pneumonia ([facility] [address] Acute respiratory failure with hypoxemia (HCC) Acute respiratory failure with hypoxemia (HCC) ICD-10: Treatment Diagnosis:  
 · Generalized Muscle Weakness (M62.81) · Difficulty in walking, Not elsewhere classified (R26.2) Precaution/Allergies: 
Bactroban [mupirocin calcium]; Mupirocin; Sulfa (sulfonamide antibiotics); and Sulfamethoxazole-trimethoprim ASSESSMENT:  
 
Ms. Arpita Burger presents with limited bed mobility, transfers and gait due to weakness. She would benefit from PT to increase her functional independence.  We feel she would benefit from a stay at Rehab prior to returning to the [facility]. She transferred out of bed and ambulated 5 feet before going to the bedside chair. Then performed LE therex sitting in the chair. Daughter and caregiver at bedside. She is supine upon arrival.  She was wet, so we work on bed mobility as follows: rolling from side<>side x 4 to get a new brief up. Then work on bed mobility, so we could get in the chair. She remain in the chair with call light near. This section established at most recent assessment PROBLEM LIST (Impairments causing functional limitations):,  
1. Decreased Strength 2. Decreased Transfer Abilities 3. Decreased Ambulation Ability/Technique 4. Decreased Balance 5. Decreased Activity Tolerance INTERVENTIONS PLANNED: (Benefits and precautions of physical therapy have been discussed with the patient.) 1. Bed Mobility 2. Gait Training 3. Therapeutic Exercise/Strengthening 4. Transfer Training TREATMENT PLAN: Frequency/Duration: daily for duration of hospital stay Rehabilitation Potential For Stated Goals: Good RECOMMENDED REHABILITATION/EQUIPMENT: (at time of discharge pending progress): Due to the probability of continued deficits (see above) this patient will likely need continued skilled physical therapy after discharge. Equipment:  
? None at this time HISTORY:  
History of Present Injury/Illness (Reason for Referral): 
79 y/o female with O2 dependent COPD and ILD presents to the ED in acute respiratory distress. She resides at [facility] and went to bed at 8 pm. She is usually on 6L O2 at night. Later developed acute vomiting and her O2 sats dropped. EMS alerted and administered albuterol nebulizer. Concern is that she may have aspirated. She was tachycardic, tachypneic and distressed. Placed on BIPAP and started on antibiotics. Initial lactic acid 1.1, troponin negative and chest xray decreased lung volumes. Will admit to ICU for further treatment. Past Medical History/Comorbidities: Ms. Lino Gonzalez  has a past medical history of Arthropathy, unspecified, site unspecified; Benign paroxysmal positional vertigo; Cardiac dysrhythmia, unspecified; Debility, unspecified; Facet syndrome ([facility] [address] GIB (gastrointestinal bleeding) (1/31/2014); History of peptic ulcer disease (01/2014); Hypertension; IBS (irritable bowel syndrome); Impaired fasting glucose; Lumbago; Memory loss; Meningioma ([facility] [address] Musculoskeletal pain; OA (osteoarthritis) of knee; Palliative care encounter (5/8/2014); Rhinorrhea; Scoliosis; Sensorineural hearing loss; Spondylolisthesis; TMJ dysfunction; Varicella; Vasomotor rhinitis; and Vitamin D deficiency. Ms. Lino Gonzalez  has a past surgical history that includes hx [place] and bso (1974); hx knee arthroscopy; hx appendectomy; hx other surgical (1998); hx knee replacement (Left, 2007); hx colonoscopy; hx tonsillectomy; and hx cataract removal (2003). Social History/Living Environment:  
Home Environment: Assisted living Care Facility Name: [place] One/Two Story Residence: One story Living Alone: No 
Support Systems: Family member(s) Patient Expects to be Discharged to[de-identified] Assisted living Current DME Used/Available at Home: Eyal Simon, [address] [facility] chair, Wheelchair, power Prior Level of Function/Work/Activity: 
Living at [facility]. Walks very short distances with rolling walker but uses a power chair for further distances. Has assist for dressing and bathing. Number of Personal Factors/Comorbidities that affect the Plan of Care: 0: LOW COMPLEXITY EXAMINATION:  
Most Recent Physical Functioning:  
Gross Assessment: 
  
         
  
Posture: 
  
Balance: 
  Bed Mobility: 
Rolling: Moderate assistance;Assist x2 (side><side x 4 to get clean up) Supine to Sit: Moderate assistance;Assist x2 Sit to Supine:  (left up in chair) Wheelchair Mobility: 
  
Transfers: 
Sit to Stand: Moderate assistance;Assist x2 Stand to Sit: Moderate assistance;Assist x2 
 Bed to Chair: Moderate assistance;Assist x2 Duration: 30 Minutes Gait: 
  
Base of Support: Narrowed Step Length: Left shortened;Right shortened Gait Abnormalities: Antalgic;Decreased step clearance Distance (ft): 8 Feet (ft) Assistive Device: Walker, rolling Ambulation - Level of Assistance: Minimal assistance;Assist x2 Interventions: Safety awareness training;Verbal cues Body Structures Involved: 1. Muscles Body Functions Affected: 1. Movement Related Activities and Participation Affected: 1. Mobility Number of elements that affect the Plan of Care: 3: MODERATE COMPLEXITY CLINICAL PRESENTATION:  
Presentation: Stable and uncomplicated: LOW COMPLEXITY CLINICAL DECISION MAKING:  
[facility] 6 Clicks Basic Mobility Inpatient Short Form How much difficulty does the patient currently have. .. Unable A Lot A Little None 1. Turning over in bed (including adjusting bedclothes, sheets and blankets)? [] 1   [] 2   [x] 3   [] 4  
2. Sitting down on and standing up from a chair with arms ( e.g., wheelchair, bedside commode, etc.)   [] 1   [x] 2   [] 3   [] 4  
3. Moving from lying on back to sitting on the side of the bed? [] 1   [x] 2   [] 3   [] 4 How much help from another person does the patient currently need. .. Total A Lot A Little None 4. Moving to and from a bed to a chair (including a wheelchair)? [] 1   [x] 2   [] 3   [] 4  
5. Need to walk in hospital room? [] 1   [x] 2   [] 3   [] 4  
6. Climbing 3-5 steps with a railing? [] 1   [x] 2   [] 3   [] 4  
© 2007, Trustees of [facility] [employer], under license to [employer]. All rights reserved Score:  Initial: 13 Most Recent: X (Date: -- ) Interpretation of Tool:  Represents activities that are increasingly more difficult (i.e. Bed mobility, Transfers, Gait). Score 24 23 22-20 19-15 14-10 9-7 6 Modifier CH CI CJ CK CL CM CN   
 
? Mobility - Walking and Moving Around:  - CURRENT STATUS: CL - 60%-79% impaired, limited or restricted  - GOAL STATUS: CK - 40%-59% impaired, limited or restricted  - D/C STATUS:  ---------------To be determined--------------- [place] / [place] / Product Type: Medicare /   
 
Medical Necessity:    
· Patient is expected to demonstrate progress in strength and range of motion to increase independence with gait and transfers. Reason for Services/Other Comments: 
· Patient continues to require skilled intervention due to limited functional independence. Use of outcome tool(s) and clinical judgement create a POC that gives a: Clear prediction of patient's progress: LOW COMPLEXITY  
  
 
 
 
TREATMENT:  
(In addition to Assessment/Re-Assessment sessions the following treatments were rendered) Pre-treatment Symptoms/Complaints:  none Pain: Initial: 0 Pain Intensity 1: 0  Post Session:    
 
Therapeutic Exercise: ( ):  Exercises per grid below to improve mobility and strength. Required minimal verbal cues to promote proper body alignment. Therapeutic Activity: (  30 Minutes ):  Therapeutic activities including Chair transfers and rolling from side<>side x 4 to get clean up. Then work on bed mobility. Date: 
9/27 Date: 
 Date: Activity/Exercise Parameters Parameters Parameters Ankle pumps 10 LAQ 10 Hip flexion 10 Hip abduction 10 Braces/Orthotics/Lines/Etc:  
· IV 
· O2 Device: Nasal cannula Treatment/Session Assessment:   
· Response to Treatment:  Tolerated therapy fairly · Interdisciplinary Collaboration:  
o Registered Nurse 
o Rehabilitation Attendant · After treatment position/precautions:  
o Up in chair 
o RN notified 
o Family at bedside · Compliance with Program/Exercises: compliant all of the time. · Recommendations/Intent for next treatment session: \"Next visit will focus on advancements to more challenging activities\". Total Treatment Duration: PT Patient Time In/Time Out Time In: 0930 Time Out: 9433 Evelyne Padron, PTA

## 2018-09-28 NOTE — PROGRESS NOTES
Hospitalist Progress Note Admit Date:  2018  9:50 PM  
Name:  Karlos Euceda Age:  80 y.o. 
:  1929 MRN:  [id number] PCP:  Carmen Hicks MD 
Treatment Team: Attending Provider: Bria Juares MD; Utilization Review: Laura Pringle RN; Consulting Provider: MD Nomi Galicia As Per Prior Documentation: 
 
\"79 y/o female with O2 dependent COPD and ILD presents to the ED in acute respiratory distress. She resides at [facility] and went to bed at 8 pm. She is usually on 6L O2 at night. Later developed acute vomiting and her O2 sats dropped. EMS alerted and administered albuterol nebulizer. Concern is that she may have aspirated. She was tachycardic, tachypneic and distressed. Placed on BIPAP and started on antibiotics. Initial lactic acid 1.1, troponin negative and chest xray decreased lung volumes. Will admit to ICU for further treatment. Today, feels better, on 5L O2. Did well w/ beside testing for swallowing. \" 
 
 
2018- seen requesting ice cubes. Her breathing is about the same if not better than when she was at the [facility]. She cant recall all of what happened but remembers vomiting. 2018- seen - daughter at bedside very agitated about moms condition- long discussion re- vomiting possibly esophagus issue, chronic lung issues and cardiac issues and general decline. ...- pt inability to have egd or other procedures given high risk with high o2 requirements- pt was also agitated overnight; unable to tolerate bipap/cpap or apap. Right now she does not even want oxygen via nc. 
 
- seen - for cpap overnight after getting some sedation but when she woke up it had to come off. She had a bm this am and just got cleaned- currently on O2 via nasal cannula. Objective:  
 
Patient Vitals for the past 24 hrs: 
 Temp Pulse Resp BP SpO2  
18 0908 - - - - 92 %  
18 0830 97.2 °F (36.2 °C) (!) 59 28 197/68 93 % 09/28/18 0742 - - - - 93 % 09/28/18 0542 - - - - 92 %  
09/28/18 0339 97.2 °F (36.2 °C) 77 20 186/77 93 % 09/28/18 0335 - - - - 93 % 09/28/18 0030 - - - - 92 %  
09/27/18 2340 - - - - 92 %  
09/27/18 2332 97.8 °F (36.6 °C) 74 18 170/68 (!) 88 %  
09/27/18 2126 - - - - 92 %  
09/27/18 2000 - 69 - - -  
09/27/18 1952 97.6 °F (36.4 °C) 71 20 191/72 91 %  
09/27/18 1615 97 °F (36.1 °C) 72 18 153/60 93 % 09/27/18 1532 - - - - 95 % 09/27/18 1205 97 °F (36.1 °C) 65 18 157/58 94 % 09/27/18 1154 - - - - 95 % Oxygen Therapy O2 Sat (%): 92 % (09/28/18 0908) Pulse via Oximetry: 70 beats per minute (09/28/18 0908) O2 Device: Nasal cannula (09/28/18 0908) O2 Flow Rate (L/min): 5 l/min (09/28/18 0908) O2 Temperature:  (.) (09/27/18 2126) FIO2 (%): 40 % (09/28/18 0908) Intake/Output Summary (Last 24 hours) at 09/28/18 1052 Last data filed at 09/28/18 8654 Gross per 24 hour Intake              862 ml Output                0 ml Net              862 ml Physical Exam: 
General:    Well nourished. Alert. CV:   RRR. No murmur, rub, or gallop. Lungs:  Diminished bibasilar Abdomen: Soft, nontender, nondistended. Bowel sounds normal.  
Extremities: Warm and dry. No cyanosis; no edema Neurologic:  grossly intact. Skin:     No rashes or jaundice. No wounds. Psych:  Normal mood and affect. I reviewed the labs, imaging, EKGs, telemetry, and other studies done this admission. Data Review:  
Recent Results (from the past 24 hour(s)) CULTURE, URINE Collection Time: 09/27/18  3:54 PM  
Result Value Ref Range Special Requests: NO SPECIAL REQUESTS Culture result:     
  NO GROWTH AFTER SHORT PERIOD OF INCUBATION. FURTHER RESULTS TO FOLLOW AFTER OVERNIGHT INCUBATION. Imaging Studies: CXR Results  (Last 48 hours) None CT Results  (Last 48 hours) None Assessment and Plan:  
 
Hospital Problems as of 9/28/2018  Date Reviewed: 6/26/2018 Codes Class Noted - Resolved POA Pleural effusion, bilateral ICD-10-CM: J90 ICD-9-CM: 511.9  9/26/2018 - Present Unknown Nonrheumatic aortic valve stenosis ICD-10-CM: I35.0 ICD-9-CM: 424.1  9/26/2018 - Present Unknown Aspiration pneumonia ([facility] ICD-10-CM: J69.0 ICD-9-CM: 507.0  9/24/2018 - Present Yes * (Principal)Acute respiratory failure with hypoxemia ([facility].) ICD-10-CM: J96.01 
ICD-9-CM: 518.81  9/24/2018 - Present Yes Late onset Alzheimer's disease without behavioral disturbance (Chronic) ICD-10-CM: G30.1, F02.80 ICD-9-CM: 331.0, 294.10  11/15/2017 - Present Yes Debility ICD-10-CM: R53.81 ICD-9-CM: 799.3  11/30/2015 - Present Yes COPD (chronic obstructive pulmonary disease) (HCC) (Chronic) ICD-10-CM: J44.9 ICD-9-CM: 207  9/1/2015 - Present Yes HTN (hypertension) (Chronic) ICD-10-CM: I10 
ICD-9-CM: 401.9  1/22/2014 - Present Yes Hyperlipidemia (Chronic) ICD-10-CM: E45.6 ICD-9-CM: 272.4  1/22/2014 - Present Yes RESOLVED: ILD (interstitial lung disease) (HCC) (Chronic) ICD-10-CM: J84.9 ICD-9-CM: 446  9/1/2015 - 9/26/2018 Yes PLAN: 
 
Acute on chronic hypoxic resp failure w/ hx of late COPD and O2 dependence- somewhat at a standstill-  Pulmonology input appreciated- recommending comfort care measures- will d/w family further Aortic stenosis, AKILA and bl pleural effusions- cardiology input appreciated - continue BB Concern for aspiration pneumonia- Continue Zosyn & wean steroids Concern for UTI- - zosyn should cover this as well Confusion- multifactorial from sundowning, meds, infection- prn meds as needed- standing depacon BA swallow ok- started on aliquid diet Will monitor Dispo: DC back to nursing home in the next 1-2 days pending stability and decisions on goals of care with the family Signed: 
Etelvina Chapa MD

## 2018-09-28 NOTE — PROGRESS NOTES
Met with pt and dtr during interdisciplinary rounds. Family wants to go to [facility] for [address]. SW explained to dtr how that worked with [employer]. Referral sent through [facility] to [facility] and they will take her when pt is medically stable. Pt would need her own CPAP for the NH which she currently does not have and would not be able to have the work up. Dr. Ashlyn Gonzalez notified pt would not have a CPAP for the NH. Abraham Kim

## 2018-09-28 NOTE — PROGRESS NOTES
Shift assessment complete. Pt resting quietly in bed. Oriented to person, pt is confused at this time. No signs of acute distress. Resp even and unlabored. O2 in place. Caretaker at bedside. Pt denies pain at this time. Call light within reach. Pt instructed to call for assistance.

## 2018-09-28 NOTE — PROGRESS NOTES
Pt in chair with daughter at side. Assessment complete. Verbal report previously received from Jovanna Palencia RN. Chair locked, call light within reach.

## 2018-09-28 NOTE — PROGRESS NOTES
Problem: Nutrition Deficit Goal: *Optimize nutritional status Nutrition Follow Up: 
Reason for initial assessment:  Consult received for diet education r/t pureed diet; have patient sit up right for 2 hours past eating. Assessment:  
Diet order(s): 9/27:  Pureed effective 1615, 9/26-27:  NPO; 9/25:  Cardiac, 2 gm Na Pt sleeping this am; spoke with sitter r/t intake at breakfast; pt only accepted juice at breakfast; pt very lethargic. Pt ate better at lunch today; was sitting in bed-ate ~ 25% pureed meat, mashed potatoes and 100% Ensure Enlive. Anthropometrics:Height: 4' 11\" (149.9 cm),  Weight: 75.3 kg (166 lb), Weight source not specified, Body mass index is 33.53 kg/(m^2). BMI class of Overweight for Older American Women. Macronutrient needs: EER:  0370-2242 kcal /day (15-20 kcal/kg BW) EPR:  52-60 grams protein/day (1.2-1.4 grams/kg IBW) GFR > 60 Intake/Comparative Standards: No recorded intake; based on minimal data, pt potentially meets 25-50% estimated kcal and protein needs (includes ~ 1.5 cans Ensure Enlive/day). Intervention: 
Meals and snacks: Continue current diet. Spoke with patient's daughter and discussed importance of having patient sit upright at meals and to remain sitting upright for 2 hours after eating (9/27). Nutrition Supplement Therapy:  Continue Ensure Enlive on all meal trays. Discharge nutrition plan:  Too soon to determine. 
  
Johann Bloom, MPH, [address] [place] [address],  
[phone]

## 2018-09-28 NOTE — PROGRESS NOTES
Problem: Interdisciplinary Rounds Goal: Interdisciplinary Rounds Interdisciplinary team rounds were held 9/28/2018 with the following team members:Care Management, Nursing, Nutrition, Pharmacy, Physical Therapy and Physician and the patient and daughter. Plan of care discussed. See clinical pathway and/or care plan for interventions and desired outcomes.

## 2018-09-28 NOTE — PROGRESS NOTES
Problem: Falls - Risk of 
Goal: *Absence of Falls Document Kamlesh Anders Fall Risk and appropriate interventions in the flowsheet. Outcome: Progressing Towards Goal 
Fall Risk Interventions: 
  
 
Mentation Interventions: Bed/chair exit alarm Medication Interventions: Bed/chair exit alarm Elimination Interventions: Call light in reach History of Falls Interventions: Bed/chair exit alarm Problem: Pressure Injury - Risk of 
Goal: *Prevention of pressure injury Document Tony Scale and appropriate interventions in the flowsheet. Outcome: Progressing Towards Goal 
Pressure Injury Interventions: 
  
 
Moisture Interventions: Absorbent underpads, Apply protective barrier, creams and emollients Activity Interventions: Pressure redistribution bed/mattress(bed type) Mobility Interventions: Pressure redistribution bed/mattress (bed type) Nutrition Interventions: Offer support with meals,snacks and hydration Friction and Shear Interventions: Lift sheet, Minimize layers

## 2018-09-28 NOTE — PROGRESS NOTES
Tova David Admission Date: 9/24/2018 Daily Progress Note: 9/28/2018 The patient's chart is reviewed and the patient is discussed with the staff. 
 
  
81 y/o female with O2 dependent COPD (6L QHS and 4 L AM) and ? ILD/dementia/debility presents to the ED in acute respiratory distress with vomiting. She lives in assisted living at the [facility] living and able to do most ADLs (eating, restroom use, but not dressing) and apparently had been having multiple bouts of vomiting and abdominal discomfort and sats were dropping. She reported did expectorate all contents and did not aspirate. No diarrhea. No fevers, no prior episodes. She was placed on BIPAP for worsening hypoxemia and concern for ?aspiration but able to be weaned back to NC.   
 
 
Subjective:  
Having more trouble with O2sats this morning and was placed back on CPAP after difficulty maintaining sats. She doesn't like it and wants to take the mask off. She is rhonchorous when she speaks Current Facility-Administered Medications Medication Dose Route Frequency  lisinopril (PRINIVIL, ZESTRIL) tablet 5 mg  5 mg Oral DAILY  busPIRone (BUSPAR) tablet 10 mg (Patient Supplied)  10 mg Oral BID  methylPREDNISolone (PF) (SOLU-MEDROL) injection 20 mg  20 mg IntraVENous DAILY  valproate (DEPACON) 250 mg in 0.9% sodium chloride 50 mL IVPB  250 mg IntraVENous Q8H  
 pantoprazole (PROTONIX) tablet 40 mg  40 mg Oral ACB  haloperidol lactate (HALDOL) injection 4 mg  4 mg IntraVENous Q6H PRN  
 albuterol (PROVENTIL VENTOLIN) nebulizer solution 2.5 mg  2.5 mg Nebulization QID RT And  
 budesonide (PULMICORT) 500 mcg/2 ml nebulizer suspension  500 mcg Nebulization BID RT  
 metoprolol tartrate (LOPRESSOR) tablet 12.5 mg  12.5 mg Oral BID  polyethylene glycol (MIRALAX) packet 17 g  17 g Oral DAILY  Azelastine (ASTEPRO) 0.15 % (205.5 mcg) nasal spray 1 Spray (Patient Supplied)  1 Spray Both Nostrils BID  
 donepezil (ARICEPT) tablet 5 mg  5 mg Oral QHS  fluticasone (FLONASE) 50 mcg/actuation nasal spray 2 Spray  2 Spray Both Nostrils DAILY  sodium chloride (NS) flush 5-10 mL  5-10 mL IntraVENous Q8H  
 sodium chloride (NS) flush 5-10 mL  5-10 mL IntraVENous PRN  
 naloxone (NARCAN) injection 0.4 mg  0.4 mg IntraVENous PRN  
 acetaminophen (TYLENOL) tablet 650 mg  650 mg Oral Q4H PRN  
 enoxaparin (LOVENOX) injection 40 mg  40 mg SubCUTAneous Q24H  
 bisacodyl (DULCOLAX) tablet 5 mg  5 mg Oral DAILY PRN  
 ondansetron (ZOFRAN) injection 4 mg  4 mg IntraVENous Q4H PRN  
 guaiFENesin-dextromethorphan (ROBITUSSIN DM) 100-10 mg/5 mL syrup 10 mL  10 mL Oral Q6H PRN  piperacillin-tazobactam (ZOSYN) 4.5 g in 0.9% sodium chloride (MBP/ADV) 100 mL  4.5 g IntraVENous Q8H  
 sertraline (ZOLOFT) tablet 50 mg  50 mg Oral QHS Review of Systems Constitutional: negative for fever, chills, sweats Cardiovascular: negative for chest pain, palpitations, syncope, edema Gastrointestinal:  negative for dysphagia, reflux, vomiting, diarrhea, abdominal pain, or melena Neurologic:  negative for focal weakness, numbness, headache Objective:  
 
Vitals:  
 09/28/18 0693 09/28/18 0040 09/28/18 0542 09/28/18 3149 BP:  186/77 Pulse:  77 Resp:  20 Temp:  97.2 °F (36.2 °C) SpO2: 93% 93% 92% 93% Weight:      
Height:      
 
Intake and Output:  
09/26 1901 - 09/28 0700 In: 862 [I.V.:862] Out: 200 [Urine:200] Physical Exam:  
Constitution:  the patient is well developed and in no acute distress on CPAP. EENMT:  Sclera clear, pupils equal, oral mucosa moist 
Respiratory: decreased BS bilaterally, with scattered rhonchi  
Cardiovascular:  RRR with mild SM Gastrointestinal: soft and non-tender; with positive bowel sounds. Musculoskeletal: warm without cyanosis. There is trace lower leg edema. Skin:  no jaundice or rashes Neurologic: no gross neuro deficits Psychiatric:  Awake and confused; falls asleep quickly CXR:  
 
9/24: 
 
 
 
Chest CT 9/25: 
 
 
 
 
IMPRESSION: No convincing interstitial lung disease seen but there is bilateral 
effusions and overlying atelectasis or infiltrates larger on the left than the 
right. Fluid filling esophagus could be reflux, increasing risk of aspiration 
but no material seen in the trachea or bronchi at this time. Probable simple 
cysts and nonobstructing left kidney stone. Echo: SUMMARY: 
 
-  Left ventricle: Systolic function was hyperdynamic. Ejection fraction was estimated in the range of 65 % to 70 %. There were no regional wall motion 
abnormalities. There was dynamic obstruction likely contributed by the hyperdynamic function with chordal AKILA, with a peak velocity of 3.6 cm/s, a 
peak gradient of 53 mmHg, and a mean gradient of 15 mmHg. There was minimal increase in gradients with Valsalva. -  Aortic valve: There was mild to moderate stenosis (mildly elevated Gradients with the hyperdynamic function; DI at 0.43; mostly restriction of the non 
coronary cusp). LAB Recent Labs  
   09/27/18 
 0720 GLUCPOC  83 No results for input(s): WBC, HGB, HCT, PLT, INR, HGBEXT, HCTEXT, PLTEXT, HGBEXT, HCTEXT, PLTEXT in the last 72 hours. No lab exists for component: INREXT, INREXT No results for input(s): NA, K, CL, CO2, GLU, BUN, CREA, MG, CA, PHOS, TROIQ, ALB, TBIL, TBILI, GPT, ALT, SGOT, BNPP in the last 72 hours. No lab exists for component: TROIP No results for input(s): PH, PCO2, PO2, HCO3 in the last 72 hours. No results for input(s): LCAD, LAC in the last 72 hours. Assessment:  (Medical Decision Making) Hospital Problems  Date Reviewed: 6/26/2018 Codes Class Noted POA Pleural effusion, bilateral ICD-10-CM: J90 ICD-9-CM: 511.9  9/26/2018 Unknown Likely due to heart failure. Has AS and AKILA. BNP was low. Nonrheumatic aortic valve stenosis ICD-10-CM: I35.0 ICD-9-CM: 424.1  9/26/2018 Unknown Mild to moderate Aspiration pneumonia ([facility] ICD-10-CM: J69.0 ICD-9-CM: 507.0  9/24/2018 Yes On Abs * (Principal)Acute respiratory failure with hypoxemia ([facility] ICD-10-CM: J96.01 
ICD-9-CM: 518.81  9/24/2018 Yes With high oxygen requirement Late onset Alzheimer's disease without behavioral disturbance (Chronic) ICD-10-CM: G30.1, F02.80 ICD-9-CM: 331.0, 294.10  11/15/2017 Yes With superimposed delirium Debility ICD-10-CM: R53.81 ICD-9-CM: 799.3  11/30/2015 Yes Severe COPD (chronic obstructive pulmonary disease) (HCC) (Chronic) ICD-10-CM: J44.9 ICD-9-CM: 006  9/1/2015 Yes No wheezing HTN (hypertension) (Chronic) ICD-10-CM: I10 
ICD-9-CM: 401.9  1/22/2014 Yes Hyperlipidemia (Chronic) ICD-10-CM: W65.5 ICD-9-CM: 272.4  1/22/2014 Yes FLAVIA- having witnessed apneas. Has been intolerant of BIPAP Plan:  (Medical Decision Making) OK to use APAP at night Will wean to optiflow during the day Recommend consideration of comfort measures, but will defer to family discussions with hospitalist. 
Please call us if we can be of further service. I think currently the treatment plan is appropriate and I don't have much to add. More than 50% of the time documented was spent in face-to-face contact with the patient and in the care of the patient on the floor/unit where the patient is located.  
 
Alicia Lamar MD

## 2018-09-29 NOTE — PROGRESS NOTES
Assessment done via doc flow sheet. Pt resting quietly in bed, alert to person, resp regular, unlabored at rest, O2 @ 2L per nasal cannula, sat 91%. Lung sounds coarse with wheezing noted. Daughter at bedside. Bed low & locked,  ails x3 up with call light within reach, instructed pt to call for assistance as needed.

## 2018-09-29 NOTE — PROGRESS NOTES
Problem: Mobility Impaired (Adult and Pediatric) Goal: *Acute Goals and Plan of Care (Insert Text) STG: 
(1.)Ms. Deal will move from supine to sit and sit to supine  with MINIMAL ASSIST within 3 treatment day(s). Progressing 9/29 
(2.)Ms. Deal will transfer from bed to chair and chair to bed with MINIMAL ASSIST using the least restrictive device within 3 treatment day(s). (3.)Ms. Deal will ambulate with MINIMAL ASSIST for 25 feet with the least restrictive device within 3 treatment day(s). Progressing 9/29 LTG: 
(1.)Ms. Deal will move from supine to sit and sit to supine  in bed with STAND BY ASSIST within 7 treatment day(s). (2.)Ms. Deal will transfer from bed to chair and chair to bed with STAND BY ASSIST using the least restrictive device within 7 treatment day(s). (3.)Ms. Deal will ambulate with CONTACT GUARD ASSIST for  feet with the least restrictive device within 7 treatment day(s). ________________________________________________________________________________________________ PHYSICAL THERAPY: Daily Note, Treatment Day: 2nd, AM 9/29/2018 INPATIENT: Hospital Day: 6 [place] / Product Type: Medicare /  
  
NAME/AGE/GENDER: Hubert Benton is a 80 y.o. female PRIMARY DIAGNOSIS: Acute respiratory failure with hypoxemia ([facility] [address] Aspiration pneumonia ([facility] [address] Acute respiratory failure with hypoxemia (HCC) Acute respiratory failure with hypoxemia (HCC) ICD-10: Treatment Diagnosis:  
 · Generalized Muscle Weakness (M62.81) · Difficulty in walking, Not elsewhere classified (R26.2) Precaution/Allergies: 
Bactroban [mupirocin calcium]; Mupirocin; Sulfa (sulfonamide antibiotics); and Sulfamethoxazole-trimethoprim ASSESSMENT:  
 
Ms. Iveth Richter presents with limited bed mobility, transfers and gait due to weakness. She would benefit from PT to increase her functional independence.  We feel she would benefit from a stay at Rehab prior to returning to the [facility]. She transferred out of bed and ambulated 5 feet before going to the bedside chair. Then performed LE therex sitting in the chair. Daughter and caregiver at bedside. Pt supine at arrival. Would like to sit up in recliner to eat breakfast. ModA x1 to transfer to EOB, pt is steady in sitting once feet are on floor. She stand with some difficulty. Ambulation very slow and requires some verbal cueing however pt with fair-good understanding of safety issues. Able to walk around bed and transfer to recliner for breakfast.  
 
 
 
This section established at most recent assessment PROBLEM LIST (Impairments causing functional limitations):,  
1. Decreased Strength 2. Decreased Transfer Abilities 3. Decreased Ambulation Ability/Technique 4. Decreased Balance 5. Decreased Activity Tolerance INTERVENTIONS PLANNED: (Benefits and precautions of physical therapy have been discussed with the patient.) 1. Bed Mobility 2. Gait Training 3. Therapeutic Exercise/Strengthening 4. Transfer Training TREATMENT PLAN: Frequency/Duration: daily for duration of hospital stay Rehabilitation Potential For Stated Goals: Good RECOMMENDED REHABILITATION/EQUIPMENT: (at time of discharge pending progress): Due to the probability of continued deficits (see above) this patient will likely need continued skilled physical therapy after discharge. Equipment:  
? None at this time HISTORY:  
History of Present Injury/Illness (Reason for Referral): 
79 y/o female with O2 dependent COPD and ILD presents to the ED in acute respiratory distress. She resides at [facility] and went to bed at 8 pm. She is usually on 6L O2 at night. Later developed acute vomiting and her O2 sats dropped. EMS alerted and administered albuterol nebulizer. Concern is that she may have aspirated. She was tachycardic, tachypneic and distressed. Placed on BIPAP and started on antibiotics. Initial lactic acid 1.1, troponin negative and chest xray decreased lung volumes. Will admit to ICU for further treatment. Past Medical History/Comorbidities: Ms. Patti Oneal  has a past medical history of Arthropathy, unspecified, site unspecified; Benign paroxysmal positional vertigo; Cardiac dysrhythmia, unspecified; Debility, unspecified; Facet syndrome ([facility] [address] GIB (gastrointestinal bleeding) (1/31/2014); History of peptic ulcer disease (01/2014); Hypertension; IBS (irritable bowel syndrome); Impaired fasting glucose; Lumbago; Memory loss; Meningioma ([facility] [address] Musculoskeletal pain; OA (osteoarthritis) of knee; Palliative care encounter (5/8/2014); Rhinorrhea; Scoliosis; Sensorineural hearing loss; Spondylolisthesis; TMJ dysfunction; Varicella; Vasomotor rhinitis; and Vitamin D deficiency. Ms. Patti Oneal  has a past surgical history that includes hx argentina and bso (1974); hx knee arthroscopy; hx appendectomy; hx other surgical (1998); hx knee replacement (Left, 2007); hx colonoscopy; hx tonsillectomy; and hx cataract removal (2003). Social History/Living Environment:  
Home Environment: Assisted living Care Facility Name: [facility] One/Two Story Residence: One story Living Alone: No 
Support Systems: Family member(s) Patient Expects to be Discharged to[de-identified] Assisted living Current DME Used/Available at Home: Beverly Ganser, [address] [facility] chair, Wheelchair, power Prior Level of Function/Work/Activity: 
Living at [facility]. Walks very short distances with rolling walker but uses a power chair for further distances. Has assist for dressing and bathing. Number of Personal Factors/Comorbidities that affect the Plan of Care: 0: LOW COMPLEXITY EXAMINATION:  
Most Recent Physical Functioning:  
Gross Assessment: 
AROM: Generally decreased, functional 
Strength: Generally decreased, functional 
         
  
Posture: 
  
Balance: 
Sitting: Intact Standing: Pull to stand; With support Bed Mobility: 
Rolling:  Moderate assistance;Assist x1 
 Supine to Sit: Moderate assistance;Assist x1 Scooting: Moderate assistance; Additional time Wheelchair Mobility: 
  
Transfers: 
Sit to Stand: Minimum assistance;Assist x1 Stand to Sit: Contact guard assistance Duration: 12 Minutes Gait: 
  
Base of Support: Narrowed Step Length: Right shortened;Left shortened Gait Abnormalities: Decreased step clearance;Shuffling gait; Other (significant rounding of TS and fwd head) Distance (ft): 12 Feet (ft) Assistive Device: Walker, rolling Ambulation - Level of Assistance: Contact guard assistance Interventions: Safety awareness training;Verbal cues Duration: 12 Minutes Body Structures Involved: 1. Muscles Body Functions Affected: 1. Movement Related Activities and Participation Affected: 1. Mobility Number of elements that affect the Plan of Care: 3: MODERATE COMPLEXITY CLINICAL PRESENTATION:  
Presentation: Stable and uncomplicated: LOW COMPLEXITY CLINICAL DECISION MAKING:  
[facility] AM-PAC 6 Clicks Basic Mobility Inpatient Short Form How much difficulty does the patient currently have. .. Unable A Lot A Little None 1. Turning over in bed (including adjusting bedclothes, sheets and blankets)? [] 1   [] 2   [x] 3   [] 4  
2. Sitting down on and standing up from a chair with arms ( e.g., wheelchair, bedside commode, etc.)   [] 1   [x] 2   [] 3   [] 4  
3. Moving from lying on back to sitting on the side of the bed? [] 1   [x] 2   [] 3   [] 4 How much help from another person does the patient currently need. .. Total A Lot A Little None 4. Moving to and from a bed to a chair (including a wheelchair)? [] 1   [x] 2   [] 3   [] 4  
5. Need to walk in hospital room? [] 1   [x] 2   [] 3   [] 4  
6. Climbing 3-5 steps with a railing? [] 1   [x] 2   [] 3   [] 4  
© 2007, Trustees of [facility], under license to [employer]. All rights reserved Score:  Initial: 13 Most Recent: X (Date: -- ) Interpretation of Tool:  Represents activities that are increasingly more difficult (i.e. Bed mobility, Transfers, Gait). Score 24 23 22-20 19-15 14-10 9-7 6 Modifier CH CI CJ CK CL CM CN   
 
? Mobility - Walking and Moving Around:  
  - CURRENT STATUS: CL - 60%-79% impaired, limited or restricted  - GOAL STATUS: CK - 40%-59% impaired, limited or restricted  - D/C STATUS:  ---------------To be determined--------------- [place] / Product Type: Medicare /   
 
Medical Necessity:    
· Patient is expected to demonstrate progress in strength and range of motion to increase independence with gait and transfers. Reason for Services/Other Comments: 
· Patient continues to require skilled intervention due to limited functional independence. Use of outcome tool(s) and clinical judgement create a POC that gives a: Clear prediction of patient's progress: LOW COMPLEXITY  
  
 
 
 
TREATMENT:  
(In addition to Assessment/Re-Assessment sessions the following treatments were rendered) Pre-treatment Symptoms/Complaints:  Tired Pain: Initial: 0 Post Session:    
 
Therapeutic Exercise: ( ):  Exercises per grid below to improve mobility and strength. Required minimal verbal cues to promote proper body alignment. Therapeutic Activity: (  12 Minutes ):  Therapeutic activities including bed mobility and transfers. Pt requires extra time and modA x1 for these activities. Gait Training (12 Minutes):  Gait training to improve and/or restore physical functioning as related to mobility, strength, balance and coordination. Ambulated 12 Feet (ft) with minimal assistance/contact guard assist and minimal verbal and tactile cues related to their stride length and ankle position and motionto promote proper body alignment. Instruction in performance of safe turning and standing upright to correct ankle position and motion and pelvis position and motion. Assistive Device: Walker, rolling Ambulation - Level of Assistance: Contact guard assistance Distance (ft): 12 Feet (ft) Interventions: Safety awareness training, Verbal cues Duration: 12 Minutes Carlita Baez Date: 
9/27 Date: 
 Date: Activity/Exercise Parameters Parameters Parameters Ankle pumps 10 LAQ 10 Hip flexion 10 Hip abduction 10 Braces/Orthotics/Lines/Etc:  
· IV 
· O2 Device: Nasal cannula Treatment/Session Assessment:   
· Response to Treatment:  Improved gait ability · Interdisciplinary Collaboration:  
o Physical Therapist 
o Registered Nurse · After treatment position/precautions:  
o Up in chair 
o Bed alarm/tab alert on 
o Bed/Chair-wheels locked 
o Call light within reach 
o RN notified 
o Family at bedside · Compliance with Program/Exercises: compliant all of the time. · Recommendations/Intent for next treatment session: \"Next visit will focus on advancements to more challenging activities\". Total Treatment Duration: PT Patient Time In/Time Out Time In: 200 Time Out: 4607 Alondra Chavez, PT

## 2018-09-29 NOTE — PROGRESS NOTES
Shift assessment complete. Pt in bed/family at bedside. Alert to person. Respirations present, even, unlabored. Oxygen in place. HR regular, S1&S2 auscultated. Pt denies any pain at this time. All needs met. Bed in lowest position, call light within reach, bed wheels locked, side rails x3. Will continue to monitor throughout the shift.

## 2018-09-29 NOTE — PROGRESS NOTES
Problem: Self Care Deficits Care Plan (Adult) Goal: *Acute Goals and Plan of Care (Insert Text) 1. Patient will perform grooming with stand by assistance in sitting with verbal cues. 2. Patient will perform Upper body dressing with stand by assistance in sitting with verbal cues. 3. Patient will perform upper and lower body bathing with contact guard assistance seated on shower chair. 5. Patient will perform toilet transfers with moderate assistance with elevated seat. 6. Patient will perform shower transfer with moderated assistance with shower chair. 7. Patient will participate in 30 + minutes of ADL/ therapeutic exercise/therapeutic activity with min rest breaks to increase activity tolerance for self care. 8. Patient will perform ADL functional mobility in room with minimal assistance. Goals to be achieved in 7 days. OCCUPATIONAL THERAPY: Daily Note, Treatment Day: 1st and AM 9/29/2018 INPATIENT: Hospital Day: 6 [place] / Product Type: Medicare /  
  
NAME/AGE/GENDER: Zo Farrell is a 80 y.o. female PRIMARY DIAGNOSIS:  Acute respiratory failure with hypoxemia ([facility] [address] Aspiration pneumonia ([facility] [address] Acute respiratory failure with hypoxemia (HCC) Acute respiratory failure with hypoxemia (HCC) ICD-10: Treatment Diagnosis:  
 · Generalized Muscle Weakness (M62.81) · Other lack of cordination (R27.8) Precautions/Allergies: 
   Bactroban [mupirocin calcium]; Mupirocin; Sulfa (sulfonamide antibiotics); and Sulfamethoxazole-trimethoprim ASSESSMENT:  
 
Ms. Eder Mauro presents with above diagnosis and was seen in room with daughter and caregiver present. Pt lives at [facility] and get some assistance with self care to include shower transfer and lower body dressing. Pt was noted to have moderate generalized weakness and would benefit from OT to maximize safety and independence with self care and functional mobility. Pt might benefit from continued rehab after discharge is she is not at baseline level of function. 9/29/18 Pt completed grooming with the assistance listed below while sitting in chair. Continue POC. This section established at most recent assessment PROBLEM LIST (Impairments causing functional limitations): 1. Decreased Strength 2. Decreased ADL/Functional Activities 3. Decreased Transfer Abilities 4. Decreased Activity Tolerance INTERVENTIONS PLANNED: (Benefits and precautions of occupational therapy have been discussed with the patient.) 1. Activities of daily living training 2. Adaptive equipment training 3. Therapeutic activity 4. Therapeutic exercise TREATMENT PLAN: Frequency/Duration: Follow patient 3 times per week to address above goals. Rehabilitation Potential For Stated Goals: Good RECOMMENDED REHABILITATION/EQUIPMENT: (at time of discharge pending progress): Due to the probability of continued deficits (see above) this patient will likely need continued skilled occupational therapy after discharge. Equipment:  
? None at this time OCCUPATIONAL PROFILE AND HISTORY:  
History of Present Injury/Illness (Reason for Referral): 
weakness Past Medical History/Comorbidities: Ms. Patti Oneal  has a past medical history of Arthropathy, unspecified, site unspecified; Benign paroxysmal positional vertigo; Cardiac dysrhythmia, unspecified; Debility, unspecified; Facet syndrome ([address] GIB (gastrointestinal bleeding) (1/31/2014); History of peptic ulcer disease (01/2014); Hypertension; IBS (irritable bowel syndrome); Impaired fasting glucose; Lumbago; Memory loss; Meningioma ([address] Musculoskeletal pain; OA (osteoarthritis) of knee; Palliative care encounter (5/8/2014); Rhinorrhea; Scoliosis; Sensorineural hearing loss; Spondylolisthesis; TMJ dysfunction; Varicella; Vasomotor rhinitis; and Vitamin D deficiency.   Ms. Toyin  has a past surgical history that includes hx argentina and bso (1974); hx knee arthroscopy; hx appendectomy; hx other surgical (1998); hx knee replacement (Left, 2007); hx colonoscopy; hx tonsillectomy; and hx cataract removal (2003). Social History/Living Environment:  
Home Environment: Assisted living Care Facility Name: [facility] One/Two Story Residence: One story Living Alone: No 
Support Systems: Family member(s) Patient Expects to be Discharged to[de-identified] Assisted living Current DME Used/Available at Home: Reji Castillo, [address] [address] chair, Wheelchair, power Prior Level of Function/Work/Activity: 
Gets assistance with shower transfer and lower body dressing. Number of Personal Factors/Comorbidities that affect the Plan of Care: Brief history (0):  LOW COMPLEXITY ASSESSMENT OF OCCUPATIONAL PERFORMANCE[de-identified]  
Activities of Daily Living:  
Basic ADLs (From Assessment) Complex ADLs (From Assessment) Feeding: Setup Oral Facial Hygiene/Grooming: Minimum assistance Bathing: Moderate assistance Upper Body Dressing: Minimum assistance Lower Body Dressing: Maximum assistance Toileting: Maximum assistance Grooming/Bathing/Dressing Activities of Daily Living Grooming Washing Face: Supervision/set-up Brushing Teeth: Supervision/set-up Brushing/Combing Hair: Minimum assistance Cognitive Retraining Safety/Judgement: Fall prevention Bed/Mat Mobility Rolling: Moderate assistance;Assist x1 Supine to Sit: Moderate assistance;Assist x1 Sit to Stand: Minimum assistance;Assist x1 Scooting: Moderate assistance; Additional time Most Recent Physical Functioning:  
Gross Assessment: 
  
         
  
Posture: 
  
Balance: 
Sitting: Intact Standing: Pull to stand; With support Bed Mobility: 
Rolling: Moderate assistance;Assist x1 Supine to Sit: Moderate assistance;Assist x1 Scooting: Moderate assistance; Additional time Wheelchair Mobility: 
  
Transfers: Sit to Stand: Minimum assistance;Assist x1 Stand to Sit: Contact guard assistance Duration: 12 Minutes Patient Vitals for the past 6 hrs: 
 BP BP Patient Position SpO2 O2 Flow Rate (L/min) Pulse  
18 0812 - - 91 % 13 l/min -  
18 0830 180/71 At rest 90 % - 69  
18 1135 - - 98 % - -  
18 1200 122/72 At rest 96 % - 72 Mental Status Neurologic State: Alert Orientation Level: Oriented to person Cognition: Follows commands Perception: Appears intact Perseveration: No perseveration noted Safety/Judgement: Fall prevention Physical Skills Involved: 
1. Balance 2. Strength 3. Activity Tolerance Cognitive Skills Affected (resulting in the inability to perform in a timely and safe manner): 1. Short Term Recall Psychosocial Skills Affected: 1. Environmental Adaptation Number of elements that affect the Plan of Care: 3-5:  MODERATE COMPLEXITY CLINICAL DECISION MAKIN[address] [place] [id number] AM-PAC 6 Clicks Daily Activity Inpatient Short Form How much help from another person does the patient currently need. .. Total A Lot A Little None 1. Putting on and taking off regular lower body clothing? [] 1   [x] 2   [] 3   [] 4  
2. Bathing (including washing, rinsing, drying)? [] 1   [x] 2   [] 3   [] 4  
3. Toileting, which includes using toilet, bedpan or urinal?   [] 1   [x] 2   [] 3   [] 4  
4. Putting on and taking off regular upper body clothing? [] 1   [] 2   [x] 3   [] 4  
5. Taking care of personal grooming such as brushing teeth? [] 1   [] 2   [x] 3   [] 4  
6. Eating meals? [] 1   [] 2   [x] 3   [] 4  
© , Trustees of [address] [place] [id number], under license to [employer]. All rights reserved Score:  Initial:15 Most Recent: X (Date: -- ) Interpretation of Tool:  Represents activities that are increasingly more difficult (i.e. Bed mobility, Transfers, Gait).  
 Score 24 23 22-20 19-15 14-10 9-7 6   
 Modifier CH CI CJ CK CL CM CN   
 
? Self Care:  
  - CURRENT STATUS: CK - 40%-59% impaired, limited or restricted  - GOAL STATUS: CJ - 20%-39% impaired, limited or restricted  - D/C STATUS:  ---------------To be determined--------------- [place] / Product Type: Medicare /   
 
Medical Necessity:    
· Patient is expected to demonstrate progress in strength, balance and functional technique to increase independence with self care. Reason for Services/Other Comments: 
· Patient would benefit from OT to maximize safety and independence with self care and functional mobility. Stevie Caballero Use of outcome tool(s) and clinical judgement create a POC that gives a: LOW COMPLEXITY  
 
 
 
TREATMENT:  
(In addition to Assessment/Re-Assessment sessions the following treatments were rendered) Pre-treatment Symptoms/Complaints:   
Pain: Initial:  
  0 Post Session:  0 Self Care: (14): Procedure(s) (per grid) utilized to improve and/or restore self-care/home management as related to grooming. Required min verbal and manual cueing to facilitate activities of daily living skills. Braces/Orthotics/Lines/Etc:  
· IV 
· O2 Device: Nasal cannula Treatment/Session Assessment:   
· Response to Treatment:  Pt up to chair tolerated fair. · Interdisciplinary Collaboration:  
o Certified Occupational Therapy Assistant 
o Registered Nurse · After treatment position/precautions:  
o Up in chair 
o Bed/Chair-wheels locked 
o Call light within reach 
o RN notified 
o Family at bedside · Compliance with Program/Exercises: compliant all of the time. · Recommendations/Intent for next treatment session: \"Next visit will focus on advancements to more challenging activities and reduction in assistance provided\". Total Treatment Duration: OT Patient Time In/Time Out Time In: 4763 Time Out: 1005 [address]

## 2018-09-29 NOTE — PROGRESS NOTES
Hospitalist Progress Note Admit Date:  2018  9:50 PM  
Name:  Ju Davidson Age:  80 y.o. 
:  1929 MRN:  [id number] PCP:  Hilaria Mayo MD 
Treatment Team: Attending Provider: Tang Scales MD; Utilization Review: Coreen Mccormack RN; Consulting Provider: Caren Singh MD; Consulting Provider: Rosemary Cleaning NP Subj As Per Prior Documentation: 
 
\"79 y/o female with O2 dependent COPD and ILD presents to the ED in acute respiratory distress. She resides at [facility] and went to bed at 8 pm. She is usually on 6L O2 at night. Later developed acute vomiting and her O2 sats dropped. EMS alerted and administered albuterol nebulizer. Concern is that she may have aspirated. She was tachycardic, tachypneic and distressed. Placed on BIPAP and started on antibiotics. Initial lactic acid 1.1, troponin negative and chest xray decreased lung volumes. Will admit to ICU for further treatment. Today, feels better, on 5L O2. Did well w/ beside testing for swallowing. \" 
 
 
2018- seen requesting ice cubes. Her breathing is about the same if not better than when she was at the [facility]. She cant recall all of what happened but remembers vomiting. 2018- seen - daughter at bedside very agitated about moms condition- long discussion re- vomiting possibly esophagus issue, chronic lung issues and cardiac issues and general decline. ...- pt inability to have egd or other procedures given high risk with high o2 requirements- pt was also agitated overnight; unable to tolerate bipap/cpap or apap. Right now she does not even want oxygen via nc. 
 
2018- seen - for cpap overnight after getting some sedation but when she woke up it had to come off.  She had a bm this am and just got cleaned- currently on O2 via nasal cannula.  
 
 
2018- Pt seen -wore cpap last night but o2 requirements have increased. Spiked low grade fever 100.1- daughter at bedside feels she is better Objective:  
 
Patient Vitals for the past 24 hrs: 
 Temp Pulse Resp BP SpO2  
09/29/18 1600 97.4 °F (36.3 °C) 67 18 163/66 (!) 67 %  
09/29/18 1519 - - - - 92 %  
09/29/18 1200 97 °F (36.1 °C) 72 20 122/72 96 %  
09/29/18 1135 - - - - 98 %  
09/29/18 0830 97.8 °F (36.6 °C) 69 18 180/71 90 % 09/29/18 0812 - - - - 91 %  
09/29/18 0336 98.3 °F (36.8 °C) (!) 57 16 180/62 93 % 09/28/18 2303 97.5 °F (36.4 °C) 65 16 167/69 95 % 09/28/18 2300 97.4 °F (36.3 °C) - - - -  
09/28/18 2055 - - - - 95 % 09/28/18 2038 - 61 - - 93 % 09/28/18 1950 100.1 °F (37.8 °C) 60 22 155/61 91 % Oxygen Therapy O2 Sat (%): (!) 67 % (09/29/18 1600) Pulse via Oximetry: 65 beats per minute (09/29/18 1519) O2 Device: Hi flow nasal cannula;Humidifier (09/29/18 1519) PEEP/CPAP (cm H2O):  (5-20  AUTO SET) (09/28/18 2055) O2 Flow Rate (L/min): 4 l/min (09/29/18 1519) O2 Temperature:  (.) (09/27/18 2126) FIO2 (%): 36 % (09/29/18 1519) No intake or output data in the 24 hours ending 09/29/18 1750 Physical Exam: 
General:    Well nourished. Alert. CV:   RRR. No murmur, rub, or gallop. Lungs:  Diminished bibasilar Abdomen: Soft, nontender, nondistended. Bowel sounds normal.  
Extremities: Warm and dry. No cyanosis; no edema Neurologic:  grossly intact. Skin:     No rashes or jaundice. No wounds. Psych:  Normal mood and affect. I reviewed the labs, imaging, EKGs, telemetry, and other studies done this admission. Data Review:  
Recent Results (from the past 24 hour(s)) CBC WITH AUTOMATED DIFF Collection Time: 09/29/18  6:41 AM  
Result Value Ref Range WBC 6.9 4.3 - 11.1 K/uL  
 RBC 3.74 (L) 4.05 - 5.2 M/uL  
 HGB 10.0 (L) 11.7 - 15.4 g/dL HCT 33.3 (L) 35.8 - 46.3 % MCV 89.0 79.6 - 97.8 FL  
 MCH 26.7 26.1 - 32.9 PG  
 MCHC 30.0 (L) 31.4 - 35.0 g/dL  
 RDW 14.8 % PLATELET 417 662 - 917 K/uL  MPV 11.9 9.4 - 12.3 FL  
 ABSOLUTE NRBC 0.00 0.0 - 0.2 K/uL  
 DF AUTOMATED NEUTROPHILS 74 43 - 78 % LYMPHOCYTES 14 13 - 44 % MONOCYTES 9 4.0 - 12.0 % EOSINOPHILS 2 0.5 - 7.8 % BASOPHILS 1 0.0 - 2.0 % IMMATURE GRANULOCYTES 0 0.0 - 5.0 %  
 ABS. NEUTROPHILS 5.1 1.7 - 8.2 K/UL  
 ABS. LYMPHOCYTES 0.9 0.5 - 4.6 K/UL  
 ABS. MONOCYTES 0.7 0.1 - 1.3 K/UL  
 ABS. EOSINOPHILS 0.1 0.0 - 0.8 K/UL  
 ABS. BASOPHILS 0.1 0.0 - 0.2 K/UL  
 ABS. IMM. GRANS. 0.0 0.0 - 0.5 K/UL METABOLIC PANEL, BASIC Collection Time: 09/29/18  6:41 AM  
Result Value Ref Range Sodium 144 136 - 145 mmol/L Potassium 3.3 (L) 3.5 - 5.1 mmol/L Chloride 108 (H) 98 - 107 mmol/L  
 CO2 31 21 - 32 mmol/L Anion gap 5 mmol/L Glucose 90 65 - 100 mg/dL BUN 17 8 - 23 MG/DL Creatinine 0.72 0.6 - 1.0 MG/DL  
 GFR est AA >60 >60 ml/min/1.73m2 GFR est non-AA >60 ml/min/1.73m2 Calcium 7.8 (L) 8.3 - 10.4 MG/DL Imaging Studies: CXR Results  (Last 48 hours) None CT Results  (Last 48 hours) None Assessment and Plan:  
 
Hospital Problems as of 9/29/2018  Date Reviewed: 6/26/2018 Codes Class Noted - Resolved POA Pleural effusion, bilateral ICD-10-CM: J90 ICD-9-CM: 511.9  9/26/2018 - Present Unknown Nonrheumatic aortic valve stenosis ICD-10-CM: I35.0 ICD-9-CM: 424.1  9/26/2018 - Present Unknown Aspiration pneumonia ([facility].) ICD-10-CM: J69.0 ICD-9-CM: 507.0  9/24/2018 - Present Yes * (Principal)Acute respiratory failure with hypoxemia ([facility] ICD-10-CM: J96.01 
ICD-9-CM: 518.81  9/24/2018 - Present Yes Late onset Alzheimer's disease without behavioral disturbance (Chronic) ICD-10-CM: G30.1, F02.80 ICD-9-CM: 331.0, 294.10  11/15/2017 - Present Yes Debility ICD-10-CM: R53.81 ICD-9-CM: 799.3  11/30/2015 - Present Yes COPD (chronic obstructive pulmonary disease) (HCC) (Chronic) ICD-10-CM: J44.9 ICD-9-CM: 472  9/1/2015 - Present Yes HTN (hypertension) (Chronic) ICD-10-CM: I10 
ICD-9-CM: 401.9  1/22/2014 - Present Yes Hyperlipidemia (Chronic) ICD-10-CM: Q65.3 ICD-9-CM: 272.4  1/22/2014 - Present Yes RESOLVED: ILD (interstitial lung disease) (HCC) (Chronic) ICD-10-CM: J84.9 ICD-9-CM: 900  9/1/2015 - 9/26/2018 Yes PLAN: 
 
Acute on chronic hypoxic resp failure w/ hx of late COPD and O2 dependence- somewhat at a standstill-  Pulmonology input appreciated- recommending comfort care measures- d/w family -they want everything done- for worseninghypoxia- will start iv lasix- cxr in the am  
Aortic stenosis, AKILA and bl pleural effusions- cardiology input appreciated - continue BB Concern for aspiration pneumonia- Continue Zosyn & wean steroids Concern for UTI- - zosyn should cover this as well Confusion- multifactorial from sundowning, meds, infection- prn meds as needed- on depacon- will switch to the sprinkles. BA swallow ok- started on a pureed diet- also started on ppi bid for 1-2 months-  
Will monitor Dispo: DC back to nursing home in the next 1-2 days pending stability and decisions on goals of care with the family Signed: 
Deangelo Ga MD

## 2018-09-30 NOTE — PROGRESS NOTES
Hospitalist Progress Note Admit Date:  2018  9:50 PM  
Name:  Murphy Miller Age:  80 y.o. 
:  1929 MRN:  [id number] PCP:  Placido Aguilar MD 
Treatment Team: Attending Provider: Amor Davidson MD; Utilization Review: Cristiano Dover RN; Consulting Provider: Jj Boles MD; Consulting Provider: Chaparrita Taylor NP Subj As Per Prior Documentation: 
 
\"79 y/o female with O2 dependent COPD and ILD presents to the ED in acute respiratory distress. She resides at [facility] and went to bed at 8 pm. She is usually on 6L O2 at night. Later developed acute vomiting and her O2 sats dropped. EMS alerted and administered albuterol nebulizer. Concern is that she may have aspirated. She was tachycardic, tachypneic and distressed. Placed on BIPAP and started on antibiotics. Initial lactic acid 1.1, troponin negative and chest xray decreased lung volumes. Will admit to ICU for further treatment. Today, feels better, on 5L O2. Did well w/ beside testing for swallowing. \" 
 
 
2018- seen requesting ice cubes. Her breathing is about the same if not better than when she was at the [facility]. She cant recall all of what happened but remembers vomiting. 2018- seen - daughter at bedside very agitated about moms condition- long discussion re- vomiting possibly esophagus issue, chronic lung issues and cardiac issues and general decline. ...- pt inability to have egd or other procedures given high risk with high o2 requirements- pt was also agitated overnight; unable to tolerate bipap/cpap or apap. Right now she does not even want oxygen via nc. 
 
2018- seen - for cpap overnight after getting some sedation but when she woke up it had to come off.  She had a bm this am and just got cleaned- currently on O2 via nasal cannula.  
 
 
2018- Pt seen -wore cpap last night but o2 requirements have increased. Spiked low grade fever 100.1- daughter at bedside feels she is better 09/30/2018- Pt seen - doing ok- o2 requirement went back down yesterday but are now back up. Nursing reports - pt initially confused but much better when daughter arrived. Her Daughter Adan Chen is at the bedside- she feels the patient is doing better but wondering if she can go to rehab on so much oxygen. She still wants her to go. She states her mother doesn't have much of a life but it is still a life and her mother is happy being able to do the little things that she enjoys. I did express that our goal in advising comfort care was to keep the patient happy. Adan Chen is hopeful for discharge to rehab tomorrow after follow up with Pulmonology. Objective:  
 
Patient Vitals for the past 24 hrs: 
 Temp Pulse Resp BP SpO2  
09/30/18 1259 97.3 °F (36.3 °C) 92 20 188/77 93 % 09/30/18 0913 - - - - 99 % 09/30/18 0721 98 °F (36.7 °C) (!) 55 20 175/64 93 % 09/30/18 0333 97.5 °F (36.4 °C) 63 22 176/70 96 %  
09/29/18 2334 98.3 °F (36.8 °C) 76 20 141/72 97 % 09/29/18 2108 - - - - 94 % 09/29/18 1917 97.3 °F (36.3 °C) 67 17 161/68 94 % 09/29/18 1600 97.4 °F (36.3 °C) 67 18 163/66 (!) 67 %  
09/29/18 1519 - - - - 92 % Oxygen Therapy O2 Sat (%): 93 % (09/30/18 1259) Pulse via Oximetry: 68 beats per minute (09/30/18 0913) O2 Device: Hi flow nasal cannula (09/30/18 0913) PEEP/CPAP (cm H2O):  (5-20  AUTO SET) (09/28/18 2055) O2 Flow Rate (L/min): 13 l/min (decreased to 8) (09/30/18 0913) O2 Temperature:  (.) (09/27/18 2126) FIO2 (%): 44 % (09/29/18 7514) No intake or output data in the 24 hours ending 09/30/18 1302 Physical Exam: 
General:    Well nourished. Alert. CV:   RRR. No murmur, rub, or gallop. Lungs:  Diminished bibasilar Abdomen: Soft, nontender, nondistended. Bowel sounds normal.  
Extremities: Warm and dry. No cyanosis; no edema Neurologic:  grossly intact. Skin:     No rashes or jaundice. No wounds. Psych:  Normal mood and affect. I reviewed the labs, imaging, EKGs, telemetry, and other studies done this admission. Data Review:  
Recent Results (from the past 24 hour(s)) CBC WITH AUTOMATED DIFF Collection Time: 09/30/18  6:15 AM  
Result Value Ref Range WBC 8.7 4.3 - 11.1 K/uL  
 RBC 4.03 (L) 4.05 - 5.2 M/uL  
 HGB 10.7 (L) 11.7 - 15.4 g/dL HCT 36.3 35.8 - 46.3 % MCV 90.1 79.6 - 97.8 FL  
 MCH 26.6 26.1 - 32.9 PG  
 MCHC 29.5 (L) 31.4 - 35.0 g/dL  
 RDW 14.8 % PLATELET 677 797 - 057 K/uL MPV 11.5 9.4 - 12.3 FL ABSOLUTE NRBC 0.00 0.0 - 0.2 K/uL  
 DF AUTOMATED NEUTROPHILS 75 43 - 78 % LYMPHOCYTES 12 (L) 13 - 44 % MONOCYTES 10 4.0 - 12.0 % EOSINOPHILS 2 0.5 - 7.8 % BASOPHILS 1 0.0 - 2.0 % IMMATURE GRANULOCYTES 1 0.0 - 5.0 %  
 ABS. NEUTROPHILS 6.5 1.7 - 8.2 K/UL  
 ABS. LYMPHOCYTES 1.1 0.5 - 4.6 K/UL  
 ABS. MONOCYTES 0.9 0.1 - 1.3 K/UL  
 ABS. EOSINOPHILS 0.2 0.0 - 0.8 K/UL  
 ABS. BASOPHILS 0.1 0.0 - 0.2 K/UL  
 ABS. IMM. GRANS. 0.1 0.0 - 0.5 K/UL METABOLIC PANEL, BASIC Collection Time: 09/30/18  6:15 AM  
Result Value Ref Range Sodium 148 (H) 136 - 145 mmol/L Potassium 3.3 (L) 3.5 - 5.1 mmol/L Chloride 106 98 - 107 mmol/L  
 CO2 34 (H) 21 - 32 mmol/L Anion gap 8 mmol/L Glucose 95 65 - 100 mg/dL BUN 16 8 - 23 MG/DL Creatinine 0.84 0.6 - 1.0 MG/DL  
 GFR est AA >60 >60 ml/min/1.73m2 GFR est non-AA >60 ml/min/1.73m2 Calcium 8.2 (L) 8.3 - 10.4 MG/DL Imaging Studies: CXR Results  (Last 48 hours) None CT Results  (Last 48 hours) None Assessment and Plan:  
 
Hospital Problems as of 9/30/2018  Date Reviewed: 6/26/2018 Codes Class Noted - Resolved POA Pleural effusion, bilateral ICD-10-CM: J90 ICD-9-CM: 511.9  9/26/2018 - Present Unknown Nonrheumatic aortic valve stenosis ICD-10-CM: I35.0 ICD-9-CM: 424.1  9/26/2018 - Present Unknown Aspiration pneumonia ([facility] ICD-10-CM: J69.0 ICD-9-CM: 507.0  9/24/2018 - Present Yes * (Principal)Acute respiratory failure with hypoxemia ([facility] ICD-10-CM: J96.01 
ICD-9-CM: 518.81  9/24/2018 - Present Yes Late onset Alzheimer's disease without behavioral disturbance (Chronic) ICD-10-CM: G30.1, F02.80 ICD-9-CM: 331.0, 294.10  11/15/2017 - Present Yes Debility ICD-10-CM: R53.81 ICD-9-CM: 799.3  11/30/2015 - Present Yes COPD (chronic obstructive pulmonary disease) (HCC) (Chronic) ICD-10-CM: J44.9 ICD-9-CM: 817  9/1/2015 - Present Yes HTN (hypertension) (Chronic) ICD-10-CM: I10 
ICD-9-CM: 401.9  1/22/2014 - Present Yes Hyperlipidemia (Chronic) ICD-10-CM: U87.0 ICD-9-CM: 272.4  1/22/2014 - Present Yes RESOLVED: ILD (interstitial lung disease) (HCC) (Chronic) ICD-10-CM: J84.9 ICD-9-CM: 866  9/1/2015 - 9/26/2018 Yes PLAN: 
 
Acute on chronic hypoxic resp failure w/ hx of late COPD and O2 dependence- somewhat at a standstill-  Pulmonology input appreciated- recommending comfort care measures- d/w family -they want everything done; Given the worseninghypoxia- will continue lasix; repeat cxr pending Aortic stenosis, [facility] - cardiology input appreciated - continue BB 
BL pleural effusions- continue lasix Concern for aspiration pneumonia- stop zosyn- switch to augmentin Concern for UTI- treated if not clinically proven Confusion- multifactorial from sundowning, meds, infection- on depakote sprinkles Insomnia- zyprexa at night BA swallow ok- will continue on a pureed diet and ppi bid for 1-2 months- per gi recommendations Hypokalemia- replete Dispo: Plan for dc back to rehab reid if stable and o2 requirements are acceptable Signed: 
Jas Chow MD

## 2018-09-30 NOTE — PROGRESS NOTES
Shift assessment complete. Pt resting in bed. Alert to person, disoriented to time, situation, place. Respirations even, unlabored, present. Wheezing heard on auscultation. HR regular, S1&S2 auscultated. O2 via nasal cannula 6 L/min. All needs met at this time. Side rails x3, bed in lowest position, call light within reach. Will continue to monitor throughout the shift.

## 2018-09-30 NOTE — PROGRESS NOTES
Pt assessment completed. Alert and oriented to person place and time, disoriented to situation. Lungs coarse bilaterally, diminished in bases. Heart sounds regular. Abdomen soft and non tender with active bowel sounds. IV present and infusing without difficulty. Pt denies pain needs at this time, will continue to monitor.

## 2018-09-30 NOTE — PROGRESS NOTES
Problem: Mobility Impaired (Adult and Pediatric) Goal: *Acute Goals and Plan of Care (Insert Text) STG: 
(1.)Ms. Deal will move from supine to sit and sit to supine  with MINIMAL ASSIST within 3 treatment day(s). Progressing 9/29 
(2.)Ms. Deal will transfer from bed to chair and chair to bed with MINIMAL ASSIST using the least restrictive device within 3 treatment day(s). (3.)Ms. Deal will ambulate with MINIMAL ASSIST for 25 feet with the least restrictive device within 3 treatment day(s). Progressing 9/29 LTG: 
(1.)Ms. Deal will move from supine to sit and sit to supine  in bed with STAND BY ASSIST within 7 treatment day(s). (2.)Ms. Deal will transfer from bed to chair and chair to bed with STAND BY ASSIST using the least restrictive device within 7 treatment day(s). (3.)Ms. Deal will ambulate with CONTACT GUARD ASSIST for  feet with the least restrictive device within 7 treatment day(s). ________________________________________________________________________________________________ PHYSICAL THERAPY: Daily Note, Treatment Day: 3rd, AM 9/30/2018 INPATIENT: Hospital Day: 7 [place] / Product Type: Medicare /  
  
NAME/AGE/GENDER: Rob Gonzalez is a 80 y.o. female PRIMARY DIAGNOSIS: Acute respiratory failure with hypoxemia ([facility] [address] Aspiration pneumonia ([facility] [address] Acute respiratory failure with hypoxemia (HCC) Acute respiratory failure with hypoxemia (HCC) ICD-10: Treatment Diagnosis:  
 · Generalized Muscle Weakness (M62.81) · Difficulty in walking, Not elsewhere classified (R26.2) Precaution/Allergies: 
Bactroban [mupirocin calcium]; Mupirocin; Sulfa (sulfonamide antibiotics); and Sulfamethoxazole-trimethoprim ASSESSMENT:  
 
Ms. Lisa Snyder presents with limited bed mobility, transfers and gait due to weakness. She would benefit from PT to increase her functional independence.  We feel she would benefit from a stay at Rehab prior to returning to the [facility]. She transferred out of bed and ambulated 5 feet before going to the bedside chair. Then performed LE therex sitting in the chair. Daughter and caregiver at bedside. Pt up in recliner. Daughter states she did not sleep well last night and was hoping to rest, but they were both agreeable to brief PT before pt takes a nap. Pt able to perform UE/LE ROM and strength exercises with moderate cueing in the recliner but declined further treatment and was unwilling to try standing or ambulation. Will progress as able. This section established at most recent assessment PROBLEM LIST (Impairments causing functional limitations):,  
1. Decreased Strength 2. Decreased Transfer Abilities 3. Decreased Ambulation Ability/Technique 4. Decreased Balance 5. Decreased Activity Tolerance INTERVENTIONS PLANNED: (Benefits and precautions of physical therapy have been discussed with the patient.) 1. Bed Mobility 2. Gait Training 3. Therapeutic Exercise/Strengthening 4. Transfer Training TREATMENT PLAN: Frequency/Duration: daily for duration of hospital stay Rehabilitation Potential For Stated Goals: Good RECOMMENDED REHABILITATION/EQUIPMENT: (at time of discharge pending progress): Due to the probability of continued deficits (see above) this patient will likely need continued skilled physical therapy after discharge. Equipment:  
? None at this time HISTORY:  
History of Present Injury/Illness (Reason for Referral): 
81 y/o female with O2 dependent COPD and ILD presents to the ED in acute respiratory distress. She resides at [facility] and went to bed at 8 pm. She is usually on 6L O2 at night. Later developed acute vomiting and her O2 sats dropped. EMS alerted and administered albuterol nebulizer. Concern is that she may have aspirated. She was tachycardic, tachypneic and distressed. Placed on BIPAP and started on antibiotics. Initial lactic acid 1.1, troponin negative and chest xray decreased lung volumes. Will admit to ICU for further treatment. Past Medical History/Comorbidities: Ms. Iveth Richter  has a past medical history of Arthropathy, unspecified, site unspecified; Benign paroxysmal positional vertigo; Cardiac dysrhythmia, unspecified; Debility, unspecified; Facet syndrome ([facility] [address] GIB (gastrointestinal bleeding) (1/31/2014); History of peptic ulcer disease (01/2014); Hypertension; IBS (irritable bowel syndrome); Impaired fasting glucose; Lumbago; Memory loss; Meningioma ([facility] [address] Musculoskeletal pain; OA (osteoarthritis) of knee; Palliative care encounter (5/8/2014); Rhinorrhea; Scoliosis; Sensorineural hearing loss; Spondylolisthesis; TMJ dysfunction; Varicella; Vasomotor rhinitis; and Vitamin D deficiency. Ms. Iveth Richter  has a past surgical history that includes hx argentina and bso (1974); hx knee arthroscopy; hx appendectomy; hx other surgical (1998); hx knee replacement (Left, 2007); hx colonoscopy; hx tonsillectomy; and hx cataract removal (2003). Social History/Living Environment:  
Home Environment: Assisted living Care Facility Name: [facility] One/Two Story Residence: One story Living Alone: No 
Support Systems: Family member(s) Patient Expects to be Discharged to[de-identified] Assisted living Current DME Used/Available at Home: Artemio Wilson, [id number] [facility] [place] chair, Wheelchair, power Prior Level of Function/Work/Activity: 
Living at [facility]. Walks very short distances with rolling walker but uses a power chair for further distances. Has assist for dressing and bathing. Number of Personal Factors/Comorbidities that affect the Plan of Care: 0: LOW COMPLEXITY EXAMINATION:  
Most Recent Physical Functioning:  
Gross Assessment: 
AROM: Generally decreased, functional 
Strength: Generally decreased, functional 
         
  
Posture: 
  
Balance: 
Sitting: Intact Bed Mobility: 
  
Wheelchair Mobility: 
  
Transfers: 
  
Gait: 
  
   
  
Body Structures Involved: 1. Muscles Body Functions Affected: 1. Movement Related Activities and Participation Affected: 1. Mobility Number of elements that affect the Plan of Care: 3: MODERATE COMPLEXITY CLINICAL PRESENTATION:  
Presentation: Stable and uncomplicated: LOW COMPLEXITY CLINICAL DECISION MAKING:  
[facility] AM-PAC 6 Clicks Basic Mobility Inpatient Short Form How much difficulty does the patient currently have. .. Unable A Lot A Little None 1. Turning over in bed (including adjusting bedclothes, sheets and blankets)? [] 1   [] 2   [x] 3   [] 4  
2. Sitting down on and standing up from a chair with arms ( e.g., wheelchair, bedside commode, etc.)   [] 1   [x] 2   [] 3   [] 4  
3. Moving from lying on back to sitting on the side of the bed? [] 1   [x] 2   [] 3   [] 4 How much help from another person does the patient currently need. .. Total A Lot A Little None 4. Moving to and from a bed to a chair (including a wheelchair)? [] 1   [x] 2   [] 3   [] 4  
5. Need to walk in hospital room? [] 1   [x] 2   [] 3   [] 4  
6. Climbing 3-5 steps with a railing? [] 1   [x] 2   [] 3   [] 4  
© 2007, Trustees of [facility], under license to [employer]. All rights reserved Score:  Initial: 13 Most Recent: X (Date: -- ) Interpretation of Tool:  Represents activities that are increasingly more difficult (i.e. Bed mobility, Transfers, Gait). Score 24 23 22-20 19-15 14-10 9-7 6 Modifier CH CI CJ CK CL CM CN   
 
? Mobility - Walking and Moving Around:  
  - CURRENT STATUS: CL - 60%-79% impaired, limited or restricted  - GOAL STATUS: CK - 40%-59% impaired, limited or restricted  - D/C STATUS:  ---------------To be determined--------------- [place] / Product Type: Medicare /   
 
Medical Necessity:    
· Patient is expected to demonstrate progress in strength and range of motion to increase independence with gait and transfers. Reason for Services/Other Comments: 
· Patient continues to require skilled intervention due to limited functional independence. Use of outcome tool(s) and clinical judgement create a POC that gives a: Clear prediction of patient's progress: LOW COMPLEXITY  
  
 
 
 
TREATMENT:  
(In addition to Assessment/Re-Assessment sessions the following treatments were rendered) Pre-treatment Symptoms/Complaints:  Tired Pain: Initial: 0 Pain Intensity 1: 0  Post Session:    
 
Therapeutic Exercise: (13 Minutes):  Exercises per grid below to improve mobility and strength. Required minimal verbal cues to promote proper body alignment. Date: 
9/27 Date: 
9/30 Date: Activity/Exercise Parameters Parameters Parameters Ankle pumps 10 20 LAQ 10 Hip flexion 10 Hip abduction 10 20 OH press  20 Scaption  20 Heel slides  20 Braces/Orthotics/Lines/Etc:  
· IV 
· O2 Device: Nasal cannula Treatment/Session Assessment:   
· Response to Treatment:  Fair. Tired throughout, requires cues · Interdisciplinary Collaboration:  
o Physical Therapist 
o Registered Nurse · After treatment position/precautions:  
o Up in chair 
o Bed alarm/tab alert on 
o Bed/Chair-wheels locked 
o Call light within reach 
o Family at bedside · Compliance with Program/Exercises: compliant all of the time. · Recommendations/Intent for next treatment session: \"Next visit will focus on advancements to more challenging activities\". Total Treatment Duration: PT Patient Time In/Time Out Time In: 0825 Time Out: 0189 Laura Lopez PT

## 2018-10-01 NOTE — PROGRESS NOTES
Problem: Self Care Deficits Care Plan (Adult) Goal: *Acute Goals and Plan of Care (Insert Text) 1. Patient will perform grooming with stand by assistance in sitting with verbal cues. 2. Patient will perform Upper body dressing with stand by assistance in sitting with verbal cues. 3. Patient will perform upper and lower body bathing with contact guard assistance seated on shower chair. 5. Patient will perform toilet transfers with moderate assistance with elevated seat. 6. Patient will perform shower transfer with moderated assistance with shower chair. 7. Patient will participate in 30 + minutes of ADL/ therapeutic exercise/therapeutic activity with min rest breaks to increase activity tolerance for self care. 8. Patient will perform ADL functional mobility in room with minimal assistance. Goals to be achieved in 7 days. OCCUPATIONAL THERAPY: Daily Note, Treatment Day: 2nd and AM 10/1/2018 INPATIENT: Hospital Day: 8 [place] / Product Type: Medicare /  
  
NAME/AGE/GENDER: Dario Melendez is a 80 y.o. female PRIMARY DIAGNOSIS:  Acute respiratory failure with hypoxemia ([address] Aspiration pneumonia ([facility] [address] Acute respiratory failure with hypoxemia (HCC) Acute respiratory failure with hypoxemia (HCC) ICD-10: Treatment Diagnosis:  
 · Generalized Muscle Weakness (M62.81) · Other lack of cordination (R27.8) Precautions/Allergies: 
   Bactroban [mupirocin calcium]; Mupirocin; Sulfa (sulfonamide antibiotics); and Sulfamethoxazole-trimethoprim ASSESSMENT:  
 
Ms. Bobby Camacho presents with above diagnosis and was seen in room with daughter and caregiver present. Pt lives at [facility] and get some assistance with self care to include shower transfer and lower body dressing. Pt was noted to have moderate generalized weakness and would benefit from OT to maximize safety and independence with self care and functional mobility. Pt might benefit from continued rehab after discharge is she is not at baseline level of function. 9/29/18 Pt completed grooming with the assistance listed below while sitting in chair. Continue POC. 10/1/18 Patient ambulated to and from bathroom with RW on 8L of O2 with moderate assist and extra time. Completed grooming sinkside also with moderate assist.  
 
This section established at most recent assessment PROBLEM LIST (Impairments causing functional limitations): 1. Decreased Strength 2. Decreased ADL/Functional Activities 3. Decreased Transfer Abilities 4. Decreased Activity Tolerance INTERVENTIONS PLANNED: (Benefits and precautions of occupational therapy have been discussed with the patient.) 1. Activities of daily living training 2. Adaptive equipment training 3. Therapeutic activity 4. Therapeutic exercise TREATMENT PLAN: Frequency/Duration: Follow patient 3 times per week to address above goals. Rehabilitation Potential For Stated Goals: Good RECOMMENDED REHABILITATION/EQUIPMENT: (at time of discharge pending progress): Due to the probability of continued deficits (see above) this patient will likely need continued skilled occupational therapy after discharge. Equipment:  
? None at this time OCCUPATIONAL PROFILE AND HISTORY:  
History of Present Injury/Illness (Reason for Referral): 
weakness Past Medical History/Comorbidities: Ms. Rakel Montanez  has a past medical history of Arthropathy, unspecified, site unspecified; Benign paroxysmal positional vertigo; Cardiac dysrhythmia, unspecified; Debility, unspecified; Facet syndrome ([address] GIB (gastrointestinal bleeding) (1/31/2014); History of peptic ulcer disease (01/2014); Hypertension; IBS (irritable bowel syndrome); Impaired fasting glucose; Lumbago; Memory loss; Meningioma ([address]  Musculoskeletal pain; OA (osteoarthritis) of knee; Palliative care encounter (5/8/2014); Rhinorrhea; Scoliosis; Sensorineural hearing loss; Spondylolisthesis; TMJ dysfunction; Varicella; Vasomotor rhinitis; and Vitamin D deficiency. Ms. Nai Barnes  has a past surgical history that includes hx argentina and bso (1974); hx knee arthroscopy; hx appendectomy; hx other surgical (1998); hx knee replacement (Left, 2007); hx colonoscopy; hx tonsillectomy; and hx cataract removal (2003). Social History/Living Environment:  
Home Environment: Assisted living Care Facility Name: [facility] One/Two Story Residence: One story Living Alone: No 
Support Systems: Family member(s) Patient Expects to be Discharged to[de-identified] Assisted living Current DME Used/Available at Home: Henry Akhtar, [address] [facility] chair, Wheelchair, power Prior Level of Function/Work/Activity: 
Gets assistance with shower transfer and lower body dressing. Number of Personal Factors/Comorbidities that affect the Plan of Care: Brief history (0):  LOW COMPLEXITY ASSESSMENT OF OCCUPATIONAL PERFORMANCE[de-identified]  
Activities of Daily Living:  
Basic ADLs (From Assessment) Complex ADLs (From Assessment) Feeding: Setup Oral Facial Hygiene/Grooming: Minimum assistance Bathing: Moderate assistance Upper Body Dressing: Minimum assistance Lower Body Dressing: Maximum assistance Toileting: Maximum assistance Grooming/Bathing/Dressing Activities of Daily Living Bed/Mat Mobility Sit to Stand: Minimum assistance Most Recent Physical Functioning:  
Gross Assessment: 
  
         
  
Posture: 
  
Balance: 
Sitting: Intact Standing: Pull to stand; With support Bed Mobility: 
  
Wheelchair Mobility: 
  
Transfers: 
Sit to Stand: Minimum assistance Stand to Sit: Minimum assistance Patient Vitals for the past 6 hrs: 
 BP BP Patient Position SpO2 O2 Flow Rate (L/min) Pulse 10/01/18 0745 - - 94 % - -  
10/01/18 0751 105/54 At rest 95 % - 90  
10/01/18 0936 - - - 13 l/min -  
10/01/18 1040 - - 96 % 8 l/min -  
 10/01/18 1137 - - 96 % 8 l/min - Mental Status Neurologic State: Drowsy Orientation Level: Oriented X4 (with periodic confusion) Cognition: Follows commands Perception: Appears intact Perseveration: No perseveration noted Safety/Judgement: Fall prevention Physical Skills Involved: 
1. Balance 2. Strength 3. Activity Tolerance Cognitive Skills Affected (resulting in the inability to perform in a timely and safe manner): 1. Short Term Recall Psychosocial Skills Affected: 1. Environmental Adaptation Number of elements that affect the Plan of Care: 3-5:  MODERATE COMPLEXITY CLINICAL DECISION MAKIN[address] [place] [address] AM-PAC 6 Clicks Daily Activity Inpatient Short Form How much help from another person does the patient currently need. .. Total A Lot A Little None 1. Putting on and taking off regular lower body clothing? [] 1   [x] 2   [] 3   [] 4  
2. Bathing (including washing, rinsing, drying)? [] 1   [x] 2   [] 3   [] 4  
3. Toileting, which includes using toilet, bedpan or urinal?   [] 1   [x] 2   [] 3   [] 4  
4. Putting on and taking off regular upper body clothing? [] 1   [] 2   [x] 3   [] 4  
5. Taking care of personal grooming such as brushing teeth? [] 1   [] 2   [x] 3   [] 4  
6. Eating meals? [] 1   [] 2   [x] 3   [] 4  
© , Trustees of [address] [place] [address], under license to [employer]. All rights reserved Score:  Initial:15 Most Recent: X (Date: -- ) Interpretation of Tool:  Represents activities that are increasingly more difficult (i.e. Bed mobility, Transfers, Gait). Score 24 23 22-20 19-15 14-10 9-7 6 Modifier CH CI CJ CK CL CM CN   
 
? Self Care:  
  - CURRENT STATUS: CK - 40%-59% impaired, limited or restricted  - GOAL STATUS: CJ - 20%-39% impaired, limited or restricted   - D/C STATUS:  ---------------To be determined--------------- 
 [place] / Product Type: Medicare /   
 
Medical Necessity:    
· Patient is expected to demonstrate progress in strength, balance and functional technique to increase independence with self care. Reason for Services/Other Comments: 
· Patient would benefit from OT to maximize safety and independence with self care and functional mobility. Jarrod Branham Use of outcome tool(s) and clinical judgement create a POC that gives a: LOW COMPLEXITY  
 
 
 
TREATMENT:  
(In addition to Assessment/Re-Assessment sessions the following treatments were rendered) Pre-treatment Symptoms/Complaints:   
Pain: Initial:  
Pain Intensity 1: 0 0 Post Session:  0 Self Care: (15): Procedure(s) (per grid) utilized to improve and/or restore self-care/home management as related to dressing and grooming. Required moderate verbal and   cueing to facilitate activities of daily living skills. Therapeutic Activity: (    15): Therapeutic activities including Bed transfers, Chair transfers and Ambulation on level ground to improve mobility, balance and coordination. Required minimal Safety awareness training to promote motor control of bilateral, upper extremity(s), lower extremity(s). Braces/Orthotics/Lines/Etc:  
· IV 
· O2 Device: Nasal cannula Treatment/Session Assessment:   
· Response to Treatment:  Pt up to chair tolerated fair. · Interdisciplinary Collaboration:  
o Certified Occupational Therapy Assistant 
o Registered Nurse · After treatment position/precautions:  
o Supine in bed 
o Bed/Chair-wheels locked 
o Call light within reach 
o RN notified 
o Family at bedside 
o Nurse at bedside 
o Side rails x 3  
· Compliance with Program/Exercises: compliant all of the time. · Recommendations/Intent for next treatment session: \"Next visit will focus on advancements to more challenging activities and reduction in assistance provided\". Total Treatment Duration: OT Patient Time In/Time Out Time In: 5660 Time Out: 1110 Lesleigh Mcardle, OT

## 2018-10-01 NOTE — PROGRESS NOTES
Hospitalist Progress Note Admit Date:  2018  9:50 PM  
Name:  Sony Barillas Age:  80 y.o. 
:  1929 MRN:  [id number] PCP:  Diana Abbott MD 
Treatment Team: Attending Provider: Ave Hernandez MD; Utilization Review: Colt Grier RN; Consulting Provider: Diego Lugo MD; Consulting Provider: Martin Qiu NP; Consulting Provider: Valeta Atkinson, MD Torrie Dubin As Per Prior Documentation: 
 
\"79 y/o female with O2 dependent COPD and ILD presents to the ED in acute respiratory distress. She resides at [facility] and went to bed at 8 pm. She is usually on 6L O2 at night. Later developed acute vomiting and her O2 sats dropped. EMS alerted and administered albuterol nebulizer. Concern is that she may have aspirated. She was tachycardic, tachypneic and distressed. Placed on BIPAP and started on antibiotics. Initial lactic acid 1.1, troponin negative and chest xray decreased lung volumes. Will admit to ICU for further treatment. Today, feels better, on 5L O2. Did well w/ beside testing for swallowing. \" 
 
 
2018- seen requesting ice cubes. Her breathing is about the same if not better than when she was at the [facility]. She cant recall all of what happened but remembers vomiting. 2018- seen - daughter at bedside very agitated about moms condition- long discussion re- vomiting possibly esophagus issue, chronic lung issues and cardiac issues and general decline. ...- pt inability to have egd or other procedures given high risk with high o2 requirements- pt was also agitated overnight; unable to tolerate bipap/cpap or apap. Right now she does not even want oxygen via nc. 
 
2018- seen - for cpap overnight after getting some sedation but when she woke up it had to come off.  She had a bm this am and just got cleaned- currently on O2 via nasal cannula.  
 
 
2018- Pt seen -wore cpap last night but o2 requirements have increased. Spiked low grade fever 100.1- daughter at bedside feels she is better 09/30/2018- Pt seen - doing ok- o2 requirement went back down yesterday but are now back up. Nursing reports - pt initially confused but much better when daughter arrived. Her Daughter Steve Johnson is at the bedside- she feels the patient is doing better but wondering if she can go to rehab on so much oxygen. She still wants her to go. She states her mother doesn't have much of a life but it is still a life and her mother is happy being able to do the little things that she enjoys. I did express that our goal in advising comfort care was to keep the patient happy. Steve Johnson is hopeful for discharge to rehab tomorrow after follow up with Pulmonology. 10/01/2018- Pt seen - daughter at bedside.- high O2 requirements. .. D/w pulm - want to get ct - to evaluate for a PE. And possible thoracocentesis Objective:  
 
Patient Vitals for the past 24 hrs: 
 Temp Pulse Resp BP SpO2  
10/01/18 0751 99.1 °F (37.3 °C) 90 - 105/54 95 % 10/01/18 0745 - - - - 94 % 10/01/18 0417 98.9 °F (37.2 °C) 87 18 134/66 97 % 10/01/18 0121 - - - - 90 % 10/01/18 0021 - 85 - 200/71 -  
09/30/18 2356 97.6 °F (36.4 °C) 85 20 - 93 % 09/30/18 2128 - 68 - - 95 % 09/30/18 1920 97.5 °F (36.4 °C) 65 16 159/75 93 % 09/30/18 1645 97.9 °F (36.6 °C) 72 20 192/71 95 % 09/30/18 1259 97.3 °F (36.3 °C) 92 20 188/77 93 % Oxygen Therapy O2 Sat (%): 95 % (10/01/18 0751) Pulse via Oximetry: 88 beats per minute (10/01/18 0745) O2 Device: Hi flow nasal cannula (10/01/18 0745) PEEP/CPAP (cm H2O):  (5-20  AUTO SET) (09/28/18 2055) O2 Flow Rate (L/min): 13 l/min (placed sat probe on L great toe. Pt keeps trying juan jose take off) (10/01/18 0121) O2 Temperature:  (.) (09/27/18 2126) FIO2 (%): 52 % (09/30/18 2128) No intake or output data in the 24 hours ending 10/01/18 0933 Physical Exam: 
General:    Well nourished. Alert. CV:   RRR. No murmur, rub, or gallop. Lungs:  Diminished bibasilar Abdomen: Soft, nontender, nondistended. Bowel sounds normal.  
Extremities: Warm and dry. No cyanosis; no edema Neurologic:  grossly intact. Skin:     No rashes or jaundice. No wounds. Psych:  Normal mood and affect. I reviewed the labs, imaging, EKGs, telemetry, and other studies done this admission. Data Review: No results found for this or any previous visit (from the past 24 hour(s)). Imaging Studies: CXR Results  (Last 48 hours) 09/30/18 1322  XR CHEST SNGL V Final result Impression:  IMPRESSION: Left pleural effusion with atelectasis or consolidation in the left  
lung base. Narrative:  PORTABLE CHEST 1 VIEW HISTORY: Respiratory failure. Hypoxia. COMPARISON: 9/24/2018 FINDINGS: A left pleural effusion is present and there is atelectasis or  
consolidation in the left lung base. The right lung is well expanded and clear. CT Results  (Last 48 hours) None Assessment and Plan:  
 
Hospital Problems as of 10/1/2018  Date Reviewed: 6/26/2018 Codes Class Noted - Resolved POA Pleural effusion, bilateral ICD-10-CM: J90 ICD-9-CM: 511.9  9/26/2018 - Present Unknown Nonrheumatic aortic valve stenosis ICD-10-CM: I35.0 ICD-9-CM: 424.1  9/26/2018 - Present Unknown Aspiration pneumonia ([facility] ICD-10-CM: J69.0 ICD-9-CM: 507.0  9/24/2018 - Present Yes * (Principal)Acute respiratory failure with hypoxemia ([facility] ICD-10-CM: J96.01 
ICD-9-CM: 518.81  9/24/2018 - Present Yes Late onset Alzheimer's disease without behavioral disturbance (Chronic) ICD-10-CM: G30.1, F02.80 ICD-9-CM: 331.0, 294.10  11/15/2017 - Present Yes Debility ICD-10-CM: R53.81 ICD-9-CM: 799.3  11/30/2015 - Present Yes COPD (chronic obstructive pulmonary disease) (HCC) (Chronic) ICD-10-CM: J44.9 ICD-9-CM: 542  9/1/2015 - Present Yes  HTN (hypertension) (Chronic) ICD-10-CM: I10 
 ICD-9-CM: 401.9  1/22/2014 - Present Yes Hyperlipidemia (Chronic) ICD-10-CM: L84.2 ICD-9-CM: 272.4  1/22/2014 - Present Yes RESOLVED: ILD (interstitial lung disease) (HCC) (Chronic) ICD-10-CM: J84.9 ICD-9-CM: 161  9/1/2015 - 9/26/2018 Yes PLAN: 
 
Acute on chronic hypoxic resp failure w/ hx of late COPD and O2 dependence-Pulmonology input appreciated- recommended comfort care measures- d/w family -they want everything done- D/W pulm and they would like a Ct chest with contrast to rule out PE given the high O2 requirements- further workup and mgt based on this. Aortic stenosis, [facility] - cardiology input appreciated - continue BB 
BL pleural effusions- continue lasix Concern for aspiration pneumonia- on augmentin Concern for UTI- treated if not clinically proven Confusion- multifactorial from sundowning, meds, infection- on depakote sprinkles Insomnia- zyprexa at night BA swallow ok- will continue on a pureed diet and ppi bid for 1-2 months- per gi recommendations Hypokalemia- replete HTN- titrate meds Dispo: Plan for dc back to rehab when medically stable and o2 requirements are acceptable Signed: 
Chanda Kanner, MD

## 2018-10-01 NOTE — PROGRESS NOTES
Ran into an issue with the telemetry codes not showing up in CC. Corporate made decision to accept pt since it was a computer issue. Pt does meet the criteria and will be transferring to [facility] this pm. Bria Duran/FIDENCIO Pt accepted to [facility] today. Ambulance arranged. Spoke with Diego Lepe daughter this pm who was in agreement. Pt alert and talking this pm. Rn to call report. No new needs iden. Toribio Nieves talked with Md again last pm. Md stated if pt remained stable over night she would consider a d/c to [facility]. This am hospitalist spoke with pulmonary Md and both agree pt would benefit from [facility]. Pt is not medically stable and at this time would not be appropriate to go to [facility] level of care. Ref copied and faxed to [facility] admissions department. Believe all three daughters would agree with this plan as they still want everything done for pt. Goal was to get her back to [facility] if and when medically stable. Family believes this would make her mentally better. Will await final review and approval. Toribio Nieves spoke with pt's daughter and Md during care rounds today. Pt had a bad night and am with increased confusion and requiring 11-13 liters of oxygen. Md unsure where pt would be best cared for. Prieto talked with adm coord for [facility]. She planned to talk with her DON and RNP at facility to see if they can handle pt in the skilled rehab or the health care unit. The level of care is the same at both facilities. Tg Cabral to get back with Prieto with a determination. Lillian Negron mentioned [facility] to pt's daughter. Sw to discuss with [facility] adm coord to see if pt meets criteria. Daughter from [employer] wants pt to go to [facility] if at all possible. Sw to cont to follow and assist. Bria Duran/FIDENCIO NIEVES spoke with Tg Cabral at [facility] who confirms they can accept pt on 5L of O2. Pt can admit to room 331.

## 2018-10-01 NOTE — PROGRESS NOTES
Problem: Interdisciplinary Rounds Goal: Interdisciplinary Rounds Interdisciplinary team rounds were held 10/1/2018 with the following team members:Care Management, Nursing, Nutrition, Pharmacy, Physical Therapy and Physician and the patient and child(shae). Plan of care discussed. See clinical pathway and/or care plan for interventions and desired outcomes.

## 2018-10-01 NOTE — PROGRESS NOTES
MD notified that pt was too asleep to take evening medications. Also her BP was 200/71, new order placed for hydralazine. Will continue to monitor.

## 2018-10-01 NOTE — PROGRESS NOTES
Emily Gonsales Admission Date: 9/24/2018 Daily Progress Note: 10/1/2018 The patient's chart is reviewed and the patient is discussed with the staff. 
 
  
79 y/o female with O2 dependent COPD (6L QHS and 4 L AM) and ? ILD/dementia/debility presents to the ED in acute respiratory distress with vomiting. She lives in assisted living at the [facility] living and able to do most ADLs (eating, restroom use, but not dressing) and apparently had been having multiple bouts of vomiting and abdominal discomfort and sats were dropping. She reported did expectorate all contents and did not aspirate. No diarrhea. No fevers, no prior episodes. She was placed on BIPAP for worsening hypoxemia and concern for ?aspiration but able to be weaned back to NC.   
 
 
Subjective:  
Remains on 13lpm of HFNC, up from 8lpm. 
Has been diuresed Current Facility-Administered Medications Medication Dose Route Frequency  furosemide (LASIX) injection 40 mg  40 mg IntraVENous Q12H  
 OLANZapine (ZyPREXA zydis) disintegrating tablet 5 mg  5 mg Oral QHS  amoxicillin-clavulanate (AUGMENTIN) 875-125 mg per tablet 1 Tab  1 Tab Oral BID WITH MEALS  methylPREDNISolone (PF) (SOLU-MEDROL) injection 20 mg  20 mg IntraVENous DAILY  divalproex (DEPAKOTE SPRINKLE) capsule 125 mg  125 mg Oral BID  pantoprazole (PROTONIX) tablet 40 mg  40 mg Oral ACB&D  
 lisinopril (PRINIVIL, ZESTRIL) tablet 5 mg  5 mg Oral DAILY  busPIRone (BUSPAR) tablet 10 mg (Patient Supplied)  10 mg Oral BID  
 haloperidol lactate (HALDOL) injection 4 mg  4 mg IntraVENous Q6H PRN  
 albuterol (PROVENTIL VENTOLIN) nebulizer solution 2.5 mg  2.5 mg Nebulization QID RT And  
 budesonide (PULMICORT) 500 mcg/2 ml nebulizer suspension  500 mcg Nebulization BID RT  
 metoprolol tartrate (LOPRESSOR) tablet 12.5 mg  12.5 mg Oral BID  polyethylene glycol (MIRALAX) packet 17 g  17 g Oral DAILY  Azelastine (ASTEPRO) 0.15 % (205.5 mcg) nasal spray 1 Spray (Patient Supplied)  1 Spray Both Nostrils BID  
 donepezil (ARICEPT) tablet 5 mg  5 mg Oral QHS  fluticasone (FLONASE) 50 mcg/actuation nasal spray 2 Spray  2 Spray Both Nostrils DAILY  sodium chloride (NS) flush 5-10 mL  5-10 mL IntraVENous Q8H  
 sodium chloride (NS) flush 5-10 mL  5-10 mL IntraVENous PRN  
 naloxone (NARCAN) injection 0.4 mg  0.4 mg IntraVENous PRN  
 acetaminophen (TYLENOL) tablet 650 mg  650 mg Oral Q4H PRN  
 enoxaparin (LOVENOX) injection 40 mg  40 mg SubCUTAneous Q24H  
 bisacodyl (DULCOLAX) tablet 5 mg  5 mg Oral DAILY PRN  
 ondansetron (ZOFRAN) injection 4 mg  4 mg IntraVENous Q4H PRN  
 guaiFENesin-dextromethorphan (ROBITUSSIN DM) 100-10 mg/5 mL syrup 10 mL  10 mL Oral Q6H PRN  
 sertraline (ZOLOFT) tablet 50 mg  50 mg Oral QHS Review of Systems Constitutional: negative for fever, chills, sweats Cardiovascular: negative for chest pain, palpitations, syncope, edema Gastrointestinal:  negative for dysphagia, reflux, vomiting, diarrhea, abdominal pain, or melena Neurologic:  negative for focal weakness, numbness, headache Objective:  
 
Vitals:  
 10/01/18 0121 10/01/18 7077 10/01/18 0745 10/01/18 0116 BP:  134/66  105/54 Pulse:  87  90 Resp:  18 Temp:  98.9 °F (37.2 °C)  99.1 °F (37.3 °C) SpO2: 90% 97% 94% 95% Weight:      
Height:      
 
Intake and Output:  
  
  
 
Physical Exam:  
Constitution:  the patient is well developed and in no acute distress on CPAP. EENMT:  Sclera clear, pupils equal, oral mucosa moist 
Respiratory: CRACKLES IN RIGHT BASE, DECREASED ON LEFT BASE Cardiovascular:  RRR with mild SM Gastrointestinal: soft and non-tender; with positive bowel sounds. Musculoskeletal: warm without cyanosis. There is trace lower leg edema, WORSE ON LEFT WITH TENDERNESS Skin:  no jaundice or rashes Neurologic: no gross neuro deficits Psychiatric:  Awake and confused; falls asleep quickly CXR:  
 
9/24: 
 
 
 
Chest CT 9/25: 
 
 
 
 
IMPRESSION: No convincing interstitial lung disease seen but there is bilateral 
effusions and overlying atelectasis or infiltrates larger on the left than the 
right. Fluid filling esophagus could be reflux, increasing risk of aspiration 
but no material seen in the trachea or bronchi at this time. Probable simple 
cysts and nonobstructing left kidney stone. Echo: SUMMARY: 
-  Left ventricle: Systolic function was hyperdynamic. Ejection fraction was estimated in the range of 65 % to 70 %. There were no regional wall motion 
abnormalities. There was dynamic obstruction likely contributed by the hyperdynamic function with chordal AKILA, with a peak velocity of 3.6 cm/s, a 
peak gradient of 53 mmHg, and a mean gradient of 15 mmHg. There was minimal increase in gradients with Valsalva. -  Aortic valve: There was mild to moderate stenosis (mildly elevated Gradients with the hyperdynamic function; DI at 0.43; mostly restriction of the non 
coronary cusp). LAB No results for input(s): GLUCPOC in the last 72 hours. No lab exists for component: [place] Recent Labs  
   09/30/18 
 0615  09/29/18 
 0641  09/28/18 
 1434 WBC  8.7  6.9  7.3 HGB  10.7*  10.0*  10.8* HCT  36.3  33.3*  36.3 PLT  187  173  173 Recent Labs  
   09/30/18 
 0615  09/29/18 
 0641  09/28/18 
 1434 NA  148*  144  144  
K  3.3*  3.3*  3.3*  
CL  106  108*  108* CO2  34*  31  26 GLU  95  90  201* BUN  16  17  24* CREA  0.84  0.72  0.87  
MG   --    --   1.9 CA  8.2*  7.8*  7.9* No results for input(s): PH, PCO2, PO2, HCO3 in the last 72 hours. No results for input(s): LCAD, LAC in the last 72 hours. Assessment:  (Medical Decision Making) Hospital Problems  Date Reviewed: 6/26/2018 Codes Class Noted POA Pleural effusion, bilateral ICD-10-CM: J90 ICD-9-CM: 511.9  9/26/2018 Unknown Has been diuresed and may need thoracentesis on left. Please use heparin if PE present since may need to hold for tap Nonrheumatic aortic valve stenosis ICD-10-CM: I35.0 ICD-9-CM: 424.1  9/26/2018 Unknown Mild to moderate Aspiration pneumonia ([facility] ICD-10-CM: J69.0 ICD-9-CM: 507.0  9/24/2018 Yes Now on oral abx * (Principal)Acute respiratory failure with hypoxemia ([facility] ICD-10-CM: J96.01 
ICD-9-CM: 518.81  9/24/2018 Yes With high oxygen requirement Late onset Alzheimer's disease without behavioral disturbance (Chronic) ICD-10-CM: G30.1, F02.80 ICD-9-CM: 331.0, 294.10  11/15/2017 Yes With superimposed delirium Debility ICD-10-CM: R53.81 ICD-9-CM: 799.3  11/30/2015 Yes Severe COPD (chronic obstructive pulmonary disease) (HCC) (Chronic) ICD-10-CM: J44.9 ICD-9-CM: 532  9/1/2015 Yes No wheezing HTN (hypertension) (Chronic) ICD-10-CM: I10 
ICD-9-CM: 401.9  1/22/2014 Yes Hyperlipidemia (Chronic) ICD-10-CM: N99.8 ICD-9-CM: 272.4  1/22/2014 Yes FLAVIA- having witnessed apneas. Has been intolerant of BIPAP, continue APAP Plan:  (Medical Decision Making) Would r/o PE given continued high oxygen requirement despite CXR with minor findings (possible left pleural effusion). If PE present, would use heparin to anticoagulate since may need thoracentesis within next 48h. Doppler the left lower extremity DNR More than 50% of the time documented was spent in face-to-face contact with the patient and in the care of the patient on the floor/unit where the patient is located.  
 
Tanja Wills MD

## 2018-10-01 NOTE — PROGRESS NOTES
Pt assessment completed. Pt is still a bit drowsy from night time medication. Oriented X 4. Lungs coarse bilaterally with even and unlabored respirations. Heart sounds regular. Abdomen soft and non tender with active bowel sounds. IV present with no s/sx of complications at this time. Assisted pt to chair and changed pt with PT. Pt denies pain needs. Daughter at bedside assisting pt. All needs met at this time. Will continue to monitor

## 2018-10-01 NOTE — PROGRESS NOTES
Problem: Mobility Impaired (Adult and Pediatric) Goal: *Acute Goals and Plan of Care (Insert Text) STG: 
(1.)Ms. Deal will move from supine to sit and sit to supine  with MINIMAL ASSIST within 3 treatment day(s). Progressing 9/29 
(2.)Ms. Deal will transfer from bed to chair and chair to bed with MINIMAL ASSIST using the least restrictive device within 3 treatment day(s). (3.)Ms. Deal will ambulate with MINIMAL ASSIST for 25 feet with the least restrictive device within 3 treatment day(s). Progressing 9/29 LTG: 
(1.)Ms. Deal will move from supine to sit and sit to supine  in bed with STAND BY ASSIST within 7 treatment day(s). (2.)Ms. Deal will transfer from bed to chair and chair to bed with STAND BY ASSIST using the least restrictive device within 7 treatment day(s). (3.)Ms. Deal will ambulate with CONTACT GUARD ASSIST for  feet with the least restrictive device within 7 treatment day(s). ________________________________________________________________________________________________ PHYSICAL THERAPY: Daily Note, AM 10/1/2018 INPATIENT: Hospital Day: 8 [place] / Product Type: Medicare /  
  
NAME/AGE/GENDER: Shira Miller is a 80 y.o. female PRIMARY DIAGNOSIS: Acute respiratory failure with hypoxemia ([facility] [address] Aspiration pneumonia ([facility] [address] Acute respiratory failure with hypoxemia (HCC) Acute respiratory failure with hypoxemia (HCC) ICD-10: Treatment Diagnosis:  
 · Generalized Muscle Weakness (M62.81) · Difficulty in walking, Not elsewhere classified (R26.2) Precaution/Allergies: 
Bactroban [mupirocin calcium]; Mupirocin; Sulfa (sulfonamide antibiotics); and Sulfamethoxazole-trimethoprim ASSESSMENT:  
 
Ms. Christopher Caballero presents with limited bed mobility, transfers and gait due to weakness. She would benefit from PT to increase her functional independence.  We feel she would benefit from a stay at Rehab prior to returning to the [facility]. She transferred out of bed and ambulated 5 feet before going to the bedside chair. Then performed LE therex sitting in the chair. Daughter and caregiver at bedside. Pt up in recliner. Daughter states she did not sleep well last night and was hoping to rest, but they were both agreeable to brief PT before pt takes a nap. Pt able to perform UE/LE ROM and strength exercises with moderate cueing in the recliner but declined further treatment and was unwilling to try standing or ambulation. Will progress as able. 10/1--Pt performed sit to stand. Pt stood for nursing and therapy to change pt's brief. When pt felt her feet were slipping, pt able to sit. Pt performed sit to stand again for placement of a clean, dry pad. Pt left up in bedside chair with needs in reach. Pt's daughter present. This section established at most recent assessment PROBLEM LIST (Impairments causing functional limitations):,  
1. Decreased Strength 2. Decreased Transfer Abilities 3. Decreased Ambulation Ability/Technique 4. Decreased Balance 5. Decreased Activity Tolerance INTERVENTIONS PLANNED: (Benefits and precautions of physical therapy have been discussed with the patient.) 1. Bed Mobility 2. Gait Training 3. Therapeutic Exercise/Strengthening 4. Transfer Training TREATMENT PLAN: Frequency/Duration: daily for duration of hospital stay Rehabilitation Potential For Stated Goals: Good RECOMMENDED REHABILITATION/EQUIPMENT: (at time of discharge pending progress): Due to the probability of continued deficits (see above) this patient will likely need continued skilled physical therapy after discharge. Equipment:  
? None at this time HISTORY:  
History of Present Injury/Illness (Reason for Referral): 
81 y/o female with O2 dependent COPD and ILD presents to the ED in acute respiratory distress.  She resides at [facility] and went to bed at 8 pm. She is usually on 6L O2 at night. Later developed acute vomiting and her O2 sats dropped. EMS alerted and administered albuterol nebulizer. Concern is that she may have aspirated. She was tachycardic, tachypneic and distressed. Placed on BIPAP and started on antibiotics. Initial lactic acid 1.1, troponin negative and chest xray decreased lung volumes. Will admit to ICU for further treatment. Past Medical History/Comorbidities: Ms. Sabino Claude  has a past medical history of Arthropathy, unspecified, site unspecified; Benign paroxysmal positional vertigo; Cardiac dysrhythmia, unspecified; Debility, unspecified; Facet syndrome ([facility] [address] GIB (gastrointestinal bleeding) (1/31/2014); History of peptic ulcer disease (01/2014); Hypertension; IBS (irritable bowel syndrome); Impaired fasting glucose; Lumbago; Memory loss; Meningioma ([facility] [address] Musculoskeletal pain; OA (osteoarthritis) of knee; Palliative care encounter (5/8/2014); Rhinorrhea; Scoliosis; Sensorineural hearing loss; Spondylolisthesis; TMJ dysfunction; Varicella; Vasomotor rhinitis; and Vitamin D deficiency. Ms. Sabino Claude  has a past surgical history that includes hx arg[place] and bso (1974); hx knee arthroscopy; hx appendectomy; hx other surgical (1998); hx knee replacement (Left, 2007); hx colonoscopy; hx tonsillectomy; and hx cataract removal (2003). Social History/Living Environment:  
Home Environment: Assisted living Care Facility Name: [facility] One/Two Story Residence: One story Living Alone: No 
Support Systems: Family member(s) Patient Expects to be Discharged to[de-identified] Assisted living Current DME Used/Available at Home: Jon Wallace, [address] chair, Wheelchair, power Prior Level of Function/Work/Activity: 
Living [facility]. Walks very short distances with rolling walker but uses a power chair for further distances. Has assist for dressing and bathing. Number of Personal Factors/Comorbidities that affect the Plan of Care: 0: LOW COMPLEXITY EXAMINATION:  
 Most Recent Physical Functioning:  
Gross Assessment: 
  
         
  
Posture: 
  
Balance: 
Sitting: Intact Standing: Pull to stand; With support Bed Mobility: 
  
Wheelchair Mobility: 
  
Transfers: 
Sit to Stand: Minimum assistance Stand to Sit: Minimum assistance Gait: 
  
Gait Abnormalities: Other (slightly unsteady and pt grew tired) Distance (ft): 1 Feet (ft) Assistive Device: Walker, rolling Ambulation - Level of Assistance: Minimal assistance Interventions: Safety awareness training Body Structures Involved: 1. Muscles Body Functions Affected: 1. Movement Related Activities and Participation Affected: 1. Mobility Number of elements that affect the Plan of Care: 3: MODERATE COMPLEXITY CLINICAL PRESENTATION:  
Presentation: Stable and uncomplicated: LOW COMPLEXITY CLINICAL DECISION MAKIN[address] [place] [id number] AM-PAC 6 Clicks Basic Mobility Inpatient Short Form How much difficulty does the patient currently have. .. Unable A Lot A Little None 1. Turning over in bed (including adjusting bedclothes, sheets and blankets)? [] 1   [] 2   [x] 3   [] 4  
2. Sitting down on and standing up from a chair with arms ( e.g., wheelchair, bedside commode, etc.)   [] 1   [x] 2   [] 3   [] 4  
3. Moving from lying on back to sitting on the side of the bed? [] 1   [x] 2   [] 3   [] 4 How much help from another person does the patient currently need. .. Total A Lot A Little None 4. Moving to and from a bed to a chair (including a wheelchair)? [] 1   [x] 2   [] 3   [] 4  
5. Need to walk in hospital room? [] 1   [x] 2   [] 3   [] 4  
6. Climbing 3-5 steps with a railing? [] 1   [x] 2   [] 3   [] 4  
© , Trustees of [address] [place] [address] [address] [id number], under license to [employer]. All rights reserved Score:  Initial: 13 Most Recent: X (Date: -- ) Interpretation of Tool:  Represents activities that are increasingly more difficult (i.e. Bed mobility, Transfers, Gait). Score 24 23 22-20 19-15 14-10 9-7 6 Modifier CH CI CJ CK CL CM CN   
 
? Mobility - Walking and Moving Around:  
  - CURRENT STATUS: CL - 60%-79% impaired, limited or restricted  - GOAL STATUS: CK - 40%-59% impaired, limited or restricted  - D/C STATUS:  ---------------To be determined--------------- [place] / [place] / Product Type: Medicare /   
 
Medical Necessity:    
· Patient is expected to demonstrate progress in strength and range of motion to increase independence with gait and transfers. Reason for Services/Other Comments: 
· Patient continues to require skilled intervention due to limited functional independence. Use of outcome tool(s) and clinical judgement create a POC that gives a: Clear prediction of patient's progress: LOW COMPLEXITY  
  
 
 
 
TREATMENT:  
(In addition to Assessment/Re-Assessment sessions the following treatments were rendered) Pre-treatment Symptoms/Complaints:   
Pain: Initial: 0 Pain Intensity 1: 0  Post Session:    
 
Therapeutic Exercise: ( 0 minutes):  Exercises per grid below to improve mobility and strength. Required minimal verbal cues to promote proper body alignment. Gait Training ():  Gait training to improve and/or restore physical functioning as related to mobility. Assistive Device: Walker, rolling Ambulation - Level of Assistance: Minimal assistance Distance (ft): 1 Feet (ft) Interventions: Safety awareness training Duration: 12 Minutes Therapeutic Activity (  12 Minutes): Therapeutic activities per grid below to improve mobility. Date: 
9/27 Date: 
9/30 Date: Activity/Exercise Parameters Parameters Parameters Ankle pumps 10 20 LAQ 10 Hip flexion 10 Hip abduction 10 20 OH press  20 Scaption  20 Heel slides  20 Braces/Orthotics/Lines/Etc:  
· IV 
· O2 Device: Nasal cannula Treatment/Session Assessment: · Response to Treatment:  Pt agreeable to activity for brief change. · Interdisciplinary Collaboration:  
o Physical Therapist 
o Registered Nurse · After treatment position/precautions:  
o Up in chair 
o Bed/Chair-wheels locked 
o Bed in low position 
o Caregiver at bedside 
o Call light within reach 
o RN notified 
o Family at bedside · Compliance with Program/Exercises: compliant all of the time. · Recommendations/Intent for next treatment session: \"Next visit will focus on advancements to more challenging activities\". Total Treatment Duration: PT Patient Time In/Time Out Time In: 7630 Time Out: 6935 Asa Young, ANTONIO

## 2018-10-01 NOTE — PROGRESS NOTES
Shift assessment complete. Daughter at bedside. Pt alert, oriented to name. Pt on humidified oxygen via NC. Bed low and locked, call light within reach. IV clean, dry, intact. Abdomen soft, non-tender, bowel sounds active. Pt started on new order for dissolvable zyprexa tonight, had to keep reminding pt to allow pill to dissolve before drinking. No complaints/concerns at this time will continue to monitor.

## 2018-10-02 NOTE — H&P (VIEW-ONLY)
CONSULT NOTE Dario Melendez 9/25/2018 Date of Admission:  9/24/2018 The patient's chart is reviewed and the patient is discussed with the staff. Subjective:  
 
Patient is a 80 y.o.  female seen and evaluated at the request of Dr. Giulia Fisher. 81 y/o female with O2 dependent COPD (6L QHS and 4 L AM) and ? ILD/dementia/jt;tu presents to the ED in acute respiratory distress with vomiting. She lives in assisted living at the [facility] living and able to do most ADLs (eating, restroom use, but not dressing) and apparently had been having multiple bouts of vomiting and abdominal discomfort and sats were dropping. She reported did expectorate all contents and did not aspirate. No diarrhea. No fevers, no prior episodes. She was placed on BIPAP for worsening hypoxemia and concern for ?aspiration. Called to assist with care. Since consult called, just went to see patient and taken off BIPAP to oxygen at 5 LPM. Currently no dyspnea. No vomiting since last night. Abdomen still slightly upset. Daughter is with here and reports patient appears to be getting back to baseline. lly on 6L O2 at night. Later developed acute vomiting and her O2 sats dropped. EMS alerted and administered albuterol nebulizer. Concern is that she may have aspirated. She was tachycardic, tachypneic and distressed. Placed on BIPAP and started on antibiotics. Initial lactic acid 1.1, troponin negative and chest xray decreased lung volumes. Will admit to ICU for further treatment. Review of Systems: 
-Fever 
-Headaches 
-Chest pain +Dyspnea, -wheezing,+ cough +Abdominal pain, -constipation +N/V (none right now) -Leg swelling All other organ systems grossly normal. 
 
 
Patient Active Problem List  
Diagnosis Code  Hypoxia R09.02  
 HTN (hypertension) I10  
 Hyperlipidemia E78.5  Iron deficiency anemia D50.9  Unspecified hearing loss H91.90  
 Urinary incontinence R32  Unspecified vitamin D deficiency E55.9  COPD (chronic obstructive pulmonary disease) ([facility]) J44.9  DDD (degenerative disc disease), lumbar M51.36  Scoliosis M41.9  Debility, unspecified R53.81  
 ILD (interstitial lung disease) ([facility] J84.9  Edema R60.9  Anxiety F41.9  Vitamin D deficiency E55.9  Debility R53.81  Late onset Alzheimer's disease without behavioral disturbance G30.1, F02.80  
 History of meningioma Z86.018  
 Aspiration pneumonia ([facility]) J69.0  Acute respiratory failure with hypoxemia ([facility]) J96.01 Prior to Admission Medications Prescriptions Last Dose Informant Patient Reported? Taking? Azelastine (ASTEPRO) 0.15 % (205.5 mcg) nasal spray   No No  
Si [place] by Both Nostrils route two (2) times a day. Cholecalciferol, Vitamin D3, (VITAMIN D3) 1,000 unit cap   No No  
Sig: Take 1,000 Units by mouth daily. Compression Socks, Medium misc   No No  
Sig: WEAR DAILY DISABLED PLACARD (DISABLED PLACARD) DMV   No No  
Sig: Use prn HYDROcodone-acetaminophen (NORCO) 5-325 mg per tablet   No No  
Sig: Take 1 Tab by mouth every six (6) hours as needed. As needed for headache OXYGEN-AIR DELIVERY SYSTEMS   Yes No  
Si L by Nasal route nightly. Continuous at 2 L, nights time at 6 L Walker (ULTRA-LIGHT ROLLATOR) misc   No No  
Sig: Use daily  
acetaminophen (TYLENOL) 325 mg tablet   Yes No  
Sig: Take  by mouth every six (6) hours as needed for Pain. albuterol (PROAIR HFA) 90 mcg/actuation inhaler   No No  
Si puffs qid prn  Indications: Chronic Obstructive Pulmonary Disease  
budesonide-formoterol (SYMBICORT) 160-4.5 mcg/actuation HFA inhaler   No No  
Si puffs bid, rinse mouth after use. busPIRone (BUSPAR) 10 mg tablet   No No  
Sig: Take 1 Tab by mouth two (2) times a day. calcium carbonate-vitamin D3 600 mg(1,500mg) -500 unit cap   No No  
Sig: Take 1 Tab by mouth two (2) times a day.   
donepezil (ARICEPT) 5 mg tablet   No No  
 Sig: Take 1 Tab by mouth nightly. fluticasone (FLONASE) 50 mcg/actuation nasal spray   No No  
Si Sprays by Both Nostrils route daily. 2 sprays each nostril prn  
furosemide (LASIX) 40 mg tablet   No No  
Sig: Take 1 Tab by mouth daily. As needed for swelling Patient taking differently: Take 20 mg by mouth daily. As needed for swelling  
guaiFENesin (ROBITUSSIN) 100 mg/5 mL liquid   No No  
Sig: Take 10 mL by mouth three (3) times daily as needed for Cough.  
multivit-min-FA-lycopen-lutein (CERTAVITE SENIOR-ANTIOXIDANT) 0.4-300-250 mg-mcg-mcg tab   Yes No  
Sig: Take  by mouth daily. ondansetron (ZOFRAN ODT) 4 mg disintegrating tablet   Yes No  
Sig: Take 4 mg by mouth every eight (8) hours as needed for Nausea. pantoprazole (PROTONIX) 40 mg tablet   No No  
Sig: Take 1 Tab by mouth daily. potassium chloride SR (KLOR-CON 10) 10 mEq tablet   Yes No  
pravastatin (PRAVACHOL) 20 mg tablet   No No  
Sig: Take 1 Tab by mouth nightly. sertraline (ZOLOFT) 100 mg tablet   No No  
Sig: Take 1 Tab by mouth nightly. Facility-Administered Medications: None Past Medical History:  
Diagnosis Date  Arthropathy, unspecified, site unspecified  Benign paroxysmal positional vertigo  Cardiac dysrhythmia, unspecified  Debility, unspecified  Facet syndrome ([address]  GIB (gastrointestinal bleeding) 2014  History of peptic ulcer disease 2014  Hypertension  IBS (irritable bowel syndrome)  Impaired fasting glucose  Lumbago  Memory loss  Meningioma ([address] removed in [facility] about   Musculoskeletal pain  OA (osteoarthritis) of knee  Palliative care encounter 2014  Rhinorrhea  Scoliosis  Sensorineural hearing loss  Spondylolisthesis Lumbar  TMJ dysfunction  Varicella  Vasomotor rhinitis  Vitamin D deficiency Past Surgical History:  
Procedure Laterality Date  HX APPENDECTOMY  HX CATARACT REMOVAL  2003 X2 WITH LENSE IMPLANT  HX COLONOSCOPY    
 HX KNEE ARTHROSCOPY    
 left knee  HX KNEE REPLACEMENT Left 2007 [address] [facility]eningi[facility]  HX TONSILLECTOMY Social History Social History  Marital status:  Spouse name: N/A  
 Number of children: N/A  
 Years of education: [employer] Occupational History   Retired Social History Main Topics  Smoking status: Former Smoker Packs/day: 1.50 Years: 45.00 Types: Cigarettes Quit date: 1/1/1991  Smokeless tobacco: Never Used  Alcohol use 1.8 oz/week 1 Glasses of wine, 1 Shots of liquor, 1 Standard drinks or equivalent per week Comment: OCCASIONAL  
 Drug use: No  
 Sexual activity: Not on file Other Topics Concern  Not on file Social History Narrative Lives alone. Ambulates with walker/cane, has supportive daughters Family History Problem Relation Age of Onset  Cancer Mother Pancreatic  Cancer Father Ear Allergies Allergen Reactions  Bactroban [Mupirocin Calcium] Rash  Mupirocin Other (comments)  Sulfa (Sulfonamide Antibiotics) Unknown (comments)  Sulfamethoxazole-Trimethoprim Other (comments) Current Facility-Administered Medications Medication Dose Route Frequency  busPIRone (BUSPAR) tablet 10 mg  10 mg Oral BID  Azelastine (ASTEPRO) 0.15 % (205.5 mcg) nasal spray 1 Spray (Patient Supplied)  1 Spray Both Nostrils BID  
 donepezil (ARICEPT) tablet 5 mg  5 mg Oral QHS  cholecalciferol (VITAMIN D3) tablet 1,000 Units  1,000 Units Oral DAILY  calcium-vitamin D (OS-INDIO) 500 mg-200 unit tablet  1 Tab Oral BID  pantoprazole (PROTONIX) tablet 40 mg  40 mg Oral DAILY  pravastatin (PRAVACHOL) tablet 20 mg  20 mg Oral QHS  sertraline (ZOLOFT) tablet 100 mg  100 mg Oral QHS  fluticasone (FLONASE) 50 mcg/actuation nasal spray 2 Spray  2 Spray Both Nostrils DAILY  sodium chloride (NS) flush 5-10 mL  5-10 mL IntraVENous Q8H  
 sodium chloride (NS) flush 5-10 mL  5-10 mL IntraVENous PRN  
 methylPREDNISolone (PF) (SOLU-MEDROL) injection 40 mg  40 mg IntraVENous Q12H  
 naloxone (NARCAN) injection 0.4 mg  0.4 mg IntraVENous PRN  
 acetaminophen (TYLENOL) tablet 650 mg  650 mg Oral Q4H PRN  
 enoxaparin (LOVENOX) injection 40 mg  40 mg SubCUTAneous Q24H  
 bisacodyl (DULCOLAX) tablet 5 mg  5 mg Oral DAILY PRN  
 ondansetron (ZOFRAN) injection 4 mg  4 mg IntraVENous Q4H PRN  
 guaiFENesin-dextromethorphan (ROBITUSSIN DM) 100-10 mg/5 mL syrup 10 mL  10 mL Oral Q6H PRN  
 albuterol-ipratropium (DUO-NEB) 2.5 MG-0.5 MG/3 ML  3 mL Nebulization Q4H PRN  
 budesonide (PULMICORT) 500 mcg/2 ml nebulizer suspension  500 mcg Nebulization BID RT And  
 albuterol (PROVENTIL VENTOLIN) nebulizer solution 2.5 mg  2.5 mg Nebulization Q6HWA RT  
 piperacillin-tazobactam (ZOSYN) 4.5 g in 0.9% sodium chloride (MBP/ADV) 100 mL  4.5 g IntraVENous Q8H  
 tuberculin injection 5 Units  5 Units IntraDERMal ONCE  
 vancomycin (VANCOCIN) 1,000 mg in 0.9% sodium chloride (MBP/ADV) 250 mL  1 g IntraVENous Q18H Current Outpatient Prescriptions Medication Sig  
 HYDROcodone-acetaminophen (NORCO) 5-325 mg per tablet Take 1 Tab by mouth every six (6) hours as needed. As needed for headache  
 guaiFENesin (ROBITUSSIN) 100 mg/5 mL liquid Take 10 mL by mouth three (3) times daily as needed for Cough.  Azelastine (ASTEPRO) 0.15 % (205.5 mcg) nasal spray 1 [place] by Both Nostrils route two (2) times a day.  fluticasone (FLONASE) 50 mcg/actuation nasal spray 2 Sprays by Both Nostrils route daily. 2 sprays each nostril prn  ondansetron (ZOFRAN ODT) 4 mg disintegrating tablet Take 4 mg by mouth every eight (8) hours as needed for Nausea.  calcium carbonate-vitamin D3 600 mg(1,500mg) -500 unit cap Take 1 Tab by mouth two (2) times a day.  Walker (ULTRA-LIGHT ROLLATOR) misc Use daily  busPIRone (BUSPAR) 10 mg tablet Take 1 Tab by mouth two (2) times a day.  potassium chloride SR (KLOR-CON 10) 10 mEq tablet  donepezil (ARICEPT) 5 mg tablet Take 1 Tab by mouth nightly.  sertraline (ZOLOFT) 100 mg tablet Take 1 Tab by mouth nightly.  pantoprazole (PROTONIX) 40 mg tablet Take 1 Tab by mouth daily.  pravastatin (PRAVACHOL) 20 mg tablet Take 1 Tab by mouth nightly.  furosemide (LASIX) 40 mg tablet Take 1 Tab by mouth daily. As needed for swelling (Patient taking differently: Take 20 mg by mouth daily. As needed for swelling)  albuterol (PROAIR HFA) 90 mcg/actuation inhaler 2 puffs qid prn  Indications: Chronic Obstructive Pulmonary Disease  budesonide-formoterol (SYMBICORT) 160-4.5 mcg/actuation HFA inhaler 2 puffs bid, rinse mouth after use.  Compression Socks, Medium misc WEAR DAILY  DISABLED PLACARD (DISABLED PLACARD) DMV Use prn  Cholecalciferol, Vitamin D3, (VITAMIN D3) 1,000 unit cap Take 1,000 Units by mouth daily.  multivit-min-FA-lycopen-lutein (CERTAVITE SENIOR-ANTIOXIDANT) 0.4-300-250 mg-mcg-mcg tab Take  by mouth daily.  acetaminophen (TYLENOL) 325 mg tablet Take  by mouth every six (6) hours as needed for Pain.  OXYGEN-AIR DELIVERY SYSTEMS 6 L by Nasal route nightly. Continuous at 2 L, nights time at 6 L Objective:  
 
Vitals:  
 09/25/18 0700 09/25/18 0708 09/25/18 1587 09/25/18 0720 BP: 155/68 Pulse:  89 Resp:      
Temp:      
SpO2:   98% 97% Weight:      
Height:      
 
 
Blood pressure 155/68, pulse 89, temperature 98.6 °F (37 °C), resp. rate 22, height 4' 11\" (1.499 m), weight 166 lb (75.3 kg), SpO2 93 %. PHYSICAL EXAM  
 
Constitutional:  the patient is well developed and in no acute distress on 5 LPM 
EENMT:  Sclera clear, pupils equal, oral mucosa moist 
Respiratory: few crackles noted. No wheezing, no rhonchi Cardiovascular:  RRR with noted murmur 3/6 to LSB. Gastrointestinal: soft and ?tenderness diffuse and mostly upper quadrants. No rebound and no guarding. with positive but low bowel sounds. Musculoskeletal: warm without cyanosis. There is no lower leg edema. Skin:  no jaundice or rashes, no wounds Neurologic: no gross neuro deficits Psychiatric:  alert and oriented x 3 CXR:  Mild chronic changes vs atelectasis in the bases left > right Recent Labs  
   09/25/18 
 0435  09/24/18 
 2159 WBC  8.4  10.1 HGB  10.1*  11.2* HCT  34.4*  38.5 PLT  170  195 Recent Labs  
   09/25/18 
 0435  09/24/18 
 2159 NA  145  143  
K  4.0  4.5  
CL  108*  108* GLU  107*  115* CO2  33*  29 BUN  25*  27* CREA  0.88  1.00  
MG  2.0   --   
PHOS  4.3*   --   
CA  8.8  8.9 ALB   --   3.3 TBILI   --   0.2 ALT   --   21 SGOT   --   38* Recent Labs  
   09/24/18 
 2246 PH  7.33* PCO2  51* PO2  101 HCO3  27* No results for input(s): LCAD, LAC in the last 72 hours. Assessment:  (Medical Decision Making) Hospital Problems  Date Reviewed: 6/26/2018 Codes Class Noted POA Aspiration pneumonia ([facility] ICD-10-CM: J69.0 ICD-9-CM: 507.0  9/24/2018 Yes * (Principal)Acute respiratory failure with hypoxemia ([facility] ICD-10-CM: J96.01 
ICD-9-CM: 518.81  9/24/2018 Yes Late onset Alzheimer's disease without behavioral disturbance (Chronic) ICD-10-CM: G30.1, F02.80 ICD-9-CM: 331.0, 294.10  11/15/2017 Yes Debility ICD-10-CM: R53.81 ICD-9-CM: 799.3  11/30/2015 Yes COPD (chronic obstructive pulmonary disease) ([facility]) (Chronic) ICD-10-CM: J44.9 ICD-9-CM: 962  9/1/2015 Yes ILD (interstitial lung disease) ([facility]) (Chronic) ICD-10-CM: J84.9 ICD-9-CM: 070  9/1/2015 Yes HTN (hypertension) (Chronic) ICD-10-CM: I10 
ICD-9-CM: 401.9  1/22/2014 Yes Hyperlipidemia (Chronic) ICD-10-CM: U77.6 ICD-9-CM: 272.4  1/22/2014 Yes Cardiac murmur --likely AS, check severity Plan:  (Medical Decision Making) -- breathing appears to be getting back to her baseline. Would continue to taper back to 4 LPM at rest and 6 LPM QHS. Hold off BIPAP for now 
-- agree with nebs -- get new CT as baseline for ? ILD and check if aspirated -- continue abx for now and antiemetics --not wheezing and taper steroids to 40 q12 and rapidly taper as tolerated 
--agree with speech evaluation to r/o aspirating, since does cough occasionally with PO intake --echo to check valvular heart disease. Currently loud on examination 
--f/u with pmd regarding vomiting/abdomainl issues. --continue remaining treatment. More than 50% of the time documented was spent in face-to-face contact with the patient and in the care of the patient on the floor/unit where the patient is located. Thank you very much for this referral.  We appreciate the opportunity to participate in this patient's care. Will follow along with above stated plan.  
 
Dario Dean MD

## 2018-10-02 NOTE — PROGRESS NOTES
Hospitalist Progress Note Admit Date:  2018  9:50 PM  
Name:  Robinson Preston Age:  80 y.o. 
:  1929 MRN:  [id number] PCP:  Ben House MD 
Treatment Team: Attending Provider: Redd Bhardwaj MD; Utilization Review: Sharon Wood RN; Consulting Provider: Anat Dang MD; Consulting Provider: Loli Garcia NP; Consulting Provider: MD Zeinab Bell As Per Prior Documentation: 
 
\"81 y/o female with O2 dependent COPD and ILD presents to the ED in acute respiratory distress. She resides at [facility] and went to bed at 8 pm. She is usually on 6L O2 at night. Later developed acute vomiting and her O2 sats dropped. EMS alerted and administered albuterol nebulizer. Concern is that she may have aspirated. She was tachycardic, tachypneic and distressed. Placed on BIPAP and started on antibiotics. Initial lactic acid 1.1, troponin negative and chest xray decreased lung volumes. Will admit to ICU for further treatment. Today, feels better, on 5L O2. Did well w/ beside testing for swallowing. \" 
 
 
2018- seen requesting ice cubes. Her breathing is about the same if not better than when she was at the [facility]. She cant recall all of what happened but remembers vomiting. 2018- seen - daughter at bedside very agitated about moms condition- long discussion re- vomiting possibly esophagus issue, chronic lung issues and cardiac issues and general decline. ...- pt inability to have egd or other procedures given high risk with high o2 requirements- pt was also agitated overnight; unable to tolerate bipap/cpap or apap. Right now she does not even want oxygen via nc. 
 
2018- seen - for cpap overnight after getting some sedation but when she woke up it had to come off.  She had a bm this am and just got cleaned- currently on O2 via nasal cannula.  
 
 
2018- Pt seen -wore cpap last night but o2 requirements have increased. Spiked low grade fever 100.1- daughter at bedside feels she is better 09/30/2018- Pt seen - doing ok- o2 requirement went back down yesterday but are now back up. Nursing reports - pt initially confused but much better when daughter arrived. Her Daughter Tiffanie Scherer is at the bedside- she feels the patient is doing better but wondering if she can go to rehab on so much oxygen. She still wants her to go. She states her mother doesn't have much of a life but it is still a life and her mother is happy being able to do the little things that she enjoys. I did express that our goal in advising comfort care was to keep the patient happy. Tiffanie Scherer is hopeful for discharge to rehab tomorrow after follow up with Pulmonology. 10/01/2018- Pt seen - daughter at bedside.- high O2 requirements. .. D/w pulm - want to get ct - to evaluate for a PE. And possible thoracocentesis 10/02/2018- seen awake and alert- no adverse overnight events- sitter to stay with her today - per her daughter Objective:  
 
Patient Vitals for the past 24 hrs: 
 Temp Pulse Resp BP SpO2  
10/02/18 0908 - 94 18 118/58 94 % 10/02/18 0752 - - - - 95 % 10/02/18 0405 98.4 °F (36.9 °C) 75 16 120/49 95 % 10/01/18 2240 98.9 °F (37.2 °C) 75 16 110/46 95 % 10/01/18 2105 - - - - 98 % 10/01/18 2041 - 79 - - 90 % 10/01/18 1924 97.6 °F (36.4 °C) 75 16 111/62 92 % 10/01/18 1533 - - - - 100 % 10/01/18 1159 98 °F (36.7 °C) 71 19 105/63 91 % 10/01/18 1137 - - - - 96 % 10/01/18 1040 - - - - 96 % Oxygen Therapy O2 Sat (%): 94 % (10/02/18 0908) Pulse via Oximetry: 77 beats per minute (10/02/18 0752) O2 Device: Hi flow nasal cannula (10/02/18 0752) PEEP/CPAP (cm H2O):  (5-20  AUTO SET) (09/28/18 2055) O2 Flow Rate (L/min): 10 l/min (10/02/18 0752) O2 Temperature:  (.) (09/27/18 2126) FIO2 (%): 44 % (10/01/18 2041) No intake or output data in the 24 hours ending 10/02/18 0911 Physical Exam: General:    Well nourished. Alert. CV:   RRR. No murmur, rub, or gallop. Lungs:  Diminished bibasilar- Lt > rt Abdomen: Soft, nontender, nondistended. Bowel sounds normal.  
Extremities: Warm and dry. No cyanosis; no edema Neurologic:  grossly intact. Skin:     No rashes or jaundice. No wounds. Psych:  Normal mood and affect. I reviewed the labs, imaging, EKGs, telemetry, and other studies done this admission. Data Review:  
Recent Results (from the past 24 hour(s)) MAGNESIUM Collection Time: 10/01/18  9:45 AM  
Result Value Ref Range Magnesium 1.9 1.8 - 2.4 mg/dL CBC WITH AUTOMATED DIFF Collection Time: 10/01/18 10:15 AM  
Result Value Ref Range WBC 11.7 (H) 4.3 - 11.1 K/uL  
 RBC 4.67 4.05 - 5.2 M/uL  
 HGB 12.5 11.7 - 15.4 g/dL HCT 43.2 35.8 - 46.3 % MCV 92.5 79.6 - 97.8 FL  
 MCH 26.8 26.1 - 32.9 PG  
 MCHC 28.9 (L) 31.4 - 35.0 g/dL  
 RDW 15.2 % PLATELET 774 701 - 024 K/uL MPV 11.8 9.4 - 12.3 FL ABSOLUTE NRBC 0.00 0.0 - 0.2 K/uL  
 DF AUTOMATED NEUTROPHILS 77 43 - 78 % LYMPHOCYTES 11 (L) 13 - 44 % MONOCYTES 9 4.0 - 12.0 % EOSINOPHILS 2 0.5 - 7.8 % BASOPHILS 1 0.0 - 2.0 % IMMATURE GRANULOCYTES 1 0.0 - 5.0 %  
 ABS. NEUTROPHILS 9.1 (H) 1.7 - 8.2 K/UL  
 ABS. LYMPHOCYTES 1.2 0.5 - 4.6 K/UL  
 ABS. MONOCYTES 1.0 0.1 - 1.3 K/UL  
 ABS. EOSINOPHILS 0.3 0.0 - 0.8 K/UL  
 ABS. BASOPHILS 0.1 0.0 - 0.2 K/UL  
 ABS. IMM. GRANS. 0.1 0.0 - 0.5 K/UL METABOLIC PANEL, BASIC Collection Time: 10/01/18 10:15 AM  
Result Value Ref Range Sodium 146 (H) 136 - 145 mmol/L Potassium 4.4 3.5 - 5.1 mmol/L Chloride 105 98 - 107 mmol/L  
 CO2 36 (H) 21 - 32 mmol/L Anion gap 5 mmol/L Glucose 134 (H) 65 - 100 mg/dL BUN 21 8 - 23 MG/DL Creatinine 1.09 (H) 0.6 - 1.0 MG/DL  
 GFR est AA >60 >60 ml/min/1.73m2 GFR est non-AA 50 ml/min/1.73m2 Calcium 9.1 8.3 - 10.4 MG/DL Imaging Studies: CXR Results  (Last 48 hours) 09/30/18 1322  XR CHEST SNGL V Final result Impression:  IMPRESSION: Left pleural effusion with atelectasis or consolidation in the left  
lung base. Narrative:  PORTABLE CHEST 1 VIEW HISTORY: Respiratory failure. Hypoxia. COMPARISON: 9/24/2018 FINDINGS: A left pleural effusion is present and there is atelectasis or  
consolidation in the left lung base. The right lung is well expanded and clear. CT Results  (Last 48 hours) 10/01/18 1702  CT CHEST W CONT Final result Impression:  IMPRESSION:  
   
1. No pulmonary emboli. 2. Left pleural effusion with partial consolidation of the left lower lobe,  
suspicious for pneumonia. Aspiration pneumonia is also possible. 3. Diffuse esophageal thickening with luminal fluid, raising the possibility of  
reflux and/or esophagitis. This patient may be a risk for aspiration. 4. Left mediastinal and hilar lymph nodes are enlarged. These may be reactive. Follow-up chest CT is recommended in 4-6 weeks following completion of therapy  
to assess for resolution. 5. Incompletely characterized hyperenhancing lesion in the right hepatic lobe  
for which follow-up MRI may be obtained on a nonemergent basis should there be  
adequate clinical concern. 6. Heterogeneous enhancement of the left kidney, suspicious for cortical  
infarct. 7. Slight increase in conspicuity of left thyroid nodule. If indicated,  
follow-up ultrasound may be performed on an outpatient basis. Narrative:  PE CHEST CT with contrast 10/1/2018 5:02 PM  
   
CLINICAL INFORMATION: 80year-old with shortness of breath. Concern for  
pulmonary embolus. COMPARISON: Chest x-ray 9/30/2018. Comparison is also made to chest CT 9/25/2018  
and older including 1/23/2014. PROCEDURE: Emergent PE protocol chest CT was performed following uneventful administration of 100 mL Isovue-370. Radiation dose reduction techniques were  
used for this study. Our CT scanners used one or all of the following: Automated  
exposure control, adjustment of the MA and/or kV according to patient size,  
iterative reconstruction. FINDINGS: Opacification of the pulmonary arteries is adequate. There are no  
emboli. There is a small to moderate-sized layering left pleural effusion. No  
pericardial fluid. The heart is mildly enlarged with chamber dilatation. Incidental coronary artery  
calcifications. Aortic valve calcifications are present. Atherosclerotic arch,  
the distal portion of which is mildly ectatic. Moderate respiratory motion is present. Moderate emphysema. There is partial  
consolidation of the left lower lobe. Patchy atelectatic changes are also  
present in the right lower lobe. There is a 1.9 cm hypoattenuating nodule in the left thyroid lobe, slightly more  
conspicuous compared to 2014. The thoracic esophagus is diffusely thick walled,  
and there is fluid in the lumen with probable retained secretions just beyond  
the thoracic inlet. Multiple enlarged mediastinal and left hilar lymph nodes are noted: For example,  
a paraesophageal lymph node image 47 measures 1.4 cm in short axis. Right  
infrahilar lymph nodes measure 1.1 cm in short axis. A small hiatal hernia is  
present. There is heterogeneous enhancement of the left kidney, raising the possibility  
of a cortical infarct. There is a hyperenhancing lesion in the right hepatic  
lobe (image 105), incompletely characterized. No aggressive osseous lesions. Assessment and Plan:  
 
Hospital Problems as of 10/2/2018  Date Reviewed: 6/26/2018 Codes Class Noted - Resolved POA Pleural effusion, bilateral ICD-10-CM: J90 ICD-9-CM: 511.9  9/26/2018 - Present Unknown Nonrheumatic aortic valve stenosis ICD-10-CM: I35.0 ICD-9-CM: 424.1  9/26/2018 - Present Unknown Aspiration pneumonia ([facility] ICD-10-CM: J69.0 ICD-9-CM: 507.0  9/24/2018 - Present Yes * (Principal)Acute respiratory failure with hypoxemia ([facility] ICD-10-CM: J96.01 
ICD-9-CM: 518.81  9/24/2018 - Present Yes Late onset Alzheimer's disease without behavioral disturbance (Chronic) ICD-10-CM: G30.1, F02.80 ICD-9-CM: 331.0, 294.10  11/15/2017 - Present Yes Debility ICD-10-CM: R53.81 ICD-9-CM: 799.3  11/30/2015 - Present Yes COPD (chronic obstructive pulmonary disease) (HCC) (Chronic) ICD-10-CM: J44.9 ICD-9-CM: 875  9/1/2015 - Present Yes HTN (hypertension) (Chronic) ICD-10-CM: I10 
ICD-9-CM: 401.9  1/22/2014 - Present Yes Hyperlipidemia (Chronic) ICD-10-CM: A77.6 ICD-9-CM: 272.4  1/22/2014 - Present Yes RESOLVED: ILD (interstitial lung disease) (HCC) (Chronic) ICD-10-CM: J84.9 ICD-9-CM: 254  9/1/2015 - 9/26/2018 Yes PLAN: 
 
Acute on chronic hypoxic resp failure w/ hx of late COPD and O2 dependence-Pulmonology input appreciated- recommended comfort care measures- d/w family- they do not want comfort measures at this time. So we are continuing with aggressive care. Ct chest done yesterday- showed no pe but left pleural effusion and pneumonia- ? Aspiration, enlarged lymph nodes on the left - suspect reactive, rt hepatic lobe lesion, enlarged esophagus with material suspect esophagitis &  aspiration, left thyroid nodule, gerd- findings d/w the family. Aortic stenosis, [facility] - cardiology input appreciated - continue BB 
BL pleural effusions- continue lasix- for left tap today with pulm Concern for aspiration pneumonia- on augmentin Concern for UTI- treated if not clinically proven Confusion- multifactorial from sundowning, meds, infection- on depakote sprinkles- mental status has improved. Insomnia- zyprexa at night BA swallow ok- will continue on a pureed diet and ppi bid for 1-2 months- per gi recommendations Hypokalemia- repleted HTN- titrate meds Further workup and management based on her clinical course Dispo: Plan for dc back to rehab when medically stable and o2 requirements are acceptable Signed: 
Deangelo Ga MD

## 2018-10-02 NOTE — PROGRESS NOTES
Shift assessment complete. Pt drowsy, oriented x4 with periodic confusion noted. Daughter at bedside. Bed low and locked, call light within reach. Brief on for incontinence. Continuous pulse oximetry in place. Abdomen soft, non-tender, obese. Lungs coarse, diminished on high-flow, humidified oxygen. No complaints/concerns at this time. Will continue to monitor.

## 2018-10-02 NOTE — PROGRESS NOTES
Karlos Euceda Admission Date: 9/24/2018 Daily Progress Note: 10/2/2018 The patient's chart is reviewed and the patient is discussed with the staff. 
 
  
79 y/o female with O2 dependent COPD (6L QHS and 4 L AM) and ? ILD/dementia/debility presents to the ED in acute respiratory distress with vomiting. She lives in assisted living at the [facility] living and able to do most ADLs (eating, restroom use, but not dressing) and apparently had been having multiple bouts of vomiting and abdominal discomfort and sats were dropping. She reported did expectorate all contents and did not aspirate. No diarrhea. No fevers, no prior episodes. She was placed on BIPAP for worsening hypoxemia and concern for ?aspiration but able to be weaned back to NC.   
 
 
Subjective: On 10lpm of O2 this morning with O2 sat of 95%. Was -200 yesterday No PE or DVT yesterday. Small L pleural effusion seen. Creatinine is slightly up today after contrast yesterday at 1.09. Was given 3cc/kg of IVF in advance of contrast. 
Na is 146 and WBC slightly elevated. Current Facility-Administered Medications Medication Dose Route Frequency  furosemide (LASIX) injection 40 mg  40 mg IntraVENous Q12H  hydrALAZINE (APRESOLINE) 20 mg/mL injection 10 mg  10 mg IntraVENous Q6H PRN  
 lisinopril (PRINIVIL, ZESTRIL) tablet 20 mg  20 mg Oral DAILY  tuberculin injection 5 Units  5 Units IntraDERMal ONCE  
 0.9% sodium chloride infusion  1 mL/kg/hr IntraVENous PRN  
 OLANZapine (ZyPREXA) tablet 2.5 mg  2.5 mg Oral QPM  
 amoxicillin-clavulanate (AUGMENTIN) 875-125 mg per tablet 1 Tab  1 Tab Oral BID WITH MEALS  methylPREDNISolone (PF) (SOLU-MEDROL) injection 20 mg  20 mg IntraVENous DAILY  divalproex (DEPAKOTE SPRINKLE) capsule 125 mg  125 mg Oral BID  pantoprazole (PROTONIX) tablet 40 mg  40 mg Oral ACB&D  
 busPIRone (BUSPAR) tablet 10 mg (Patient Supplied)  10 mg Oral BID  
  haloperidol lactate (HALDOL) injection 4 mg  4 mg IntraVENous Q6H PRN  
 albuterol (PROVENTIL VENTOLIN) nebulizer solution 2.5 mg  2.5 mg Nebulization QID RT And  
 budesonide (PULMICORT) 500 mcg/2 ml nebulizer suspension  500 mcg Nebulization BID RT  
 metoprolol tartrate (LOPRESSOR) tablet 12.5 mg  12.5 mg Oral BID  polyethylene glycol (MIRALAX) packet 17 g  17 g Oral DAILY  Azelastine (ASTEPRO) 0.15 % (205.5 mcg) nasal spray 1 Spray (Patient Supplied)  1 Spray Both Nostrils BID  
 donepezil (ARICEPT) tablet 5 mg  5 mg Oral QHS  fluticasone (FLONASE) 50 mcg/actuation nasal spray 2 Spray  2 Spray Both Nostrils DAILY  sodium chloride (NS) flush 5-10 mL  5-10 mL IntraVENous Q8H  
 sodium chloride (NS) flush 5-10 mL  5-10 mL IntraVENous PRN  
 naloxone (NARCAN) injection 0.4 mg  0.4 mg IntraVENous PRN  
 acetaminophen (TYLENOL) tablet 650 mg  650 mg Oral Q4H PRN  
 enoxaparin (LOVENOX) injection 40 mg  40 mg SubCUTAneous Q24H  
 bisacodyl (DULCOLAX) tablet 5 mg  5 mg Oral DAILY PRN  
 ondansetron (ZOFRAN) injection 4 mg  4 mg IntraVENous Q4H PRN  
 guaiFENesin-dextromethorphan (ROBITUSSIN DM) 100-10 mg/5 mL syrup 10 mL  10 mL Oral Q6H PRN  
 sertraline (ZOLOFT) tablet 50 mg  50 mg Oral QHS Review of Systems Constitutional: negative for fever, chills, sweats Cardiovascular: negative for chest pain, palpitations, syncope, edema Gastrointestinal:  negative for dysphagia, reflux, vomiting, diarrhea, abdominal pain, or melena Neurologic:  negative for focal weakness, numbness, headache Objective:  
 
Vitals:  
 10/01/18 2105 10/01/18 2240 10/02/18 0405 10/02/18 4545 BP:  110/46 120/49 Pulse:  75 75 Resp:  16 16 Temp:  98.9 °F (37.2 °C) 98.4 °F (36.9 °C) SpO2: 98% 95% 95% 95% Weight:      
Height:      
 
Intake and Output:  
  
  
 
Physical Exam:  
Constitution:  the patient is well developed and in no acute distress on CPAP. EENMT:  Sclera clear, pupils equal, oral mucosa moist 
Respiratory: CRACKLES IN RIGHT BASE, DECREASED ON LEFT BASE Cardiovascular:  RRR with mild SM Gastrointestinal: soft and non-tender; with positive bowel sounds. Musculoskeletal: warm without cyanosis. There is trace lower leg edema, WORSE ON LEFT WITH TENDERNESS Skin:  no jaundice or rashes Neurologic: no gross neuro deficits Psychiatric:  Awake and confused; falls asleep quickly CXR:  
 
9/24: 
 
 
 
Chest CT 9/25: 
 
 
 
 
IMPRESSION: No convincing interstitial lung disease seen but there is bilateral 
effusions and overlying atelectasis or infiltrates larger on the left than the 
right. Fluid filling esophagus could be reflux, increasing risk of aspiration 
but no material seen in the trachea or bronchi at this time. Probable simple 
cysts and nonobstructing left kidney stone. CTPE 10/1: 
 
1. No pulmonary emboli. 2. Left pleural effusion with partial consolidation of the left lower lobe, 
suspicious for pneumonia. Aspiration pneumonia is also possible. 3. Diffuse esophageal thickening with luminal fluid, raising the possibility of 
reflux and/or esophagitis. This patient may be a risk for aspiration. 4. Left mediastinal and hilar lymph nodes are enlarged. These may be reactive. Follow-up chest CT is recommended in 4-6 weeks following completion of therapy 
to assess for resolution. 5. Incompletely characterized hyperenhancing lesion in the right hepatic lobe 
for which follow-up MRI may be obtained on a nonemergent basis should there be 
adequate clinical concern 6. Heterogeneous enhancement of the left kidney, suspicious for cortical 
infarct. 7. Slight increase in conspicuity of left thyroid nodule. If indicated, 
follow-up ultrasound may be performed on an outpatient basis. Echo: SUMMARY: 
-  Left ventricle: Systolic function was hyperdynamic.  Ejection fraction was estimated in the range of 65 % to 70 %. There were no regional wall motion 
abnormalities. There was dynamic obstruction likely contributed by the hyperdynamic function with chordal AKILA, with a peak velocity of 3.6 cm/s, a 
peak gradient of 53 mmHg, and a mean gradient of 15 mmHg. There was minimal increase in gradients with Valsalva. -  Aortic valve: There was mild to moderate stenosis (mildly elevated Gradients with the hyperdynamic function; DI at 0.43; mostly restriction of the non 
coronary cusp). LAB No results for input(s): GLUCPOC in the last 72 hours. No lab exists for component: [place] Recent Labs 10/01/18 [address]  09/30/18 
 0615 WBC  11.7*  8.7 HGB  12.5  10.7* HCT  43.2  36.3 PLT  263  187 Recent Labs 10/01/18 [address]  10/01/18 
 0945  09/30/18 
 7957 NA  146*   --   148* K  4.4   --   3.3*  
CL  105   --   106 CO2  36*   --   34* GLU  134*   --   95 BUN  21   --   16  
CREA  1.09*   --   0.84  
MG   --   1.9   --   
CA  9.1   --   8.2* No results for input(s): PH, PCO2, PO2, HCO3 in the last 72 hours. No results for input(s): LCAD, LAC in the last 72 hours. Assessment:  (Medical Decision Making) Hospital Problems  Date Reviewed: 6/26/2018 Codes Class Noted POA Pleural effusion, bilateral ICD-10-CM: J90 ICD-9-CM: 511.9  9/26/2018 Unknown Has been diuresed and may need thoracentesis on left. Please use heparin if PE present since may need to hold for tap Nonrheumatic aortic valve stenosis ICD-10-CM: I35.0 ICD-9-CM: 424.1  9/26/2018 Unknown Mild to moderate Aspiration pneumonia ([facility] ICD-10-CM: J69.0 ICD-9-CM: 507.0  9/24/2018 Yes Now on oral abx * (Principal)Acute respiratory failure with hypoxemia ([address] ICD-10-CM: J96.01 
ICD-9-CM: 518.81  9/24/2018 Yes With high oxygen requirement Late onset Alzheimer's disease without behavioral disturbance (Chronic) ICD-10-CM: G30.1, F02.80 ICD-9-CM: 331.0, 294.10  11/15/2017 Yes With superimposed delirium Debility ICD-10-CM: R53.81 ICD-9-CM: 799.3  11/30/2015 Yes Severe COPD (chronic obstructive pulmonary disease) (HCC) (Chronic) ICD-10-CM: J44.9 ICD-9-CM: 872  9/1/2015 Yes No wheezing HTN (hypertension) (Chronic) ICD-10-CM: I10 
ICD-9-CM: 401.9  1/22/2014 Yes Hyperlipidemia (Chronic) ICD-10-CM: K93.7 ICD-9-CM: 272.4  1/22/2014 Yes FLAVIA- having witnessed apneas. Has been intolerant of BIPAP, continue APAP Plan:  (Medical Decision Making) Continues on augmentin for aspiration pneumonia Plan to ultrasound to see if left effusion can be tapped today. Wean O2, continue PT. Holding diuretics while monitoring renal function post-contrast.  May need to resume soon. More than 50% of the time documented was spent in face-to-face contact with the patient and in the care of the patient on the floor/unit where the patient is located.  
 
Shimon Aguila MD

## 2018-10-02 NOTE — PROGRESS NOTES
Problem: Self Care Deficits Care Plan (Adult) Goal: *Acute Goals and Plan of Care (Insert Text) 1. Patient will perform grooming with stand by assistance in sitting with verbal cues. 2. Patient will perform Upper body dressing with stand by assistance in sitting with verbal cues. 3. Patient will perform upper and lower body bathing with contact guard assistance seated on shower chair. 5. Patient will perform toilet transfers with moderate assistance with elevated seat. 6. Patient will perform shower transfer with moderated assistance with shower chair. 7. Patient will participate in 30 + minutes of ADL/ therapeutic exercise/therapeutic activity with min rest breaks to increase activity tolerance for self care. 8. Patient will perform ADL functional mobility in room with minimal assistance. Goals to be achieved in 7 days. OCCUPATIONAL THERAPY: Daily Note, Treatment Day: 3rd and AM 10/2/2018 INPATIENT: Hospital Day: 9 [place] / Product Type: Medicare /  
  
NAME/AGE/GENDER: Nyssa Prior is a 80 y.o. female PRIMARY DIAGNOSIS:  Acute respiratory failure with hypoxemia ([address] Aspiration pneumonia ([facility] [address] Acute respiratory failure with hypoxemia (HCC) Acute respiratory failure with hypoxemia (HCC) ICD-10: Treatment Diagnosis:  
 · Generalized Muscle Weakness (M62.81) · Other lack of cordination (R27.8) Precautions/Allergies: 
   Bactroban [mupirocin calcium]; Mupirocin; Sulfa (sulfonamide antibiotics); and Sulfamethoxazole-trimethoprim ASSESSMENT:  
 
Ms. Rajan Adame presents with above diagnosis and was seen in room with daughter and caregiver present. Pt lives at [facility] and get some assistance with self care to include shower transfer and lower body dressing. Pt was noted to have moderate generalized weakness and would benefit from OT to maximize safety and independence with self care and functional mobility. Pt might benefit from continued rehab after discharge is she is not at baseline level of function. 9/29/18 Pt completed grooming with the assistance listed below while sitting in chair. Continue POC. 10/1/18 Patient ambulated to and from bathroom with RW on 8L of O2 with moderate assist and extra time. Completed grooming sinkside also with moderate assist.  
 
10/2/18 Patient completed bilateral upper extremity exercises. See chart below for exercises and reps. Continue OT. This section established at most recent assessment PROBLEM LIST (Impairments causing functional limitations): 1. Decreased Strength 2. Decreased ADL/Functional Activities 3. Decreased Transfer Abilities 4. Decreased Activity Tolerance INTERVENTIONS PLANNED: (Benefits and precautions of occupational therapy have been discussed with the patient.) 1. Activities of daily living training 2. Adaptive equipment training 3. Therapeutic activity 4. Therapeutic exercise TREATMENT PLAN: Frequency/Duration: Follow patient 3 times per week to address above goals. Rehabilitation Potential For Stated Goals: Good RECOMMENDED REHABILITATION/EQUIPMENT: (at time of discharge pending progress): Due to the probability of continued deficits (see above) this patient will likely need continued skilled occupational therapy after discharge. Equipment:  
? None at this time OCCUPATIONAL PROFILE AND HISTORY:  
History of Present Injury/Illness (Reason for Referral): 
weakness Past Medical History/Comorbidities: Ms. Corinna Reynoso  has a past medical history of Arthropathy, unspecified, site unspecified; Benign paroxysmal positional vertigo; Cardiac dysrhythmia, unspecified; Debility, unspecified; Facet syndrome ([facility] [address] GIB (gastrointestinal bleeding) (1/31/2014); History of peptic ulcer disease (01/2014); Hypertension; IBS (irritable bowel syndrome);  Impaired fasting glucose; Lumbago; Memory loss; Meningioma ([facility] [address] Musculoskeletal pain; OA (osteoarthritis) of knee; Palliative care encounter (5/8/2014); Rhinorrhea; Scoliosis; Sensorineural hearing loss; Spondylolisthesis; TMJ dysfunction; Varicella; Vasomotor rhinitis; and Vitamin D deficiency. Ms. Sabino Claude  has a past surgical history that includes hx argentina and bso (1974); hx knee arthroscopy; hx appendectomy; hx other surgical (1998); hx knee replacement (Left, 2007); hx colonoscopy; hx tonsillectomy; and hx cataract removal (2003). Social History/Living Environment:  
Home Environment: Assisted living Care Facility Name: [place] One/Two Story Residence: One story Living Alone: No 
Support Systems: Family member(s) Patient Expects to be Discharged to[de-identified] Assisted living Current DME Used/Available at Home: Alvord Rodrigo, [address] chair, Wheelchair, power Prior Level of Function/Work/Activity: 
Gets assistance with shower transfer and lower body dressing. Number of Personal Factors/Comorbidities that affect the Plan of Care: Brief history (0):  LOW COMPLEXITY ASSESSMENT OF OCCUPATIONAL PERFORMANCE[de-identified]  
Activities of Daily Living:  
Basic ADLs (From Assessment) Complex ADLs (From Assessment) Feeding: Setup Oral Facial Hygiene/Grooming: Minimum assistance Bathing: Moderate assistance Upper Body Dressing: Minimum assistance Lower Body Dressing: Maximum assistance Toileting: Maximum assistance Grooming/Bathing/Dressing Activities of Daily Living Cognitive Retraining Safety/Judgement: Fall prevention Most Recent Physical Functioning:  
Gross Assessment: 
  
         
  
Posture: 
  
Balance: 
Sitting: Intact Standing: Pull to stand; With support Bed Mobility: 
  
Wheelchair Mobility: 
  
Transfers: 
   
 
    
 
Patient Vitals for the past 6 hrs: 
 BP BP Patient Position SpO2 O2 Flow Rate (L/min) Pulse 10/02/18 0752 - - 95 % 10 l/min -  
10/02/18 0908 118/58 At rest 94 % - 94 Mental Status Neurologic State: Alert Orientation Level: Oriented X4 Cognition: Follows commands Perception: Appears intact Perseveration: No perseveration noted Safety/Judgement: Fall prevention Physical Skills Involved: 
1. Balance 2. Strength 3. Activity Tolerance Cognitive Skills Affected (resulting in the inability to perform in a timely and safe manner): 1. Short Term Recall Psychosocial Skills Affected: 1. Environmental Adaptation Number of elements that affect the Plan of Care: 3-5:  MODERATE COMPLEXITY CLINICAL DECISION MAKING:  
[facility] AM-PAC 6 Clicks Daily Activity Inpatient Short Form How much help from another person does the patient currently need. .. Total A Lot A Little None 1. Putting on and taking off regular lower body clothing? [] 1   [x] 2   [] 3   [] 4  
2. Bathing (including washing, rinsing, drying)? [] 1   [x] 2   [] 3   [] 4  
3. Toileting, which includes using toilet, bedpan or urinal?   [] 1   [x] 2   [] 3   [] 4  
4. Putting on and taking off regular upper body clothing? [] 1   [] 2   [x] 3   [] 4  
5. Taking care of personal grooming such as brushing teeth? [] 1   [] 2   [x] 3   [] 4  
6. Eating meals? [] 1   [] 2   [x] 3   [] 4  
© 2007, Trustees of [facility], under license to [employer]. All rights reserved Score:  Initial:15 Most Recent: X (Date: -- ) Interpretation of Tool:  Represents activities that are increasingly more difficult (i.e. Bed mobility, Transfers, Gait). Score 24 23 22-20 19-15 14-10 9-7 6 Modifier CH CI CJ CK CL CM CN   
 
? Self Care:  
  - CURRENT STATUS: CK - 40%-59% impaired, limited or restricted  - GOAL STATUS: CJ - 20%-39% impaired, limited or restricted  - D/C STATUS:  ---------------To be determined--------------- [place] / Product Type: Medicare /   
 
Medical Necessity: · Patient is expected to demonstrate progress in strength, balance and functional technique to increase independence with self care. Reason for Services/Other Comments: 
· Patient would benefit from OT to maximize safety and independence with self care and functional mobility. Dayron Whelan Use of outcome tool(s) and clinical judgement create a POC that gives a: LOW COMPLEXITY  
 
 
 
TREATMENT:  
(In addition to Assessment/Re-Assessment sessions the following treatments were rendered) Pre-treatment Symptoms/Complaints:   
Pain: Initial:  
Pain Intensity 1: 3 Pain Location 1: Back Pain Intervention(s) 1: Repositioned 0 Post Session:  0 Therapeutic Exercise: (10):  Exercises per grid below to improve strength and coordination. Required minimal verbal cues to promote proper body alignment and promote proper body mechanics. Progressed repetitions as indicated. Date: 
10-2-18 Date: 
 Date: Activity/Exercise Parameters Parameters Parameters Elbow flex/ext 10 Supination/pronation 10 Wrist flex/ext 10 Digits  10 Braces/Orthotics/Lines/Etc:  
· IV 
· O2 Device: Nasal cannula Treatment/Session Assessment:   
· Response to Treatment:  Pt up to chair tolerated well · Interdisciplinary Collaboration:  
o Occupational Therapist 
o Registered Nurse · After treatment position/precautions:  
o Up in chair 
o Bed/Chair-wheels locked 
o RN notified 
o sitter at bedside · Compliance with Program/Exercises: compliant all of the time. · Recommendations/Intent for next treatment session: \"Next visit will focus on advancements to more challenging activities and reduction in assistance provided\". Total Treatment Duration: OT Patient Time In/Time Out Time In: 9935 Time Out: 1107 Ebonie Kimble OT

## 2018-10-02 NOTE — PROGRESS NOTES
Shift assessment complete. Pt alert and oriented x4. Respirations even and unlabored. Lung sounds coarse, however no signs of respiratory distress. HR regular. Abdomen soft with hypoactive bowel sounds heard in all four quadrants. Skin intact, no edema noted. Safety measures in place. Call light within reach, family at bedside. Will continue to monitor.

## 2018-10-02 NOTE — PROGRESS NOTES
Problem: Mobility Impaired (Adult and Pediatric) Goal: *Acute Goals and Plan of Care (Insert Text) STG: 
(1.)Ms. Deal will move from supine to sit and sit to supine  with MINIMAL ASSIST within 3 treatment day(s). Progressing 9/29 
(2.)Ms. Deal will transfer from bed to chair and chair to bed with MINIMAL ASSIST using the least restrictive device within 3 treatment day(s). (3.)Ms. Deal will ambulate with MINIMAL ASSIST for 25 feet with the least restrictive device within 3 treatment day(s). Progressing 9/29 LTG: 
(1.)Ms. Deal will move from supine to sit and sit to supine  in bed with STAND BY ASSIST within 7 treatment day(s). (2.)Ms. Deal will transfer from bed to chair and chair to bed with STAND BY ASSIST using the least restrictive device within 7 treatment day(s). (3.)Ms. Deal will ambulate with CONTACT GUARD ASSIST for  feet with the least restrictive device within 7 treatment day(s). ________________________________________________________________________________________________ PHYSICAL THERAPY: Daily Note, Treatment Day: 5th, AM 10/2/2018 INPATIENT: Hospital Day: 9 [place] / Product Type: Medicare /  
  
NAME/AGE/GENDER: Gerson Barber is a 80 y.o. female PRIMARY DIAGNOSIS: Acute respiratory failure with hypoxemia ([facility] [address] Aspiration pneumonia ([facility] [address] Acute respiratory failure with hypoxemia (HCC) Acute respiratory failure with hypoxemia (HCC) ICD-10: Treatment Diagnosis:  
 · Generalized Muscle Weakness (M62.81) · Difficulty in walking, Not elsewhere classified (R26.2) Precaution/Allergies: 
Bactroban [mupirocin calcium]; Mupirocin; Sulfa (sulfonamide antibiotics); and Sulfamethoxazole-trimethoprim ASSESSMENT:  
 
Ms. Katia Spangler presents supine on contact, had a thoracentesis this morning and pt stated she was tired of people doing things to her.  With some encouragement she was willing to move to the edge of the bed. Was having some back pain and willing to sit up to see if that would help. After sitting on side of bed a few minutes she wanted to get into the chair. Helped her stand and with verbal cues and increased time took some steps from the bed to the chair. Pt settled in chair and worked with OT. Caregiver at bedside. Hope to progress at next session. Pt did not do a whole lot today but considering her morning therapy was glad she got up. This section established at most recent assessment PROBLEM LIST (Impairments causing functional limitations):,  
1. Decreased Strength 2. Decreased Transfer Abilities 3. Decreased Ambulation Ability/Technique 4. Decreased Balance 5. Decreased Activity Tolerance INTERVENTIONS PLANNED: (Benefits and precautions of physical therapy have been discussed with the patient.) 1. Bed Mobility 2. Gait Training 3. Therapeutic Exercise/Strengthening 4. Transfer Training TREATMENT PLAN: Frequency/Duration: daily for duration of hospital stay Rehabilitation Potential For Stated Goals: Good RECOMMENDED REHABILITATION/EQUIPMENT: (at time of discharge pending progress): Due to the probability of continued deficits (see above) this patient will likely need continued skilled physical therapy after discharge. Equipment:  
? None at this time HISTORY:  
History of Present Injury/Illness (Reason for Referral): 
79 y/o female with O2 dependent COPD and ILD presents to the ED in acute respiratory distress. She resides at [facility] and went to bed at 8 pm. She is usually on 6L O2 at night. Later developed acute vomiting and her O2 sats dropped. EMS alerted and administered albuterol nebulizer. Concern is that she may have aspirated. She was tachycardic, tachypneic and distressed. Placed on BIPAP and started on antibiotics.  Initial lactic acid 1.1, troponin negative and chest xray decreased lung volumes. Will admit to ICU for further treatment. Past Medical History/Comorbidities: Ms. Arsalan Daniels  has a past medical history of Arthropathy, unspecified, site unspecified; Benign paroxysmal positional vertigo; Cardiac dysrhythmia, unspecified; Debility, unspecified; Facet syndrome ([facility] [address] GIB (gastrointestinal bleeding) (1/31/2014); History of peptic ulcer disease (01/2014); Hypertension; IBS (irritable bowel syndrome); Impaired fasting glucose; Lumbago; Memory loss; Meningioma ([facility] [address] Musculoskeletal pain; OA (osteoarthritis) of knee; Palliative care encounter (5/8/2014); Rhinorrhea; Scoliosis; Sensorineural hearing loss; Spondylolisthesis; TMJ dysfunction; Varicella; Vasomotor rhinitis; and Vitamin D deficiency. Ms. Arsalan Daniels  has a past surgical history that includes hx argentina and bso (1974); hx knee arthroscopy; hx appendectomy; hx other surgical (1998); hx knee replacement (Left, 2007); hx colonoscopy; hx tonsillectomy; and hx cataract removal (2003). Social History/Living Environment:  
Home Environment: Assisted living Care Facility Name: [place] One/Two Story Residence: One story Living Alone: No 
Support Systems: Family member(s) Patient Expects to be Discharged to[de-identified] Assisted living Current DME Used/Available at Home: Mirtha Morris, [address] [facility] chair, Wheelchair, power Prior Level of Function/Work/Activity: 
Living at [facility]. Walks very short distances with rolling walker but uses a power chair for further distances. Has assist for dressing and bathing. Number of Personal Factors/Comorbidities that affect the Plan of Care: 0: LOW COMPLEXITY EXAMINATION:  
Most Recent Physical Functioning:  
Gross Assessment: 
  
         
  
Posture: 
  
Balance: 
Sitting: Intact Standing: Pull to stand; With support Bed Mobility: 
Supine to Sit: Minimum assistance; Moderate assistance;Assist x2 Sit to Supine:  (stayed up in chair) Duration: 5 Minutes Wheelchair Mobility: 
  
Transfers: Sit to Stand: Minimum assistance;Assist x2 Stand to Sit: Minimum assistance;Assist x2 Bed to Chair: Minimum assistance; Additional time;Assist x2 Duration: 5 Minutes Gait: 
  
Base of Support: Narrowed Step Length: Left shortened;Right shortened Gait Abnormalities: Decreased step clearance;Shuffling gait; Step to gait Distance (ft):  (bed to chair, just a few steps) Assistive Device: Walker, rolling Ambulation - Level of Assistance: Minimal assistance; Additional time;Assist x2 Interventions: Safety awareness training;Verbal cues Body Structures Involved: 1. Muscles Body Functions Affected: 1. Movement Related Activities and Participation Affected: 1. Mobility Number of elements that affect the Plan of Care: 3: MODERATE COMPLEXITY CLINICAL PRESENTATION:  
Presentation: Stable and uncomplicated: LOW COMPLEXITY CLINICAL DECISION MAKIN[address] [place] [id number] AM-PAC 6 Clicks Basic Mobility Inpatient Short Form How much difficulty does the patient currently have. .. Unable A Lot A Little None 1. Turning over in bed (including adjusting bedclothes, sheets and blankets)? [] 1   [] 2   [x] 3   [] 4  
2. Sitting down on and standing up from a chair with arms ( e.g., wheelchair, bedside commode, etc.)   [] 1   [x] 2   [] 3   [] 4  
3. Moving from lying on back to sitting on the side of the bed? [] 1   [x] 2   [] 3   [] 4 How much help from another person does the patient currently need. .. Total A Lot A Little None 4. Moving to and from a bed to a chair (including a wheelchair)? [] 1   [x] 2   [] 3   [] 4  
5. Need to walk in hospital room? [] 1   [x] 2   [] 3   [] 4  
6. Climbing 3-5 steps with a railing? [] 1   [x] 2   [] 3   [] 4  
© , Trustees of [address] [place] [id number], under license to [employer]. All rights reserved Score:  Initial: 13 Most Recent: X (Date: -- ) Interpretation of Tool:  Represents activities that are increasingly more difficult (i.e. Bed mobility, Transfers, Gait). Score 24 23 22-20 19-15 14-10 9-7 6 Modifier CH CI CJ CK CL CM CN   
 
? Mobility - Walking and Moving Around:  
  - CURRENT STATUS: CL - 60%-79% impaired, limited or restricted  - GOAL STATUS: CK - 40%-59% impaired, limited or restricted  - D/C STATUS:  ---------------To be determined--------------- [place] / Product Type: Medicare /   
 
Medical Necessity:    
· Patient is expected to demonstrate progress in strength and range of motion to increase independence with gait and transfers. Reason for Services/Other Comments: 
· Patient continues to require skilled intervention due to limited functional independence. Use of outcome tool(s) and clinical judgement create a POC that gives a: Clear prediction of patient's progress: LOW COMPLEXITY  
  
 
 
 
TREATMENT:  
(In addition to Assessment/Re-Assessment sessions the following treatments were rendered) Pre-treatment Symptoms/Complaints:   
Pain: Initial: 0 Pain Intensity 1: 3  Post Session:  Not reporting any pain Therapeutic Activity: (5 Minutes 5 Minutes ):  Therapeutic activities including Bed transfers, Chair transfers and Ambulation on level ground to improve mobility, strength, balance and coordination. Required minimal assist of 2 and  Safety awareness training;Verbal cues to promote dynamic balance in standing. Date: 
9/27 Date: 
9/30 Date: Activity/Exercise Parameters Parameters Parameters Ankle pumps 10 20 LAQ 10 Hip flexion 10 Hip abduction 10 20 OH press  20 Scaption  20 Heel slides  20 Braces/Orthotics/Lines/Etc:  
· IV 
· O2 Device: Nasal cannula Treatment/Session Assessment:   
· Response to Treatment:  Pt tolerated fair · Interdisciplinary Collaboration:  
o Occupational Therapist 
o Registered Nurse · After treatment position/precautions:  
o Up in chair 
o Bed/Chair-wheels locked o Caregiver at bedside 
o Call light within reach · Compliance with Program/Exercises: compliant all of the time. · Recommendations/Intent for next treatment session: \"Next visit will focus on advancements to more challenging activities\". Total Treatment Duration: PT Patient Time In/Time Out Time In: 2552 Time Out: 2025 Palma Mcardle, PT

## 2018-10-02 NOTE — PROGRESS NOTES
Spoke with MD regarding BP of 110/46, as well as pt being very sleepy right now and not having gotten her aricept or zoloft yet due to drowziness. MD gave ok to hold meds and no new orders otherwise.

## 2018-10-02 NOTE — INTERVAL H&P NOTE
H&P Update: 
Ju Davidson was seen and examined. History and physical has been reviewed. The patient has been examined.  There have been no significant clinical changes since the completion of the originally dated History and Physical. 
 
Signed By: Messi Og MD   
 October 2, 2018 10:11 AM

## 2018-10-02 NOTE — PROGRESS NOTES
Pt complains of back pain. PRN Tylenol 650 mg given PO. Pt also repositioned in bed. Safety measures in place. Call light within reach, family at bedside. Will continue to monitor.

## 2018-10-02 NOTE — PROCEDURES
THORACENTESIS  
10/02/18 Indication/Preprocedure diagnosis: LEFT Pleural effusion Volume of fluid withdrawn: 250ml Postprocedural Diagnosis:  Pleural effusion After obtaining informed consent the patient was placed sitting up on the side of the bed and using ultrasound guidance the pleural fluid was located on the LEFT side  and marked. The diaphragm and atelectatic lung were clearly visualized. The patient's skin was cleaned with ChloraPrep. Using sterile technique the skin was anesthetized with 10mL of 1% Xylocaine and a stab wound made and a catheter inserted into the pleural space with LEFT  cc of serosanguinous-appearing fluid was removed. While changing the syringe due to difficulty withdrawing fluid, air was withdrawn into catheter. The patient tolerated the procedure well. The pleural fluid is sent for diagnostic studies (see orders). CXR ordered to assess for air in pleural space, which is suspected from stopcock error rather than pleural injury. Oxygenation normal throughout procedure.  
 
Lei Duenas MD

## 2018-10-03 NOTE — PROGRESS NOTES
Called to change patient to [facility]. Patient currently asleep and patient's daughter is requesting to not take her off at this time. I encouraged her to call with any other needs.

## 2018-10-03 NOTE — PROGRESS NOTES
Hospitalist Progress Note Admit Date:  2018  9:50 PM  
Name:  Emily Gonsales Age:  80 y.o. 
:  1929 MRN:  [id number] PCP:  Dinah Brower MD 
Treatment Team: Attending Provider: Juan Díaz MD; Utilization Review: Yung Caesy RN; Consulting Provider: Maggie Almanza MD; Consulting Provider: Glo Lopez NP; Consulting Provider: MD Melanie Astudillo As Per Prior Documentation: 
 
\"81 y/o female with O2 dependent COPD and ILD presents to the ED in acute respiratory distress. She resides at [facility] and went to bed at 8 pm. She is usually on 6L O2 at night. Later developed acute vomiting and her O2 sats dropped. EMS alerted and administered albuterol nebulizer. Concern is that she may have aspirated. She was tachycardic, tachypneic and distressed. Placed on BIPAP and started on antibiotics. Initial lactic acid 1.1, troponin negative and chest xray decreased lung volumes. Will admit to ICU for further treatment. Today, feels better, on 5L O2. Did well w/ beside testing for swallowing. \" Hospital Course So far: 
\" The patient had a ct chest w/o contrast on 2018 which showed: \" No convincing interstitial lung disease seen but there is bilateral effusions and overlying atelectasis or infiltrates larger on the left than the right. Fluid filling esophagus could be reflux, increasing risk of aspiration but no material seen in the trachea or bronchi at this time. Probable simple cysts and nonobstructing left kidney stone. \" Pulmonology was consulted for the respiratory failure and pneumonia. They recommended comfort care measures as the patient was intolerant of  bipap and cpap. The family declined. The patient has 3 daughters. 2 are in agreement with comfort care and 1 is not. So she they are requesting everything be done.   
 
GI was consulted for the dilated esophagus and concern for possible achalasia or lesion. They declined to do and egd because pt was to high risk respiratory wise. A barium swallow was done instead and the  Patient was started on pureed diet and protonix bid. Echo showed AKILA and cardiology recommended a BB which was started. They will follow up in clinic. Because of High o2 requirements pulmonary requested a repeat ct chest with contrast to rule out a PE. This one done 10/01/2018 and showed: \" 1. No pulmonary emboli. 2. Left pleural effusion with partial consolidation of the left lower lobe, 
suspicious for pneumonia. Aspiration pneumonia is also possible. 3. Diffuse esophageal thickening with luminal fluid, raising the possibility of reflux and/or esophagitis. This patient may be a risk for aspiration. 4. Left mediastinal and hilar lymph nodes are enlarged. These may be reactive. Follow-up chest CT is recommended in 4-6 weeks following completion of therapy to assess for resolution. 5. Incompletely characterized hyperenhancing lesion in the right hepatic lobe 
for which follow-up MRI may be obtained on a nonemergent basis should there be adequate clinical concern. 6. Heterogeneous enhancement of the left kidney, suspicious for cortical 
Infarct. 7. Slight increase in conspicuity of left thyroid nodule. If indicated, follow-up ultrasound may be performed on an outpatient basis. \" Pulmonology performed- thoracocentesis on 10/02/2018 with  ~ 200 ml of fluid removed from the left lung. Unfortunately the patient ahs continued to require high O2 requirements at night 10- 13L. She cannot go to rehab on these levels and we are looking at a SNF. She uses CPAP prn at night and would need outpatient studies to get a cpap machine on discharge. We have discussed with the family that not much to be done to fix her from a respiratory standpoint. She has completed a course of abx for her aspiration Pneumonia. Case mgt is looking into whether the [facility] will be able to monitor her overnight and during the day to keep up with er o2 requirements. \" 
 
 
 
Objective:  
 
Patient Vitals for the past 24 hrs: 
 Temp Pulse Resp BP SpO2  
10/03/18 1634 96.7 °F (35.9 °C) 76 20 117/82 95 % 10/03/18 1507 - - - - 96 % 10/03/18 1158 - - - - 98 % 10/03/18 1105 98.1 °F (36.7 °C) (!) 104 20 100/57 93 % 10/03/18 0834 - - - - 98 % 10/03/18 0811 97.4 °F (36.3 °C) 86 20 101/57 90 % 10/03/18 0406 98.4 °F (36.9 °C) 84 24 163/66 90 % 10/02/18 2338 98.4 °F (36.9 °C) 78 20 138/71 90 % 10/02/18 2325 - - - - 93 % 10/02/18 2150 - - - - 95 % 10/02/18 2134 - 72 - - 96 % 10/02/18 1936 97.7 °F (36.5 °C) 79 24 94/55 92 % Oxygen Therapy O2 Sat (%): 95 % (10/03/18 1634) Pulse via Oximetry: 70 beats per minute (10/03/18 1507) O2 Device: Hi flow nasal cannula (10/03/18 1507) PEEP/CPAP (cm H2O):  (5-20  AUTO SET) (09/28/18 2055) O2 Flow Rate (L/min): 6 l/min (10/03/18 1507) O2 Temperature:  (.) (09/27/18 2126) FIO2 (%): 44 % (10/01/18 2041) No intake or output data in the 24 hours ending 10/03/18 1731 Physical Exam: 
General:    Well nourished. Alert. CV:   RRR. No murmur, rub, or gallop. Lungs:  Diminished bibasilar- Lt > rt Abdomen: Soft, nontender, nondistended. Bowel sounds normal.  
Extremities: Warm and dry. No cyanosis; no edema Neurologic:  grossly intact. Skin:     No rashes or jaundice. No wounds. Psych:  Normal mood and affect. I reviewed the labs, imaging, EKGs, telemetry, and other studies done this admission. Data Review:  
Recent Results (from the past 24 hour(s)) METABOLIC PANEL, BASIC Collection Time: 10/03/18  6:25 AM  
Result Value Ref Range Sodium 145 136 - 145 mmol/L Potassium 4.4 3.5 - 5.1 mmol/L Chloride 100 98 - 107 mmol/L  
 CO2 39 (H) 21 - 32 mmol/L Anion gap 6 mmol/L Glucose 99 65 - 100 mg/dL  BUN 34 (H) 8 - 23 MG/DL  
 Creatinine 1.02 (H) 0.6 - 1.0 MG/DL  
 GFR est AA >60 >60 ml/min/1.73m2 GFR est non-AA 54 ml/min/1.73m2 Calcium 9.3 8.3 - 10.4 MG/DL  
CBC WITH AUTOMATED DIFF Collection Time: 10/03/18  6:25 AM  
Result Value Ref Range WBC 13.2 (H) 4.3 - 11.1 K/uL  
 RBC 4.66 4.05 - 5.2 M/uL  
 HGB 12.5 11.7 - 15.4 g/dL HCT 42.7 35.8 - 46.3 % MCV 91.6 79.6 - 97.8 FL  
 MCH 26.8 26.1 - 32.9 PG  
 MCHC 29.3 (L) 31.4 - 35.0 g/dL  
 RDW 15.2 % PLATELET 425 734 - 956 K/uL MPV 12.0 9.4 - 12.3 FL ABSOLUTE NRBC 0.00 0.0 - 0.2 K/uL  
 DF AUTOMATED NEUTROPHILS 78 43 - 78 % LYMPHOCYTES 10 (L) 13 - 44 % MONOCYTES 9 4.0 - 12.0 % EOSINOPHILS 2 0.5 - 7.8 % BASOPHILS 1 0.0 - 2.0 % IMMATURE GRANULOCYTES 1 0.0 - 5.0 %  
 ABS. NEUTROPHILS 10.3 (H) 1.7 - 8.2 K/UL  
 ABS. LYMPHOCYTES 1.3 0.5 - 4.6 K/UL  
 ABS. MONOCYTES 1.2 0.1 - 1.3 K/UL  
 ABS. EOSINOPHILS 0.2 0.0 - 0.8 K/UL  
 ABS. BASOPHILS 0.1 0.0 - 0.2 K/UL  
 ABS. IMM. GRANS. 0.1 0.0 - 0.5 K/UL  
PLEASE READ & DOCUMENT PPD TEST IN 48 HRS Collection Time: 10/03/18  4:33 PM  
Result Value Ref Range PPD negative Negative  
 mm Induration 0.0 mm Imaging Studies: CXR Results  (Last 48 hours) 10/03/18 0425  XR CHEST SNGL V Final result Impression:  IMPRESSION:  Decreased left pleural effusion and basilar atelectasis or  
infiltrate. Narrative:  EXAM:  Chest x-ray. DATE:  October 3, 2018. INDICATION:  Pleural effusion. COMPARISON:  Yesterday's chest x-ray. TECHNIQUE:  Single frontal view chest.  
   
FINDINGS:  The previously seen left pleural effusion has decreased, with a tiny  
residual remaining. There is also decreased atelectasis or infiltrate in the  
left lung base. The right lung and pleural space remain clear. Cardiac size and  
pulmonary vasculature are within normal limits. No pneumothorax is identified. 10/02/18 1015  XR CHEST SNGL V Final result Impression:  IMPRESSION:   No pneumothorax. Small left pleural effusion. Minimal atelectasis  
suspected at the left base, otherwise the lungs are clear. Narrative:  CHEST X-RAY, one view. HISTORY:  Recent thoracentesis, follow-up. TECHNIQUE:  AP upright portable view COMPARISON: Exam 2 days prior CT Results  (Last 48 hours) None Assessment and Plan:  
 
Hospital Problems as of 10/3/2018  Date Reviewed: 6/26/2018 Codes Class Noted - Resolved POA Pleural effusion, bilateral ICD-10-CM: J90 ICD-9-CM: 511.9  9/26/2018 - Present Unknown Nonrheumatic aortic valve stenosis ICD-10-CM: I35.0 ICD-9-CM: 424.1  9/26/2018 - Present Unknown [facility] ICD-10-CM: J69.0 ICD-9-CM: 507.0  9/24/2018 - Present Yes * (Principal)Acute respiratory failure with hypoxemia ([facility] ICD-10-CM: J96.01 
ICD-9-CM: 518.81  9/24/2018 - Present Yes Late onset Alzheimer's disease without behavioral disturbance (Chronic) ICD-10-CM: G30.1, F02.80 ICD-9-CM: 331.0, 294.10  11/15/2017 - Present Yes Debility ICD-10-CM: R53.81 ICD-9-CM: 799.3  11/30/2015 - Present Yes COPD (chronic obstructive pulmonary disease) (HCC) (Chronic) ICD-10-CM: J44.9 ICD-9-CM: 331  9/1/2015 - Present Yes HTN (hypertension) (Chronic) ICD-10-CM: I10 
ICD-9-CM: 401.9  1/22/2014 - Present Yes Hyperlipidemia (Chronic) ICD-10-CM: S54.6 ICD-9-CM: 272.4  1/22/2014 - Present Yes RESOLVED: ILD (interstitial lung disease) (HCC) (Chronic) ICD-10-CM: J84.9 ICD-9-CM: 174  9/1/2015 - 9/26/2018 Yes PLAN: 
 
Acute on chronic hypoxic resp failure w/ hx of late COPD and O2 dependence-Pulmonology input appreciated- recommended comfort care measures- d/w family- they do not want comfort measures at this time. So we are continuing with aggressive care.  Pulmonology has informed them that there is not much to do to improve her respiratory status. We are working on dispo to a SNF at the [facility]. Ct chest done 10/01/2018- showed no pe but left pleural effusion and pneumonia- ? Aspiration, enlarged lymph nodes on the left - suspect reactive, rt hepatic lobe lesion, enlarged esophagus with material suspect esophagitis &  aspiration, left thyroid nodule, gerd- findings d/w the family. Aortic stenosis, [facility] - cardiology input appreciated - continue BB 
BL pleural effusions- s/p left thoracocentesis with pulm 10/02/2018. ~ 200 ml removed Concern for aspiration pneumonia- s/p a course of zosyn & augmentin Concern for UTI- treated if not clinically proven Confusion- multifactorial from sundowning, meds, infection- on depakote sprinkles- mental status had improved. Insomnia- we started zyprexa at night which left her very drowsy in the am - will discontinue BA swallow ok- will continue on a pureed diet and ppi bid for 1-2 months- per gi recommendations Hypokalemia- repleted HTN- titrate meds Further workup and management based on her clinical course Dispo: Plan for dc to a SNF as O2 requirements are to high for rehab- case mgt d/w the [facility] Signed: 
Morris Koyanagi, MD

## 2018-10-03 NOTE — PROGRESS NOTES
Meka Maldonado Admission Date: 9/24/2018 Daily Progress Note: 10/3/2018 The patient's chart is reviewed and the patient is discussed with the staff. 
 
  
79 y/o female with O2 dependent COPD (6L QHS and 4 L AM) and ? ILD/dementia/debility presents to the ED in acute respiratory distress with vomiting. She lives in assisted living at the [facility] living and able to do most ADLs (eating, restroom use, but not dressing) and apparently had been having multiple bouts of vomiting and abdominal discomfort and sats were dropping. She reported did expectorate all contents and did not aspirate. No diarrhea. No fevers, no prior episodes. She was placed on BIPAP for worsening hypoxemia and concern for ?aspiration but able to be weaned back to NC.  Thoracentesis on 10/2 with 250ml of fluid removed from left side. Subjective: On 13lpm this morning. CXR improved. WBC slightly up. She was delirious overnight and remains sleepy this morning. Current Facility-Administered Medications Medication Dose Route Frequency  enoxaparin (LOVENOX) injection 30 mg  30 mg SubCUTAneous Q24H  
 amoxicillin-clavulanate (AUGMENTIN) 500-125 mg per tablet 1 Tab  1 Tab Oral BID WITH MEALS  
 Saccharomyces boulardii (FLORASTOR) capsule 250 mg  250 mg Oral BID  hydrALAZINE (APRESOLINE) 20 mg/mL injection 10 mg  10 mg IntraVENous Q6H PRN  
 lisinopril (PRINIVIL, ZESTRIL) tablet 20 mg  20 mg Oral DAILY  OLANZapine (ZyPREXA) tablet 2.5 mg  2.5 mg Oral QPM  
 methylPREDNISolone (PF) (SOLU-MEDROL) injection 20 mg  20 mg IntraVENous DAILY  divalproex (DEPAKOTE SPRINKLE) capsule 125 mg  125 mg Oral BID  pantoprazole (PROTONIX) tablet 40 mg  40 mg Oral ACB&D  
 busPIRone (BUSPAR) tablet 10 mg (Patient Supplied)  10 mg Oral BID  
 haloperidol lactate (HALDOL) injection 4 mg  4 mg IntraVENous Q6H PRN  
 albuterol (PROVENTIL VENTOLIN) nebulizer solution 2.5 mg  2.5 mg Nebulization QID RT And  
 budesonide (PULMICORT) 500 mcg/2 ml nebulizer suspension  500 mcg Nebulization BID RT  
 metoprolol tartrate (LOPRESSOR) tablet 12.5 mg  12.5 mg Oral BID  polyethylene glycol (MIRALAX) packet 17 g  17 g Oral DAILY  Azelastine (ASTEPRO) 0.15 % (205.5 mcg) nasal spray 1 Spray (Patient Supplied)  1 Spray Both Nostrils BID  
 donepezil (ARICEPT) tablet 5 mg  5 mg Oral QHS  fluticasone (FLONASE) 50 mcg/actuation nasal spray 2 Spray  2 Spray Both Nostrils DAILY  sodium chloride (NS) flush 5-10 mL  5-10 mL IntraVENous Q8H  
 sodium chloride (NS) flush 5-10 mL  5-10 mL IntraVENous PRN  
 naloxone (NARCAN) injection 0.4 mg  0.4 mg IntraVENous PRN  
 acetaminophen (TYLENOL) tablet 650 mg  650 mg Oral Q4H PRN  
 bisacodyl (DULCOLAX) tablet 5 mg  5 mg Oral DAILY PRN  
 ondansetron (ZOFRAN) injection 4 mg  4 mg IntraVENous Q4H PRN  
 guaiFENesin-dextromethorphan (ROBITUSSIN DM) 100-10 mg/5 mL syrup 10 mL  10 mL Oral Q6H PRN  
 sertraline (ZOLOFT) tablet 50 mg  50 mg Oral QHS Review of Systems Unable to obtain due to patient sleepiness Objective:  
 
Vitals:  
 10/02/18 2338 10/03/18 0406 10/03/18 3407 10/03/18 5274 BP: 138/71 163/66 101/57 Pulse: 78 84 86 Resp: 20 24 20 Temp: 98.4 °F (36.9 °C) 98.4 °F (36.9 °C) 97.4 °F (36.3 °C) SpO2: 90% 90% 90% 98% Weight:      
Height:      
 
Intake and Output:  
  
  
 
Physical Exam:  
Constitution:  the patient is well developed and in no acute distress on CPAP. EENMT:  Sclera clear, pupils equal, oral mucosa moist 
Respiratory: CRACKLES IN RIGHT BASE, DECREASED ON LEFT BASE Cardiovascular:  RRR with mild SM Gastrointestinal: soft and non-tender; with positive bowel sounds. Musculoskeletal: warm without cyanosis. There is trace lower leg edema, WORSE ON LEFT WITH TENDERNESS Skin:  no jaundice or rashes Neurologic: no gross neuro deficits Psychiatric:  Awake and confused; falls asleep quickly CXR:  
 10/3: 
 
 
 
 
Chest CT 9/25: 
 
 
 
 
IMPRESSION: No convincing interstitial lung disease seen but there is bilateral 
effusions and overlying atelectasis or infiltrates larger on the left than the 
right. Fluid filling esophagus could be reflux, increasing risk of aspiration 
but no material seen in the trachea or bronchi at this time. Probable simple 
cysts and nonobstructing left kidney stone. CTPE 10/1: 
 
1. No pulmonary emboli. 2. Left pleural effusion with partial consolidation of the left lower lobe, 
suspicious for pneumonia. Aspiration pneumonia is also possible. 3. Diffuse esophageal thickening with luminal fluid, raising the possibility of 
reflux and/or esophagitis. This patient may be a risk for aspiration. 4. Left mediastinal and hilar lymph nodes are enlarged. These may be reactive. Follow-up chest CT is recommended in 4-6 weeks following completion of therapy 
to assess for resolution. 5. Incompletely characterized hyperenhancing lesion in the right hepatic lobe 
for which follow-up MRI may be obtained on a nonemergent basis should there be 
adequate clinical concern 6. Heterogeneous enhancement of the left kidney, suspicious for cortical 
infarct. 7. Slight increase in conspicuity of left thyroid nodule. If indicated, 
follow-up ultrasound may be performed on an outpatient basis. Echo: SUMMARY: 
-  Left ventricle: Systolic function was hyperdynamic. Ejection fraction was estimated in the range of 65 % to 70 %. There were no regional wall motion 
abnormalities. There was dynamic obstruction likely contributed by the hyperdynamic function with chordal AKILA, with a peak velocity of 3.6 cm/s, a 
peak gradient of 53 mmHg, and a mean gradient of 15 mmHg. There was minimal increase in gradients with Valsalva. -  Aortic valve: There was mild to moderate stenosis (mildly elevated Gradients with the hyperdynamic function; DI at 0.43; mostly restriction of the non 
coronary cusp).  
 
 
LAB 
 No results for input(s): GLUCPOC in the last 72 hours. No lab exists for component: [place] Recent Labs 10/03/18 
 0625  10/02/18 
 1005  10/01/18 [address] WBC  13.2*  12.5*  11.7* HGB  12.5  11.9  12.5 HCT  42.7  40.3  43.2 PLT  225  249  263 Recent Labs 10/03/18 
 0625  10/02/18 
 1005  10/01/18 [address]  10/01/18 
 0945 NA  145  144  146*   --   
K  4.4  3.8  4.4   --   
CL  100  101  105   --   
CO2  39*  35*  36*   --   
GLU  99  138*  134*   --   
BUN  34*  26*  21   --   
CREA  1.02*  1.25*  1.09*   --   
MG   --    --    --   1.9 CA  9.3  9.4  9.1   -- No results for input(s): PH, PCO2, PO2, HCO3 in the last 72 hours. No results for input(s): LCAD, LAC in the last 72 hours. Assessment:  (Medical Decision Making) Hospital Problems  Date Reviewed: 6/26/2018 Codes Class Noted POA Pleural effusion, bilateral ICD-10-CM: J90 ICD-9-CM: 511.9  9/26/2018 Unknown Exudative effusion on left s/p thoracentesis. Nonrheumatic aortic valve stenosis ICD-10-CM: I35.0 ICD-9-CM: 424.1  9/26/2018 Unknown Mild to moderate Aspiration pneumonia ([facility] ICD-10-CM: J69.0 ICD-9-CM: 507.0  9/24/2018 Yes Now on oral abx * (Principal)Acute respiratory failure with hypoxemia ([facility] ICD-10-CM: J96.01 
ICD-9-CM: 518.81  9/24/2018 Yes With high oxygen requirement Late onset Alzheimer's disease without behavioral disturbance (Chronic) ICD-10-CM: G30.1, F02.80 ICD-9-CM: 331.0, 294.10  11/15/2017 Yes Delirium much improved. Debility ICD-10-CM: R53.81 ICD-9-CM: 799.3  11/30/2015 Yes Severe COPD (chronic obstructive pulmonary disease) (HCC) (Chronic) ICD-10-CM: J44.9 ICD-9-CM: 097  9/1/2015 Yes No wheezing, continue bronchodilators HTN (hypertension) (Chronic) ICD-10-CM: I10 
ICD-9-CM: 401.9  1/22/2014 Yes Hyperlipidemia (Chronic) ICD-10-CM: N67.4 ICD-9-CM: 272.4  1/22/2014 Yes FLAVIA- having witnessed apneas. Has been intolerant of BIPAP, continue APAP Plan:  (Medical Decision Making) Continues on augmentin for aspiration pneumonia Can resume diuretics today Wean O2, continue PT. Delirium discussed with family and caregiver. Prognosis remains guarded. More than 50% of the time documented was spent in face-to-face contact with the patient and in the care of the patient on the floor/unit where the patient is located.  
 
Oriana Conklin MD

## 2018-10-03 NOTE — PROGRESS NOTES
Shift assessment complete. Pt drowsy, oriented x3 with intermittent confusion. Pt falls asleep easily. Daughter at bedside. Respirations even, pt on 7L high-flow humidified O2 via NC, on continuous pulse oximetry, lungs diminished. Abdomen obese, soft, bowel sounds active. Bed low and locked, call light within reach. No complaints/concerns at this time. Will continue to monitor.

## 2018-10-03 NOTE — PROGRESS NOTES
Problem: Mobility Impaired (Adult and Pediatric) Goal: *Acute Goals and Plan of Care (Insert Text) STG: 
(1.)Ms. Deal will move from supine to sit and sit to supine  with MINIMAL ASSIST within 3 treatment day(s). Progressing 9/29 
(2.)Ms. Deal will transfer from bed to chair and chair to bed with MINIMAL ASSIST using the least restrictive device within 3 treatment day(s). (3.)Ms. Deal will ambulate with MINIMAL ASSIST for 25 feet with the least restrictive device within 3 treatment day(s). Progressing 9/29 LTG: 
(1.)Ms. Deal will move from supine to sit and sit to supine  in bed with STAND BY ASSIST within 7 treatment day(s). (2.)Ms. Deal will transfer from bed to chair and chair to bed with STAND BY ASSIST using the least restrictive device within 7 treatment day(s). (3.)Ms. Deal will ambulate with CONTACT GUARD ASSIST for  feet with the least restrictive device within 7 treatment day(s). ________________________________________________________________________________________________ PHYSICAL THERAPY: Daily Note, Treatment Day: 6th, AM 10/3/2018 INPATIENT: Hospital Day: 10 [place] / Product Type: Medicare /  
  
NAME/AGE/GENDER: Dario Melendez is a 80 y.o. female PRIMARY DIAGNOSIS: Acute respiratory failure with hypoxemia ([facility] [address] Aspiration pneumonia ([facility] [address] Acute respiratory failure with hypoxemia (HCC) Acute respiratory failure with hypoxemia (HCC) ICD-10: Treatment Diagnosis:  
 · Generalized Muscle Weakness (M62.81) · Difficulty in walking, Not elsewhere classified (R26.2) Precaution/Allergies: 
Bactroban [mupirocin calcium]; Mupirocin; Sulfa (sulfonamide antibiotics); and Sulfamethoxazole-trimethoprim ASSESSMENT:  
 
Ms. Daralene Brisker is supine upon arrival.  She needs the restroom: supine>EOB>BSC with Mod A/Min A.   Then goes from bed>to chair with Min A/Mod A and verbal cues to stay in walker and take steps over to the chair. She was left in chair with call light near and sitter present. This section established at most recent assessment PROBLEM LIST (Impairments causing functional limitations):,  
1. Decreased Strength 2. Decreased Transfer Abilities 3. Decreased Ambulation Ability/Technique 4. Decreased Balance 5. Decreased Activity Tolerance INTERVENTIONS PLANNED: (Benefits and precautions of physical therapy have been discussed with the patient.) 1. Bed Mobility 2. Gait Training 3. Therapeutic Exercise/Strengthening 4. Transfer Training TREATMENT PLAN: Frequency/Duration: daily for duration of hospital stay Rehabilitation Potential For Stated Goals: Good RECOMMENDED REHABILITATION/EQUIPMENT: (at time of discharge pending progress): Due to the probability of continued deficits (see above) this patient will likely need continued skilled physical therapy after discharge. Equipment:  
? None at this time HISTORY:  
History of Present Injury/Illness (Reason for Referral): 
79 y/o female with O2 dependent COPD and ILD presents to the ED in acute respiratory distress. She resides at [facility] and went to bed at 8 pm. She is usually on 6L O2 at night. Later developed acute vomiting and her O2 sats dropped. EMS alerted and administered albuterol nebulizer. Concern is that she may have aspirated. She was tachycardic, tachypneic and distressed. Placed on BIPAP and started on antibiotics. Initial lactic acid 1.1, troponin negative and chest xray decreased lung volumes. Will admit to ICU for further treatment. Past Medical History/Comorbidities: Ms. Rajan Adame  has a past medical history of Arthropathy, unspecified, site unspecified; Benign paroxysmal positional vertigo; Cardiac dysrhythmia, unspecified; Debility, unspecified; Facet syndrome ([facility] [address] GIB (gastrointestinal bleeding) (1/31/2014);  History of peptic ulcer disease (01/2014); Hypertension; IBS (irritable bowel syndrome); Impaired fasting glucose; Lumbago; Memory loss; Meningioma ([address] Musculoskeletal pain; OA (osteoarthritis) of knee; Palliative care encounter (5/8/2014); Rhinorrhea; Scoliosis; Sensorineural hearing loss; Spondylolisthesis; TMJ dysfunction; Varicella; Vasomotor rhinitis; and Vitamin D deficiency. Ms. Corinna Reynoso  has a past surgical history that includes hx [place] and bso (1974); hx knee arthroscopy; hx appendectomy; hx other surgical (1998); hx knee replacement (Left, 2007); hx colonoscopy; hx tonsillectomy; and hx cataract removal (2003). Social History/Living Environment:  
Home Environment: Assisted living Care Facility Name: [facility] Residence: One story Living Alone: No 
Support Systems: Family member(s) Patient Expects to be Discharged to[de-identified] Assisted living Current DME Used/Available at Home: Lorie Ibrahim, [address] [facility] chair, Wheelchair, power Prior Level of Function/Work/Activity: 
Living at [facility]. Walks very short distances with rolling walker but uses a power chair for further distances. Has assist for dressing and bathing. Number of Personal Factors/Comorbidities that affect the Plan of Care: 0: LOW COMPLEXITY EXAMINATION:  
Most Recent Physical Functioning:  
Gross Assessment: 
  
         
  
Posture: 
  
Balance: 
  Bed Mobility: 
Rolling: Minimum assistance; Moderate assistance Supine to Sit: Minimum assistance; Moderate assistance Sit to Supine:  (left up in chair) Wheelchair Mobility: 
  
Transfers: 
Sit to Stand: Minimum assistance Stand to Sit:  (left up in chair) Bed to Chair: Minimum assistance;Assist x2 Duration: 38 Minutes Gait: 
  
Base of Support: Narrowed Step Length: Left shortened;Right shortened Gait Abnormalities: Decreased step clearance Distance (ft): 6 Feet (ft) Assistive Device: Walker, rolling Ambulation - Level of Assistance: Minimal assistance;Assist x2 Interventions: Safety awareness training Body Structures Involved: 1. Muscles Body Functions Affected: 1. Movement Related Activities and Participation Affected: 1. Mobility Number of elements that affect the Plan of Care: 3: MODERATE COMPLEXITY CLINICAL PRESENTATION:  
Presentation: Stable and uncomplicated: LOW COMPLEXITY CLINICAL DECISION MAKING:  
[facility] AM-PAC 6 Clicks Basic Mobility Inpatient Short Form How much difficulty does the patient currently have. .. Unable A Lot A Little None 1. Turning over in bed (including adjusting bedclothes, sheets and blankets)? [] 1   [] 2   [x] 3   [] 4  
2. Sitting down on and standing up from a chair with arms ( e.g., wheelchair, bedside commode, etc.)   [] 1   [x] 2   [] 3   [] 4  
3. Moving from lying on back to sitting on the side of the bed? [] 1   [x] 2   [] 3   [] 4 How much help from another person does the patient currently need. .. Total A Lot A Little None 4. Moving to and from a bed to a chair (including a wheelchair)? [] 1   [x] 2   [] 3   [] 4  
5. Need to walk in hospital room? [] 1   [x] 2   [] 3   [] 4  
6. Climbing 3-5 steps with a railing? [] 1   [x] 2   [] 3   [] 4  
© 2007, Trustees of [facility], under license to [employer]. All rights reserved Score:  Initial: 13 Most Recent: X (Date: -- ) Interpretation of Tool:  Represents activities that are increasingly more difficult (i.e. Bed mobility, Transfers, Gait). Score 24 23 22-20 19-15 14-10 9-7 6 Modifier CH CI CJ CK CL CM CN   
 
? Mobility - Walking and Moving Around:  
  - CURRENT STATUS: CL - 60%-79% impaired, limited or restricted  - GOAL STATUS: CK - 40%-59% impaired, limited or restricted  - D/C STATUS:  ---------------To be determined--------------- [place] / [place] / Product Type: Medicare /   
 
Medical Necessity:    
· Patient is expected to demonstrate progress in strength and range of motion to increase independence with gait and transfers. Reason for Services/Other Comments: 
· Patient continues to require skilled intervention due to limited functional independence. Use of outcome tool(s) and clinical judgement create a POC that gives a: Clear prediction of patient's progress: LOW COMPLEXITY  
  
 
 
 
TREATMENT:  
(In addition to Assessment/Re-Assessment sessions the following treatments were rendered) Pre-treatment Symptoms/Complaints:   
Pain: Initial: 0 Pain Intensity 1: 0 (same after therapy)  Post Session:   
 
Therapeutic Activity: (  38 Minutes ):  Therapeutic activities including Bed transfers, Chair transfers and Ambulation on level ground to improve mobility, strength, balance and coordination. Required minimal assist of 2 and  Safety awareness training to promote dynamic balance in standing. Min A/Mod A x 2 to go from bed to [facility], then to chair. Date: 
9/27 Date: 
9/30 Date: Activity/Exercise Parameters Parameters Parameters Ankle pumps 10 20 LAQ 10 Hip flexion 10 Hip abduction 10 20 OH press  20 Scaption  20 Heel slides  20 Braces/Orthotics/Lines/Etc:  
· IV 
· O2 Device: Nasal cannula Treatment/Session Assessment:   
· Response to Treatment:  Pt tolerated ambulation and transfers fairly · Interdisciplinary Collaboration:  
o Occupational Therapist 
o Registered Nurse · After treatment position/precautions:  
o Up in chair 
o Bed/Chair-wheels locked 
o Caregiver at bedside 
o Call light within reach · Compliance with Program/Exercises: compliant all of the time. · Recommendations/Intent for next treatment session: \"Next visit will focus on advancements to more challenging activities\". Total Treatment Duration: PT Patient Time In/Time Out Time In: 0730 Time Out: [id number] Evelyne Padron, PTA

## 2018-10-03 NOTE — PROGRESS NOTES
Problem: Interdisciplinary Rounds Goal: Interdisciplinary Rounds Interdisciplinary team rounds were held 10/3/2018 with the following team members:Care Management, Nursing, Nutrition, Pharmacy, Physical Therapy and Physician and the patient and child(shae). Plan of care discussed. See clinical pathway and/or care plan for interventions and desired outcomes.

## 2018-10-03 NOTE — PROGRESS NOTES
Shift assessment complete. Pt drowsy, yet arrousable. Oriented x4. States \" I don't want to talk right now. \" Respirations even and unlabored. Lung sounds diminished, however no signs of respiratory distress. HR regular. Abdomen soft with active bowel sounds heard in all four quadrants. Skin intact, trace edema noted to BLE. IVs capped. No signs of pain of distress. No needs voiced. Safety measures in place, bed alarm on. Call light within reach, daughter at bedside. Will continue to monitor.

## 2018-10-03 NOTE — PROGRESS NOTES
Problem: Falls - Risk of 
Goal: *Absence of Falls Document Raester Torin Fall Risk and appropriate interventions in the flowsheet. Outcome: Progressing Towards Goal 
Fall Risk Interventions: 
Mobility Interventions: Bed/chair exit alarm Mentation Interventions: Bed/chair exit alarm Medication Interventions: Bed/chair exit alarm Elimination Interventions: Call light in reach History of Falls Interventions: Bed/chair exit alarm Problem: Pressure Injury - Risk of 
Goal: *Prevention of pressure injury Document Tony Scale and appropriate interventions in the flowsheet. Outcome: Progressing Towards Goal 
Pressure Injury Interventions: 
Sensory Interventions: Assess changes in LOC, Assess need for specialty bed Moisture Interventions: Absorbent underpads, Apply protective barrier, creams and emollients Activity Interventions: Pressure redistribution bed/mattress(bed type) Mobility Interventions: Pressure redistribution bed/mattress (bed type) Nutrition Interventions: Offer support with meals,snacks and hydration Friction and Shear Interventions: HOB 30 degrees or less

## 2018-10-03 NOTE — PROGRESS NOTES
Shift assessment complete. Pt resting in bed. Alert to person and to time. Disoriented to place/situation. Respirations even, unlabored. Lung sounds diminished on auscultation. Oxygen in place. HR regular, S1&S2 auscultated. Abdomen soft, non-tender, active bowel sounds. IV flushed and patent. All needs met at this time. Pt denies pain at this time. Bed in lowest position, call light within reach, side rails x3, bed wheels locked. Will continue to monitor throughout the shift.

## 2018-10-04 NOTE — PROGRESS NOTES
Problem: Nutrition Deficit Goal: *Optimize nutritional status Problem: Nutrition Deficit Goal: *Optimize nutritional status Nutrition Follow Up 10/04/18: 
Reason for initial assessment:  Consult received for diet education r/t pureed diet; have patient sit up right for 2 hours past eating. Assessment:  
Diet order(s): 9/27:  Pureed effective 1615, 9/26-10/04:  NPO; 9/25:  Cardiac, 2 gm Na Follow Up 10/04/18: 
Patients caregiver in room for follow-up and answered questions relating to patients intake on 10/04/18:  Patients caregiver stated intake at breakfast: ~ 25% and 100% of (1) Ensure Enlive. Assessed patents meal intake for lunch 10/04/18: ~10%; was not interested in eating or drinking the Ensure Enlive. Patient was sitting up in bed and stated she was hot and had removed her blanket. Anthropometrics:Height: 4' 11\" (149.9 cm), Weight: 75.3 kg (166 lb. ),Weight source not specified, Body mass index is 33.53 kg/(m^2). BMI class of Overweight for Older American Women. Macronutrient needs: EER:  1811-1819 kcal /day (15-20 kcal/kg BW) EPR:  52-60 grams protein/day (1.2-1.4 grams/kg IBW) GFR > 60 Intake/Comparative Standards: Meal rounds: ~25% breakfast and 100% Ensure Enlive. ~10% Lunch. No recorded intake; based on minimal data, patient potentially meets ~25-40% of estimated kcal and protein needs. Intervention: 
Meals and snacks: Continue current diet. Nutrition Supplement Therapy:  Continue Ensure Enlive on all meal trays. Discharge nutrition plan: Too soon to determine.

## 2018-10-04 NOTE — PROGRESS NOTES
PT c/o of chest pain upon breathing. VS were BP  97/58  HR 81 Temp 98.0  91% O2. Notified Dr Janes Almonte of situation and was told to continue to monitor.

## 2018-10-04 NOTE — DISCHARGE SUMMARY
Discharge Summary Patient: Dafne Patterson MRN: [id number]  SSN: [id number] YOB: 1929  Age: 80 y.o. Sex: female Admit Date: 9/24/2018 Discharge Date: 10/4/2018 Admission Diagnoses: Acute respiratory failure with hypoxemia ([facility] Aspiration pneumonia ([facility] Discharge Diagnoses:  
Problem List as of 10/4/2018  Date Reviewed: 6/26/2018 Codes Class Noted - Resolved Pleural effusion, bilateral ICD-10-CM: J90 ICD-9-CM: 511.9  9/26/2018 - Present Nonrheumatic aortic valve stenosis ICD-10-CM: I35.0 ICD-9-CM: 424.1  9/26/2018 - Present Aspiration pneumonia ([facility] ICD-10-CM: J69.0 ICD-9-CM: 507.0  9/24/2018 - Present * (Principal)Acute respiratory failure with hypoxemia [facility]) ICD-10-CM: J96.01 
ICD-9-CM: 518.81  9/24/2018 - Present History of meningioma ICD-10-CM: Y95.265 
ICD-9-CM: V12.41  1/15/2018 - Present Overview Signed 1/15/2018 11:38 AM by Darek Villanueva MD  
  Removed 5412 Late onset Alzheimer's disease without behavioral disturbance (Chronic) ICD-10-CM: G30.1, F02.80 ICD-9-CM: 331.0, 294.10  11/15/2017 - Present Anxiety ICD-10-CM: F41.9 ICD-9-CM: 300.00  11/30/2015 - Present Vitamin D deficiency ICD-10-CM: E55.9 ICD-9-CM: 268.9  11/30/2015 - Present Debility ICD-10-CM: R53.81 ICD-9-CM: 799.3  11/30/2015 - Present Edema ICD-10-CM: R60.9 ICD-9-CM: 782.3  9/3/2015 - Present Hypoxia (Chronic) ICD-10-CM: R09.02 
ICD-9-CM: 799.02  9/1/2015 - Present COPD (chronic obstructive pulmonary disease) (HCC) (Chronic) ICD-10-CM: J44.9 ICD-9-CM: 615  9/1/2015 - Present Debility, unspecified (Chronic) ICD-10-CM: R53.81 ICD-9-CM: 799.3  9/1/2015 - Present DDD (degenerative disc disease), lumbar ICD-10-CM: M51.36 
ICD-9-CM: 722.52  Unknown - Present Scoliosis ICD-10-CM: M41.9 ICD-9-CM: 737.30  Unknown - Present Iron deficiency anemia (Chronic) ICD-10-CM: D50.9 ICD-9-CM: 280.9  5/8/2014 - Present Unspecified hearing loss (Chronic) ICD-10-CM: H91.90 ICD-9-CM: 389.9  5/8/2014 - Present Urinary incontinence (Chronic) ICD-10-CM: R32 
ICD-9-CM: 788.30  5/8/2014 - Present Unspecified vitamin D deficiency (Chronic) ICD-10-CM: E55.9 ICD-9-CM: 268.9  5/8/2014 - Present HTN (hypertension) (Chronic) ICD-10-CM: I10 
ICD-9-CM: 401.9  1/22/2014 - Present Hyperlipidemia (Chronic) ICD-10-CM: H10.6 ICD-9-CM: 272.4  1/22/2014 - Present RESOLVED: Bilateral impacted cerumen ICD-10-CM: H61.23 
ICD-9-CM: 380.4  9/20/2016 - 1/15/2018 RESOLVED: Agitation ICD-10-CM: R45.1 ICD-9-CM: 307.9  5/31/2016 - 1/15/2018 RESOLVED: Dementia ICD-10-CM: F03.90 ICD-9-CM: 294.20  11/30/2015 - 1/15/2018 RESOLVED: ILD (interstitial lung disease) (HCC) (Chronic) ICD-10-CM: J84.9 ICD-9-CM: 110  9/1/2015 - 9/26/2018 RESOLVED: Acute posthemorrhagic anemia ICD-10-CM: D62 
ICD-9-CM: 285.1  6/3/2015 - 9/1/2015 RESOLVED: Edema ICD-10-CM: R60.9 ICD-9-CM: 782.3  6/3/2015 - 9/1/2015 RESOLVED: Memory loss ICD-10-CM: R41.3 ICD-9-CM: 780.93  6/3/2015 - 1/15/2018 RESOLVED: Anxiety state, unspecified ICD-10-CM: F41.1 ICD-9-CM: 300.00  6/3/2015 - 1/15/2018 RESOLVED: Abnormality of gait (Chronic) ICD-10-CM: R26.9 ICD-9-CM: 781.2  5/8/2014 - 9/1/2015 RESOLVED: Backache, unspecified (Chronic) ICD-10-CM: M54.9 ICD-9-CM: 724.5  5/8/2014 - 9/1/2015 RESOLVED: Dementia without behavioral disturbance (Chronic) ICD-10-CM: F03.90 ICD-9-CM: 294.20  5/8/2014 - 11/15/2017 RESOLVED: Mild cognitive impairment, so stated ICD-10-CM: G31.84 ICD-9-CM: 331.83  5/8/2014 - 7/8/2014 RESOLVED: Weakness (Chronic) ICD-10-CM: R53.1 ICD-9-CM: 780.79  5/8/2014 - 9/1/2015 RESOLVED: Cardiac dysrhythmia, unspecified (Chronic) ICD-10-CM: I49.9 ICD-9-CM: 427.9  1/22/2014 - 9/1/2015 Discharge Condition: [facility] Course: Ms. Rakel Montanez is a 79 y/o female with O2 dependent COPD ( 6 liters ) and ILD who was admitted on 9/24/18 with a diagnosis of acute respiratory distress, possible secondary to aspiration PNa. She resides at [place] [address] and was noted hypoxic and having cough and vomiting, reason to transfer her here. She was Placed on BIPAP and started on antibiotics. The patient had a ct chest w/o contrast on 09/25/2018 which showed bilateral effusions and overlying atelectasis or infiltrates, also dilated esophagus. Pulmonology was consulted,they recommended comfort care measures as the patient was intolerant of  bipap and cpap. The family declined. The patient has 3 daughters. 2 are in agreement with comfort care and 1 is not. So she they are requesting everything be done. GI was consulted for the dilated esophagus and concern for possible achalasia or lesion. They declined to do and egd because pt was to high risk respiratory wise. A barium swallow was done instead and the Patient was started on pureed diet and protonix bid. Echo showed AKILA and cardiology recommended a BB which was started. They will follow up in clinic. Because of High o2 requirements a repeat ct chest with contrast to rule out a PE done on 10/01/2018 and showed: Left pleural effusion with partial consolidation of the left lower lobe,suspicious for pneumonia; Diffuse esophageal thickening with luminal fluid. Pulmonology performed- thoracocentesis on 10/02/2018 with  ~ 250 ml of fluid removed from the left lung. Unfortunately the patient has continued to require high O2 requirements at night 10- 13L. She cannot go to rehab on these levels and we are looking [facility] a SNF versus [facility] at this moment. Patient seems to be chronically deteriorating, she requires higher amount of oxygen at nights ( 10-11 liters ). She is unable to tolerate cpap/APAP.   [facility] team was consulted. Patient was accepted and is being transferred today for continuance of care. CODE STATUS: DNR/DNI Physical Exam:  
GENERAL: alert, cooperative, no distress, appears stated age EYE: negative LYMPHATIC: Cervical, supraclavicular, and axillary nodes normal.  
THROAT & NECK: normal and no erythema or exudates noted. LUNG: clear to auscultation bilaterally, no wheezing, on 7 liters NC 
HEART: regular rate and rhythm, S1, S2 normal, no murmur, click, rub or gallop ABDOMEN: soft, non-tender. Bowel sounds normal. No masses,  no organomegaly EXTREMITIES:  extremities normal, atraumatic, no cyanosis or edema SKIN: Normal. 
NEUROLOGIC: negative PSYCHIATRIC: non focal 
 
Consults: Cardiology, Gastroenterology, Physical Medicine and Rehabilitation and Pulmonary/Critical Care Significant Diagnostic Studies: see dc summary note Recent Results (from the past 24 hour(s)) PLEASE READ & DOCUMENT PPD TEST IN 48 HRS Collection Time: 10/03/18  4:33 PM  
Result Value Ref Range PPD negative Negative  
 mm Induration 0.0 mm METABOLIC PANEL, BASIC Collection Time: 10/04/18  6:00 AM  
Result Value Ref Range Sodium 144 136 - 145 mmol/L Potassium 4.3 3.5 - 5.1 mmol/L Chloride 99 98 - 107 mmol/L  
 CO2 41 (HH) 21 - 32 mmol/L Anion gap 4 mmol/L Glucose 94 65 - 100 mg/dL BUN 39 (H) 8 - 23 MG/DL Creatinine 1.19 (H) 0.6 - 1.0 MG/DL  
 GFR est AA 55 (L) >60 ml/min/1.73m2 GFR est non-AA 45 ml/min/1.73m2 Calcium 9.9 8.3 - 10.4 MG/DL  
CBC WITH AUTOMATED DIFF Collection Time: 10/04/18  6:00 AM  
Result Value Ref Range WBC 13.3 (H) 4.3 - 11.1 K/uL  
 RBC 4.40 4.05 - 5.2 M/uL  
 HGB 11.7 11.7 - 15.4 g/dL HCT 40.1 35.8 - 46.3 % MCV 91.1 79.6 - 97.8 FL  
 MCH 26.6 26.1 - 32.9 PG  
 MCHC 29.2 (L) 31.4 - 35.0 g/dL  
 RDW 15.3 % PLATELET 192 718 - 128 K/uL MPV 12.3 9.4 - 12.3 FL ABSOLUTE NRBC 0.00 0.0 - 0.2 K/uL  
 DF AUTOMATED NEUTROPHILS 79 (H) 43 - 78 % LYMPHOCYTES 11 (L) 13 - 44 % MONOCYTES 7 4.0 - 12.0 % EOSINOPHILS 2 0.5 - 7.8 % BASOPHILS 1 0.0 - 2.0 % IMMATURE GRANULOCYTES 1 0.0 - 5.0 %  
 ABS. NEUTROPHILS 10.5 (H) 1.7 - 8.2 K/UL  
 ABS. LYMPHOCYTES 1.5 0.5 - 4.6 K/UL  
 ABS. MONOCYTES 1.0 0.1 - 1.3 K/UL  
 ABS. EOSINOPHILS 0.2 0.0 - 0.8 K/UL  
 ABS. BASOPHILS 0.1 0.0 - 0.2 K/UL  
 ABS. IMM. GRANS. 0.1 0.0 - 0.5 K/UL Disposition: [place] Discharge Medications:  
Current Discharge Medication List  
  
START taking these medications Details  
bisacodyl (DULCOLAX) 5 mg EC tablet Take 1 Tab by mouth DIALYSIS PRN for Constipation. Qty: 30 Tab, Refills: 0  
  
budesonide (PULMICORT) 0.5 mg/2 mL nbsp 2 mL by Nebulization route two (2) times a day. Qty: 1 Each, Refills: 1  
  
albuterol (PROVENTIL VENTOLIN) 2.5 mg /3 mL (0.083 %) nebulizer solution 3 mL by Nebulization route every four (4) hours as needed for Wheezing. Qty: 24 Each, Refills: 0  
  
divalproex (DEPAKOTE SPRINKLE) 125 mg capsule Take 1 Cap by mouth two (2) times a day. Qty: 60 Cap, Refills: 0  
  
enoxaparin (LOVENOX) 30 mg/0.3 mL injection 0.3 mL by SubCUTAneous route every twenty-four (24) hours. Qty: 30 Syringe, Refills: 0  
  
guaiFENesin-dextromethorphan (ROBITUSSIN DM) 100-10 mg/5 mL syrup Take 10 mL by mouth every six (6) hours as needed for up to 10 days. Qty: 1 Bottle, Refills: 1  
  
haloperidol lactate (HALDOL) 5 mg/mL injection 0.4 mL by IntraVENous route every six (6) hours as needed. Qty: 1 Vial, Refills: 0  
  
hydrALAZINE (APRESOLINE) 20 mg/mL injection 0.5 mL by IntraVENous route every six (6) hours as needed (SBP> 160 or dbp > 100). Qty: 10 mL, Refills: 1  
  
lisinopril (PRINIVIL, ZESTRIL) 20 mg tablet Take 1 Tab by mouth daily. Qty: 30 Tab, Refills: 0  
  
metoprolol tartrate (LOPRESSOR) 25 mg tablet Take 0.5 Tabs by mouth two (2) times a day. Qty: 30 Tab, Refills: 0 OLANZapine (ZYPREXA) 2.5 mg tablet Take 1 Tab by mouth every evening. Qty: 30 Tab, Refills: 0  
  
ondansetron (ZOFRAN) 4 mg/2 mL soln 2 mL by IntraVENous route every four (4) hours as needed. Qty: 30 Vial, Refills: 0  
  
polyethylene glycol (MIRALAX) 17 gram packet Take 1 Packet by mouth daily. Qty: 1 Packet, Refills: 0 Saccharomyces boulardii (FLORASTOR) 250 mg capsule Take 1 Cap by mouth two (2) times a day for 7 days. Qty: 14 Cap, Refills: 0 CONTINUE these medications which have NOT CHANGED Details Azelastine (ASTEPRO) 0.15 % (205.5 mcg) nasal spray 1 Ace by Both Nostrils route two (2) times a day. Qty: 1 Bottle, Refills: 12  
 Associated Diagnoses: Sinus headache  
  
fluticasone (FLONASE) 50 mcg/actuation nasal spray 2 Sprays by Both Nostrils route daily. 2 sprays each nostril prn 
Qty: 1 Bottle, Refills: 11  
 Associated Diagnoses: Sinus headache  
  
calcium carbonate-vitamin D3 600 mg(1,500mg) -500 unit cap Take 1 Tab by mouth two (2) times a day. Qty: 60 Cap, Refills: 12 Walker (ULTRA-LIGHT ROLLATOR) misc Use daily 
Qty: 1 Each, Refills: 0 Associated Diagnoses: Debility; Risk for falls  
  
busPIRone (BUSPAR) 10 mg tablet Take 1 Tab by mouth two (2) times a day. Qty: 60 Tab, Refills: 3  
  
donepezil (ARICEPT) 5 mg tablet Take 1 Tab by mouth nightly. Qty: 90 Tab, Refills: 3 Associated Diagnoses: Dementia without behavioral disturbance, unspecified dementia type  
  
sertraline (ZOLOFT) 100 mg tablet Take 1 Tab by mouth nightly. Qty: 30 Tab, Refills: 3  
  
pantoprazole (PROTONIX) 40 mg tablet Take 1 Tab by mouth daily. Qty: 180 Tab, Refills: 4  
  
pravastatin (PRAVACHOL) 20 mg tablet Take 1 Tab by mouth nightly. Qty: 90 Tab, Refills: 4 Associated Diagnoses: Mixed hyperlipidemia  
  
albuterol (PROAIR HFA) 90 mcg/actuation inhaler 2 puffs qid prn  Indications: Chronic Obstructive Pulmonary Disease Qty: 1 Inhaler, Refills: 11  
 Associated Diagnoses: Chronic obstructive pulmonary disease, unspecified COPD type ([address] budesonide-formoterol (SYMBICORT) 160-4.5 mcg/actuation HFA inhaler 2 puffs bid, rinse mouth after use. Qty: 1 Inhaler, Refills: 11  
 Associated Diagnoses: Chronic obstructive pulmonary disease, unspecified COPD type ([address] Compression Socks, Medium misc WEAR DAILY Qty: 2 Each, Refills: 12  
 Associated Diagnoses: Edema, unspecified type DISABLED PLACARD (DISABLED PLACARD) DMV Use prn 
Qty: 1 Each, Refills: 0 Cholecalciferol, Vitamin D3, (VITAMIN D3) 1,000 unit cap Take 1,000 Units by mouth daily. Qty: 90 Cap, Refills: 3  
  
multivit-min-FA-lycopen-lutein (CERTAVITE SENIOR-ANTIOXIDANT) 0.4-300-250 mg-mcg-mcg tab Take  by mouth daily. acetaminophen (TYLENOL) 325 mg tablet Take  by mouth every six (6) hours as needed for Pain. STOP taking these medications HYDROcodone-acetaminophen (NORCO) 5-325 mg per tablet Comments:  
Reason for Stopping:   
   
 guaiFENesin (ROBITUSSIN) 100 mg/5 mL liquid Comments:  
Reason for Stopping:   
   
 ondansetron (ZOFRAN ODT) 4 mg disintegrating tablet Comments:  
Reason for Stopping:   
   
 potassium chloride SR (KLOR-CON 10) 10 mEq tablet Comments:  
Reason for Stopping:   
   
 furosemide (LASIX) 40 mg tablet Comments:  
Reason for Stopping: OXYGEN-AIR DELIVERY SYSTEMS Comments:  
Reason for Stopping:   
   
  
 
 
Activity: Activity as tolerated Diet: Cardiac Diet Wound Care: None needed No orders of the defined types were placed in this encounter. Signed By: Felecia Pereyra MD   
 October 4, 2018

## 2018-10-04 NOTE — PROGRESS NOTES
PRN haldol given due to pts increased aggitation, repeatedly taking O2 and gown off, pt believes it is morning and time to get up/get ready for the day. Will continue to monitor.

## 2018-10-04 NOTE — PROGRESS NOTES
Progress Note Patient: Alexia Rucker MRN: [id number]  SSN: [id number] YOB: 1929  Age: 80 y.o. Sex: female Admit Date: 9/24/2018 LOS: 10 days Subjective:  
Ms. Sabino Claude is a 79 y/o female with O2 dependent COPD ( 6 liters ) and ILD who was admitted on 9/24/18 with a diagnosis of acute respiratory distress, possible secondary to aspiration PNa. She resides at [place] [address] and was noted hypoxic and having cough and vomiting, reason to transfer her here. She was Placed on BIPAP and started on antibiotics. The patient had a ct chest w/o contrast on 09/25/2018 which showed bilateral effusions and overlying atelectasis or infiltrates, also dilated esophagus. Pulmonology was consulted,they recommended comfort care measures as the patient was intolerant of  bipap and cpap. The family declined. The patient has 3 daughters. 2 are in agreement with comfort care and 1 is not. So she they are requesting everything be done. GI was consulted for the dilated esophagus and concern for possible achalasia or lesion. They declined to do and egd because pt was to high risk respiratory wise. A barium swallow was done instead and the Patient was started on pureed diet and protonix bid. Echo showed AKILA and cardiology recommended a BB which was started. They will follow up in clinic. Because of High o2 requirements a repeat ct chest with contrast to rule out a PE done on 10/01/2018 and showed: Left pleural effusion with partial consolidation of the left lower lobe,suspicious for pneumonia; Diffuse esophageal thickening with luminal fluid. Pulmonology performed- thoracocentesis on 10/02/2018 with  ~ 250 ml of fluid removed from the left lung. Unfortunately the patient has continued to require high O2 requirements at night 10- 13L. She cannot go to rehab on these levels and we are looking [facility] SNF [facility] at this moment. Pulmonary team evaluated her today, no new changes in plan.  Patient seems to be chronically deteriorating and at this point she may benefit from palliative care / Hospice. I spoke with  and will try to transfer her to [facility]. 
 
On my assessment today she was found alert, in no distress, on 7 liters NC.  
  
Objective:  
 
Vitals:  
 10/03/18 6530 10/04/18 0034 10/04/18 0456 10/04/18 9735 BP:  124/56 116/62 Pulse:  69 72 Resp:  18 20 Temp:  96.6 °F (35.9 °C) 96.3 °F (35.7 °C) SpO2: 93% 94% 93% 99% Weight:      
Height:      
  
 
Intake and Output: 
Current Shift:   
Last three shifts:   
 
Physical Exam:  
GENERAL: alert, cooperative, no distress, appears stated age EYE: negative LYMPHATIC: Cervical, supraclavicular, and axillary nodes normal.  
THROAT & NECK: normal and no erythema or exudates noted. LUNG: clear to auscultation bilaterally, no wheezing, on 7 liters NC 
HEART: regular rate and rhythm, S1, S2 normal, no murmur, click, rub or gallop ABDOMEN: soft, non-tender. Bowel sounds normal. No masses,  no organomegaly EXTREMITIES:  extremities normal, atraumatic, no cyanosis or edema SKIN: Normal. 
NEUROLOGIC: negative PSYCHIATRIC: non focal 
 
Lab/Data Review: All lab results for the last 24 hours reviewed. Assessment:  
 
Principal Problem: 
  Acute respiratory failure with hypoxemia ([address] (9/24/2018) Active Problems: 
  HTN (hypertension) (1/22/2014) Hyperlipidemia (1/22/2014) COPD (chronic obstructive pulmonary disease) ([address] (9/1/2015) Debility (11/30/2015) Late onset Alzheimer's disease without behavioral disturbance (11/15/2017) Aspiration pneumonia ([address] (9/24/2018) Pleural effusion, bilateral (9/26/2018) Nonrheumatic aortic valve stenosis (9/26/2018) Plan:  
-Acute on chronic hypoxic resp failure w/ hx of late COPD and O2 dependence/aspiration pneumonia/pleural effusion: s/p left thoracentesis: Pulmonology input appreciated, on duonebs, solumedrol and lasix stopped today - s/p augmentin trial ( 10 days completed ) - wean O2 prn to baseline - encouraged to use CPAP at night -  recommended comfort care measures- d/w family- they do not want comfort measures at this time. 
 
-Enlarged esophagus with material suspect esophagitis/achalasia: too high risk for EGD according to GI - on PPI x 2 months - she passed MSB test - follow speech recommendations - Aortic stenosis, AKILA - cardiology input appreciated - continue BB - to be referred as outpatient 
 
-Confusion- multifactorial from sundowning, meds, infection- on depakote sprinkles- mental status has improved. -Insomnia- zyprexa at night -HTN- titrate meds DVT: heparin, GCS Code status: DNR/DNI Disposition: consult to [facility]. Awaiting input, otherwise, patient will be transfer to SNF later this week. Signed By: Benji Pedraza MD   
 October 4, 2018

## 2018-10-04 NOTE — PROGRESS NOTES
TRANSFER - OUT REPORT: 
 
Verbal report given to Kristin Sosa RN on [place]  being transferred to [facility](unit) for routine progression of care Report consisted of patients Situation, Background, Assessment and  
Recommendations(SBAR). Information from the following report(s) SBAR, Kardex, Intake/Output and MAR was reviewed with the receiving nurse. Opportunity for questions and clarification was provided. Patient transported with: 
 O2 @ 7 liters Patients medications from home

## 2018-10-04 NOTE — PROGRESS NOTES
AM assessment completed. Pt Alert and oriented to person and place. PT in bed eating breakfast. Respirations even, unlabored and diminished on 7L high flow humidified 02 via NC. Continuous pulse oximetry (on left big toe). Bowel sounds active. Bed low and locked, call light within reach, instructed to use call button if PT has any needs. Will continue to monitor.

## 2018-10-04 NOTE — PROGRESS NOTES
Alexia Rucker Admission Date: 9/24/2018 Daily Progress Note: 10/4/2018 The patient's chart is reviewed and the patient is discussed with the staff. 
 
  
79 y/o female with O2 dependent COPD (6L QHS and 4 L AM) and ? ILD/dementia/debility presents to the ED in acute respiratory distress with vomiting. She lives in assisted living at the [facility] living and able to do most ADLs (eating, restroom use, but not dressing) and apparently had been having multiple bouts of vomiting and abdominal discomfort and sats were dropping. She reported did expectorate all contents and did not aspirate. No diarrhea. No fevers, no prior episodes. She was placed on BIPAP for worsening hypoxemia and concern for ?aspiration but able to be weaned back to NC.  Thoracentesis on 10/2 with 250ml of fluid removed from left side. Subjective:  
Was more confused starting late on Tuesday. WBC rising. Was weaned to 5lpm yesterday but not wearing CPAP at night and  Now on 11lpm.  Now with elevated bicarb (40) on IV diuretics Current Facility-Administered Medications Medication Dose Route Frequency  haloperidol lactate (HALDOL) injection 2 mg  2 mg IntraVENous Q6H PRN  
 enoxaparin (LOVENOX) injection 30 mg  30 mg SubCUTAneous Q24H  
 amoxicillin-clavulanate (AUGMENTIN) 500-125 mg per tablet 1 Tab  1 Tab Oral BID WITH MEALS  
 Saccharomyces boulardii (FLORASTOR) capsule 250 mg  250 mg Oral BID  hydrALAZINE (APRESOLINE) 20 mg/mL injection 10 mg  10 mg IntraVENous Q6H PRN  
 lisinopril (PRINIVIL, ZESTRIL) tablet 20 mg  20 mg Oral DAILY  OLANZapine (ZyPREXA) tablet 2.5 mg  2.5 mg Oral QPM  
 methylPREDNISolone (PF) (SOLU-MEDROL) injection 20 mg  20 mg IntraVENous DAILY  divalproex (DEPAKOTE SPRINKLE) capsule 125 mg  125 mg Oral BID  pantoprazole (PROTONIX) tablet 40 mg  40 mg Oral ACB&D  
 busPIRone (BUSPAR) tablet 10 mg (Patient Supplied)  10 mg Oral BID  
  albuterol (PROVENTIL VENTOLIN) nebulizer solution 2.5 mg  2.5 mg Nebulization QID RT And  
 budesonide (PULMICORT) 500 mcg/2 ml nebulizer suspension  500 mcg Nebulization BID RT  
 metoprolol tartrate (LOPRESSOR) tablet 12.5 mg  12.5 mg Oral BID  polyethylene glycol (MIRALAX) packet 17 g  17 g Oral DAILY  Azelastine (ASTEPRO) 0.15 % (205.5 mcg) nasal spray 1 Spray (Patient Supplied)  1 Spray Both Nostrils BID  
 donepezil (ARICEPT) tablet 5 mg  5 mg Oral QHS  fluticasone (FLONASE) 50 mcg/actuation nasal spray 2 Spray  2 Spray Both Nostrils DAILY  sodium chloride (NS) flush 5-10 mL  5-10 mL IntraVENous Q8H  
 sodium chloride (NS) flush 5-10 mL  5-10 mL IntraVENous PRN  
 naloxone (NARCAN) injection 0.4 mg  0.4 mg IntraVENous PRN  
 acetaminophen (TYLENOL) tablet 650 mg  650 mg Oral Q4H PRN  
 bisacodyl (DULCOLAX) tablet 5 mg  5 mg Oral DAILY PRN  
 ondansetron (ZOFRAN) injection 4 mg  4 mg IntraVENous Q4H PRN  
 guaiFENesin-dextromethorphan (ROBITUSSIN DM) 100-10 mg/5 mL syrup 10 mL  10 mL Oral Q6H PRN  
 sertraline (ZOLOFT) tablet 50 mg  50 mg Oral QHS Review of Systems Unable to obtain due to patient sleepiness Objective:  
 
Vitals:  
 10/04/18 8001 10/04/18 2083 10/04/18 4857 10/04/18 4640 BP: 124/56 116/62 103/57 Pulse: 69 72 67 Resp: 18 20 20 Temp: 96.6 °F (35.9 °C) 96.3 °F (35.7 °C) 98.2 °F (36.8 °C) SpO2: 94% 93% 97% 99% Weight:      
Height:      
 
Intake and Output:  
  
  
 
Physical Exam:  
Constitution:  the patient is well developed and in no acute distress on CPAP. EENMT:  Sclera clear, pupils equal, oral mucosa moist 
Respiratory: CRACKLES IN RIGHT BASE, DECREASED ON LEFT BASE Cardiovascular:  RRR with mild SM Gastrointestinal: soft and non-tender; with positive bowel sounds. Musculoskeletal: warm without cyanosis. There is trace lower leg edema, WORSE ON LEFT WITH TENDERNESS Skin:  no jaundice or rashes Neurologic: no gross neuro deficits Psychiatric:  Awake and confused; falls asleep quickly CXR:  
10/3: 
 
 
 
 
Chest CT 9/25: 
 
 
 
 
IMPRESSION: No convincing interstitial lung disease seen but there is bilateral 
effusions and overlying atelectasis or infiltrates larger on the left than the 
right. Fluid filling esophagus could be reflux, increasing risk of aspiration 
but no material seen in the trachea or bronchi at this time. Probable simple 
cysts and nonobstructing left kidney stone. CTPE 10/1: 
 
1. No pulmonary emboli. 2. Left pleural effusion with partial consolidation of the left lower lobe, 
suspicious for pneumonia. Aspiration pneumonia is also possible. 3. Diffuse esophageal thickening with luminal fluid, raising the possibility of 
reflux and/or esophagitis. This patient may be a risk for aspiration. 4. Left mediastinal and hilar lymph nodes are enlarged. These may be reactive. Follow-up chest CT is recommended in 4-6 weeks following completion of therapy 
to assess for resolution. 5. Incompletely characterized hyperenhancing lesion in the right hepatic lobe 
for which follow-up MRI may be obtained on a nonemergent basis should there be 
adequate clinical concern 6. Heterogeneous enhancement of the left kidney, suspicious for cortical 
infarct. 7. Slight increase in conspicuity of left thyroid nodule. If indicated, 
follow-up ultrasound may be performed on an outpatient basis. Echo: 
-  Left ventricle: Systolic function was hyperdynamic. Ejection fraction was estimated in the range of 65 % to 70 %. There were no regional wall motion 
abnormalities. There was dynamic obstruction likely contributed by the hyperdynamic function with chordal AKILA, with a peak velocity of 3.6 cm/s, a 
peak gradient of 53 mmHg, and a mean gradient of 15 mmHg. There was minimal increase in gradients with Valsalva. -  Aortic valve:  There was mild to moderate stenosis (mildly elevated Gradients with the hyperdynamic function; DI at 0.43; mostly restriction of the non 
coronary cusp). LAB No results for input(s): GLUCPOC in the last 72 hours. No lab exists for component: [place] Recent Labs 10/04/18 
 0600  10/03/18 
 0625  10/02/18 
 1005  10/01/18 21  WBC  13.3*  13.2*  12.5*  11.7* HGB  11.7  12.5  11.9  12.5 HCT  40.1  42.7  40.3  43.2 PLT  194  225  249  263 Recent Labs 10/04/18 
 0600  10/03/18 
 0625  10/02/18 
 1005   10/01/18 
 0945 NA  144  145  144   < >   --   
K  4.3  4.4  3.8   < >   --   
CL  99  100  101   < >   --   
CO2  41*  39*  35*   < >   --   
GLU  94  99  138*   < >   --   
BUN  39*  34*  26*   < >   --   
CREA  1.19*  1.02*  1.25*   < >   --   
MG   --    --    --    --   1.9 CA  9.9  9.3  9.4   < >   --   
 < > = values in this interval not displayed. No results for input(s): PH, PCO2, PO2, HCO3 in the last 72 hours. No results for input(s): LCAD, LAC in the last 72 hours. Assessment:  (Medical Decision Making) Hospital Problems  Date Reviewed: 6/26/2018 Codes Class Noted POA Pleural effusion, bilateral ICD-10-CM: J90 ICD-9-CM: 511.9  9/26/2018 Unknown Exudative effusion on left s/p thoracentesis. Nonrheumatic aortic valve stenosis ICD-10-CM: I35.0 ICD-9-CM: 424.1  9/26/2018 Unknown Mild to moderate Aspiration pneumonia ([facility] [address] ICD-10-CM: J69.0 ICD-9-CM: 507.0  9/24/2018 Yes Now on oral abx * (Principal)Acute respiratory failure with hypoxemia ([facility] [address] ICD-10-CM: J96.01 
ICD-9-CM: 518.81  9/24/2018 Yes With high oxygen requirement Late onset Alzheimer's disease without behavioral disturbance (Chronic) ICD-10-CM: G30.1, F02.80 ICD-9-CM: 331.0, 294.10  11/15/2017 Yes Delirium much improved. Debility ICD-10-CM: R53.81 ICD-9-CM: 799.3  11/30/2015 Yes Severe COPD (chronic obstructive pulmonary disease) (HCC) (Chronic) ICD-10-CM: J44.9 ICD-9-CM: 654  9/1/2015 Yes No wheezing, continue bronchodilators HTN (hypertension) (Chronic) ICD-10-CM: I10 
ICD-9-CM: 401.9  1/22/2014 Yes Hyperlipidemia (Chronic) ICD-10-CM: N86.6 ICD-9-CM: 272.4  1/22/2014 Yes FLAVIA- having witnessed apneas. Has been intolerant of BIPAP, continue APAP Plan:  (Medical Decision Making) --Recheck UA given delirium 
--Could stop augmentin at this point. Has had over 10 days of antibiotics. --Wean O2 as possible. Seems to need at least 7lpm continuously for now. --Recommend continued consideration of nighttime APAP. She would likely benefit from this. Family understanding of this and will work on coaxing her to try again. -- Stop IV diuretics today. Could resume her home dose lasix soon but would hold off on diuresis today. More than 50% of the time documented was spent in face-to-face contact with the patient and in the care of the patient on the floor/unit where the patient is located.  
 
Maria Isabel Weems MD

## 2018-10-04 NOTE — PROGRESS NOTES
Problem: Falls - Risk of 
Goal: *Absence of Falls Document Debby Kappa Fall Risk and appropriate interventions in the flowsheet. Outcome: Progressing Towards Goal 
Fall Risk Interventions: 
Mobility Interventions: Bed/chair exit alarm, Patient to call before getting OOB, PT Consult for mobility concerns, PT Consult for assist device competence, Utilize walker, cane, or other assistive device Mentation Interventions: Bed/chair exit alarm, Door open when patient unattended, Reorient patient, More frequent rounding Medication Interventions: Bed/chair exit alarm, Patient to call before getting OOB, Teach patient to arise slowly Elimination Interventions: Bed/chair exit alarm, Call light in reach, Patient to call for help with toileting needs, Toilet paper/wipes in reach, Toileting schedule/hourly rounds History of Falls Interventions: Bed/chair exit alarm, Door open when patient unattended

## 2018-10-04 NOTE — PROGRESS NOTES
Problem: Interdisciplinary Rounds Goal: Interdisciplinary Rounds Interdisciplinary team rounds were held 10/4/2018 with the following team members:Care Management, Nursing, Nutrition, Pharmacy, Physical Therapy and Physician and the child(shae). Plan of care discussed. See clinical pathway and/or care plan for interventions and desired outcomes.

## 2018-10-05 NOTE — PROGRESS NOTES
This note will not be viewable in [address] MELISSA outreach postponed for 30 days due to discharge to [facility].

## 2018-11-05 NOTE — PROGRESS NOTES
This note will not be viewable in [address] Attempted 30 day follow up for MELISSA following discharge from [facility]. Per [facility] patient noted to have discharged to the [facility] on 10/25/18. No MELISSA indicated at this time.

## 2018-11-15 NOTE — H&P (VIEW-ONLY)
[address] [place]. [facility], [address] 
[phone] Patient Name:  Omer Payne YOB: 1929 Office Visit 11/15/2018 CHIEF COMPLAINT:   
 
Chief Complaint Patient presents with  
[facility] Follow Up  
 O2/Oxygen  Pneumonia  Pleural Effusion  COPD HISTORY OF PRESENT ILLNESS:  
 
Extensive hospital records are reviewed: \"Hospital Course: Ms. Brandin Woodruff is a 79 y/o female with O2 dependent COPD ( 6 liters ) and ILD who was admitted on 9/24/18 with a diagnosis of acute respiratory distress, possible secondary to aspiration PNa. She resides at [place] [address] and was noted hypoxic and having cough and vomiting, reason to transfer her here. She was Placed on BIPAP and started on antibiotics. The patient had a ct chest w/o contrast on 09/25/2018 which showed bilateral effusions and overlying atelectasis or infiltrates, also dilated esophagus. Pulmonology was consulted,they recommended comfort care measures as the patient was intolerant of  bipap and cpap. The family declined. The patient has 3 daughters. 2 are in agreement with comfort care and 1 is not. So she they are requesting everything be done. GI was consulted for the dilated esophagus and concern for possible achalasia or lesion. They declined to do and egd because pt was to high risk respiratory wise. A barium swallow was done instead and the Patient was started on pureed diet and protonix bid. Echo showed AKILA and cardiology recommended a BB which was started. They will follow up in clinic.  Because of High o2 requirements a repeat ct chest with contrast to rule out a PE done on 10/01/2018 and showed: Left pleural effusion with partial consolidation of the left lower lobe,suspicious for pneumonia;  Diffuse esophageal thickening with luminal fluid. Pulmonology performed- thoracocentesis on 10/02/2018 with  ~ 250 ml of fluid removed from the left lung. Unfortunately the patient has continued to require high O2 requirements at night 10- 13L. She cannot go to rehab on these levels and we are looking at a SNF versus [facility] at this moment. Patient seems to be chronically deteriorating, she requires higher amount of oxygen at nights ( 10-11 liters ). She is unable to tolerate cpap/APAP.  aware and [facility] team was consulted. Patient was accepted and is being transferred today for continuance of care.  
  
CODE STATUS: DNR/DNI She transferred to [facility], had stay there from 10/4/2018 - 10/25/2018. [facility] discharge summary is reviewed. He is maintained with nebulizer therapy and completed antibiotics for pneumonia on October 23. She required high flow oxygen at 8 L/min with BiPAP at night. She was on a prednisone taper she had swallowing study by speech therapy. Activity was progressed with therapies. She was treated for UTI. She did experience some confusion and agitation, treated with Haldol. \" Complicated picture/hospitalization/[facility] stay, now at [facility]. Family was not in agreement with hospice. She has DNR in effect. She reports some increase in shortness of breath over the past few days, worse when supine. Had drainage of large left effusion while at [facility], daughter reports ~ 1 L. She is on lovenox. Had swallowing evaluation prior to d/c, no evidence of aspiration. Was requiring high flow rate O2 (10 L min), sat 98 - 100% today on 8 L, then 94% on 5 L. Having some issues with BIPAP, states \"smothering,\" daughter hired a sitter who assists patient with BIPAP, states wearing anywhere between 1 - 3 hours/night. BIPAP settings are unknown, daughter has card from [facility], [place], [place]. Previously,  ILD was felt to be mild. Chest CT 9/2018 describes no convincing ILD but + effusions and atelectasis/infiltrates. Past Medical History:  
Diagnosis Date  Arthropathy, unspecified, site unspecified  Benign paroxysmal positional vertigo  Cardiac dysrhythmia, unspecified  Debility, unspecified  Facet syndrome ([facility]  GIB (gastrointestinal bleeding) 1/31/2014  History of peptic ulcer disease 01/2014  Hypertension  IBS (irritable bowel syndrome)  Impaired fasting glucose  Lumbago  Memory loss  Meningioma ([facility] removed in [facility] about 1998  Musculoskeletal pain  OA (osteoarthritis) of knee  Palliative care encounter 5/8/2014  Rhinorrhea  Scoliosis  Sensorineural hearing loss  Spondylolisthesis Lumbar  TMJ dysfunction  Varicella  Vasomotor rhinitis  Vitamin D deficiency Problem List  Date Reviewed: 6/26/2018 Codes Class Noted Pleural effusion, bilateral ICD-10-CM: J90 ICD-9-CM: 511.9  9/26/2018 Nonrheumatic aortic valve stenosis ICD-10-CM: I35.0 ICD-9-CM: 424.1  9/26/2018 Aspiration pneumonia ([facility] ICD-10-CM: J69.0 ICD-9-CM: 507.0  9/24/2018 Acute respiratory failure with hypoxemia [facility]) ICD-10-CM: J96.01 
ICD-9-CM: 518.81  9/24/2018 History of meningioma ICD-10-CM: U43.612 
ICD-9-CM: V12.41  1/15/2018 Overview Signed 1/15/2018 11:38 AM by Rosita Coppola MD  
  Removed [id number] Late onset Alzheimer's disease without behavioral disturbance (Chronic) ICD-10-CM: G30.1, F02.80 ICD-9-CM: 331.0, 294.10  11/15/2017 Anxiety ICD-10-CM: F41.9 ICD-9-CM: 300.00  11/30/2015 Vitamin D deficiency ICD-10-CM: E55.9 ICD-9-CM: 268.9  11/30/2015 Debility ICD-10-CM: R53.81 ICD-9-CM: 799.3  11/30/2015 Edema ICD-10-CM: R60.9 ICD-9-CM: 782.3  9/3/2015 Hypoxia (Chronic) ICD-10-CM: R09.02 
ICD-9-CM: 799.02  9/1/2015 COPD (chronic obstructive pulmonary disease) (HCC) (Chronic) ICD-10-CM: J44.9 ICD-9-CM: 332  9/1/2015  Debility, unspecified (Chronic) ICD-10-CM: R53.81 
 ICD-9-CM: 799.3  9/1/2015 DDD (degenerative disc disease), lumbar ICD-10-CM: M51.36 
ICD-9-CM: 722.52  Unknown Scoliosis ICD-10-CM: M41.9 ICD-9-CM: 737.30  Unknown Iron deficiency anemia (Chronic) ICD-10-CM: D50.9 ICD-9-CM: 280.9  5/8/2014 Unspecified hearing loss (Chronic) ICD-10-CM: H91.90 ICD-9-CM: 389.9  5/8/2014 Urinary incontinence (Chronic) ICD-10-CM: R32 
ICD-9-CM: 788.30  5/8/2014 Unspecified vitamin D deficiency (Chronic) ICD-10-CM: E55.9 ICD-9-CM: 268.9  5/8/2014 HTN (hypertension) (Chronic) ICD-10-CM: I10 
ICD-9-CM: 401.9  1/22/2014 Hyperlipidemia (Chronic) ICD-10-CM: C51.0 ICD-9-CM: 272.4  1/22/2014 Past Surgical History:  
Procedure Laterality Date  HX APPENDECTOMY  HX CATARACT REMOVAL  2003 X2 WITH LENSE IMPLANT  HX COLONOSCOPY    
 HX KNEE ARTHROSCOPY    
 left knee  HX KNEE REPLACEMENT Left 2007 [address] Mening[address] brain [facility]  HX TONSILLECTOMY DIAGNOSTICS:   
 
CT of chest: 1/23/14: No CT evidence of pulmonary embolus. Mild chronic lung disease   
CXR: 3/20/14: Normal cardiomediastinal silhouette. Mild dependent subsegmental atelectasis bilateral lung bases. No evidence of pneumothorax, pleural effusion, or air space opacity. Visualized soft tissue and osseous structures otherwise unremarkable.   
Spirometry: 3/20/14: FVC: 1.66 (77%), FEV1: 0.89 (58%), FEV1/FVC: 54%   
LE US 3/2014 - negative for DVT   
Spirometry 5/23/14: Moderate obstructive defect, slight improvement in FVC and FEV 1. CXR 9/1: cardiac silhouette and mediastinum to be unremarkable. There is a persistent diffuse interstitial prominence most in keeping with chronic lung changes. No dense, consolidated airspace opacity noted. There is no pneumothorax. BNP 28 on 9/1 BMP 9/4/2015.  
Ambulatory oximetry 9/16/2015  Baseline saturation on room at rest was 83%, patient was noted to have shallow respirations. Saturation improved to 93% on room air with pursed lip breathing. Saturation maintaining 89% or above on room air with ambulation. CPFT's 2015 -moderate airway obstruction, normal lung volumes, decreased diffusion capacity.  There has been slight improvement in spirometry compared to her initial study. Spirometry 2016 - Moderate obstructive defect, no significant interval change Multiple chest x-rays 2018, 10/2018. Chest CT 18 -bilateral effusions, overlying atelectasis or infiltrates larger on the left than right. Echo 2018EF 65-70%. Mildmoderate aortic stenosis. Left lower extremity ultrasound 10/2018negative for DVT. Chest CT 10/1/2018 negative for PE. Left pleural effusion with partial consolidation left lower lobe. Chest CT 10/19/2018negative for PE. Bibasilar atelectasis or pneumonia left worse than right. CXR 10/24/2018 moderate left pleural effusion, tiny right pleural effusion. Social History Socioeconomic History  Marital status:  Spouse name: Not on file  Number of children: Not on file  Years of education: karthikeyan  Highest education level: Not on file Social Needs  Financial resource strain: Not on file  Food insecurity - worry: Not on file  Food insecurity - inability: Not on file  Transportation needs - medical: Not on file  Transportation needs - non-medical: Not on file Occupational History Employer: RETIRED Tobacco Use  Smoking status: Former Smoker Packs/day: 1.50 Years: 45.00 Pack years: 67.50 Types: Cigarettes Last attempt to quit: 1991 Years since quittin.8  Smokeless tobacco: Never Used Substance and Sexual Activity  Alcohol use: Yes Alcohol/week: 1.8 oz Types: 1 Glasses of wine, 1 Shots of liquor, 1 Standard drinks or equivalent per week Comment: OCCASIONAL  
 Drug use:  No  
  Sexual activity: Not on file Other Topics Concern  Not on file Social History Narrative Lives alone. Ambulates with walker/cane, has supportive daughters Family History Problem Relation Age of Onset  Cancer Mother Pancreatic  Cancer Father Ear Allergies Allergen Reactions  Bactroban [Mupirocin Calcium] Rash  Mupirocin Other (comments)  Sulfa (Sulfonamide Antibiotics) Unknown (comments)  Sulfamethoxazole-Trimethoprim Other (comments) Current Outpatient Medications Medication Sig  pantoprazole (PROTONIX) 40 mg tablet TAKE 1 TABLET BY MOUTH DAILY  bisacodyl (DULCOLAX) 5 mg EC tablet Take 1 Tab by mouth DIALYSIS PRN for Constipation.  budesonide (PULMICORT) 0.5 mg/2 mL nbsp 2 mL by Nebulization route two (2) times a day.  albuterol (PROVENTIL VENTOLIN) 2.5 mg /3 mL (0.083 %) nebulizer solution 3 mL by Nebulization route every four (4) hours as needed for Wheezing.  divalproex (DEPAKOTE SPRINKLE) 125 mg capsule Take 1 Cap by mouth two (2) times a day.  enoxaparin (LOVENOX) 30 mg/0.3 mL injection 0.3 mL by SubCUTAneous route every twenty-four (24) hours.  haloperidol lactate (HALDOL) 5 mg/mL injection 0.4 mL by IntraVENous route every six (6) hours as needed.  hydrALAZINE (APRESOLINE) 20 mg/mL injection 0.5 mL by IntraVENous route every six (6) hours as needed (SBP> 160 or dbp > 100).  lisinopril (PRINIVIL, ZESTRIL) 20 mg tablet Take 1 Tab by mouth daily.  metoprolol tartrate (LOPRESSOR) 25 mg tablet Take 0.5 Tabs by mouth two (2) times a day.  OLANZapine (ZYPREXA) 2.5 mg tablet Take 1 Tab by mouth every evening.  ondansetron (ZOFRAN) 4 mg/2 mL soln 2 mL by IntraVENous route every four (4) hours as needed.  polyethylene glycol (MIRALAX) 17 gram packet Take 1 Packet by mouth daily.  Azelastine (ASTEPRO) 0.15 % (205.5 mcg) nasal spray 1 [place] by Both Nostrils route two (2) times a day.  fluticasone (FLONASE) 50 mcg/actuation nasal spray 2 Sprays by Both Nostrils route daily. 2 sprays each nostril prn  calcium carbonate-vitamin D3 600 mg(1,500mg) -500 unit cap Take 1 Tab by mouth two (2) times a day.  Walker (ULTRA-LIGHT ROLLATOR) misc Use daily  busPIRone (BUSPAR) 10 mg tablet Take 1 Tab by mouth two (2) times a day.  donepezil (ARICEPT) 5 mg tablet Take 1 Tab by mouth nightly.  sertraline (ZOLOFT) 100 mg tablet Take 1 Tab by mouth nightly.  pravastatin (PRAVACHOL) 20 mg tablet Take 1 Tab by mouth nightly.  albuterol (PROAIR HFA) 90 mcg/actuation inhaler 2 puffs qid prn  Indications: Chronic Obstructive Pulmonary Disease  budesonide-formoterol (SYMBICORT) 160-4.5 mcg/actuation HFA inhaler 2 puffs bid, rinse mouth after use.  Compression Socks, Medium misc WEAR DAILY  DISABLED PLACARD (DISABLED PLACARD) DMV Use prn  Cholecalciferol, Vitamin D3, (VITAMIN D3) 1,000 unit cap Take 1,000 Units by mouth daily.  multivit-min-FA-lycopen-lutein (CERTAVITE SENIOR-ANTIOXIDANT) 0.4-300-250 mg-mcg-mcg tab Take  by mouth daily.  acetaminophen (TYLENOL) 325 mg tablet Take  by mouth every six (6) hours as needed for Pain. No current facility-administered medications for this visit. REVIEW OF SYSTEMS: 
 
CONSTITUTIONAL: 
There is no history of fever, chills, night sweats, weight loss, weight gain,  or lethargy/hypersomnolence. She is weak. CARDIAC: No  palpitations, orthopnea, murmurs, or edema. Reports episodes of left lateral lower rib discomfort last night, currently resolved. GI: 
No dysphagia, heartburn reflux, nausea/vomiting, diarrhea, abdominal pain, or bleeding. NEURO:  
There is no history of AMS, persistent headache, decreased level of consciousness, seizures, or motor or sensory deficits. PHYSICAL EXAM: 
 
Vitals:  
 11/15/18 1433 BP: 134/60 BP 1 Location: Left arm BP Patient Position: Sitting Pulse: 94 Resp: 16  
 Temp: 96 °F (35.6 °C) SpO2: 98% Comment: 10 lpm  
Weight: Comment: Stretcher patient Height: Comment: Stretcher patient There is no height or weight on file to calculate BMI. GENERAL APPEARANCE: 
The patient is over weight and in no respiratory distress. Examined on stretcher. HEENT: 
PERRL. Conjunctivae unremarkable. NECK/LYMPHATIC:  
Symmetrical with no elevation of jugular venous pulsation. Trachea midline. No thyroid enlargement. No cervical adenopathy. LUNGS:  
Normal respiratory effort with symmetrical lung expansion. Breath sounds - decreased left lung with dullness ~ 1/2 way up, no wheezing or crackles. HEART:  
There is a regular rate and rhythm. No murmur, rub, or gallop. There is no edema in the lower extremities. ABDOMEN: 
Soft and non-tender. No hepatosplenomegaly. Bowel sounds are normal.   
 
NEURO: 
The patient is alert and oriented to person, place, and time. (She does not remember me, I've seen her routinely for 4 years). Memory appears impaired and mood is normal.  No gross sensorimotor deficits are present. She is appropriately conversant DIAGNOSTIC TESTS: Studies were personally reviewed by me and discussed with the patient and daughter. PCXR:  
Results for orders placed during the hospital encounter of 09/24/18 XR CHEST PORT Narrative AP chest radiograph History: sob, 80 years Female  Patients daughter sts patient is having trouble 
breathing, he has COPD, also has been vomiting Comparison: Chest radiograph September 01, 2015 Findings:   Normal cardiomediastinal silhouette. Persistent nonspecific mild 
diffuse interstitial prominence. Probable trace bilateral pleural effusions 
unchanged. Decreased lung volumes. Mild subsegmental atelectasis bilateral 
lung bases. No evidence of pneumothorax. Visualized soft tissue and osseous 
structures otherwise unremarkable. Impression Impression:  Decreased lung volumes. CT of chest w contrast:   
Results for orders placed during the hospital encounter of 10/04/18 CT CHEST W CONT Narrative CTA OF THE CHEST - PE STUDY HISTORY: Shortness of breath and tachycardia. COMPARISON: None TECHNIQUE: A helical acquisition was performed through the chest utilizing 2.16EW slice thickness during the infusion of 100 cc of Isovue-370. 3-D post-processed images were created on an independent workstation. Multiplanar 
reformats were obtained. The exam was focused on the pulmonary arteries. Dose reduction techniques used: Automated exposure control, adjustment of the 
mAs and/or kVp according to patient's size, and iterative reconstruction 
techniques. FINDINGS: 
*  PULMONARY VESSELS: No evidence of pulmonary embolism. *  PLEURA / PERICARDIUM: Large left pleural effusion. Small right pleural 
effusion. *  JOSUE / MEDIASTINUM: Within normal limits. *  LUNGS: Bibasilar atelectasis or pneumonia left greater than right. Patchy 
airspace disease in the left upper lobe. COPD. *  TRACHEOBRONCHIAL TREE: Within normal limits. *  AORTA: Within normal limits. *  CORONARY ARTERIES: Abundant coronary artery calcification is seen. *  CHEST WALL/AXILLA: Within normal limits. *  VISUALIZED UPPER ABDOMEN: Within normal limits. *  SPINE / BONES: Within normal limits. *  ADDITIONAL COMMENTS: None. Impression IMPRESSION: 
 
No evidence of pulmonary embolism. Bibasilar atelectasis or pneumonia left worse than right. Left upper lobe pneumonia. COPD. Large left and small right pleural effusions. Coronary artery disease. Date of Dictation: 10/19/2018 3:12 AM  
 
 
 
 
CT of chest w/out contrast:    
Results for orders placed during the hospital encounter of 09/24/18 CT CHEST WO CONT Narrative CT chest without IV contrast with high-resolution imaging September 25, 2018 Reference exam: January 13, 2014 Indication: COPD, dementia with difficulty breathing, interstitial lung disease 
and infiltrates Technique: Radiation dose reduction techniques were used for this study using 
one or more of the following: automated exposure control; adjustment of mA 
and/or kV (according to patient size);  iterative reconstruction. 5 mm axial 
images were taken through the chest without IV contrast with high-resolution 
images obtained as well. FINDINGS: The esophagus is somewhat distended, with some fluid present and 
mildly thickened wall but no focal lesion seen. Imaging through the upper 
abdomen including adrenal glands shows probable cysts and non obstructing 
calcifications in the left kidney. There is a left-sided pleural effusion not 
present on the previous study with a much smaller right effusion with overlying 
atelectasis or infiltrates in the dependent portions of both lungs. There is no 
aspirated debris seen within the trachea or bronchi at this time. Impression IMPRESSION: No convincing interstitial lung disease seen but there is bilateral 
effusions and overlying atelectasis or infiltrates larger on the left than the 
right. Fluid filling esophagus could be reflux, increasing risk of aspiration 
but no material seen in the trachea or bronchi at this time. Probable simple 
cysts and nonobstructing left kidney stone. Repeat O2 sat 100% on 8 lpm, 94% on 5 lpm. 
 
ASSESSMENT:   (Medical Decision Making) Encounter Diagnoses Name Primary?  Recurrent left pleural effusion Yes  Chronic respiratory failure with hypoxia and hypercapnia (HCC)  Chronic obstructive pulmonary disease, unspecified COPD type ([address]  Hypoxemia  ILD (interstitial lung disease) ([address]  Aspiration pneumonia of left lower lobe due to vomit ([address] Complicated picture/hospitalization/[facility] stay, now at [facility]. Family was not in agreement with hospice. She has DNR in effect. She reports some increase in shortness of breath over the past few days, worse when supine. Lung sounds left lung base very diminished and dull ~ 1/2 way up. Suspect recurrence of effusion. Had drainage of large left effusion while at [facility], daughter reports ~ 1 L. She is on lovenox. Had swallowing evaluation prior to d/c, no evidence of aspiration. Was requiring high flow rate O2 (10 L min), sat 98 - 100% today on 8 L, then 94% on 5 L. Having some issues with BIPAP, states \"smothering,\" daughter hired a sitter who assists patient with BIPAP, states wearing anywhere between 1 - 3 hours/night. BIPAP settings are unknown, daughter has card from [place], [place]. Previously,  ILD was felt to be mild. Chest CT 9/2018 describes no convincing ILD but + effusions and atelectasis/infiltrates. PLAN:  
Hold lovenox tonite and tomorrow morning. Bronch lab for ultrasound and possible thoracentesis of left pleural effusion, CXR after tap. Overnight oximetry on BIPAP/O2 - will communicate with [facility], [facility]. Will also ask for assistance with BIPAP and need to determine BIPAP settings. Follow up in 4 weeks with CXR, sooner if needed. Continue nebulizer therapy as she is doing. Titrate Oxgyen to maintain O2 sat 90% or >. 
 
 
Orders Placed This Encounter  SCHEDULE PROCEDURE Ultrasound, thoracentesis of left pleural effusion. Hold lovenox PM 11/15 and AM 11/16.  AMB SUPPLY ORDER Overnight oximetry on BIPAP and O2. Titrate O2 to keep sat > 90%. Please contact our office with BIPAPsettings. Follow-up Disposition: 
Return in about 4 weeks (around 12/13/2018) for MD or [place], 40 min appt, COPD, hypoxia, pleural effusion, w/ CXR.   
 
Niki Waldron NP 
 
 Total  time spent with patient - 55  min. Over 50% of today's office visit was spent in face to face time reviewing test results, prognosis, importance of compliance, education about disease process, benefits of medications, instructions for management of acute symptoms, and follow up plans. Collaborating MD: Dr. Amanda Lee Electronically signed. Dictated using voice recognition software.   Proof read but unrecognized errors may exist.

## 2018-11-15 NOTE — H&P (VIEW-ONLY)
[address] [place]. [address] [facility], [address] [facility] 
[phone] Patient Name:  Farida Akins YOB: 1929 Office Visit 11/15/2018 CHIEF COMPLAINT:   
 
Chief Complaint Patient presents with  
[facility] Follow Up  
 O2/Oxygen  Pneumonia  Pleural Effusion  COPD HISTORY OF PRESENT ILLNESS:  
 
Extensive hospital records are reviewed: \"Hospital Course: Ms. Aniyah Jean is a 81 y/o female with O2 dependent COPD ( 6 liters ) and ILD who was admitted on 9/24/18 with a diagnosis of acute respiratory distress, possible secondary to aspiration PNa. She resides at [place] [address] and was noted hypoxic and having cough and vomiting, reason to transfer her here. She was Placed on BIPAP and started on antibiotics. The patient had a ct chest w/o contrast on 09/25/2018 which showed bilateral effusions and overlying atelectasis or infiltrates, also dilated esophagus. Pulmonology was consulted,they recommended comfort care measures as the patient was intolerant of  bipap and cpap. The family declined. The patient has 3 daughters. 2 are in agreement with comfort care and 1 is not. So she they are requesting everything be done. GI was consulted for the dilated esophagus and concern for possible achalasia or lesion. They declined to do and egd because pt was to high risk respiratory wise. A barium swallow was done instead and the Patient was started on pureed diet and protonix bid. Echo showed AKILA and cardiology recommended a BB which was started. They will follow up in clinic.  Because of High o2 requirements a repeat ct chest with contrast to rule out a PE done on 10/01/2018 and showed: Left pleural effusion with partial consolidation of the left lower lobe,suspicious for pneumonia;  Diffuse esophageal thickening with luminal fluid. Pulmonology performed- thoracocentesis on 10/02/2018 with  ~ 250 ml of fluid removed from the left lung. Unfortunately the patient has continued to require high O2 requirements at night 10- 13L. She cannot go to rehab on these levels and we are looking at a SNF versus [facility] at this moment. Patient seems to be chronically deteriorating, she requires higher amount of oxygen at nights ( 10-11 liters ). She is unable to tolerate cpap/APAP.  aware and [facility] team was consulted. Patient was accepted and is being transferred today for continuance of care.  
  
CODE STATUS: DNR/DNI She transferred to [facility], had stay there from 10/4/2018 - 10/25/2018. [facility] discharge summary is reviewed. He is maintained with nebulizer therapy and completed antibiotics for pneumonia on October 23. She required high flow oxygen at 8 L/min with BiPAP at night. She was on a prednisone taper she had swallowing study by speech therapy. Activity was progressed with therapies. She was treated for UTI. She did experience some confusion and agitation, treated with Haldol. \" Complicated picture/hospitalization/[facility] stay, now at [facility]. Family was not in agreement with hospice. She has DNR in effect. She reports some increase in shortness of breath over the past few days, worse when supine. Had drainage of large left effusion while at [facility], daughter reports ~ 1 L. She is on lovenox. Had swallowing evaluation prior to d/c, no evidence of aspiration. Was requiring high flow rate O2 (10 L min), sat 98 - 100% today on 8 L, then 94% on 5 L. Having some issues with BIPAP, states \"smothering,\" daughter hired a sitter who assists patient with BIPAP, states wearing anywhere between 1 - 3 hours/night. BIPAP settings are unknown, daughter has card from [place], [place]. Previously,  ILD was felt to be mild. Chest CT 9/2018 describes no convincing ILD but + effusions and atelectasis/infiltrates. Past Medical History:  
Diagnosis Date  Arthropathy, unspecified, site unspecified  Benign paroxysmal positional vertigo  Cardiac dysrhythmia, unspecified  Debility, unspecified  Facet syndrome ([facility]  GIB (gastrointestinal bleeding) 1/31/2014  History of peptic ulcer disease 01/2014  Hypertension  IBS (irritable bowel syndrome)  Impaired fasting glucose  Lumbago  Memory loss  Meningioma ([facility] removed in [facility] about 1998  Musculoskeletal pain  OA (osteoarthritis) of knee  Palliative care encounter 5/8/2014  Rhinorrhea  Scoliosis  Sensorineural hearing loss  Spondylolisthesis Lumbar  TMJ dysfunction  Varicella  Vasomotor rhinitis  Vitamin D deficiency Problem List  Date Reviewed: 6/26/2018 Codes Class Noted Pleural effusion, bilateral ICD-10-CM: J90 ICD-9-CM: 511.9  9/26/2018 Nonrheumatic aortic valve stenosis ICD-10-CM: I35.0 ICD-9-CM: 424.1  9/26/2018 Aspiration pneumonia ([facility] ICD-10-CM: J69.0 ICD-9-CM: 507.0  9/24/2018 Acute respiratory failure with hypoxemia [facility]) ICD-10-CM: J96.01 
ICD-9-CM: 518.81  9/24/2018 History of meningioma ICD-10-CM: K10.212 
ICD-9-CM: V12.41  1/15/2018 Overview Signed 1/15/2018 11:38 AM by Carlyn Dempsey MD  
  Removed [id number] Late onset Alzheimer's disease without behavioral disturbance (Chronic) ICD-10-CM: G30.1, F02.80 ICD-9-CM: 331.0, 294.10  11/15/2017 Anxiety ICD-10-CM: F41.9 ICD-9-CM: 300.00  11/30/2015 Vitamin D deficiency ICD-10-CM: E55.9 ICD-9-CM: 268.9  11/30/2015 Debility ICD-10-CM: R53.81 ICD-9-CM: 799.3  11/30/2015 Edema ICD-10-CM: R60.9 ICD-9-CM: 782.3  9/3/2015 Hypoxia (Chronic) ICD-10-CM: R09.02 
ICD-9-CM: 799.02  9/1/2015 COPD (chronic obstructive pulmonary disease) (HCC) (Chronic) ICD-10-CM: J44.9 ICD-9-CM: 055  9/1/2015  Debility, unspecified (Chronic) ICD-10-CM: R53.81 
 ICD-9-CM: 799.3  9/1/2015 DDD (degenerative disc disease), lumbar ICD-10-CM: M51.36 
ICD-9-CM: 722.52  Unknown Scoliosis ICD-10-CM: M41.9 ICD-9-CM: 737.30  Unknown Iron deficiency anemia (Chronic) ICD-10-CM: D50.9 ICD-9-CM: 280.9  5/8/2014 Unspecified hearing loss (Chronic) ICD-10-CM: H91.90 ICD-9-CM: 389.9  5/8/2014 Urinary incontinence (Chronic) ICD-10-CM: R32 
ICD-9-CM: 788.30  5/8/2014 Unspecified vitamin D deficiency (Chronic) ICD-10-CM: E55.9 ICD-9-CM: 268.9  5/8/2014 HTN (hypertension) (Chronic) ICD-10-CM: I10 
ICD-9-CM: 401.9  1/22/2014 Hyperlipidemia (Chronic) ICD-10-CM: X17.3 ICD-9-CM: 272.4  1/22/2014 Past Surgical History:  
Procedure Laterality Date  HX APPENDECTOMY  HX CATARACT REMOVAL  2003 X2 WITH LENSE IMPLANT  HX COLONOSCOPY    
 HX KNEE ARTHROSCOPY    
 left knee  HX KNEE REPLACEMENT Left 2007 [address] brain [facility]  HX TONSILLECTOMY DIAGNOSTICS:   
 
CT of chest: 1/23/14: No CT evidence of pulmonary embolus. Mild chronic lung disease   
CXR: 3/20/14: Normal cardiomediastinal silhouette. Mild dependent subsegmental atelectasis bilateral lung bases. No evidence of pneumothorax, pleural effusion, or air space opacity. Visualized soft tissue and osseous structures otherwise unremarkable.   
Spirometry: 3/20/14: FVC: 1.66 (77%), FEV1: 0.89 (58%), FEV1/FVC: 54%   
LE US 3/2014 - negative for DVT   
Spirometry 5/23/14: Moderate obstructive defect, slight improvement in FVC and FEV 1. CXR 9/1: cardiac silhouette and mediastinum to be unremarkable. There is a persistent diffuse interstitial prominence most in keeping with chronic lung changes. No dense, consolidated airspace opacity noted. There is no pneumothorax. BNP 28 on 9/1 BMP 9/4/2015.  
Ambulatory oximetry 9/16/2015  Baseline saturation on room at rest was 83%, patient was noted to have shallow respirations. Saturation improved to 93% on room air with pursed lip breathing. Saturation maintaining 89% or above on room air with ambulation. CPFT's 2015 -moderate airway obstruction, normal lung volumes, decreased diffusion capacity.  There has been slight improvement in spirometry compared to her initial study. Spirometry 2016 - Moderate obstructive defect, no significant interval change Multiple chest x-rays 2018, 10/2018. Chest CT 18 -bilateral effusions, overlying atelectasis or infiltrates larger on the left than right. Echo 2018EF 65-70%. Mildmoderate aortic stenosis. Left lower extremity ultrasound 10/2018negative for DVT. Chest CT 10/1/2018 negative for PE. Left pleural effusion with partial consolidation left lower lobe. Chest CT 10/19/2018negative for PE. Bibasilar atelectasis or pneumonia left worse than right. CXR 10/24/2018 moderate left pleural effusion, tiny right pleural effusion. Social History Socioeconomic History  Marital status:  Spouse name: Not on file  Number of children: Not on file  Years of education: karthikeyan  Highest education level: Not on file Social Needs  Financial resource strain: Not on file  Food insecurity - worry: Not on file  Food insecurity - inability: Not on file  Transportation needs - medical: Not on file  Transportation needs - non-medical: Not on file Occupational History Employer: RETIRED Tobacco Use  Smoking status: Former Smoker Packs/day: 1.50 Years: 45.00 Pack years: 67.50 Types: Cigarettes Last attempt to quit: 1991 Years since quittin.8  Smokeless tobacco: Never Used Substance and Sexual Activity  Alcohol use: Yes Alcohol/week: 1.8 oz Types: 1 Glasses of wine, 1 Shots of liquor, 1 Standard drinks or equivalent per week Comment: OCCASIONAL  
 Drug use:  No  
  Sexual activity: Not on file Other Topics Concern  Not on file Social History Narrative Lives alone. Ambulates with walker/cane, has supportive daughters Family History Problem Relation Age of Onset  Cancer Mother Pancreatic  Cancer Father Ear Allergies Allergen Reactions  Bactroban [Mupirocin Calcium] Rash  Mupirocin Other (comments)  Sulfa (Sulfonamide Antibiotics) Unknown (comments)  Sulfamethoxazole-Trimethoprim Other (comments) Current Outpatient Medications Medication Sig  pantoprazole (PROTONIX) 40 mg tablet TAKE 1 TABLET BY MOUTH DAILY  bisacodyl (DULCOLAX) 5 mg EC tablet Take 1 Tab by mouth DIALYSIS PRN for Constipation.  budesonide (PULMICORT) 0.5 mg/2 mL nbsp 2 mL by Nebulization route two (2) times a day.  albuterol (PROVENTIL VENTOLIN) 2.5 mg /3 mL (0.083 %) nebulizer solution 3 mL by Nebulization route every four (4) hours as needed for Wheezing.  divalproex (DEPAKOTE SPRINKLE) 125 mg capsule Take 1 Cap by mouth two (2) times a day.  enoxaparin (LOVENOX) 30 mg/0.3 mL injection 0.3 mL by SubCUTAneous route every twenty-four (24) hours.  haloperidol lactate (HALDOL) 5 mg/mL injection 0.4 mL by IntraVENous route every six (6) hours as needed.  hydrALAZINE (APRESOLINE) 20 mg/mL injection 0.5 mL by IntraVENous route every six (6) hours as needed (SBP> 160 or dbp > 100).  lisinopril (PRINIVIL, ZESTRIL) 20 mg tablet Take 1 Tab by mouth daily.  metoprolol tartrate (LOPRESSOR) 25 mg tablet Take 0.5 Tabs by mouth two (2) times a day.  OLANZapine (ZYPREXA) 2.5 mg tablet Take 1 Tab by mouth every evening.  ondansetron (ZOFRAN) 4 mg/2 mL soln 2 mL by IntraVENous route every four (4) hours as needed.  polyethylene glycol (MIRALAX) 17 gram packet Take 1 Packet by mouth daily.  Azelastine (ASTEPRO) 0.15 % (205.5 mcg) nasal spray 1 Helen by Both Nostrils route two (2) times a day.  fluticasone (FLONASE) 50 mcg/actuation nasal spray 2 Sprays by Both Nostrils route daily. 2 sprays each nostril prn  calcium carbonate-vitamin D3 600 mg(1,500mg) -500 unit cap Take 1 Tab by mouth two (2) times a day.  Walker (ULTRA-LIGHT ROLLATOR) misc Use daily  busPIRone (BUSPAR) 10 mg tablet Take 1 Tab by mouth two (2) times a day.  donepezil (ARICEPT) 5 mg tablet Take 1 Tab by mouth nightly.  sertraline (ZOLOFT) 100 mg tablet Take 1 Tab by mouth nightly.  pravastatin (PRAVACHOL) 20 mg tablet Take 1 Tab by mouth nightly.  albuterol (PROAIR HFA) 90 mcg/actuation inhaler 2 puffs qid prn  Indications: Chronic Obstructive Pulmonary Disease  budesonide-formoterol (SYMBICORT) 160-4.5 mcg/actuation HFA inhaler 2 puffs bid, rinse mouth after use.  Compression Socks, Medium misc WEAR DAILY  DISABLED PLACARD (DISABLED PLACARD) DMV Use prn  Cholecalciferol, Vitamin D3, (VITAMIN D3) 1,000 unit cap Take 1,000 Units by mouth daily.  multivit-min-FA-lycopen-lutein (CERTAVITE SENIOR-ANTIOXIDANT) 0.4-300-250 mg-mcg-mcg tab Take  by mouth daily.  acetaminophen (TYLENOL) 325 mg tablet Take  by mouth every six (6) hours as needed for Pain. No current facility-administered medications for this visit. REVIEW OF SYSTEMS: 
 
CONSTITUTIONAL: 
There is no history of fever, chills, night sweats, weight loss, weight gain,  or lethargy/hypersomnolence. She is weak. CARDIAC: No  palpitations, orthopnea, murmurs, or edema. Reports episodes of left lateral lower rib discomfort last night, currently resolved. GI: 
No dysphagia, heartburn reflux, nausea/vomiting, diarrhea, abdominal pain, or bleeding. NEURO:  
There is no history of AMS, persistent headache, decreased level of consciousness, seizures, or motor or sensory deficits. PHYSICAL EXAM: 
 
Vitals:  
 11/15/18 1433 BP: 134/60 BP 1 Location: Left arm BP Patient Position: Sitting Pulse: 94 Resp: 16  
 Temp: 96 °F (35.6 °C) SpO2: 98% Comment: 10 lpm  
Weight: Comment: Stretcher patient Height: Comment: Stretcher patient There is no height or weight on file to calculate BMI. GENERAL APPEARANCE: 
The patient is over weight and in no respiratory distress. Examined on stretcher. HEENT: 
PERRL. Conjunctivae unremarkable. NECK/LYMPHATIC:  
Symmetrical with no elevation of jugular venous pulsation. Trachea midline. No thyroid enlargement. No cervical adenopathy. LUNGS:  
Normal respiratory effort with symmetrical lung expansion. Breath sounds - decreased left lung with dullness ~ 1/2 way up, no wheezing or crackles. HEART:  
There is a regular rate and rhythm. No murmur, rub, or gallop. There is no edema in the lower extremities. ABDOMEN: 
Soft and non-tender. No hepatosplenomegaly. Bowel sounds are normal.   
 
NEURO: 
The patient is alert and oriented to person, place, and time. (She does not remember me, I've seen her routinely for 4 years). Memory appears impaired and mood is normal.  No gross sensorimotor deficits are present. She is appropriately conversant DIAGNOSTIC TESTS: Studies were personally reviewed by me and discussed with the patient and daughter. PCXR:  
Results for orders placed during the hospital encounter of 09/24/18 XR CHEST PORT Narrative AP chest radiograph History: sob, 80 years Female  Patients daughter sts patient is having trouble 
breathing, he has COPD, also has been vomiting Comparison: Chest radiograph September 01, 2015 Findings:   Normal cardiomediastinal silhouette. Persistent nonspecific mild 
diffuse interstitial prominence. Probable trace bilateral pleural effusions 
unchanged. Decreased lung volumes. Mild subsegmental atelectasis bilateral 
lung bases. No evidence of pneumothorax. Visualized soft tissue and osseous 
structures otherwise unremarkable. Impression Impression:  Decreased lung volumes. CT of chest w contrast:   
Results for orders placed during the hospital encounter of 10/04/18 CT CHEST W CONT Narrative CTA OF THE CHEST - PE STUDY HISTORY: Shortness of breath and tachycardia. COMPARISON: None TECHNIQUE: A helical acquisition was performed through the chest utilizing 5.83IJ slice thickness during the infusion of 100 cc of Isovue-370. 3-D post-processed images were created on an independent workstation. Multiplanar 
reformats were obtained. The exam was focused on the pulmonary arteries. Dose reduction techniques used: Automated exposure control, adjustment of the 
mAs and/or kVp according to patient's size, and iterative reconstruction 
techniques. FINDINGS: 
*  PULMONARY VESSELS: No evidence of pulmonary embolism. *  PLEURA / PERICARDIUM: Large left pleural effusion. Small right pleural 
effusion. *  JOSUE / MEDIASTINUM: Within normal limits. *  LUNGS: Bibasilar atelectasis or pneumonia left greater than right. Patchy 
airspace disease in the left upper lobe. COPD. *  TRACHEOBRONCHIAL TREE: Within normal limits. *  AORTA: Within normal limits. *  CORONARY ARTERIES: Abundant coronary artery calcification is seen. *  CHEST WALL/AXILLA: Within normal limits. *  VISUALIZED UPPER ABDOMEN: Within normal limits. *  SPINE / BONES: Within normal limits. *  ADDITIONAL COMMENTS: None. Impression IMPRESSION: 
 
No evidence of pulmonary embolism. Bibasilar atelectasis or pneumonia left worse than right. Left upper lobe pneumonia. COPD. Large left and small right pleural effusions. Coronary artery disease. Date of Dictation: 10/19/2018 3:12 AM  
 
 
 
 
CT of chest w/out contrast:    
Results for orders placed during the hospital encounter of 09/24/18 CT CHEST WO CONT Narrative CT chest without IV contrast with high-resolution imaging September 25, 2018 Reference exam: January 13, 2014 Indication: COPD, dementia with difficulty breathing, interstitial lung disease 
and infiltrates Technique: Radiation dose reduction techniques were used for this study using 
one or more of the following: automated exposure control; adjustment of mA 
and/or kV (according to patient size);  iterative reconstruction. 5 mm axial 
images were taken through the chest without IV contrast with high-resolution 
images obtained as well. FINDINGS: The esophagus is somewhat distended, with some fluid present and 
mildly thickened wall but no focal lesion seen. Imaging through the upper 
abdomen including adrenal glands shows probable cysts and non obstructing 
calcifications in the left kidney. There is a left-sided pleural effusion not 
present on the previous study with a much smaller right effusion with overlying 
atelectasis or infiltrates in the dependent portions of both lungs. There is no 
aspirated debris seen within the trachea or bronchi at this time. Impression IMPRESSION: No convincing interstitial lung disease seen but there is bilateral 
effusions and overlying atelectasis or infiltrates larger on the left than the 
right. Fluid filling esophagus could be reflux, increasing risk of aspiration 
but no material seen in the trachea or bronchi at this time. Probable simple 
cysts and nonobstructing left kidney stone. Repeat O2 sat 100% on 8 lpm, 94% on 5 lpm. 
 
ASSESSMENT:   (Medical Decision Making) Encounter Diagnoses Name Primary?  Recurrent left pleural effusion Yes  Chronic respiratory failure with hypoxia and hypercapnia (HCC)  Chronic obstructive pulmonary disease, unspecified COPD type ([address]  Hypoxemia  ILD (interstitial lung disease) ([address]  Aspiration pneumonia of left lower lobe due to vomit ([address] Complicated picture/hospitalization/[facility] stay, now at [facility]. Family was not in agreement with hospice. She has DNR in effect. She reports some increase in shortness of breath over the past few days, worse when supine. Lung sounds left lung base very diminished and dull ~ 1/2 way up. Suspect recurrence of effusion. Had drainage of large left effusion while at [facility], daughter reports ~ 1 L. She is on lovenox. Had swallowing evaluation prior to d/c, no evidence of aspiration. Was requiring high flow rate O2 (10 L min), sat 98 - 100% today on 8 L, then 94% on 5 L. Having some issues with BIPAP, states \"smothering,\" daughter hired a sitter who assists patient with BIPAP, states wearing anywhere between 1 - 3 hours/night. BIPAP settings are unknown, daughter has card from [place], [place]. Previously,  ILD was felt to be mild. Chest CT 9/2018 describes no convincing ILD but + effusions and atelectasis/infiltrates. PLAN:  
Hold lovenox tonite and tomorrow morning. Bronch lab for ultrasound and possible thoracentesis of left pleural effusion, CXR after tap. Overnight oximetry on BIPAP/O2 - will communicate with [facility]. Will also ask for assistance with BIPAP and need to determine BIPAP settings. Follow up in 4 weeks with CXR, sooner if needed. Continue nebulizer therapy as she is doing. Titrate Oxgyen to maintain O2 sat 90% or >. 
 
 
Orders Placed This Encounter  SCHEDULE PROCEDURE Ultrasound, thoracentesis of left pleural effusion. Hold lovenox PM 11/15 and AM 11/16.  AMB SUPPLY ORDER Overnight oximetry on BIPAP and O2. Titrate O2 to keep sat > 90%. Please contact our office with BIPAPsettings. Follow-up Disposition: 
Return in about 4 weeks (around 12/13/2018) for MD or [place], 40 min appt, COPD, hypoxia, pleural effusion, w/ CXR.   
 
Mary Ellen Robbins, ALDO 
 
 Total  time spent with patient - 55  min. Over 50% of today's office visit was spent in face to face time reviewing test results, prognosis, importance of compliance, education about disease process, benefits of medications, instructions for management of acute symptoms, and follow up plans. Collaborating MD: Dr. Caitlyn Smith Electronically signed. Dictated using voice recognition software.   Proof read but unrecognized errors may exist.

## 2018-11-16 NOTE — PROCEDURES
PROCEDURE: 
 
DIAGNOSTIC/THERAPEUTIC THORACENTESIS/PLEURAL MANNOMETRY. PRE-OP DIAGNOSIS: 
 
L PLEURAL EFFUSION POST-OP DIAGNOSIS: 
 
L PLEURAL EFFUSION 
 
ASSISTANT: 
 
none ANESTHESIA: 
 
LOCAL ANESTHESIA WITH 1% LIDOCAINE 10 CC TOTAL. CHEST ULTRASOUND FINDINGS: 
 
A Turbo-M, Sonosite ultrasound with a 5-16 mHz probe was used to image the chest and localize the pleural effusion on the Left chest. 
 
A large anechoic space was seen on the Left consistent with an uncomplicated pleural effusion. DESCRIPTION OF PROCEDURE: 
 
After obtaining informed consent and localizing the safest location for thoracentesis, the  9th intercostal space was marked with a blunt, plastic needle cap in the mid scapular line. An [employer] AK-0100 Pleral-Seal thoracentesis kit was used to perform the procedure. The skin was  cleansed with the supplied  chlorhexididne swab and then draped in the usual fasion. Using the previously marked location as a giude, a 22 G 1.5 inch needle was used to inject 10 cc of 1% lidocaine into the skin and subcutaneous tissue, as well as onto the underlying rib and inter-costal muscles, pleural fluid was aspirated to assure proper location, prior to removing the anesthesia needle. A 3mm  incision was then made, with the supplied scalpel in the usual fashion to facilitate the insertiopn of the thoracentesis needle. The needle with an 8French thoracentesis catheter was then introduced into the chest through the previously made incision in the usual fashion, the rib localized with the needle, and the catheter then marched over the rib into the pleural space. After aspirating fluid, the thoracentesis catheter was then placed into the chest using the needle itself as a trocar. The needle was then removed and the catheter was attached to the supplied tubing without complication. 12cc of serosanguinouss fluid, was aspirated and sent for analysis. Following this a total of 1200 cc was removed from the Left chest, without complication, with the patient experiencing coughing during the procedure. Fluid was sent for the following tests: 
 
Cell count with differential 
LDH Glucose Total protein Cytology ADA AFB Routine culture and Gram stain Post procedure US confirmed complete drainage of the effusion. EBL:  
 
1 drop COMPLICATIONS: 
 
None Ki Sullivan MD.

## 2018-11-16 NOTE — DISCHARGE INSTRUCTIONS
Thoracentesis: What to Expect at Home  Your Recovery  Thoracentesis (say \"izdz-ei-nqv-MITCH-sis\") is a procedure to remove fluid from the space between the lungs and the chest wall (pleural space). This procedure may also be called a \"chest tap. \" It is normal to have a small amount of fluid in the pleural space. But too much fluid can build up because of problems such as infection, heart failure, or lung cancer. The procedure may have been done to help with shortness of breath and pain caused by the fluid buildup. Or you may have had this procedure so the doctor could test the fluid to find the cause of the buildup. Your chest may be sore where the doctor put the needle or catheter into your skin (the puncture site). This usually gets better after a day or two. You can go back to work or your normal activities as soon as you feel up to it. If the doctor sent the fluid to a lab for testing, it may take several days to get the results. The doctor or nurse will discuss the results with you. This care sheet gives you a general idea about how long it will take for you to recover. But each person recovers at a different pace. Follow the steps below to feel better as quickly as possible. How can you care for yourself at home? Activity    · Rest when you feel tired. Getting enough sleep will help you recover.     · Avoid strenuous activities, such as bicycle riding, jogging, weight lifting, or aerobic exercise, until your doctor says it is okay.     · You may shower. Do not take a bath until the puncture site has healed, or until your doctor tells you it is okay.     · Ask your doctor when you can drive again.     · You may need to take 1 or 2 days off from work. It depends on the type of work you do and how you feel. Diet    · You can eat your normal diet.     · Drink plenty of fluids (unless your doctor tells you not to). Medicines    · Your doctor will tell you if and when you can restart your medicines.  He or she will also give you instructions about taking any new medicines.     · If you take blood thinners, such as warfarin (Coumadin), clopidogrel (Plavix), or aspirin, be sure to talk to your doctor. He or she will tell you if and when to start taking those medicines again. Make sure that you understand exactly what your doctor wants you to do.     · Be safe with medicines. Take pain medicines exactly as directed. ? If the doctor gave you a prescription medicine for pain, take it as prescribed. ? If you are not taking a prescription pain medicine, ask your doctor if you can take an over-the-counter medicine. ? Do not take two or more pain medicines at the same time unless the doctor told you to. Many pain medicines have acetaminophen, which is Tylenol. Too much acetaminophen (Tylenol) can be harmful.     · If you think your pain medicine is making you sick to your stomach:  ? Take your medicine after meals (unless your doctor has told you not to). ? Ask your doctor for a different pain medicine.     · If your doctor prescribed antibiotics, take them as directed. Do not stop taking them just because you feel better. You need to take the full course of antibiotics.    Care of the puncture site    · Wash the area daily with warm, soapy water, and pat it dry. Don't use hydrogen peroxide or alcohol, which may delay healing. You may cover the area with a gauze bandage if it weeps or rubs against clothing. Change the bandage every day.     · Keep the area clean and dry. Follow-up care is a key part of your treatment and safety. Be sure to make and go to all appointments, and call your doctor if you are having problems. It's also a good idea to know your test results and keep a list of the medicines you take. Please try and keep puncture site dry for next 24 hours.  Keep all follow-up appointments with [facility] and if you become short of breath please call [phone] for possible fluid drainage again.        When should you call for help? Call 911 anytime you think you may need emergency care. For example, call if:    · You passed out (lost consciousness).     · You have severe trouble breathing.     · You have sudden chest pain and shortness of breath, or you cough up blood.    Call your doctor now or seek immediate medical care if:    · You have shortness of breath that is new or getting worse.     · You have new or worse pain in your chest, especially when you take a deep breath.     · You are sick to your stomach or cannot keep fluids down.     · You have a fever over 100°F.     · Bright red blood has soaked through the bandage over your puncture site.     · You have signs of infection, such as:  ? Increased pain, swelling, warmth, or redness. ? Red streaks leading from the puncture site. ? Pus draining from the puncture site. ? Swollen lymph nodes in your neck, armpits, or groin. ? A fever.     · You cough up a lot more mucus than normal, or your mucus changes color.    Watch closely for changes in your health, and be sure to contact your doctor if you have any problems. Where can you learn more? Go to http://isidoro-iliana.info/. Enter V101 in the search box to learn more about \"Thoracentesis: What to Expect at Home. \"  Current as of: December 6, 2017  Content Version: 11.8  © 3100-2593 [employer]. Care instructions adapted under license by [id number] (which disclaims liability or warranty for this information). If you have questions about a medical condition or this instruction, always ask your healthcare professional. [place] [address] any warranty or liability for your use of this information.

## 2018-11-16 NOTE — INTERVAL H&P NOTE
H&P Update: 
Yanni Yanes was seen and examined. History and physical has been reviewed. The patient has been examined.  There have been no significant clinical changes since the completion of the originally dated History and Physical. 
 
Signed By: Thong Contreras MD   
 November 16, 2018 1:44 PM

## 2018-11-16 NOTE — PROGRESS NOTES
Pt. Discharged back to [facility] nursing facility by [employer]. All belongings returned to patient. Patient discharged via her personal wheelchair with personal O2 tank. Bilateral hearing aids are with her daughter Lovette Lefort. Vital signs stable. Able to tolerate PO ice chips.   Seen by MD.

## 2018-11-16 NOTE — PROGRESS NOTES
Dr. Akira Soliz at the bedside speaking with patient and daughter Akbar Terry. Patient reassessed by Dr. Akira Soliz. Opportunity for questions provided. VSS see flow sheet.

## 2018-11-16 NOTE — PROGRESS NOTES
Daughter concerned over patients complaints of discomfort. Reassured patient and daughter that this was expected after her procedure. Dr. Chiquis Beltrán called and made aware.

## 2018-11-18 PROBLEM — N39.0 UTI (URINARY TRACT INFECTION): Status: ACTIVE | Noted: 2018-01-01

## 2018-11-18 NOTE — ED NOTES
TRANSFER - OUT REPORT: 
 
Verbal report given to receiving RN on [address]  being transferred to [place] [address] for routine progression of care Report consisted of patients Situation, Background, Assessment and  
Recommendations(SBAR). Information from the following report(s) ED Summary, MAR and Recent Results was reviewed with the receiving nurse. Lines:  
Peripheral IV 11/18/18 Right Wrist (Active) Site Assessment Clean, dry, & intact 11/18/2018 10:02 AM  
Phlebitis Assessment 0 11/18/2018 10:02 AM  
Infiltration Assessment 0 11/18/2018 10:02 AM  
Dressing Status Clean, dry, & intact 11/18/2018 10:02 AM  
Hub Color/Line Status Pink 11/18/2018 10:02 AM  
  
 
Opportunity for questions and clarification was provided. Patient transported with: 
maurice

## 2018-11-18 NOTE — PROGRESS NOTES
Assessment complete via flow sheet. Pt A&Ox3. Respirations and unlabored, diminished in bases. S1 S2 auscultated. Pt on 10L NC. Pt  Has staples to back of head, scant bloody drainage. Pt reports pain level of 0 out of 10. Bowel sounds active, abdomen soft. Denies other needs. Bed in lowest position, side rails up 3, call bell in reach. Instructed to call for assistance. Pt verbalized understanding. Plan of care reviewed with patient. Daughter at bedside.

## 2018-11-18 NOTE — ED PROVIDER NOTES
HPI: 
80 F, here status post fall. She is on 10 L nasal cannula chronically. At assisted living facility with a sitter however she got up out of bed this morning unassisted and fell and hit the back of her head. Unable help in her back into bed she complained of few seconds of chest discomfort. She is otherwise at baseline at this time. No vomiting. Does not complain of any extremity pain, hip pain. Bleeding in the back of the scalp. Was recently on Lovenox up until 48 hours ago. ROS Constitutional: No fever, no chills Skin: no rash Eye: No vision changes ENMT:  
Respiratory: No shortness of breath Cardiovascular: + chest pain, no palpitations Gastrointestinal: No vomiting, no nausea, no diarrhea, no abdominal pain :  
MSK: No back pain, no muscle pain, no joint pain Neuro: No headache, no change in mental status, no numbness, no tingling, no weakness All other review of systems positive per history of present illness and the above otherwise negative or noncontributory. Visit Vitals BP 99/48 (BP 1 Location: Left arm, BP Patient Position: At rest) Temp 98.1 °F (36.7 °C) Resp 20 SpO2 94% Past Medical History:  
Diagnosis Date  Arthritis  Arthropathy, unspecified, site unspecified  Benign paroxysmal positional vertigo  Cardiac dysrhythmia, unspecified  Chronic obstructive pulmonary disease ([address]  Debility, unspecified  Dementia  Facet syndrome ([address]  GERD (gastroesophageal reflux disease)  GIB (gastrointestinal bleeding) 1/31/2014  History of peptic ulcer disease 01/2014  Hypertension  IBS (irritable bowel syndrome)  Ill-defined condition  Impaired fasting glucose  Lumbago  Memory loss  Meningioma ([address] removed in [facility] about 1998  Musculoskeletal pain  OA (osteoarthritis) of knee  Palliative care encounter 5/8/2014  Rhinorrhea  Scoliosis  Sensorineural hearing loss  Sleep apnea  Spondylolisthesis Lumbar  TMJ dysfunction  Varicella  Vasomotor rhinitis  Vitamin D deficiency Past Surgical History:  
Procedure Laterality Date  HX APPENDECTOMY  HX CATARACT REMOVAL  2003 X2 WITH LENSE IMPLANT  HX COLONOSCOPY    
 HX KNEE ARTHROSCOPY    
 left knee  HX KNEE REPLACEMENT Left 2007 [place] [facility]  HX TONSILLECTOMY Prior to Admission Medications Prescriptions Last Dose Informant Patient Reported? Taking? Azelastine (ASTEPRO) 0.15 % (205.5 mcg) nasal spray   No No  
Si [place] by Both Nostrils route two (2) times a day. Cholecalciferol, Vitamin D3, (VITAMIN D3) 1,000 unit cap   No No  
Sig: Take 1,000 Units by mouth daily. Compression Socks, Medium misc   No No  
Sig: WEAR DAILY DISABLED PLACARD (DISABLED PLACARD) DMV   No No  
Sig: Use prn OLANZapine (ZYPREXA) 2.5 mg tablet   No No  
Sig: Take 1 Tab by mouth every evening. OXYGEN-AIR DELIVERY SYSTEMS   Yes No  
Sig: 10 lpm cont. Walker (ULTRA-LIGHT ROLLATOR) misc   No No  
Sig: Use daily  
acetaminophen (TYLENOL) 325 mg tablet   Yes No  
Sig: Take  by mouth every six (6) hours as needed for Pain. albuterol (PROAIR HFA) 90 mcg/actuation inhaler   No No  
Si puffs qid prn  Indications: Chronic Obstructive Pulmonary Disease  
albuterol (PROVENTIL VENTOLIN) 2.5 mg /3 mL (0.083 %) nebulizer solution   No No  
Sig: 3 mL by Nebulization route every four (4) hours as needed for Wheezing. ascorbic acid, vitamin C, (VITAMIN C) 500 mg tablet   Yes No  
Sig: Take  by mouth.  
bipap machine kit   Yes No  
Sig: by Does Not Apply route. bisacodyl (DULCOLAX) 5 mg EC tablet   No No  
Sig: Take 1 Tab by mouth DIALYSIS PRN for Constipation. budesonide (PULMICORT) 0.5 mg/2 mL nbsp   No No  
Si mL by Nebulization route two (2) times a day. budesonide-formoterol (SYMBICORT) 160-4.5 mcg/actuation HFA inhaler   No No  
Si puffs bid, rinse mouth after use. busPIRone (BUSPAR) 10 mg tablet   No No  
Sig: Take 1 Tab by mouth two (2) times a day. calcium carbonate-vitamin D3 600 mg(1,500mg) -500 unit cap   No No  
Sig: Take 1 Tab by mouth two (2) times a day. divalproex (DEPAKOTE SPRINKLE) 125 mg capsule   No No  
Sig: Take 1 Cap by mouth two (2) times a day. donepezil (ARICEPT) 5 mg tablet   No No  
Sig: Take 1 Tab by mouth nightly. enoxaparin (LOVENOX) 30 mg/0.3 mL injection   No No  
Si.3 mL by SubCUTAneous route every twenty-four (24) hours. fluticasone (FLONASE) 50 mcg/actuation nasal spray   No No  
Si Sprays by Both Nostrils route daily. 2 sprays each nostril prn  
furosemide (LASIX) 40 mg tablet   Yes No  
Sig: Take  by mouth daily. haloperidol lactate (HALDOL) 5 mg/mL injection   No No  
Si.4 mL by IntraVENous route every six (6) hours as needed. hydrALAZINE (APRESOLINE) 20 mg/mL injection   No No  
Si.5 mL by IntraVENous route every six (6) hours as needed (SBP> 160 or dbp > 100). lisinopril (PRINIVIL, ZESTRIL) 20 mg tablet   No No  
Sig: Take 1 Tab by mouth daily. metoprolol tartrate (LOPRESSOR) 25 mg tablet   No No  
Sig: Take 0.5 Tabs by mouth two (2) times a day. multivit-min-FA-lycopen-lutein (CERTAVITE SENIOR-ANTIOXIDANT) 0.4-300-250 mg-mcg-mcg tab   Yes No  
Sig: Take  by mouth daily. ondansetron (ZOFRAN) 4 mg/2 mL soln   No No  
Si mL by IntraVENous route every four (4) hours as needed. pantoprazole (PROTONIX) 40 mg tablet   No No  
Sig: TAKE 1 TABLET BY MOUTH DAILY polyethylene glycol (MIRALAX) 17 gram packet   No No  
Sig: Take 1 Packet by mouth daily. potassium chloride (KLOR-CON M20) 20 mEq tablet   Yes No  
Sig: Take  by mouth two (2) times a day. pravastatin (PRAVACHOL) 20 mg tablet   No No  
Sig: Take 1 Tab by mouth nightly.   
predniSONE (DELTASONE) 10 mg tablet   Yes No  
 Sig: Take  by mouth daily (with breakfast). sertraline (ZOLOFT) 100 mg tablet   No No  
Sig: Take 1 Tab by mouth nightly. Facility-Administered Medications: None Adult Exam  
General: alert, no acute distress Head: normocephalic 
2 cm laceration in the back of the scalp. No FB  
ENT: moist mucous membranes Neck: supple, non-tender; full range of motion Cardiovascular: regular rate and rhythm, normal peripheral perfusion, no edema Respiratory:  normal respirations; no wheezing, rales or rhonchi Gastrointestinal: soft, non-tender; no rebound or guarding, no peritoneal signs, no distension Back: non-tender, full range of motion Musculoskeletal: normal range of motion, normal strength, no gross deformities Neurological: alert and oriented x 4, no gross focal deficits; normal speech Psychiatric: cooperative; appropriate mood and affect MDM:pelvic stable no pain in extremities, spine. We will obtain CT scan of the head. Assessment. Laceration repair. Obtain basic lab work due to a few seconds of chest discomfort. EKG sinus tachycardia rate of 102 with normal axis, interval.  No STEMI or ischemic changes noted. She is otherwise at baseline at this time. No urinary complaints. This is a mechanical fall. Cr elevated. Will give IVF. Family does not want to stay in hospital.  
 
9:37 AM 
Went to the bathroom. Produced urine. They noted dark stool. Rectal exam performed by me. Dark sticky stool. She is on iron and started on November 6. Hemoccult negative. Likely secondary to iron. Hemoglobin of 10.2 which is stable. 9:18 AM 
Procedure Note - Laceration repair Performed by: Buster Silveira MD 
Immediately prior to the procedure, the patient was reevaluated and found suitable for the planned procedure and any planned medications. Immediately prior to the procedure a timeout was called to verify the correct patient, procedure, equipment, and markings as appropriate. Complexity: simple A 2 cm laceration to the scalp was irrigated copiously, prepped with ChloraPrep and draped as appropriate. The area was anesthetized with 2 mLs of 1% lidocaine. The wound was explored and no foreign bodies were found. The wound was repaired with 5 staples. The wound was closed with good hemostasis and approximation. A dressing was applied. The procedure took 10 minutes. The patient tolerated the procedure well. 10:54 AM 
She also now has a UTI. Heart rate remained persistent over 100. Will check lactic acid level. We'll get blood culture. We'll give Rocephin. Spoke with the family. Some of the family member has concern about her staying in the hospital however at this time with her recent fall, creatinine of 0.8 that increased to 1.5 with an associated UTI with her overall deconditioned and I feel it is necessary to keep her and they are in agreement. She will be admitted to the hospitalist service. Recent Results (from the past 12 hour(s)) CBC W/O DIFF Collection Time: 11/18/18  7:36 AM  
Result Value Ref Range WBC 12.8 (H) 4.3 - 11.1 K/uL  
 RBC 3.82 (L) 4.05 - 5.2 M/uL  
 HGB 10.2 (L) 11.7 - 15.4 g/dL HCT 35.0 (L) 35.8 - 46.3 % MCV 91.6 79.6 - 97.8 FL  
 MCH 26.7 26.1 - 32.9 PG  
 MCHC 29.1 (L) 31.4 - 35.0 g/dL  
 RDW 16.6 % PLATELET 191 771 - 274 K/uL MPV 11.5 9.4 - 12.3 FL ABSOLUTE NRBC 0.00 0.0 - 0.2 K/uL METABOLIC PANEL, BASIC Collection Time: 11/18/18  7:36 AM  
Result Value Ref Range Sodium 141 136 - 145 mmol/L Potassium 5.5 (H) 3.5 - 5.1 mmol/L Chloride 104 98 - 107 mmol/L  
 CO2 30 21 - 32 mmol/L Anion gap 7 mmol/L Glucose 109 (H) 65 - 100 mg/dL BUN 37 (H) 8 - 23 MG/DL Creatinine 1.56 (H) 0.6 - 1.0 MG/DL  
 GFR est AA 40 (L) >60 ml/min/1.73m2 GFR est non-AA 33 ml/min/1.73m2 Calcium 9.6 8.3 - 10.4 MG/DL  
POC TROPONIN-I Collection Time: 11/18/18  7:39 AM  
Result Value Ref Range Troponin-I (POC) 0.03 0.02 - 0.05 ng/ml CT Results  (Last 48 hours)  
          
 11/18/18 0809  CT HEAD WO CONT Final result Impression:  IMPRESSION:   
1. No acute intracranial abnormality. 2. 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 3. Mild chronic white matter microangiopathic changes. Narrative:  CT of the head without contrast.  
   
CLINICAL INDICATION: Abrasion to the head after a fall PROCEDURE: Serial thin section axial images are obtained from the cranial vertex  
through the skull base without the administration of intravenous contrast.   
Radiation dose reduction techniques were used for this study. Our CT scanners  
use one or all of the following: Automated exposure control, adjusted of the mA  
and/or kV according to patient size, iterative reconstruction COMPARISON: Head CT dated 1/26/2014. FINDINGS: Examination is limited by motion degradation on multiple sequences. There is no acute intracranial hemorrhage, mass, or mass effect. No abnormal  
extra-axial fluid collections identified. There is no hydrocephalus. The basilar  
cisterns are widely patent. Encephalomalacia is again noted in the right frontal  
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 Otherwise the gray-white brain  
parenchymal interface is maintained. There is persistent mild periventricular  
white matter hypoattenuation. A right frontal craniotomy flap is again noted. No  
skull fracture or aggressive osseous lesion noted. The mastoid air cells and  
included paranasal sinuses are clear. No results found. No results found for this or any previous visit (from the past 24 hour(s)). Dragon voice recognition software was used to create this note. Although the note has been reviewed and corrected where necessary, additional errors may have been overlooked and remain in the text.

## 2018-11-18 NOTE — PROGRESS NOTES
TRANSFER - IN REPORT: 
 
Verbal report received from Mily(name) on [employer]  being received from ER(unit) for routine progression of care Report consisted of patients Situation, Background, Assessment and  
Recommendations(SBAR). Information from the following report(s) SBAR, ED Summary, Procedure Summary and Recent Results was reviewed with the receiving nurse. Opportunity for questions and clarification was provided.

## 2018-11-18 NOTE — H&P
HOSPITALIST H&P/CONSULTNAME:   [facility] Age:  80 y.o. 
:   1929 MRN:   [id number] PCP: Rosas Saldivar MD 
Consulting MD: Treatment Team: Attending Provider: Ruslan Palomares MD; Primary Nurse: Lashonda Christina RN 
HPI:  
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 She has a sitter but tried to get up to bathroom by herself and fell back hitting head, getting small laceration. She resides at [place] [id number] She is accompanied by two daughters, one of which is HCPOA She was recently admitted with aspiration, worse respiratory failure, pneumonia. Daughters report significant history of sundowning and agitation in hospital. 
 
In ER, she had lac repair on scalp, negative workup for other traumatic injury. She was noted to have pyuria and alicia, started on fluids and rocephin. At the moment, she feels fairly well. Breathing is at baseline, bottom is sore from fall, no headache or change in mental status. Endorses nausea but has appetite. Is intermittently incontinent of urine and has no dysuria or frequency. Complete ROS done and is as stated in HPI or otherwise negative Past Medical History:  
Diagnosis Date  Arthritis  Arthropathy, unspecified, site unspecified  Benign paroxysmal positional vertigo  Cardiac dysrhythmia, unspecified  Chronic obstructive pulmonary disease ([facility]  Debility, unspecified  Dementia  Facet syndrome ([facility]  GERD (gastroesophageal reflux disease)  GIB (gastrointestinal bleeding) 2014  History of peptic ulcer disease 2014  Hypertension  IBS (irritable bowel syndrome)  Ill-defined condition  Impaired fasting glucose  Lumbago  Memory loss  Meningioma ([facility] removed in [facility] about   Musculoskeletal pain  OA (osteoarthritis) of knee  Palliative care encounter 2014  Rhinorrhea  Scoliosis  Sensorineural hearing loss  Sleep apnea  Spondylolisthesis Lumbar  TMJ dysfunction  Varicella  Vasomotor rhinitis  Vitamin D deficiency Past Surgical History:  
Procedure Laterality Date  HX APPENDECTOMY  HX CATARACT REMOVAL  2003 X2 WITH LENSE IMPLANT  HX COLONOSCOPY    
 HX KNEE ARTHROSCOPY    
 left knee  HX KNEE REPLACEMENT Left 2007 [address] Meningioma Rt brain [facility]  HX TONSILLECTOMY Prior to Admission Medications Prescriptions Last Dose Informant Patient Reported? Taking? Azelastine (ASTEPRO) 0.15 % (205.5 mcg) nasal spray   No No  
Si [place] by Both Nostrils route two (2) times a day. Compression Socks, Medium misc   No No  
Sig: WEAR DAILY DISABLED PLACARD (DISABLED PLACARD) DMV   No No  
Sig: Use prn OXYGEN-AIR DELIVERY SYSTEMS   Yes No  
Sig: Keep SpO2> 90% Walker (ULTRA-LIGHT ROLLATOR) misc   No No  
Sig: Use daily  
acetaminophen (TYLENOL) 325 mg tablet   Yes Yes Sig: Take 325 mg by mouth every four (4) hours as needed for Pain. albuterol (PROVENTIL VENTOLIN) 2.5 mg /3 mL (0.083 %) nebulizer solution   No No  
Sig: 3 mL by Nebulization route every four (4) hours as needed for Wheezing. Patient taking differently: 2.5 mg by Nebulization route every four (4) hours. ascorbic acid, vitamin C, (VITAMIN C) 500 mg tablet   Yes No  
Sig: Take  by mouth.  
bipap machine kit   Yes No  
Sig: by Does Not Apply route. budesonide (PULMICORT) 0.5 mg/2 mL nbsp   No No  
Si mL by Nebulization route two (2) times a day. donepezil (ARICEPT) 5 mg tablet   No No  
Sig: Take 1 Tab by mouth nightly. furosemide (LASIX) 40 mg tablet   Yes No  
Sig: Take 40 mg by mouth daily. lisinopril (PRINIVIL, ZESTRIL) 20 mg tablet   No No  
Sig: Take 1 Tab by mouth daily.   
pantoprazole (PROTONIX) 40 mg tablet   No No  
 Sig: TAKE 1 TABLET BY MOUTH DAILY polyethylene glycol (MIRALAX) 17 gram packet   No No  
Sig: Take 1 Packet by mouth daily. potassium chloride (KLOR-CON M20) 20 mEq tablet   Yes No  
Sig: Take  by mouth two (2) times a day. predniSONE (DELTASONE) 10 mg tablet   Yes No  
Sig: Take  by mouth daily (with breakfast). sertraline (ZOLOFT) 100 mg tablet   No No  
Sig: Take 1 Tab by mouth nightly. Patient taking differently: Take 50 mg by mouth nightly. Facility-Administered Medications: None Allergies Allergen Reactions  Bactroban [Mupirocin Calcium] Rash  Mupirocin Other (comments)  Sulfa (Sulfonamide Antibiotics) Unknown (comments)  Sulfamethoxazole-Trimethoprim Other (comments) Social History Tobacco Use  Smoking status: Former Smoker Packs/day: 1.50 Years: 45.00 Pack years: 67.50 Types: Cigarettes Last attempt to quit: 1991 Years since quittin.8  Smokeless tobacco: Never Used Substance Use Topics  Alcohol use: Yes Alcohol/week: 1.8 oz Types: 1 Glasses of wine, 1 Shots of liquor, 1 Standard drinks or equivalent per week Comment: OCCASIONAL Family History Problem Relation Age of Onset  Cancer Mother Pancreatic  Cancer Father Ear Objective:  
 
Visit Vitals /50 Pulse (!) 108 Temp 98.1 °F (36.7 °C) Resp (!) 33 SpO2 97% Temp (24hrs), Av.1 °F (36.7 °C), Min:98.1 °F (36.7 °C), Max:98.1 °F (36.7 °C) Oxygen Therapy O2 Sat (%): 97 % (18 1125) Pulse via Oximetry: 99 beats per minute (18 1125) O2 Device: Nasal cannula (18 0710) O2 Flow Rate (L/min): 10 l/min (18 0710) Physical Exam: 
General:    Elderly, frail, NAD Head:   Staples on 4cm lac posterior scalp. Nose:  Nares normal. No drainage or sinus tenderness. Lungs:   No respiratory distress on 10L high flow Heart:   Regular rate and rhythm,  no murmur, rub or gallop. Abdomen:   Soft, non-tender. Not distended. Bowel sounds normal.  
Extremities: No cyanosis. No edema. No clubbing Skin:     Texture, turgor normal. No rashes or lesions. Not Jaundiced Neurologic: Alert and oriented, pleasant Data Review:  
Recent Results (from the past 24 hour(s)) CBC W/O DIFF Collection Time: 11/18/18  7:36 AM  
Result Value Ref Range WBC 12.8 (H) 4.3 - 11.1 K/uL  
 RBC 3.82 (L) 4.05 - 5.2 M/uL  
 HGB 10.2 (L) 11.7 - 15.4 g/dL HCT 35.0 (L) 35.8 - 46.3 % MCV 91.6 79.6 - 97.8 FL  
 MCH 26.7 26.1 - 32.9 PG  
 MCHC 29.1 (L) 31.4 - 35.0 g/dL  
 RDW 16.6 % PLATELET 625 784 - 831 K/uL MPV 11.5 9.4 - 12.3 FL ABSOLUTE NRBC 0.00 0.0 - 0.2 K/uL METABOLIC PANEL, BASIC Collection Time: 11/18/18  7:36 AM  
Result Value Ref Range Sodium 141 136 - 145 mmol/L Potassium 5.5 (H) 3.5 - 5.1 mmol/L Chloride 104 98 - 107 mmol/L  
 CO2 30 21 - 32 mmol/L Anion gap 7 mmol/L Glucose 109 (H) 65 - 100 mg/dL BUN 37 (H) 8 - 23 MG/DL Creatinine 1.56 (H) 0.6 - 1.0 MG/DL  
 GFR est AA 40 (L) >60 ml/min/1.73m2 GFR est non-AA 33 ml/min/1.73m2 Calcium 9.6 8.3 - 10.4 MG/DL  
POC TROPONIN-I Collection Time: 11/18/18  7:39 AM  
Result Value Ref Range Troponin-I (POC) 0.03 0.02 - 0.05 ng/ml EKG, 12 LEAD, INITIAL Collection Time: 11/18/18  7:45 AM  
Result Value Ref Range Ventricular Rate 102 BPM  
 Atrial Rate 102 BPM  
 P-R Interval 134 ms QRS Duration 72 ms Q-T Interval 312 ms QTC Calculation (Bezet) 406 ms Calculated P Axis 62 degrees Calculated R Axis 39 degrees Calculated T Axis 47 degrees Diagnosis    
  !! AGE AND GENDER SPECIFIC ECG ANALYSIS !! Sinus tachycardia Possible Left atrial enlargement Borderline ECG When compared with ECG of 25-SEP-2018 14:19, 
Aberrant conduction is no longer Present Nonspecific T wave abnormality no longer evident in Lateral leads QT has shortened Confirmed by ST GUERA JAMES MD (), DEVEN RUBY ([id number]) on 11/18/2018 12:24:30 PM 
  
URINE MICROSCOPIC Collection Time: 11/18/18  9:43 AM  
Result Value Ref Range WBC 20-50 0 /hpf  
 RBC 0 0 /hpf Epithelial cells 0-3 0 /hpf Bacteria 3+ (H) 0 /hpf Casts 0-3 0 /lpf Crystals, urine 0 0 /LPF Mucus TRACE 0 /lpf Other observations RESULTS VERIFIED MANUALLY CREATININE Collection Time: 11/18/18 10:16 AM  
Result Value Ref Range Creatinine 1.43 (H) 0.6 - 1.0 MG/DL POC LACTIC ACID Collection Time: 11/18/18 11:18 AM  
Result Value Ref Range Lactic Acid (POC) 0.93 0.5 - 1.9 mmol/L Imaging Suman Choi CT HEAD WO CONT Final Result IMPRESSION:   
1. No acute intracranial abnormality. 2. 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 3. Mild chronic white matter microangiopathic changes. Assessment and Plan: Active Hospital Problems Diagnosis Date Noted  UTI (urinary tract infection) 11/18/2018 PLAN: 
UTI: rocephin, IVF. Nontoxic appearing. Sounds asymptomatic, but may have contributed to unsteadiness and fall and/or decision to get out of bed leading to fall. Daughters request that she be kept as briefly as possible and are hopeful to return tomorrow if no deterioration in condition. Dehydration: gentle IVF, monitor Cr. Hold lisinopril and lasix for now. Dementia, sundowning: instructed daughters on delirium precautions. Continue home aricept. COPD/ILD: respiratory status at baseline. Continue supplemental oxygen, wears up to 10L at [facility]. She does not tolerate cpap. DVT PPx: HSQ Code Status: Apparently this has been a point of contention with daughters today but they and patient tell me full code right now. Previous admissions, she was DNR and they were discussing hospice. Anticipated discharge: 1-2 midnights Signed By: Charles Tomlinson MD   
 November 18, 2018

## 2018-11-18 NOTE — PROGRESS NOTES
11/18/18 1451 Dual Skin Pressure Injury Assessment Dual Skin Pressure Injury Assessment WDL Second Care Provider (Based on [address] [place] [address]) Berny Ledesma

## 2018-11-18 NOTE — ED TRIAGE NOTES
PMD-Ismael Burdick. Pt here via [facility] from [address] [place] [address] at [facility] after a fall. Pt attempted to get up unassisted. No LOC. Abrasion noted to back of head.

## 2018-11-18 NOTE — DISCHARGE INSTRUCTIONS
Sure she drink plenty of fluid. Please follow up with her primary care physician for repeat labs for assessment of her creatinine which is a kidney function level. Return immediately if she does not produce any urine, feel worse, fever, altered mental status. 5 staples need to be removed in 5-7 days.

## 2018-11-19 NOTE — PROGRESS NOTES
Assessment complete via flow sheet. Pt alert &Ox2, confused at times. Pt has staples to back of head from recent fall 11/18. Respirations even and unlabored, coarse and diminished in bases. S1 S2 auscultated. Pt on 10L O2. Pt reports pain level of 0 out of 10. Bowel sounds active, abdomen soft. Denies other needs. Bed in lowest position, side rails up 3, call bell in reach. Instructed to call for assistance. Pt verbalized understanding. Plan of care reviewed with patient.

## 2018-11-19 NOTE — PROGRESS NOTES
Family is requesting pt not be woke up for respiratory treatments and for v/s. Explained the risks to daughter, and she still is requesting not to wake her up.

## 2018-11-19 NOTE — PROGRESS NOTES
Hospitalist- Dr. Bekah Jaquez called about head pain pt is experiencing from fall earlier today. Pt states it is still a 5/10, and Tylenol 325 mg po did not help. New order received for Tramadol 50 mg po every 6 hours prn for pain. Pt has had low BP since admit and made MD aware.

## 2018-11-19 NOTE — PROGRESS NOTES
Shift assessment complete. [place] & A&OX2- to self and place. Lung sounds diminished in the bilateral bases. Pt on 9L NC. Respirations present. Even and unlabored. No s/s of distress. Apical HR is regular. Abdomen is soft & non-tender. Bowel sounds are present and active in all four quadrants. Staples intact to posterior head with a scant amt of dry blood at site. Zero c/o pain at this time. Call light within reach. Encouraged patient to call for assistance. Patient verbalizes understanding. See Doc Flowsheet for assessment details. Patient resting in bed. Bed alarm in progress. Daughter is at the bedside.

## 2018-11-19 NOTE — PROGRESS NOTES
Care Management Interventions Transition of Care Consult (CM Consult): Discharge Planning Current Support Network: [address] Confirm Follow Up Transport: Family Plan discussed with Pt/Family/Caregiver: Yes Freedom of Choice Offered: Yes Discharge Location Discharge Placement: Skilled nursing facility Per MD pt stable for d/c. Pt admitted on 11-18 from [facility] skilled unit. SW spoke with Francisco Krause at [facility] & informed of pt's return. Pt's daughter Yohan Franklin) at bedside and aware of transport time. Chart copied, nsg called report, transport arranged to [facility].

## 2018-11-19 NOTE — DISCHARGE SUMMARY
Hospitalist Discharge Summary Patient ID: 
Dago Rivers 
[id number] 
37 y.o. 
6/1/1929 Admit date: 11/18/2018  6:58 AM 
Discharge date and time: 11/19/2018 Attending: Dylan Barrios MD 
PCP:  Durand Curling, MD 
Treatment Team: Attending Provider: Dylan Barrios MD; Utilization Review: Isela Flaherty RN; Utilization Review: Allan Taylor RN 
 
Principal Diagnosis UTI (urinary tract infection) Principal Problem: 
  UTI (urinary tract infection) (11/18/2018) Active Problems: Hypoxia (9/1/2015) HTN (hypertension) (1/22/2014) Iron deficiency anemia (5/8/2014) Urinary incontinence (5/8/2014) COPD (chronic obstructive [facility] disease) ([facility] (9/1/2015) Late onset Alzheimer's disease without behavioral disturbance (11/15/2017) Hospital Course: 
Please refer to the admission H&P for details of presentation. 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 She tried to get out of bed unassisted to bathroom. She had normal head ct and small lac stapled in ER. Staples should be removed on 11/26. She was admitted to observation status because she was found to have ALYSON and signs of UTI. She received a total of 2 doses of rocephin while inpatient and will take macrobid for 3 more days as outpatient. Cultures are still pending as of time of discharge. She is found to also have signs bacterial conjunctivitis so will start erythromycin ointment. Medication reconciliation is updates as per list from SNF; only change while inpatient is addition of antibiotics and ophthalmic ointment. Significant Diagnostic Studies:  
CT HEAD WO CONT Final Result IMPRESSION:   
1. No acute intracranial abnormality. 2. 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 3. Mild chronic white matter microangiopathic changes. Labs: Results:  
   
Chemistry Recent Labs 11/19/18 
0657 11/18/18 
1016 11/18/18 
0736 GLU 88  --  109*   --  141  
K 4.5  --  5.5*  
  --  104 CO2 28  --  30  
BUN 27*  --  37* CREA 1.07* 1.43* 1.56* CA 8.7  --  9.6 AGAP 9  --  7  
  
CBC w/Diff Recent Labs 11/19/18 
7662 11/18/18 
4827 WBC 9.1 12.8*  
RBC 3.50* 3.82* HGB 9.3* 10.2* HCT 32.7* 35.0*  
 349 Cardiac Enzymes No results for input(s): CPK, CKND1, PHILLIP in the last 72 hours. No lab exists for component: Ada Arsen Coagulation No results for input(s): PTP, INR, APTT in the last 72 hours. No lab exists for component: INREXT Lipid Panel Lab Results Component Value Date/Time Cholesterol, total 160 06/15/2018 10:19 AM  
 HDL Cholesterol 52 06/15/2018 10:19 AM  
 LDL, calculated 87 06/15/2018 10:19 AM  
 VLDL, calculated 21 06/15/2018 10:19 AM  
 Triglyceride 107 06/15/2018 10:19 AM  
  
BNP No results for input(s): BNPP in the last 72 hours. Liver Enzymes No results for input(s): TP, ALB, TBIL, AP, SGOT, GPT in the last 72 hours. No lab exists for component: DBIL Thyroid Studies Lab Results Component Value Date/Time TSH 2.900 06/15/2018 10:19 AM  
    
 
 
Discharge Exam: 
Visit [facility] /61 (BP 1 Location: Right arm, BP Patient Position: At rest) Pulse 93 Temp 98.3 °F (36.8 °C) Resp 18 Ht 5' 3\" (1.6 m) Wt 68 kg (149 lb 14.6 oz) SpO2 98% Breastfeeding? No  
BMI 26.56 kg/m² General appearance: alert, cooperative, no distress, appears stated age Head: staples in place occiput Lungs: clear to auscultation bilaterally Heart: regular rate and rhythm, S1, S2 normal, no murmur, click, rub or gallop Abdomen: soft, non-tender. Bowel sounds normal. No masses,  no organomegaly Extremities: no cyanosis or edema Neurologic: Grossly normal 
 
Disposition: SNF Discharge Condition: stable Patient Instructions: Azeb azar in a week, f/u prn SNF MD 
 Current Discharge Medication List  
  
START taking these medications Details  
nitrofurantoin, macrocrystal-monohydrate, (MACROBID) 100 mg capsule Take 1 Cap by mouth two (2) times a day for 3 days. Qty: 6 Cap, Refills: 0  
  
erythromycin (ILOTYCIN) ophthalmic ointment 0.5 inch ointment to lower lid of both eyes 4 times daily for 5 days Qty: 1 Tube, Refills: 0 CONTINUE these medications which have NOT CHANGED Details  
acetaminophen (TYLENOL) 325 mg tablet Take 325 mg by mouth every four (4) hours as needed for Pain. ferrous sulfate (IRON) 325 mg (65 mg iron) tablet Take 325 mg by mouth Daily (before breakfast). potassium chloride (KLOR-CON M20) 20 mEq tablet Take  by mouth two (2) times a day. furosemide (LASIX) 40 mg tablet Take 40 mg by mouth daily. predniSONE (DELTASONE) 10 mg tablet Take  by mouth daily (with breakfast). ascorbic acid, vitamin C, (VITAMIN C) 500 mg tablet Take  by mouth. OXYGEN-AIR DELIVERY SYSTEMS Keep SpO2> 90%  
  
bipap machine kit by Does Not Apply route. pantoprazole (PROTONIX) 40 mg tablet TAKE 1 TABLET BY MOUTH DAILY Qty: 30 Tab, Refills: 5  
  
budesonide (PULMICORT) 0.5 mg/2 mL nbsp 2 mL by Nebulization route two (2) times a day. Qty: 1 Each, Refills: 1  
  
albuterol (PROVENTIL VENTOLIN) 2.5 mg /3 mL (0.083 %) nebulizer solution 3 mL by Nebulization route every four (4) hours as needed for Wheezing. Qty: 24 Each, Refills: 0  
  
lisinopril (PRINIVIL, ZESTRIL) 20 mg tablet Take 1 Tab by mouth daily. Qty: 30 Tab, Refills: 0  
  
polyethylene glycol (MIRALAX) 17 gram packet Take 1 Packet by mouth daily. Qty: 1 Packet, Refills: 0 Azelastine (ASTEPRO) 0.15 % (205.5 mcg) nasal spray 1 [place] by Both Nostrils route two (2) times a day. Qty: 1 Bottle, Refills: 12  
 Associated Diagnoses: Sinus headache Walker (ULTRA-LIGHT ROLLATOR) misc Use daily 
Qty: 1 Each, Refills: 0 Associated Diagnoses: Debility; Risk for falls donepezil (ARICEPT) 5 mg tablet Take 1 Tab by mouth nightly. Qty: 90 Tab, Refills: 3 Associated Diagnoses: Dementia without behavioral disturbance, unspecified dementia type  
  
sertraline (ZOLOFT) 100 mg tablet Take 1 Tab by mouth nightly. Qty: 30 Tab, Refills: 3 Compression Socks, Medium misc WEAR DAILY Qty: 2 Each, Refills: 12  
 Associated Diagnoses: Edema, unspecified type DISABLED PLACARD (DISABLED PLACARD) DMV Use prn 
Qty: 1 Each, Refills: 0 Activity: Activity as tolerated Diet: Resume previous diet Follow-up: 
  
Follow-up Appointments Procedures  FOLLOW UP VISIT Appointment in: Other (Specify) PRN SNF MD  
  PRN SNF MD  
  Standing Status:   Standing Number of Occurrences:   1 Order Specific Question:   Appointment in Answer: Other (Specify) Time spent to discharge patient 35 minutes Signed: 
Sharon Oglesby MD 
11/19/2018 
8:19 AM

## 2018-12-05 PROBLEM — J90 RECURRENT PLEURAL EFFUSION ON LEFT: Status: ACTIVE | Noted: 2018-01-01

## 2018-12-05 NOTE — PROCEDURES
PROCEDURE: 
CHEST ULTRASOUND. INDICATION: 
RECURRENT L PLEURAL EFFUSION _FOR POSSIBLE PLUREX CATHETER PLACEMENT 
 
 
FINDINGS: 
A [employer] M portable ultrasound, with a 5-16 Mhz linear probe was used to image the chest on the  left in the s area of the 6th and 7th intercostal space in the mid,  anterior and posterior axillary lines. A small amount of pleural fluid was identified. IMPRESSION: 
 
There is only a small amount of fluid on the L- plurex placement was thus postponed until more fluid is evident. Patient will return to bronchoscopy lab in 3 weeks to reassess L chest with bedside US. Decision to place plurex will be made then. Patient is now in hospice - was given 5 mg of morphine for pain prior to discharge to the [facility].  
 
La Braga MD.

## 2018-12-05 NOTE — PROGRESS NOTES
Pt to [facility] via [employer]. Pt stable at time of dc. Daughter at bedside. Dc instructions given to transport.

## 2018-12-05 NOTE — DISCHARGE INSTRUCTIONS
NEXT APPOINTMENT IS December 27 th at 1:00 (Be here at 12:00) to recheck for fluid and see if a Pleural catheter is necessary. Learning About a Pleural Effusion  What is it? A pleural effusion (say \"PLER-catalina [id number]\") is the buildup of fluid in the space between tissues lining the lungs and the chest wall. Because of the fluid buildup, the lungs may not be able to expand completely. This can make it hard to breathe. A pleural empyema (say \"zt-cc-MX-[id number]\") is a problem that can happen with pleural effusion. Bacteria or other infections cause pus to form in the pleural fluid. But most pleural effusions don't become infected. What causes it? A pleural effusion has many causes. They include pneumonia, cancer, inflammation of the tissues around the lungs, and heart failure. What are the symptoms? Symptoms of a pleural effusion may include:  · Trouble breathing. · Shortness of breath. · Chest pain. · Fever. · A cough. A minor pleural effusion may not cause any symptoms. How is it diagnosed? A pleural effusion is usually diagnosed with an X-ray and a physical exam. The doctor listens to the airflow in your lungs. How is it treated? A pleural effusion can be treated by removing fluid from the space between the tissues around the lungs. This is done with a needle that's put into the chest (thoracentesis). A small amount of the fluid may be sent to a lab to find out what is causing the buildup of fluid. Removing the fluid may help to relieve symptoms, such as shortness of breath and chest pain. It can help the lungs to expand more fully. If the pleural effusion doesn't get better, a catheter may be placed in the chest. This is a flexible tube that allows fluid to drain from the lungs. The catheter stays in the chest until the doctor removes it. Some people may get a treatment that removes the fluid and then puts a medicine into the chest cavity.  This helps to prevent too much fluid from building up again.  A minor pleural effusion often goes away on its own. Doctors may need to treat the condition that is causing the pleural effusion. For example, you may get medicines to treat pneumonia or congestive heart failure. When the condition is treated, the effusion usually goes away. For a pleural empyema, the pus needs to be drained. It may drain from a flexible tube placed in the chest. Or you may have surgery to drain it. You also will get antibiotics. Follow-up care is a key part of your treatment and safety. Be sure to make and go to all appointments, and call your doctor if you are having problems. It's also a good idea to know your test results and keep a list of the medicines you take. Where can you learn more? Go to http://isidoro-iliana.info/. Enter A920 in the search box to learn more about \"Learning About a Pleural Effusion. \"  Current as of: June 18, 2018  Content Version: 11.8  © 7785-6932 Healthwise, "[employer]. Care instructions adapted under license by [employer] (which disclaims liability or warranty for this information). If you have questions about a medical condition or this instruction, always ask your healthcare professional. [address] any warranty or liability for your use of this information.

## 2018-12-05 NOTE — INTERVAL H&P NOTE
H&P Update: 
Boris Pedraza was seen and examined. History and physical has been reviewed. The patient has been examined.  There have been no significant clinical changes since the completion of the originally dated History and Physical. 
 
Signed By: Ismael Gold MD   
 December 5, 2018 1:32 PM

## 2018-12-17 PROBLEM — J96.11 CHRONIC RESPIRATORY FAILURE WITH HYPOXIA (HCC): Chronic | Status: ACTIVE | Noted: 2018-01-01

## 2018-12-17 PROBLEM — D64.9 ANEMIA: Status: ACTIVE | Noted: 2018-01-01

## 2018-12-17 PROBLEM — N17.9 AKI (ACUTE KIDNEY INJURY) (HCC): Status: ACTIVE | Noted: 2018-01-01

## 2018-12-17 PROBLEM — A41.9 SEPSIS (HCC): Status: ACTIVE | Noted: 2018-01-01

## 2018-12-17 PROBLEM — J90 RECURRENT LEFT PLEURAL EFFUSION: Chronic | Status: ACTIVE | Noted: 2018-01-01

## 2018-12-17 PROBLEM — J84.9 ILD (INTERSTITIAL LUNG DISEASE) (HCC): Chronic | Status: ACTIVE | Noted: 2018-01-01

## 2018-12-17 PROBLEM — E87.5 HYPERKALEMIA: Status: ACTIVE | Noted: 2018-01-01

## 2018-12-17 PROBLEM — G93.40 ACUTE ENCEPHALOPATHY: Status: ACTIVE | Noted: 2018-01-01

## 2018-12-17 PROBLEM — I35.0 AORTIC STENOSIS: Chronic | Status: ACTIVE | Noted: 2018-01-01

## 2018-12-17 NOTE — ED PROVIDER NOTES
77-year-old female with history of dementia, COPD, chronic respiratory failure on 10 L O2 at baseline per daughter presents via EMS for altered mental status due to recently diagnosed UTI according to patient's daughter. Patient is resident at [facility]. Daughter states that patient is on hospice but added rescinded said that she could come to the ER. States that cascade has not been administering antibiotic for UTI as she needed to be. States the patient is confused at baseline but is slightly more confused at the current time. Patient poor historian at this time. The history is provided by the patient and a relative. No  was used. Altered mental status    This is a new problem. Episode onset: 1-2 days ago  The problem has not changed since onset. Risk factors include dementia.         Past Medical History:   Diagnosis Date    Arthritis     Arthropathy, unspecified, site unspecified     Benign paroxysmal positional vertigo     Cardiac dysrhythmia, unspecified     Chronic obstructive pulmonary disease (HCC)     Debility, unspecified     Dementia     Facet syndrome (HCC)     GERD (gastroesophageal reflux disease)     GIB (gastrointestinal bleeding) 1/31/2014    History of peptic ulcer disease 01/2014    Hypertension     IBS (irritable bowel syndrome)     Ill-defined condition     Impaired fasting glucose     Lumbago     Memory loss     Meningioma (HCC)     removed in [facility] about 1998    Musculoskeletal pain     OA (osteoarthritis) of knee     Palliative care encounter 5/8/2014    Rhinorrhea     Scoliosis     Sensorineural hearing loss     Sleep apnea     Spondylolisthesis     Lumbar    TMJ dysfunction     Varicella     Vasomotor rhinitis     Vitamin D deficiency        Past Surgical History:   Procedure Laterality Date    HX APPENDECTOMY      HX CATARACT REMOVAL  2003    X2 WITH LENSE IMPLANT     HX COLONOSCOPY      HX KNEE ARTHROSCOPY left knee    HX KNEE REPLACEMENT Left 2007    HX OTHER SURGICAL  1998    Meningioma Rt brain    HX JOSE MIGUEL AND BSO  1974    HX TONSILLECTOMY           Family History:   Problem Relation Age of Onset    Cancer Mother         Pancreatic    Cancer Father         Ear       Social History     Socioeconomic History    Marital status:      Spouse name: Not on file    Number of children: Not on file    Years of education: karthikeyan    Highest education level: Not on file   Social Needs    Financial resource strain: Not on file    Food insecurity - worry: Not on file    Food insecurity - inability: Not on file   Your [employer] Unz[employer] needs - medical: Not on file   Your [employer] needs - non-medical: Not on file   Occupational History     Employer: RETIRED   Tobacco Use    Smoking status: Former Smoker     Packs/day: 1.50     Years: 45.00     Pack years: 67.50     Types: Cigarettes     Last attempt to quit: 1991     Years since quittin.9    Smokeless tobacco: Never Used   Substance and Sexual Activity    Alcohol use: Yes     Alcohol/week: 1.8 oz     Types: 1 Glasses of wine, 1 Shots of liquor, 1 Standard drinks or equivalent per week     Comment: OCCASIONAL    Drug use: No    Sexual activity: Not on file   Other Topics Concern    Not on file   Social History Narrative    Lives alone. Ambulates with walker/cane, has supportive daughters         ALLERGIES: Bactroban [mupirocin calcium]; Mupirocin; Sulfa (sulfonamide antibiotics); and Sulfamethoxazole-trimethoprim    Review of Systems   Unable to perform ROS: Mental status change       Vitals:    18 1433   BP: 109/53   Pulse: (!) 113   Resp: 20   Temp: 98.7 °F (37.1 °C)   SpO2: 90%   Weight: 67.6 kg (149 lb)   Height: 5' 3\" (1.6 m)            Physical Exam   Constitutional: She is oriented to person, place, and time. She appears well-developed and well-nourished. Well appearing and in NAD. HENT:   Head: Normocephalic. MMM.  No tonsillar erythema or exudate. Eyes: Conjunctivae and EOM are normal. Pupils are equal, round, and reactive to light. Neck: No JVD present. No tracheal deviation present. Cardiovascular: Regular rhythm and normal heart sounds. Tachycardic. Pulses 2+ and equal bilaterally. Pulmonary/Chest: Effort normal and breath sounds normal.   Coarse breath sounds present bilaterally. Abdominal: Soft. Bowel sounds are normal.   Soft, NTND. No rebound or guarding. No CVAT. Musculoskeletal: Normal range of motion. Neurological: She is alert and oriented to person, place, and time. No cranial nerve deficit. Confused. Strength 5 over 5 throughout. No focal weakness. No facial droop. No dysarthria. No meningismus. Skin: Skin is warm and dry. No rash. Nursing note and vitals reviewed. MDM  Number of Diagnoses or Management Options  Acute encephalopathy: new and requires workup  Acute hyperkalemia: new and requires workup  ALYSON (acute kidney injury) [facility]): new and requires workup  Diagnosis management comments: Daughter states recent diagnosis of UTI. Blood cultures obtained. Patient given Rocephin 1 g IV. EKG with sinus tachycardia. Chest x-ray with stable left basilar consolidation/pleural effusion. Labs of elevated LA, hyperkalemia and ALYSON. Treated potassium with calcium gluconate, insulin 10 units IV, D50, and Albuterol. UA w/ trace LE. Hospitalist consulted for admission. Discussed obtaining a CT head w/ hospitalist staff. State that they did not want one at this time.         Amount and/or Complexity of Data Reviewed  Clinical lab tests: ordered and reviewed  Tests in the radiology section of CPT®: ordered and reviewed  Tests in the medicine section of CPT®: ordered and reviewed  Review and summarize past medical records: yes  Independent visualization of images, tracings, or specimens: yes    Risk of Complications, Morbidity, and/or Mortality  Presenting problems: high  Diagnostic procedures: high  Management options: high    Patient Progress  Patient progress: other (comment) (Guarded )    ED Course as of Dec 17 1520   Mon Dec 17, 2018   1519 CXR IMPRESSION: Stable left basal consolidation and pleural effusion. [DF]      ED Course User Index  [DF] Sharmila Aguayo MD       EKG  Date/Time: 12/17/2018 3:20 PM  Performed by: Sharmila Aguayo MD  Authorized by: Sharmila Aguayo MD     ECG reviewed by ED Physician in the absence of a cardiologist: yes    Rate:     ECG rate:  135    ECG rate assessment: tachycardic    Rhythm:     Rhythm: sinus tachycardia    Ectopy:     Ectopy: none    QRS:     QRS axis:  Normal    QRS intervals:  Normal  Conduction:     Conduction: normal    ST segments:     ST segments:  Normal  T waves:     T waves: normal          Results Include:    Recent Results (from the past 24 hour(s))   EKG, 12 LEAD, INITIAL    Collection Time: 12/17/18  3:05 PM   Result Value Ref Range    Ventricular Rate 135 BPM    Atrial Rate 110 BPM    P-R Interval 128 ms    QRS Duration 82 ms    Q-T Interval 298 ms    QTC Calculation (Bezet) 447 ms    Calculated P Axis 46 degrees    Calculated R Axis 25 degrees    Calculated T Axis 43 degrees    Diagnosis       !! AGE AND GENDER SPECIFIC ECG ANALYSIS !!   Sinus tachycardia with frequent and consecutive Premature ventricular   complexes  Possible Left atrial enlargement  Abnormal ECG  When compared with ECG of 18-NOV-2018 07:45,  Premature ventricular complexes are now Present     POC LACTIC ACID    Collection Time: 12/17/18  3:11 PM   Result Value Ref Range    Lactic Acid (POC) 2.36 (H) 0.5 - 1.9 mmol/L   CBC WITH AUTOMATED DIFF    Collection Time: 12/17/18  3:16 PM   Result Value Ref Range    WBC 12.5 (H) 4.3 - 11.1 K/uL    RBC 4.09 4.05 - 5.2 M/uL    HGB 10.9 (L) 11.7 - 15.4 g/dL    HCT 37.9 35.8 - 46.3 %    MCV 92.7 79.6 - 97.8 FL    MCH 26.7 26.1 - 32.9 PG    MCHC 28.8 (L) 31.4 - 35.0 g/dL    RDW 16.6 (H) 11.9 - 14.6 % PLATELET 343 910 - 689 K/uL    MPV 12.8 (H) 9.4 - 12.3 FL    ABSOLUTE NRBC 0.00 0.0 - 0.2 K/uL    DF AUTOMATED      NEUTROPHILS 90 (H) 43 - 78 %    LYMPHOCYTES 3 (L) 13 - 44 %    MONOCYTES 5 4.0 - 12.0 %    EOSINOPHILS 1 0.5 - 7.8 %    BASOPHILS 0 0.0 - 2.0 %    IMMATURE GRANULOCYTES 1 0.0 - 5.0 %    ABS. NEUTROPHILS 11.3 (H) 1.7 - 8.2 K/UL    ABS. LYMPHOCYTES 0.4 (L) 0.5 - 4.6 K/UL    ABS. MONOCYTES 0.7 0.1 - 1.3 K/UL    ABS. EOSINOPHILS 0.1 0.0 - 0.8 K/UL    ABS. BASOPHILS 0.0 0.0 - 0.2 K/UL    ABS. IMM. GRANS. 0.1 0.0 - 0.5 K/UL   METABOLIC PANEL, COMPREHENSIVE    Collection Time: 12/17/18  3:16 PM   Result Value Ref Range    Sodium 138 136 - 145 mmol/L    Potassium 7.0 (HH) 3.5 - 5.1 mmol/L    Chloride 104 98 - 107 mmol/L    CO2 26 21 - 32 mmol/L    Anion gap 8 mmol/L    Glucose 110 (H) 65 - 100 mg/dL    BUN 50 (H) 8 - 23 MG/DL    Creatinine 2.42 (H) 0.6 - 1.0 MG/DL    GFR est AA 24 (L) >60 ml/min/1.73m2    GFR est non-AA 20 ml/min/1.73m2    Calcium 10.0 8.3 - 10.4 MG/DL    Bilirubin, total 0.3 0.2 - 1.1 MG/DL    ALT (SGPT) 19 12 - 65 U/L    AST (SGOT) 37 15 - 37 U/L    Alk. phosphatase 149 (H) 50 - 130 U/L    Protein, total 7.6 g/dL    Albumin 3.0 (L) 3.2 - 4.6 g/dL    Globulin 4.6 (H) 2.3 - 3.5 g/dL    A-G Ratio 0.7     POC G3    Collection Time: 12/17/18  3:38 PM   Result Value Ref Range    Device: NASAL CANNULA      FIO2 (POC) 44 %    pH (POC) 7.377 7.35 - 7.45      pCO2 (POC) 42.5 35 - 45 MMHG    pO2 (POC) 79 75 - 100 MMHG    HCO3 (POC) 24.9 22 - 26 MMOL/L    sO2 (POC) 95 95 - 98 %    Base excess (POC) 0 mmol/L    Allens test (POC) YES      Site RIGHT RADIAL      Patient temp.  98.6      Specimen type (POC) ARTERIAL      Performed by Ethel     CO2, POC 26 MMOL/L    Flow rate (POC) 6.000 L/min    COLLECT TIME 1,533     TROPONIN I    Collection Time: 12/17/18  4:00 PM   Result Value Ref Range    Troponin-I, Qt. <0.02 (L) 0.02 - 0.05 NG/ML   URINALYSIS W/ RFLX MICROSCOPIC    Collection Time: 12/17/18 4:12 PM   Result Value Ref Range    Color YELLOW      Appearance CLOUDY      Specific gravity 1.011 1.001 - 1.023      pH (UA) 5.0 5.0 - 9.0      Protein NEGATIVE  NEG mg/dL    Glucose NEGATIVE  mg/dL    Ketone NEGATIVE  NEG mg/dL    Bilirubin NEGATIVE  NEG      Blood NEGATIVE  NEG      Urobilinogen 0.2 0.2 - 1.0 EU/dL    Nitrites NEGATIVE  NEG      Leukocyte Esterase TRACE (A) NEG      WBC 0-3 0 /hpf    RBC 0-3 0 /hpf    Epithelial cells 0-3 0 /hpf    Bacteria 0 0 /hpf    Casts HYALINE 0 /lpf    Amorphous Crystals 1+ (H) 0     Cal Franklin MD; 12/17/2018 @5:17 PM Voice dictation software was used during the making of this note. This software is not perfect and grammatical and other typographical errors may be present.   This note has not been proofread for errors.  ===================================================================

## 2018-12-17 NOTE — ED NOTES
TRANSFER - OUT REPORT:    Verbal report given to Callie Lechuga on 1959 [facility]  being transferred to [id number] for routine progression of care       Report consisted of patients Situation, Background, Assessment and   Recommendations(SBAR). Information from the following report(s) ED Summary, MAR, Med Rec Status and Cardiac Rhythm sinus tachycardia was reviewed with the receiving nurse. Lines:   Peripheral IV 12/17/18 Anterior;Distal;Inferior;Left;Lower Arm (Active)       Peripheral IV 12/17/18 Right Antecubital (Active)   Site Assessment Clean, dry, & intact 12/17/2018  4:02 PM   Phlebitis Assessment 0 12/17/2018  4:02 PM   Infiltration Assessment 0 12/17/2018  4:02 PM   Dressing Status Clean, dry, & intact 12/17/2018  4:02 PM        Opportunity for questions and clarification was provided.       Patient transported with:   Monitor  O2 @ 6 liters  Registered Nurse

## 2018-12-17 NOTE — H&P
Hospitalist H&P Note     Admit Date:  2018  2:28 PM   Name:  Sherrie Aparicio   Age:  80 y.o.  :  1929   MRN:  [id number]   PCP:  Elliott Aguilar MD  Treatment Team: Primary Nurse: Miguel Conway RN    HPI:     CC:  Confusion     Ms. Deal is a 79 yo female with PMH of ILD/COPD/chronic hypoxic respiratory failure on 10 L NC baseline, on home hospice for those indications, who was brought by daughter from longterm living situation at [place] with confusion and UTI. Per daughter Bonilla Keita at bedside, patient has become progressively confused and was diagnosed with UTI, started macrobid for several days. In the ED she is altered, with ALYSON on hyperkalemia of 7.0 she has received IVF, calcium gluconate/albuterol/D50/ insulin and rocephin. UA negative. CXR shows chronic left base consolidation and effusion. During my assessment, she became hypotensive.      Per daughter she is BIPAP intolerant, unsure if she is on chronic steroids       10 systems impossible with mentation       Past Medical History:   Diagnosis Date    Arthritis     Arthropathy, unspecified, site unspecified     Benign paroxysmal positional vertigo     Cardiac dysrhythmia, unspecified     Chronic obstructive pulmonary disease ([address]     Debility, unspecified     Dementia     Facet syndrome (HCC)     GERD (gastroesophageal reflux disease)     GIB (gastrointestinal bleeding) 2014    History of peptic ulcer disease 2014    Hypertension     IBS (irritable bowel syndrome)     Ill-defined condition     Impaired fasting glucose     Lumbago     Memory loss     [facility]     removed in [facility] about     Musculoskeletal pain     OA (osteoarthritis) of knee     Palliative care encounter 2014    Rhinorrhea     Scoliosis     Sensorineural hearing loss     Sleep apnea     Spondylolisthesis     Lumbar    TMJ dysfunction     Varicella     Vasomotor rhinitis     Vitamin D deficiency Past Surgical History:   Procedure Laterality Date    HX APPENDECTOMY      HX CATARACT REMOVAL  2003    X2 WITH LENSE IMPLANT     HX COLONOSCOPY      HX KNEE ARTHROSCOPY      left knee    HX KNEE REPLACEMENT Left 2007    HX OTHER SURGICAL  1998    Meningioma Rt brain    HX JOSE MIGUEL AND BSO  1974    HX TONSILLECTOMY        Allergies   Allergen Reactions    Bactroban [Mupirocin Calcium] Rash    Mupirocin Other (comments)    Sulfa (Sulfonamide Antibiotics) Unknown (comments)    Sulfamethoxazole-Trimethoprim Other (comments)      Social History     Tobacco Use    Smoking status: Former Smoker     Packs/day: 1.50     Years: 45.00     Pack years: 67.50     Types: Cigarettes     Last attempt to quit: 1991     Years since quittin.9    Smokeless tobacco: Never Used   Substance Use Topics    Alcohol use: Yes     Alcohol/week: 1.8 oz     Types: 1 Glasses of wine, 1 Shots of liquor, 1 Standard drinks or equivalent per week     Comment: OCCASIONAL      Family History   Problem Relation Age of Onset    Cancer Mother         Pancreatic    Cancer Father         Ear      Immunization History   Administered Date(s) Administered    Influenza High Dose Vaccine PF 10/17/2016, 10/01/2017    Influenza Vaccine 10/01/2013, 10/01/2014, 2015    Influenza Vaccine (Quad) 2018    Pneumococcal Conjugate (PCV-13) 2015    Pneumococcal Polysaccharide (PPSV-23) 2014    Pneumococcal Vaccine (Unspecified Type) 10/29/2014    TB Skin Test (PPD) Intradermal 2014, 10/01/2018, 2018    Zoster Vaccine, Live 2017     PTA Medications:  Prior to Admission Medications   Prescriptions Last Dose Informant Patient Reported? Taking? Azelastine (ASTEPRO) 0.15 % (205.5 mcg) nasal spray   No No   Si Frederic by Both Nostrils route two (2) times a day.    Compression Socks, Medium misc   No No   Sig: WEAR DAILY   DISABLED PLACARD (DISABLED PLACARD) DMV   No No   Sig: Use prn   OXYGEN-AIR DELIVERY SYSTEMS   Yes No   Sig: Keep SpO2> 90%   Walker (ULTRA-LIGHT ROLLATOR) misc   No No   Sig: Use daily   acetaminophen (TYLENOL) 325 mg tablet   Yes No   Sig: Take 325 mg by mouth every four (4) hours as needed for Pain. albuterol (PROVENTIL VENTOLIN) 2.5 mg /3 mL (0.083 %) nebulizer solution   No No   Sig: 3 mL by Nebulization route every four (4) hours as needed for Wheezing. Patient taking differently: 2.5 mg by Nebulization route every four (4) hours. ascorbic acid, vitamin C, (VITAMIN C) 500 mg tablet   Yes No   Sig: Take  by mouth.   bipap machine kit   Yes No   Sig: by Does Not Apply route. budesonide (PULMICORT) 0.5 mg/2 mL nbsp   No No   Si mL by Nebulization route two (2) times a day. donepezil (ARICEPT) 5 mg tablet   No No   Sig: Take 1 Tab by mouth nightly. erythromycin (ILOTYCIN) ophthalmic ointment   No No   Si.5 inch ointment to lower lid of both eyes 4 times daily for 5 days   ferrous sulfate (IRON) 325 mg (65 mg iron) tablet   Yes No   Sig: Take 325 mg by mouth Daily (before breakfast). furosemide (LASIX) 40 mg tablet   Yes No   Sig: Take 40 mg by mouth daily. lisinopril (PRINIVIL, ZESTRIL) 20 mg tablet   No No   Sig: Take 1 Tab by mouth daily. pantoprazole (PROTONIX) 40 mg tablet   No No   Sig: TAKE 1 TABLET BY MOUTH DAILY   polyethylene glycol (MIRALAX) 17 gram packet   No No   Sig: Take 1 Packet by mouth daily. potassium chloride (KLOR-CON M20) 20 mEq tablet   Yes No   Sig: Take  by mouth two (2) times a day. predniSONE (DELTASONE) 10 mg tablet   Yes No   Sig: Take  by mouth daily (with breakfast). sertraline (ZOLOFT) 100 mg tablet   No No   Sig: Take 1 Tab by mouth nightly. Patient taking differently: Take 50 mg by mouth nightly.       Facility-Administered Medications: None       Objective:     Patient Vitals for the past 24 hrs:   Temp Pulse Resp BP SpO2   18 1737  (!) 126  (!) 80/43 90 %   18 1716  (!) 128  99/46 92 %   18 1626     95 % 12/17/18 1433 98.7 °F (37.1 °C) (!) 114 20 109/53 (!) 81 %     Oxygen Therapy  O2 Sat (%): 90 % (12/17/18 1737)  Pulse via Oximetry: 127 beats per minute (12/17/18 1737)  O2 Device: Nasal cannula (12/17/18 1737)  O2 Flow Rate (L/min): 6 l/min (12/17/18 1737)  FIO2 (%): 44 % (12/17/18 1626)  No intake or output data in the 24 hours ending 12/17/18 1755    Physical Exam:  General:    Lethargic, elderly,  Eyes:   Normal sclera. Extraocular movements intact. PERRLA  ENT:  Normocephalic, atraumatic.  dry mucous membranes  CV:   Regular and tachycardic, . No edema  Lungs:  CTAB. No wheezing, rhonchi, or rales. Abdomen: Soft, nontender, nondistended. Present BS  Extremities: Warm and dry. .  Neurologic:  grossly intact. Moves all extremities and generally confused   Skin:     No rashes or jaundice. Normal coloration  Psych:  Slightly agitated     I reviewed the labs, imaging, EKGs, telemetry, and other studies done this admission. EKG: tracing personally reviewed as sinus tachycardia     Data Review:   Recent Results (from the past 24 hour(s))   EKG, 12 LEAD, INITIAL    Collection Time: 12/17/18  3:05 PM   Result Value Ref Range    Ventricular Rate 135 BPM    Atrial Rate 110 BPM    P-R Interval 128 ms    QRS Duration 82 ms    Q-T Interval 298 ms    QTC Calculation (Bezet) 447 ms    Calculated P Axis 46 degrees    Calculated R Axis 25 degrees    Calculated T Axis 43 degrees    Diagnosis       !! AGE AND GENDER SPECIFIC ECG ANALYSIS !!   Sinus tachycardia with frequent and consecutive Premature ventricular   complexes  Possible Left atrial enlargement  Abnormal ECG  When compared with ECG of 18-NOV-2018 07:45,  Premature ventricular complexes are now Present     POC LACTIC ACID    Collection Time: 12/17/18  3:11 PM   Result Value Ref Range    Lactic Acid (POC) 2.36 (H) 0.5 - 1.9 mmol/L   CBC WITH AUTOMATED DIFF    Collection Time: 12/17/18  3:16 PM   Result Value Ref Range    WBC 12.5 (H) 4.3 - 11.1 K/uL    RBC 4.09 4.05 - 5.2 M/uL    HGB 10.9 (L) 11.7 - 15.4 g/dL    HCT 37.9 35.8 - 46.3 %    MCV 92.7 79.6 - 97.8 FL    MCH 26.7 26.1 - 32.9 PG    MCHC 28.8 (L) 31.4 - 35.0 g/dL    RDW 16.6 (H) 11.9 - 14.6 %    PLATELET 198 426 - 934 K/uL    MPV 12.8 (H) 9.4 - 12.3 FL    ABSOLUTE NRBC 0.00 0.0 - 0.2 K/uL    DF AUTOMATED      NEUTROPHILS 90 (H) 43 - 78 %    LYMPHOCYTES 3 (L) 13 - 44 %    MONOCYTES 5 4.0 - 12.0 %    EOSINOPHILS 1 0.5 - 7.8 %    BASOPHILS 0 0.0 - 2.0 %    IMMATURE GRANULOCYTES 1 0.0 - 5.0 %    ABS. NEUTROPHILS 11.3 (H) 1.7 - 8.2 K/UL    ABS. LYMPHOCYTES 0.4 (L) 0.5 - 4.6 K/UL    ABS. MONOCYTES 0.7 0.1 - 1.3 K/UL    ABS. EOSINOPHILS 0.1 0.0 - 0.8 K/UL    ABS. BASOPHILS 0.0 0.0 - 0.2 K/UL    ABS. IMM. GRANS. 0.1 0.0 - 0.5 K/UL   METABOLIC PANEL, COMPREHENSIVE    Collection Time: 12/17/18  3:16 PM   Result Value Ref Range    Sodium 138 136 - 145 mmol/L    Potassium 7.0 (HH) 3.5 - 5.1 mmol/L    Chloride 104 98 - 107 mmol/L    CO2 26 21 - 32 mmol/L    Anion gap 8 mmol/L    Glucose 110 (H) 65 - 100 mg/dL    BUN 50 (H) 8 - 23 MG/DL    Creatinine 2.42 (H) 0.6 - 1.0 MG/DL    GFR est AA 24 (L) >60 ml/min/1.73m2    GFR est non-AA 20 ml/min/1.73m2    Calcium 10.0 8.3 - 10.4 MG/DL    Bilirubin, total 0.3 0.2 - 1.1 MG/DL    ALT (SGPT) 19 12 - 65 U/L    AST (SGOT) 37 15 - 37 U/L    Alk. phosphatase 149 (H) 50 - 130 U/L    Protein, total 7.6 g/dL    Albumin 3.0 (L) 3.2 - 4.6 g/dL    Globulin 4.6 (H) 2.3 - 3.5 g/dL    A-G Ratio 0.7     POC G3    Collection Time: 12/17/18  3:38 PM   Result Value Ref Range    Device: NASAL CANNULA      FIO2 (POC) 44 %    pH (POC) 7.377 7.35 - 7.45      pCO2 (POC) 42.5 35 - 45 MMHG    pO2 (POC) 79 75 - 100 MMHG    HCO3 (POC) 24.9 22 - 26 MMOL/L    sO2 (POC) 95 95 - 98 %    Base excess (POC) 0 mmol/L    Allens test (POC) YES      Site RIGHT RADIAL      Patient temp.  98.6      Specimen type (POC) ARTERIAL      Performed by Ethel     CO2, POC 26 MMOL/L    Flow rate (POC) 6.000 L/min    COLLECT TIME 1,533     TROPONIN I    Collection Time: 12/17/18  4:00 PM   Result Value Ref Range    Troponin-I, Qt. <0.02 (L) 0.02 - 0.05 NG/ML   URINALYSIS W/ RFLX MICROSCOPIC    Collection Time: 12/17/18  4:12 PM   Result Value Ref Range    Color YELLOW      Appearance CLOUDY      Specific gravity 1.011 1.001 - 1.023      pH (UA) 5.0 5.0 - 9.0      Protein NEGATIVE  NEG mg/dL    Glucose NEGATIVE  mg/dL    Ketone NEGATIVE  NEG mg/dL    Bilirubin NEGATIVE  NEG      Blood NEGATIVE  NEG      Urobilinogen 0.2 0.2 - 1.0 EU/dL    Nitrites NEGATIVE  NEG      Leukocyte Esterase TRACE (A) NEG      WBC 0-3 0 /hpf    RBC 0-3 0 /hpf    Epithelial cells 0-3 0 /hpf    Bacteria 0 0 /hpf    Casts HYALINE 0 /lpf    Amorphous Crystals 1+ (H) 0       All Micro Results     Procedure Component Value Units Date/Time    CULTURE, BLOOD [[id number]] Collected:  12/17/18 1600    Order Status:  Completed Specimen:  Blood Updated:  12/17/18 1634    CULTURE, BLOOD [[id number]] Collected:  12/17/18 1516    Order Status:  Completed Specimen:  Blood Updated:  12/17/18 1531          Other Studies:  Xr Chest Port    Result Date: 12/17/2018  Portable chest x-ray INDICATION: Shortness of breath COMPARISON: 10/24/2018 FINDINGS: EKG leads are present over the chest. Cardiac silhouette is upper limits of normal. Left basal consolidation obscuring the left diaphragm is stable with a left pleural effusion. The right lung is clear. No pneumothorax. IMPRESSION: Stable left basal consolidation and pleural effusion.       Assessment and Plan:     Hospital Problems as of 12/17/2018 Date Reviewed: 11/15/2018          Codes Class Noted - Resolved POA    ALYSON (acute kidney injury) ([facility] ICD-10-CM: N17.9  ICD-9-CM: 584.9  12/17/2018 - Present Yes        Chronic respiratory failure with hypoxia (HCC) (Chronic) ICD-10-CM: J96.11  ICD-9-CM: 518.83, 799.02  12/17/2018 - Present Yes        ILD (interstitial lung disease) ([facility] (Chronic) ICD-10-CM: J84.9  ICD-9-CM: 281 12/17/2018 - Present Yes        Hyperkalemia ICD-10-CM: E87.5  ICD-9-CM: 276.7  12/17/2018 - Present Yes        Anemia ICD-10-CM: D64.9  ICD-9-CM: 285.9  12/17/2018 - Present Yes        Recurrent left pleural effusion (Chronic) ICD-10-CM: J90  ICD-9-CM: 511.9  12/17/2018 - Present Yes        Aortic stenosis (Chronic) ICD-10-CM: I35.0  ICD-9-CM: 424.1  12/17/2018 - Present Yes        * (Principal) Sepsis ([facility] ICD-10-CM: A41.9  ICD-9-CM: 038.9, 995.91  12/17/2018 - Present Yes        Acute encephalopathy ICD-10-CM: G93.40  ICD-9-CM: 348.30  12/17/2018 - Present Yes        UTI (urinary tract infection) ICD-10-CM: N39.0  ICD-9-CM: 599.0  11/18/2018 - Present Yes        Late onset Alzheimer's disease without behavioral disturbance (Chronic) ICD-10-CM: G30.1, F02.80  ICD-9-CM: 331.0, 294.10  11/15/2017 - Present Yes        COPD (chronic obstructive pulmonary disease) (HCC) (Chronic) ICD-10-CM: J44.9  ICD-9-CM: 496  9/1/2015 - Present Yes        HTN (hypertension) (Chronic) ICD-10-CM: I10  ICD-9-CM: 401.9  1/22/2014 - Present Yes              · Sepsis: admit to ICU, bolus 500 cc NS now, levophed if hypotension continues despite IVF bolus, continue  cc/hr, start vancomycin/zosyn, start stress dose steroids (prednsione 10 mg daily on home list) followup repeat lactates, BC x 2, urine cx   · ALYSON : continue IVF, followup BMP tomorrow   · Hyperkalemia: repeat lab 2300, stopped lisinopril/ potassium  · Chronic hypoxic respiratory failure/ COPD/ ILD: stable at 6-10 L NC requirement, BIPAP intolerant, pulm consult, continue duonebs/ pulmicort   · Acute encephalopathy: nonfocal exam, followup neuro checks , NPO  · Anemia: chronic, followup CBC       Discharge planning:  pending  DVT ppx: SCD  Code status:  DNR confirmed with daughter Efren Sanderson   Estimated LOS:  Greater than 2 midnights  Risk:  high  Care plan: Efren Sanderson daughter / [place] [phone]  Signed:  Sravanthi Millard MD

## 2018-12-17 NOTE — ED TRIAGE NOTES
Patient comes in via EMS for AMS due to diagnosed UTI. [place] [facility] is facility she is living at currently. Daughter reports facility failed to call in prescription for a week and has not started antibiotics yet. Afebrile for EMS.  Some confusion but able to answer questions
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
## 2018-12-18 PROBLEM — A41.9 SEPTIC SHOCK (HCC): Status: ACTIVE | Noted: 2018-01-01

## 2018-12-18 PROBLEM — R65.21 SEPTIC SHOCK (HCC): Status: ACTIVE | Noted: 2018-01-01

## 2018-12-18 PROBLEM — J96.21 ACUTE ON CHRONIC RESPIRATORY FAILURE WITH HYPOXIA (HCC): Status: ACTIVE | Noted: 2018-01-01

## 2018-12-18 NOTE — PROGRESS NOTES
Patient drowsy, oriented to person, speech slurred. Will follow simple commands like squeeze, wiggle toes. Daughter at bedside. Discussed plan of care. Completed admission assessment. Obtained emergency contact numbers. Daughter stated she is going home, informed her she will be called for any changes and encouraged her to call if she would like to get a update during the night.

## 2018-12-18 NOTE — H&P (VIEW-ONLY)
CONSULT NOTE    Sabiha Deal    12/18/2018    Date of Admission:  12/17/2018    The patient's chart is reviewed and the patient is discussed with the staff. Subjective:     Patient is a 80 y.o.  female seen and evaluated at the request of Dr. Toribio Essex for the evaluation of acute on chronic respiratory failure. Patient is know to us, she has chronic  ILD/COPD/chronic hypoxic respiratory failure on 10 L NC baseline, on home hospice for those indications, who was brought by daughter from longterm living situation at [facility] with confusion and UTI. Per daughter Holden Flores at bedside, patient has become progressively confused and was diagnosed with UTI, started macrobid for several days. In the ED she is altered, in acute renal failure,lethargic and then she became hypotensive. she was admitted to ICU, on levophed overnight. Now coming off levophed  PEr daugther she is intolerant of BIPAP. She is on optiflow now. She has chronic L effusion, has thoracentesis in the past and is set up for pleurax on 12/26.         .      Review of Systems  Review of systems not obtained due to patient factors.     Patient Active Problem List   Diagnosis Code    Hypoxia R09.02    HTN (hypertension) I10    Hyperlipidemia E78.5    Iron deficiency anemia D50.9    Unspecified hearing loss H91.90    Urinary incontinence R32    Unspecified vitamin D deficiency E55.9    COPD (chronic obstructive pulmonary disease) ([address] J44.9    DDD (degenerative disc disease), lumbar M51.36    Scoliosis M41.9    Debility, unspecified R53.81    Edema R60.9    Anxiety F41.9    Vitamin D deficiency E55.9    Debility R53.81    Late onset Alzheimer's disease without behavioral disturbance G30.1, F02.80    History of meningioma Z86.018    Aspiration pneumonia ([address] J69.0    Acute respiratory failure with hypoxemia (HCC) J96.01    Pleural effusion, bilateral J90    Nonrheumatic aortic valve stenosis I35.0  UTI (urinary tract infection) N39.0    Recurrent pleural effusion on left J90    ALYSON (acute kidney injury) ([facility]) N17.9    Chronic respiratory failure with hypoxia ([facility]) J96.11    ILD (interstitial lung disease) ([facility]) J84.9    Hyperkalemia E87.5    Anemia D64.9    Recurrent left pleural effusion J90    Aortic stenosis I35.0    Sepsis ([facility]) A41.9    Acute encephalopathy G93.40    Septic shock ([facility]) A41.9, R65.21    Acute on chronic respiratory failure with hypoxia ([facility]) J96.21           Prior to Admission Medications   Prescriptions Last Dose Informant Patient Reported? Taking? Azelastine (ASTEPRO) 0.15 % (205.5 mcg) nasal spray   No No   Si [place] by Both Nostrils route two (2) times a day. Compression Socks, Medium misc   No No   Sig: WEAR DAILY   DISABLED PLACARD (DISABLED PLACARD) DMV   No No   Sig: Use prn   OXYGEN-AIR DELIVERY SYSTEMS   Yes No   Sig: Keep SpO2> 90%   Walker (ULTRA-LIGHT ROLLATOR) misc   No No   Sig: Use daily   acetaminophen (TYLENOL) 325 mg tablet   Yes No   Sig: Take 325 mg by mouth every four (4) hours as needed for Pain. albuterol (PROVENTIL VENTOLIN) 2.5 mg /3 mL (0.083 %) nebulizer solution   No No   Sig: 3 mL by Nebulization route every four (4) hours as needed for Wheezing. Patient taking differently: 2.5 mg by Nebulization route every four (4) hours. ascorbic acid, vitamin C, (VITAMIN C) 500 mg tablet   Yes No   Sig: Take  by mouth.   bipap machine kit   Yes No   Sig: by Does Not Apply route. budesonide (PULMICORT) 0.5 mg/2 mL nbsp   No No   Si mL by Nebulization route two (2) times a day. donepezil (ARICEPT) 5 mg tablet   No No   Sig: Take 1 Tab by mouth nightly. erythromycin (ILOTYCIN) ophthalmic ointment   No No   Si.5 inch ointment to lower lid of both eyes 4 times daily for 5 days   ferrous sulfate (IRON) 325 mg (65 mg iron) tablet   Yes No   Sig: Take 325 mg by mouth Daily (before breakfast).    furosemide (LASIX) 40 mg tablet   Yes No Sig: Take 40 mg by mouth daily. lisinopril (PRINIVIL, ZESTRIL) 20 mg tablet   No No   Sig: Take 1 Tab by mouth daily. pantoprazole (PROTONIX) 40 mg tablet   No No   Sig: TAKE 1 TABLET BY MOUTH DAILY   polyethylene glycol (MIRALAX) 17 gram packet   No No   Sig: Take 1 Packet by mouth daily. potassium chloride (KLOR-CON M20) 20 mEq tablet   Yes No   Sig: Take  by mouth two (2) times a day. predniSONE (DELTASONE) 10 mg tablet   Yes No   Sig: Take  by mouth daily (with breakfast). sertraline (ZOLOFT) 100 mg tablet   No No   Sig: Take 1 Tab by mouth nightly. Patient taking differently: Take 50 mg by mouth nightly.       Facility-Administered Medications: None       Past Medical History:   Diagnosis Date    Arthritis     Arthropathy, unspecified, site unspecified     Benign paroxysmal positional vertigo     Cardiac dysrhythmia, unspecified     Chronic obstructive pulmonary disease (HCC)     Debility, unspecified     Dementia     Facet syndrome (HCC)     GERD (gastroesophageal reflux disease)     GIB (gastrointestinal bleeding) 1/31/2014    History of peptic ulcer disease 01/2014    Hypertension     IBS (irritable bowel syndrome)     Ill-defined condition     Impaired fasting glucose     Lumbago     Memory loss     Meningioma (HCC)     [facility]    Musculoskeletal pain     OA (osteoarthritis) of knee     Palliative care encounter 5/8/2014    Rhinorrhea     Scoliosis     Sensorineural hearing loss     Sleep apnea     Spondylolisthesis     Lumbar    TMJ dysfunction     Varicella     Vasomotor rhinitis     Vitamin D deficiency      Past Surgical History:   Procedure Laterality Date    HX APPENDECTOMY      HX CATARACT REMOVAL  2003    X2 WITH LENSE IMPLANT     HX COLONOSCOPY      HX KNEE ARTHROSCOPY      left knee    HX KNEE REPLACEMENT Left 2007    HX OTHER SURGICAL  1998    Meningioma Rt brain    HX JOSE MIGUEL AND BSO  1974    HX TONSILLECTOMY       Social History     Socioeconomic History    Marital status:      Spouse name: Not on file    Number of children: Not on file    Years of education: karthikeyan    Highest education level: Not on file   Social Needs    Financial resource strain: Not on file    Food insecurity - worry: Not on file    Food insecurity - inability: Not on file   [employer] needs - medical: Not on file   [employer] needs - non-medical: Not on file   Occupational History     Employer: RETIRED   Tobacco Use    Smoking status: Former Smoker     Packs/day: 1.50     Years: 45.00     Pack years: 67.50     Types: Cigarettes     Last attempt to quit: 1991     Years since quittin.9    Smokeless tobacco: Never Used   Substance and Sexual Activity    Alcohol use: Yes     Alcohol/week: 1.8 oz     Types: 1 Glasses of wine, 1 Shots of liquor, 1 Standard drinks or equivalent per week     Comment: OCCASIONAL    Drug use: No    Sexual activity: Not on file   Other Topics Concern    Not on file   Social History Narrative    Lives alone.  Ambulates with walker/cane, has supportive daughters     Family History   Problem Relation Age of Onset    Cancer Mother         Pancreatic    Cancer Father         Ear     Allergies   Allergen Reactions    Bactroban [Mupirocin Calcium] Rash    Mupirocin Other (comments)    Sulfa (Sulfonamide Antibiotics) Unknown (comments)    Sulfamethoxazole-Trimethoprim Other (comments)       Current Facility-Administered Medications   Medication Dose Route Frequency    [START ON 2018] vancomycin (VANCOCIN) 1,000 mg in 0.9% sodium chloride (MBP/ADV) 250 mL  1,000 mg IntraVENous Q36H    hydrocortisone Sod Succ (PF) (SOLU-CORTEF) injection 50 mg  50 mg IntraVENous Q8H    albuterol (PROVENTIL VENTOLIN) nebulizer solution 2.5 mg  2.5 mg Nebulization Q6H RT    budesonide (PULMICORT) 500 mcg/2 ml nebulizer suspension  500 mcg Nebulization BID RT    pantoprazole (PROTONIX) 40 mg in sodium chloride 0.9% 10 mL injection  40 mg IntraVENous DAILY    sodium chloride (NS) flush 5-10 mL  5-10 mL IntraVENous PRN    0.9% sodium chloride infusion  100 mL/hr IntraVENous CONTINUOUS    acetaminophen (TYLENOL) tablet 650 mg  650 mg Oral Q6H PRN    ondansetron (ZOFRAN) injection 4 mg  4 mg IntraVENous Q6H PRN    LORazepam (ATIVAN) injection 0.5 mg  0.5 mg IntraVENous Q6H PRN    morphine injection 1 mg  1 mg IntraVENous Q4H PRN    NOREPINephrine (LEVOPHED) 4 mg in 5% dextrose 250 mL infusion  0-16 mcg/min IntraVENous TITRATE    piperacillin-tazobactam (ZOSYN) 4.5 g in 0.9% sodium chloride (MBP/ADV) 100 mL  4.5 g IntraVENous Q12H         Objective:     Vitals:    12/18/18 0917 12/18/18 0932 12/18/18 0947 12/18/18 1002   BP: 125/52 133/49 129/46 131/49   Pulse: (!) 122 (!) 123 (!) 123 (!) 123   Resp: 13 17 15 15   Temp:       SpO2: 91% 95% 95% 95%   Weight:       Height:           PHYSICAL EXAM     Constitutional:  the patient is well developed and in no acute distress,on otpiflow  EENMT:  Sclera clear, pupils equal, oral mucosa moist  Respiratory: diminished on L  Cardiovascular:  RRR without M,G,R  Gastrointestinal: soft and non-tender; with positive bowel sounds. Musculoskeletal: warm without cyanosis. There is no lower leg edema.   Skin:  no jaundice or rashes, no wounds   Neurologic: no gross neuro deficits     Psychiatric:  Lethargic but trying to open eyes    CXR:        Recent Labs     12/18/18  0411 12/17/18  1516   WBC 12.9* 12.5*   HGB 9.6* 10.9*   HCT 33.2* 37.9    228     Recent Labs     12/18/18  0411 12/18/18  0003 12/17/18  1600 12/17/18  1516    141  --  138   K 5.5* 5.5*  --  7.0*   * 108*  --  104   * 142*  --  110*   CO2 23 23  --  26   BUN 41* 43*  --  50*   CREA 1.97* 2.10*  --  2.42*   CA 8.8 9.2  --  10.0   TROIQ  --   --  <0.02*  --    ALB 2.4*  --   --  3.0*   TBILI 0.3  --   --  0.3   ALT 15  --   --  19   SGOT 32  --   --  37       Recent Labs     12/18/18  0417 12/18/18  0003 12/17/18  1934   LAC 1.3 1.2 2.3*       Assessment:  (Medical Decision Making)     Hospital Problems  Date Reviewed: 11/15/2018          Codes Class Noted POA    * (Principal) Septic shock ([facility]   was on pressors, now off  ICD-10-CM: A41.9, R65.21  ICD-9-CM: 038.9, 785.52, 995.92  12/18/2018 Unknown        Acute on chronic respiratory failure with hypoxia (HCC) on 10L nc at home ICD-10-CM: J96.21  ICD-9-CM: 518.84, 799.02  12/18/2018 Unknown        ALYSON (acute kidney injury) ([facility] ICD-10-CM: N17.9  ICD-9-CM: 584.9  12/17/2018 Yes        Chronic respiratory failure with hypoxia (HCC) (Chronic) ICD-10-CM: J96.11  ICD-9-CM: 518.83, 799.02  12/17/2018 Yes        ILD (interstitial lung disease) ([facility] (Chronic)   chronic  ICD-10-CM: J84.9  ICD-9-CM: 709  12/17/2018 Yes            Recurrent left pleural effusion (Chronic) chronic ,is supposed to have pleurax placed on 12/26 ICD-10-CM: J90  ICD-9-CM: 511.9  12/17/2018 Yes        Aortic stenosis (Chronic) ICD-10-CM: I35.0  ICD-9-CM: 424.1  12/17/2018 Yes        Sepsis ([facility] ICD-10-CM: A41.9  ICD-9-CM: 038.9, 995.91  12/17/2018 Yes        Acute encephalopathy likely due to sepsis ICD-10-CM: G93.40  ICD-9-CM: 348.30  12/17/2018 Yes        UTI (urinary tract infection) ICD-10-CM: N39.0  ICD-9-CM: 599.0  11/18/2018 Yes        Late onset Alzheimer's disease without behavioral disturbance (Chronic) ICD-10-CM: G30.1, F02.80  ICD-9-CM: 331.0, 294.10  11/15/2017 Yes        COPD (chronic obstructive pulmonary disease) ([facility] (Chronic) ICD-10-CM: J44.9  ICD-9-CM: 496  9/1/2015 Yes              Plan:  (Medical Decision Making)     -- wean pressors  - may wean solucortef some  - continue ABX  - continue optilfow  -no thoracentesis right now- she is actually scheduled for pleurax for 12/26/2018  -she was in hospice but now her daughter revoke it, she is still DNR and should return to hospice if she survive      More than 50% of the time documented was spent in face-to-face contact with the patient and in the care of the patient on the floor/unit where the patient is located. Thank you very much for this referral.  We appreciate the opportunity to participate in this patient's care. Will follow along with above stated plan.     Tiffanie Vargas MD

## 2018-12-18 NOTE — PROGRESS NOTES
Hospitalist Progress Note    2018  Admit Date: 2018  2:28 PM   NAME: Juliana Lockhart   :  1929   MRN:  [id number]   Attending: Donald Lee MD  PCP:  Patience Langley MD    SUBJECTIVE:   From HPI:  \"Ms. Deal is a 81 yo female with PMH of ILD/COPD/chronic hypoxic respiratory failure on 10 L NC baseline, on home hospice for those indications, who was brought by daughter from longterm living situation at [facility] with confusion and UTI. Per daughter Jose Gonsalves at bedside, patient has become progressively confused and was diagnosed with UTI, started macrobid for several days. In the ED she is altered, with ALYSON on hyperkalemia of 7.0 she has received IVF, calcium gluconate/albuterol/D50/ insulin and rocephin. UA negative. CXR shows chronic left base consolidation and effusion. On admission evaluation, she became hypotensive and required admission to ICU.    18:  She is asleep, mouth breathing. Respiratory Therapy and nurse at beside. Caregiver, Ren Terrazas, is also at bedside. Discussed condition and plan with Ms. Ren Terrazas. Review of Systems unable to be obtained / mentation. PHYSICAL EXAM     Visit Vitals  /50   Pulse (!) 120   Temp 99.3 °F (37.4 °C)   Resp 19   Ht 5' 3\" (1.6 m)   Wt 70.5 kg (155 lb 6.8 oz)   SpO2 94%   Breastfeeding? No   BMI 27.53 kg/m²      Temp (24hrs), Av.6 °F (37 °C), Min:97.5 °F (36.4 °C), Max:99.3 °F (37.4 °C)    Oxygen Therapy  O2 Sat (%): 94 % (18)  Pulse via Oximetry: 124 beats per minute (18)  O2 Device: Heated; Hi flow nasal cannula (18)  O2 Flow Rate (L/min): 30 l/min (18)  O2 Temperature: 86 °F (30 °C) (18)  FIO2 (%): 50 % (18)    Intake/Output Summary (Last 24 hours) at 2018 0851  Last data filed at 2018 0550  Gross per 24 hour   Intake 1689 ml   Output 800 ml   Net 889 ml      General: No acute distress    Lungs:  Diminished bilaterally.    Heart:  Regular rate and rhythm,  No murmur, rub, or gallop  Abdomen: Soft, Non distended, Non tender, Positive bowel sounds  Extremities: No cyanosis, clubbing or edema  Neurologic:  No focal deficits    ASSESSMENT      Active Hospital Problems    Diagnosis Date Noted    ALYSON (acute kidney injury) ([facility] 12/17/2018    Chronic respiratory failure with hypoxia ([address] 12/17/2018    ILD (interstitial lung disease) ([address] 12/17/2018    Hyperkalemia 12/17/2018    Anemia 12/17/2018    Recurrent left pleural effusion 12/17/2018    Aortic stenosis 12/17/2018    Sepsis ([address] 12/17/2018    Acute encephalopathy 12/17/2018    UTI (urinary tract infection) 11/18/2018    Late onset Alzheimer's disease without behavioral disturbance 11/15/2017    COPD (chronic obstructive pulmonary disease) ([facility] 09/01/2015    HTN (hypertension) 01/22/2014     Plan:     · Sepsis: Continue levophed, attempt to wean as tolerated. Continue vanc/zosyn and stress dose steroids, BC x 2, urine cx pending. Likely urinary source, but unclear as UA was not overtly remarkable. May have been sterilized by PO abx as outpatient, but unclear how many doses patient received. Lactic acid is now normal with IVFs on admission. · ALYSON : continue IVF, BMP shows some improvement. · Hyperkalemia: repeat improved, stopped lisinopril and potassium. Will monitor, but hold off on additional K-lowering meds at this time to prevent precipitous drop as recent K is 5.5. · Chronic hypoxic respiratory failure/COPD/ILD: hypoxic this AM.  Placed on Optiflow just prior to my entry into her room. RT following. BIPAP intolerant per family, pulm consult appreciated, continue duonebs/pulmicort   · Acute encephalopathy: nonfocal exam, followup neuro checks , NPO, ST consult ordered  · Anemia: chronic, followup CBC         DVT Prophylaxis: SCDs    Dispo:  CM following. Was previously on hospice, revoked by daughter on admission yesterday. Has full time caregivers.   Was at [facility] prior to admission.     Signed By: Augusto Johnson MD     December 18, 2018

## 2018-12-18 NOTE — PROGRESS NOTES
Report to [facility]. Levophed drip at 3 mcg/ blood pressure WNL. Patient repositioned for comfort, dual skin assessment completed.

## 2018-12-18 NOTE — PROGRESS NOTES
Dysphagia goals:  LTG: Patient will tolerate least restrictive diet without signs/symptoms of aspiration at discharge for safe swallow function. STG: Patient will participate in po trials (with ST only) to determine least restrictive diet    Speech language pathology: bedside swallow note: Initial Assessment    NAME/AGE/GENDER: Omer Payne is a 80 y.o. female  DATE: 12/18/2018  PRIMARY DIAGNOSIS: ALYSON (acute kidney injury) ([facility]  ALYSON (acute kidney injury) ([facility] [address]      ICD-10: Treatment Diagnosis: oropharyngeal dysphagia R13.12  INTERDISCIPLINARY COLLABORATION: Registered Nurse  PRECAUTIONS/ALLERGIES: Bactroban [mupirocin calcium]; Mupirocin; Sulfa (sulfonamide antibiotics); and Sulfamethoxazole-trimethoprim ASSESSMENT: Ms. Brandin Woodruff presents with oropharyngeal dysphagia. RN reported patient has been asleep most of the day with decreased command following. Daughter at bedside reporting patient on regular diet/thin liquids at baseline. Asleep upon entry, minimally roused with verbal and tactile stimuli. Required significant cueing to maintain eye opening. Single ice chip presented x2. Audible swallow with wet throat clear and weak cough upon initial presentation of single ice chip. Ice chip presented again with wet breath sounds and throat clear upon cued vocalization. Did not trial further consistencies due to poor level of alertness. Recommend continue NPO at this time. Not appropriate for diet given decreased level of alertness. Discussed diet recommendation with patient's daughter. Will follow up for po trials to determine ability to tolerate po diet. Patient will benefit from skilled intervention to address the below impairments. ?????? ? ? This section established at most recent assessment??????????  PROBLEM LIST (Impairments causing functional limitations):  1.  Oropharyngeal dysphagia   REHABILITATION POTENTIAL FOR STATED GOALS: Fair  PLAN OF CARE:   Patient will benefit from skilled intervention to address the following impairments. RECOMMENDATIONS AND PLANNED INTERVENTIONS (Benefits and precautions of therapy have been discussed with the patient.):  · NPO  MEDICATIONS:  · Non-oral  ASPIRATION PRECAUTIONS:  · Slow rate of intake  · Small bites/sips  · Upright at 90 degrees during meal  COMPENSATORY STRATEGIES/MODIFICATIONS INCLUDING:  · None  OTHER RECOMMENDATIONS (including follow up treatment recommendations): · Family training/education  · Patient education  RECOMMENDED DIET MODIFICATIONS DISCUSSED WITH:  · Nursing  · Family  · Patient  FREQUENCY/DURATION: Will follow up for po trials and determine frequency of care at that time. RECOMMENDED REHABILITATION/EQUIPMENT: (at time of discharge pending progress): Due to the probability of continued deficits (see above) this patient will not likely need continued skilled speech therapy after discharge. SUBJECTIVE:   Asleep upon entry. Poor level of alertness, minimal eye opening with max cueing. Oriented to person only. History of Present Injury/Illness: Ms. Eliel Noland  has a past medical history of Arthritis, Arthropathy, unspecified, site unspecified, Benign paroxysmal positional vertigo, Cardiac dysrhythmia, unspecified, Chronic obstructive pulmonary disease ([address] Debility, unspecified, Dementia, Facet syndrome ([address] GERD (gastroesophageal reflux disease), GIB (gastrointestinal bleeding), History of peptic ulcer disease, Hypertension, IBS (irritable bowel syndrome), Ill-defined condition, Impaired fasting glucose, Lumbago, Memory loss, Meningioma ([address] Musculoskeletal pain, OA (osteoarthritis) of knee, Palliative care encounter, Rhinorrhea, Scoliosis, Sensorineural hearing loss, Sleep apnea, Spondylolisthesis, TMJ dysfunction, Varicella, Vasomotor rhinitis, and Vitamin D deficiency. .  She also  has a past surgical history that includes hx [place] and bso (1974); hx knee arthroscopy; hx appendectomy; hx other surgical (1998); hx knee replacement (Left, 2007); hx colonoscopy; hx tonsillectomy; hx cataract removal (2003); ULTRASOUND (N/A, 12/5/2018); ULTRASOUND (Left, 11/16/2018); THORACENTESIS (Left, 11/16/2018); ESOPHAGOGASTRODUODENOSCOPY (EGD) (N/A, 1/31/2014); INJECTION (N/A, 1/31/2014); ENDOSCOPIC FOREIGN BODY REMOVAL (N/A, 1/31/2014); ENDOSCOPIC BANDING OR LIGATION (N/A, 1/31/2014); BRONCHOSCOPY (N/A, 1/30/2014); and BRONCHOSCOPY (N/A, 1/26/2014). Present Symptoms: dysphagia    Pain Scale 1: Numeric (0 - 10)  Pain Intensity 1: 0  Current Medications:   No current facility-administered medications on file prior to encounter. Current Outpatient Medications on File Prior to Encounter   Medication Sig Dispense Refill    erythromycin (ILOTYCIN) ophthalmic ointment 0.5 inch ointment to lower lid of both eyes 4 times daily for 5 days 1 Tube 0    acetaminophen (TYLENOL) 325 mg tablet Take 325 mg by mouth every four (4) hours as needed for Pain.  ferrous sulfate (IRON) 325 mg (65 mg iron) tablet Take 325 mg by mouth Daily (before breakfast).  potassium chloride (KLOR-CON M20) 20 mEq tablet Take  by mouth two (2) times a day.  furosemide (LASIX) 40 mg tablet Take 40 mg by mouth daily.  predniSONE (DELTASONE) 10 mg tablet Take  by mouth daily (with breakfast).  ascorbic acid, vitamin C, (VITAMIN C) 500 mg tablet Take  by mouth.  OXYGEN-AIR DELIVERY SYSTEMS Keep SpO2> 90%      bipap machine kit by Does Not Apply route.  pantoprazole (PROTONIX) 40 mg tablet TAKE 1 TABLET BY MOUTH DAILY 30 Tab 5    budesonide (PULMICORT) 0.5 mg/2 mL nbsp 2 mL by Nebulization route two (2) times a day. 1 Each 1    albuterol (PROVENTIL VENTOLIN) 2.5 mg /3 mL (0.083 %) nebulizer solution 3 mL by Nebulization route every four (4) hours as needed for Wheezing. (Patient taking differently: 2.5 mg by Nebulization route every four (4) hours.) 24 Each 0    lisinopril (PRINIVIL, ZESTRIL) 20 mg tablet Take 1 Tab by mouth daily.  30 Tab 0    polyethylene glycol (MIRALAX) 17 gram packet Take 1 Packet by mouth daily. 1 Packet 0    Azelastine (ASTEPRO) 0.15 % (205.5 mcg) nasal spray 1 [place] by Both Nostrils route two (2) times a day. 1 Bottle 12    Walker (ULTRA-LIGHT ROLLATOR) misc Use daily 1 Each 0    donepezil (ARICEPT) 5 mg tablet Take 1 Tab by mouth nightly. 90 Tab 3    sertraline (ZOLOFT) 100 mg tablet Take 1 Tab by mouth nightly. (Patient taking differently: Take 50 mg by mouth nightly.) 30 Tab 3    Compression Socks, Medium misc WEAR DAILY 2 Each 12    DISABLED PLACARD (DISABLED PLACARD) DMV Use prn 1 Each 0     Current Dietary Status:  NPO    Social History/Home Situation:    Home Environment: Long term care  # Steps to Enter: 0  One/Two Story Residence: One story  Living Alone: No  Support Systems: Child(shae), Family member(s), Friends \ neighbors  Patient Expects to be Discharged to[de-identified] Unknown  Current DME Used/Available at Home: Adaptive bathing aides, Adaptive dressing aides, Oxygen, portable, Shower chair, Transfer bench, Tub transfer bench, Wheelchair, Other (comment)  OBJECTIVE:   Respiratory Status:  Hi flow nasal cannula  15 l/min  Oral Motor Structure/Speech:  Oral-Motor Structure/Motor Speech  Oral Hygiene: fair    Cognitive and Communication Status:  Neurologic State: Confused;Drowsy  Orientation Level: Oriented to person  Cognition: Decreased attention/concentration;Decreased command following             BEDSIDE SWALLOW EVALUATION  Oral Assessment:  Oral Assessment  Oral Hygiene: fair  P.O. Trials:  Patient Position: upright in bed     The patient was given  the following: How Presented: Spoon;SLP-fed/presented    ORAL PHASE:  Bolus Acceptance: Impaired  Bolus Formation/Control: Impaired  Propulsion: Delayed (# of seconds)  Type of Impairment: Delayed  Oral Residue: None    PHARYNGEAL PHASE:  Initiation of Swallow: Delayed (# of seconds)  Laryngeal Elevation: Decreased  Aspiration Signs/Symptoms: Clear throat;Weak cough; Change vocal quality  Vocal Quality: Low volume           Pharyngeal Phase Characteristics: Audible swallow; Poor endurance    OTHER OBSERVATIONS:  Rate/bite size: Questionable   Endurance:  Questionable   Comments: Tool Used: Dysphagia Outcome and Severity Scale (CHILANGO)    Score Comments   Normal Diet  [] 7 With no strategies or extra time needed   Functional Swallow  [] 6 May have mild oral or pharyngeal delay       Mild Dysphagia    [] 5 Which may require one diet consistency restricted (those who demonstrate penetration which is entirely cleared on MBS would be included)   Mild-Moderate Dysphagia  [] 4 With 1-2 diet consistencies restricted       Moderate Dysphagia  [] 3 With 2 or more diet consistencies restricted       Moderately Severe Dysphagia  [x] 2 With partial PO strategies (trials with ST only)       Severe Dysphagia  [] 1 With inability to tolerate any PO safely          Score:  Initial: 2 Most Recent: X (Date: -- )   Interpretation of Tool: The Dysphagia Outcome and Severity Scale (CHILANGO) is a simple, easy-to-use, 7-point scale developed to systematically rate the functional severity of dysphagia based on objective assessment and make recommendations for diet level, independence level, and type of nutrition. Score 7 6 5 4 3 2 1   Modifier CH CI CJ CK CL CM CN   ?  Swallowing:     - CURRENT STATUS: CM - 80%-99% impaired, limited or restricted    - GOAL STATUS:  CI - 1%-19% impaired, limited or restricted    - D/C STATUS:  ---------------To be determined---------------  [place] / [place] / Product Type: Medicare /     TREATMENT:    (In addition to Assessment/Re-Assessment sessions the following treatments were rendered)  Assessment/Reassessment only, no treatment provided today  MODALITIES:                                                                    ORAL MOTOR  EXERCISES: LARYNGEAL / PHARYNGEAL EXERCISES:                                                                                                                                     __________________________________________________________________________________________________  Safety:   After treatment position/precautions:  · RN notified  · Family at bedside  · Upright in Bed  Treatment Assessment:  Evaluation only. Progression/Medical Necessity:   · Patient is expected to demonstrate progress in swallow strength, swallow timeliness, swallow function, diet tolerance and swallow safety to work toward diet advancement. Compliance with Program/Exercises: Will assess as treatment progresses  Reason for Continuation of Services/Other Comments:  · Patient continues to require skilled intervention due to medical complications. Recommendations/Intent for next treatment session: \"Treatment next visit will focus on po trials to determine ability to tolerate diet\".     Total Treatment Duration:  Time In: 1645  Time Out: [address], [facility] FOOTE, CF-SLP

## 2018-12-18 NOTE — PROGRESS NOTES
Bedside and Verbal shift change report given to Dara Kehr RN (oncoming nurse) by Keke Esqueda (offgoing nurse). Report included the following information SBAR, Kardex, ED Summary, Intake/Output, MAR, Recent Results and Cardiac Rhythm .

## 2018-12-18 NOTE — CONSULTS
CONSULT NOTE    Maite Deal    12/18/2018    Date of Admission:  12/17/2018    The patient's chart is reviewed and the patient is discussed with the staff. Subjective:     Patient is a 80 y.o.  female seen and evaluated at the request of Dr. Rupa Heath for the evaluation of acute on chronic respiratory failure. Patient is know to us, she has chronic  ILD/COPD/chronic hypoxic respiratory failure on 10 L NC baseline, on home hospice for those indications, who was brought by daughter from longterm living situation at [facility] with confusion and UTI. Per daughter Robby Castillo at bedside, patient has become progressively confused and was diagnosed with UTI, started macrobid for several days. In the ED she is altered, in acute renal failure,lethargic and then she became hypotensive. she was admitted to ICU, on levophed overnight. Now coming off levophed  PEr daugther she is intolerant of BIPAP. She is on optiflow now. She has chronic L effusion, has thoracentesis in the past and is set up for pleurax on 12/26.         .      Review of Systems  Review of systems not obtained due to patient factors.     Patient Active Problem List   Diagnosis Code    Hypoxia R09.02    HTN (hypertension) I10    Hyperlipidemia E78.5    Iron deficiency anemia D50.9    Unspecified hearing loss H91.90    Urinary incontinence R32    Unspecified vitamin D deficiency E55.9    COPD (chronic obstructive pulmonary disease) ([address] J44.9    DDD (degenerative disc disease), lumbar M51.36    Scoliosis M41.9    Debility, unspecified R53.81    Edema R60.9    Anxiety F41.9    Vitamin D deficiency E55.9    Debility R53.81    Late onset Alzheimer's disease without behavioral disturbance G30.1, F02.80    History of meningioma Z86.018    Aspiration pneumonia ([address] J69.0    Acute respiratory failure with hypoxemia (HCC) J96.01    Pleural effusion, bilateral J90    Nonrheumatic aortic valve stenosis I35.0  UTI (urinary tract infection) N39.0    Recurrent pleural effusion on left J90    ALYSON (acute kidney injury) ([facility]) N17.9    Chronic respiratory failure with hypoxia ([facility]) J96.11    ILD (interstitial lung disease) ([facility]) J84.9    Hyperkalemia E87.5    Anemia D64.9    Recurrent left pleural effusion J90    Aortic stenosis I35.0    Sepsis ([facility]) A41.9    Acute encephalopathy G93.40    Septic shock ([facility]) A41.9, R65.21    Acute on chronic respiratory failure with hypoxia ([facility]) J96.21           Prior to Admission Medications   Prescriptions Last Dose Informant Patient Reported? Taking? Azelastine (ASTEPRO) 0.15 % (205.5 mcg) nasal spray   No No   [place] by Both Nostrils route two (2) times a day. Compression Socks, Medium misc   No No   Sig: WEAR DAILY   DISABLED PLACARD (DISABLED PLACARD) DMV   No No   Sig: Use prn   OXYGEN-AIR DELIVERY SYSTEMS   Yes No   Sig: Keep SpO2> 90%   Walker (ULTRA-LIGHT ROLLATOR) misc   No No   Sig: Use daily   acetaminophen (TYLENOL) 325 mg tablet   Yes No   Sig: Take 325 mg by mouth every four (4) hours as needed for Pain. albuterol (PROVENTIL VENTOLIN) 2.5 mg /3 mL (0.083 %) nebulizer solution   No No   Sig: 3 mL by Nebulization route every four (4) hours as needed for Wheezing. Patient taking differently: 2.5 mg by Nebulization route every four (4) hours. ascorbic acid, vitamin C, (VITAMIN C) 500 mg tablet   Yes No   Sig: Take  by mouth.   bipap machine kit   Yes No   Sig: by Does Not Apply route. budesonide (PULMICORT) 0.5 mg/2 mL nbsp   No No   Si mL by Nebulization route two (2) times a day. donepezil (ARICEPT) 5 mg tablet   No No   Sig: Take 1 Tab by mouth nightly. erythromycin (ILOTYCIN) ophthalmic ointment   No No   Si.5 inch ointment to lower lid of both eyes 4 times daily for 5 days   ferrous sulfate (IRON) 325 mg (65 mg iron) tablet   Yes No   Sig: Take 325 mg by mouth Daily (before breakfast).    furosemide (LASIX) 40 mg tablet   Yes No Sig: Take 40 mg by mouth daily. lisinopril (PRINIVIL, ZESTRIL) 20 mg tablet   No No   Sig: Take 1 Tab by mouth daily. pantoprazole (PROTONIX) 40 mg tablet   No No   Sig: TAKE 1 TABLET BY MOUTH DAILY   polyethylene glycol (MIRALAX) 17 gram packet   No No   Sig: Take 1 Packet by mouth daily. potassium chloride (KLOR-CON M20) 20 mEq tablet   Yes No   Sig: Take  by mouth two (2) times a day. predniSONE (DELTASONE) 10 mg tablet   Yes No   Sig: Take  by mouth daily (with breakfast). sertraline (ZOLOFT) 100 mg tablet   No No   Sig: Take 1 Tab by mouth nightly. Patient taking differently: Take 50 mg by mouth nightly.       Facility-Administered Medications: None       Past Medical History:   Diagnosis Date    Arthritis     Arthropathy, unspecified, site unspecified     Benign paroxysmal positional vertigo     Cardiac dysrhythmia, unspecified     Chronic obstructive pulmonary disease (HCC)     Debility, unspecified     Dementia     Facet syndrome (HCC)     GERD (gastroesophageal reflux disease)     GIB (gastrointestinal bleeding) 1/31/2014    History of peptic ulcer disease 01/2014    Hypertension     IBS (irritable bowel syndrome)     Ill-defined condition     Impaired fasting glucose     Lumbago     Memory loss     Meningioma (HCC)     rem[facility]    Musculoskeletal pain     OA (osteoarthritis) of knee     Palliative care encounter 5/8/2014    Rhinorrhea     Scoliosis     Sensorineural hearing loss     Sleep apnea     Spondylolisthesis     Lumbar    TMJ dysfunction     Varicella     Vasomotor rhinitis     Vitamin D deficiency      Past Surgical History:   Procedure Laterality Date    HX APPENDECTOMY      HX CATARACT REMOVAL  2003    X2 WITH LENSE IMPLANT     HX COLONOSCOPY      HX KNEE ARTHROSCOPY      left knee    HX KNEE REPLACEMENT Left 2007    HX OTHER SURGICAL  1998    Meningioma Rt brain    HX JOSE MIGUEL AND BSO  1974    HX TONSILLECTOMY       Social History     Socioeconomic History    Marital status:      Spouse name: Not on file    Number of children: Not on file    Years of education: karthikeyan    Highest education level: Not on file   Social Needs    Financial resource strain: Not on file    Food insecurity - worry: Not on file    Food insecurity - inability: Not on file   [employer] needs - medical: Not on file   [employer] needs - non-medical: Not on file   Occupational History     Employer: RETIRED   Tobacco Use    Smoking status: Former Smoker     Packs/day: 1.50     Years: 45.00     Pack years: 67.50     Types: Cigarettes     Last attempt to quit: 1991     Years since quittin.9    Smokeless tobacco: Never Used   Substance and Sexual Activity    Alcohol use: Yes     Alcohol/week: 1.8 oz     Types: 1 Glasses of wine, 1 Shots of liquor, 1 Standard drinks or equivalent per week     Comment: OCCASIONAL    Drug use: No    Sexual activity: Not on file   Other Topics Concern    Not on file   Social History Narrative    Lives alone.  Ambulates with walker/cane, has supportive daughters     Family History   Problem Relation Age of Onset    Cancer Mother         Pancreatic    Cancer Father         Ear     Allergies   Allergen Reactions    Bactroban [Mupirocin Calcium] Rash    Mupirocin Other (comments)    Sulfa (Sulfonamide Antibiotics) Unknown (comments)    Sulfamethoxazole-Trimethoprim Other (comments)       Current Facility-Administered Medications   Medication Dose Route Frequency    [START ON 2018] vancomycin (VANCOCIN) 1,000 mg in 0.9% sodium chloride (MBP/ADV) 250 mL  1,000 mg IntraVENous Q36H    hydrocortisone Sod Succ (PF) (SOLU-CORTEF) injection 50 mg  50 mg IntraVENous Q8H    albuterol (PROVENTIL VENTOLIN) nebulizer solution 2.5 mg  2.5 mg Nebulization Q6H RT    budesonide (PULMICORT) 500 mcg/2 ml nebulizer suspension  500 mcg Nebulization BID RT    pantoprazole (PROTONIX) 40 mg in sodium chloride 0.9% 10 mL injection  40 mg IntraVENous DAILY    sodium chloride (NS) flush 5-10 mL  5-10 mL IntraVENous PRN    0.9% sodium chloride infusion  100 mL/hr IntraVENous CONTINUOUS    acetaminophen (TYLENOL) tablet 650 mg  650 mg Oral Q6H PRN    ondansetron (ZOFRAN) injection 4 mg  4 mg IntraVENous Q6H PRN    LORazepam (ATIVAN) injection 0.5 mg  0.5 mg IntraVENous Q6H PRN    morphine injection 1 mg  1 mg IntraVENous Q4H PRN    NOREPINephrine (LEVOPHED) 4 mg in 5% dextrose 250 mL infusion  0-16 mcg/min IntraVENous TITRATE    piperacillin-tazobactam (ZOSYN) 4.5 g in 0.9% sodium chloride (MBP/ADV) 100 mL  4.5 g IntraVENous Q12H         Objective:     Vitals:    12/18/18 0917 12/18/18 0932 12/18/18 0947 12/18/18 1002   BP: 125/52 133/49 129/46 131/49   Pulse: (!) 122 (!) 123 (!) 123 (!) 123   Resp: 13 17 15 15   Temp:       SpO2: 91% 95% 95% 95%   Weight:       Height:           PHYSICAL EXAM     Constitutional:  the patient is well developed and in no acute distress,on otpiflow  EENMT:  Sclera clear, pupils equal, oral mucosa moist  Respiratory: diminished on L  Cardiovascular:  RRR without M,G,R  Gastrointestinal: soft and non-tender; with positive bowel sounds. Musculoskeletal: warm without cyanosis. There is no lower leg edema.   Skin:  no jaundice or rashes, no wounds   Neurologic: no gross neuro deficits     Psychiatric:  Lethargic but trying to open eyes    CXR:        Recent Labs     12/18/18  0411 12/17/18  1516   WBC 12.9* 12.5*   HGB 9.6* 10.9*   HCT 33.2* 37.9    228     Recent Labs     12/18/18  0411 12/18/18  0003 12/17/18  1600 12/17/18  1516    141  --  138   K 5.5* 5.5*  --  7.0*   * 108*  --  104   * 142*  --  110*   CO2 23 23  --  26   BUN 41* 43*  --  50*   CREA 1.97* 2.10*  --  2.42*   CA 8.8 9.2  --  10.0   TROIQ  --   --  <0.02*  --    ALB 2.4*  --   --  3.0*   TBILI 0.3  --   --  0.3   ALT 15  --   --  19   SGOT 32  --   --  37       Recent Labs     12/18/18  0419 12/18/18  0003 12/17/18  1934   LAC 1.3 1.2 2.3*       Assessment:  (Medical Decision Making)     Hospital Problems  Date Reviewed: 11/15/2018          Codes Class Noted POA    * (Principal) Septic shock ([facility]   was on pressors, now off  ICD-10-CM: A41.9, R65.21  ICD-9-CM: 038.9, 785.52, 995.92  12/18/2018 Unknown        Acute on chronic respiratory failure with hypoxia (HCC) on 10L nc at home ICD-10-CM: J96.21  ICD-9-CM: 518.84, 799.02  12/18/2018 Unknown        ALYSON (acute kidney injury) ([facility] ICD-10-CM: N17.9  ICD-9-CM: 584.9  12/17/2018 Yes        Chronic respiratory failure with hypoxia (HCC) (Chronic) ICD-10-CM: J96.11  ICD-9-CM: 518.83, 799.02  12/17/2018 Yes        ILD (interstitial lung disease) ([facility] (Chronic)   chronic  ICD-10-CM: J84.9  ICD-9-CM: 654  12/17/2018 Yes            Recurrent left pleural effusion (Chronic) chronic ,is supposed to have pleurax placed on 12/26 ICD-10-CM: J90  ICD-9-CM: 511.9  12/17/2018 Yes        Aortic stenosis (Chronic) ICD-10-CM: I35.0  ICD-9-CM: 424.1  12/17/2018 Yes        Sepsis ([facility] ICD-10-CM: A41.9  ICD-9-CM: 038.9, 995.91  12/17/2018 Yes        Acute encephalopathy likely due to sepsis ICD-10-CM: G93.40  ICD-9-CM: 348.30  12/17/2018 Yes        UTI (urinary tract infection) ICD-10-CM: N39.0  ICD-9-CM: 599.0  11/18/2018 Yes        Late onset Alzheimer's disease without behavioral disturbance (Chronic) ICD-10-CM: G30.1, F02.80  ICD-9-CM: 331.0, 294.10  11/15/2017 Yes        COPD (chronic obstructive pulmonary disease) ([facility] (Chronic) ICD-10-CM: J44.9  ICD-9-CM: 496  9/1/2015 Yes              Plan:  (Medical Decision Making)     -- wean pressors  - may wean solucortef some  - continue ABX  - continue optilfow  -no thoracentesis right now- she is actually scheduled for pleurax for 12/26/2018  -she was in hospice but now her daughter revoke it, she is still DNR and should return to hospice if she survive      More than 50% of the time documented was spent in face-to-face contact with the patient and in the care of the patient on the floor/unit where the patient is located. Thank you very much for this referral.  We appreciate the opportunity to participate in this patient's care. Will follow along with above stated plan.     Gaye Vgea MD

## 2018-12-18 NOTE — PROGRESS NOTES
Care Management Interventions  PCP Verified by CM: Yes  Mode of Transport at Discharge: BLS  Transition of Care Consult (CM Consult): [address]  Current Support Network: [address] [place] [address]  Confirm Follow Up Transport: Family  Plan discussed with Pt/Family/Caregiver: Yes  Freedom of Choice Offered: Yes  Discharge Location  Discharge Placement: Long Term Care(Patient is a long term/private pay pt @ [address])  Saw pt in interdisciplinarily rounds, plan of care and discharge date/ location discussed. Per the hospitalitis plans are to return to [address] and resume hospice services. Kimberlee Berger has been up dated of pt's progress. 12/20 @ 0830 Saw pt in interdisciplinarily rounds, plan of care and discharge date/ location discussed. Per the hospitalitis plans for Palliative Care see pt and speak with dtr(Génesis) about pt's progress since hospitalization. Dtr does not to be realistic about pt's condition, seems to have conflicting feelings about what decisions to make regarding her mothers care. Hopefully Palliative Care can bring some answers to dtr's concerns/questions. 12/20 @ 1500 Speech saw pt and recommendations are as follow per SLP note. Recommend initiate pureed diet and thin liquids. Give meds whole in applesauce. Patient should be fully awake and fully upright for all PO. If patient starts coughing, discontinue PO. SLP will follow for diet tolerance and PO trials for potential diet upgrades, depending on goals of care. Discussed with Dr. Eloise Herrera. Family meeting scheduled with Dr. Amy Riggs tomorrow. Palliative care consulted.

## 2018-12-18 NOTE — PROGRESS NOTES
Problem: Nutrition Deficit  Goal: *Optimize nutritional status  Nutrition#  Reason for assessment: Referral received from nursing admission Malnutrition Screening Tool for recently lost 14-23# without trying and eating poorly due to decreased appetite. EN / PN prior to admission  Assessment:   Diet order(s):  NPO  Pt presented from the [facility] with altered mental status r/t UTI. Past medical history notable for advanced dementia, ILD-O2 dependent, COPD, HTN   Food/Nutrition Patient History:    Pt lives at the [facility]; had been on Hospice prior to recent admission. Pt currently on Optiflow. Daughter in room; daughter states her mother has had poor po intake recently, no appetite; receives 3 meals/day at the [facility]. Pt had been on a Pureed diet during last admission (9/2018) but had improved and was tolerating solid food. Pt was not drinking the Ensure supplement r/t dislike. Pertinent Labs:  Hyperkalemia 5.5, hyperglycemia-149 mg/dl   Pertinent Rx:  IVF 0.9% sodium chloride at 100 ml/hr, Vanc, Zosyn  Anthropometrics:Height: 5' 3\" (160 cm),  Weight: 70.5 kg (155 lb 6.8 oz), Weight Source: Bed, Body mass index is 27.53 kg/m². BMI class of normal range for Older American Women. Weight history per EMR:  Unable to determine if weights are actual vs stated.      WT / BMI WEIGHT BODY MASS INDEX   12/18/2018 155 lb 6.8 oz 27.53 kg/m2   12/5/2018 149 lb 26.39 kg/m2   11/18/2018 149 lb 14.6 oz 26.56 kg/m2   11/16/2018 150 lb 26.57 kg/m2   11/15/2018     9/24/2018 166 lb 33.53 kg/m2   6/26/2018 166 lb 32.97 kg/m2   6/22/2018 166 lb 32.97 kg/m2     Macronutrient needs:  EER:  4091-5427 kcal /day (20-25 kcal/kg  BW)  EPR:  52-63 grams protein/day (1-1.2 grams/kg IBW) GFR 25  Intake/Comparative Standards:  NPO; does not meet estimated kcal or protein needs    Nutrition Diagnosis: Inadequate oral intake r/t decreased ability to consume sufficient oral intake as evidenced by NPO status, unintentional weigh loss.    Intervention:  Meals and snacks: NPO; advance per MD recommendations. Speech consult pending. Discharge nutrition plan: Too soon to determine.   Isma Martin, MPH, [address] [place] [address],   [phone]

## 2018-12-18 NOTE — PROGRESS NOTES
Pharmacokinetic Consult to Pharmacist    Sohail Cat is a 80 y.o. female being treated for Sepsis with Vancomycin and Zosyn. Height: 63in  Weight: 67.6kg  Lab Results   Component Value Date/Time    BUN 50 (H) 12/17/2018 03:16 PM    Creatinine 2.42 (H) 12/17/2018 03:16 PM    WBC 12.5 (H) 12/17/2018 03:16 PM    Procalcitonin 2.6 10/17/2018 01:25 PM    Lactic acid 1.7 01/31/2014 06:01 AM    Lactic Acid (POC) 2.36 (H) 12/17/2018 03:11 PM      Estimated Creatinine Clearance: 14.6 mL/min (A) (based on SCr of 2.42 mg/dL (H)). CULTURES:  Pending    Day 1 of vancomycin. Goal trough is 15-20. Will start with Vancomcyin 1250mg IV x dose now and readjust dosing based on tomorrow's lab work. Will continue to follow patient. Thank you,  CHERRI Barroso, PharmD

## 2018-12-18 NOTE — PROGRESS NOTES
Levophed drip started at 3 mcg/min, blood pressure =  112/41 , MAP = 51. MAP staying in the 40- 50 range.

## 2018-12-18 NOTE — PROGRESS NOTES
Interdisciplinary team rounds were held 12/18/2018 with the following team members:Care Management, Nursing and Physician and the patient. Plan of care discussed. See clinical pathway and/or care plan for interventions and desired outcomes.

## 2018-12-18 NOTE — PROGRESS NOTES
TRANSFER - IN REPORT:    Verbal report received from [facility] on [address] [facility]  being received from ED(unit) for routine progression of care      Report consisted of patients Situation, Background, Assessment and   Recommendations(SBAR). Information from the following report(s) SBAR, Kardex, ED Summary, [facility] and Recent Results was reviewed with the receiving nurse. Opportunity for questions and clarification was provided. Assessment completed upon patients arrival to unit and care assumed.

## 2018-12-18 NOTE — PROGRESS NOTES
Pt removed from Optiflow heated high-flow nasal cannula and placed on 15L high-flow nasal cannula and tolerating well at this time.        12/18/18 1448   Oxygen Therapy   O2 Sat (%) 94 %   Pulse via Oximetry 119 beats per minute   O2 Device Hi flow nasal cannula   O2 Flow Rate (L/min) 15 l/min

## 2018-12-18 NOTE — PROGRESS NOTES
Pt placed on Optiflow heated high flow nasal cannula for low sat of 82% due to mouth-breathing while sleeping/resting. Pt in no distress; will continue to monitor closely. 12/18/18 0741   Oxygen Therapy   O2 Sat (%) 94 %   Pulse via Oximetry 124 beats per minute   O2 Device Heated; Hi flow nasal cannula   O2 Flow Rate (L/min) 30 l/min   O2 Temperature 86 °F (30 °C)   FIO2 (%) 50 %

## 2018-12-18 NOTE — PROGRESS NOTES
Received patient from ED to room 373 from ED. Drowsy states that she wants out of this bed. Oriented only to person. Dual skin assessment done with Gabriela Jansen RN. Noted redness to sacral area and bilateral heels. Allevyn applied. Depends removed with smear of stool cleaned. CHG bath done.

## 2018-12-19 NOTE — PROGRESS NOTES
Problem: Pressure Injury - Risk of  Goal: *Prevention of pressure injury  Document Tony Scale and appropriate interventions in the flowsheet. Outcome: Progressing Towards Goal  Pressure Injury Interventions:  Sensory Interventions: Assess changes in LOC, Check visual cues for pain, Discuss PT/OT consult with provider, Float heels, Keep linens dry and wrinkle-free, Maintain/enhance activity level, Minimize linen layers, Monitor skin under medical devices, Pressure redistribution bed/mattress (bed type), Turn and reposition approx. every two hours (pillows and wedges if needed)    Moisture Interventions: Absorbent underpads, Assess need for specialty bed, Check for incontinence Q2 hours and as needed, Internal/External urinary devices, Maintain skin hydration (lotion/cream), Minimize layers    Activity Interventions: Assess need for specialty bed, Increase time out of bed, Pressure redistribution bed/mattress(bed type), PT/OT evaluation    Mobility Interventions: Assess need for specialty bed, Float heels, HOB 30 degrees or less, Pressure redistribution bed/mattress (bed type), PT/OT evaluation, Turn and reposition approx.  every two hours(pillow and wedges)    Nutrition Interventions: Document food/fluid/supplement intake    Friction and Shear Interventions: Apply protective barrier, creams and emollients, Feet elevated on foot rest, Foam dressings/transparent film/skin sealants, HOB 30 degrees or less, Minimize layers

## 2018-12-19 NOTE — PROGRESS NOTES
Problem: Falls - Risk of  Goal: *Absence of Falls  Document Herlinda Fall Risk and appropriate interventions in the flowsheet.   Outcome: Progressing Towards Goal  Fall Risk Interventions:       Mentation Interventions: Adequate sleep, hydration, pain control, Bed/chair exit alarm, Door open when patient unattended, Family/sitter at bedside, Increase mobility, More frequent rounding, Reorient patient    Medication Interventions: Bed/chair exit alarm    Elimination Interventions: Bed/chair exit alarm, Call light in reach, Toileting schedule/hourly rounds    History of Falls Interventions: Bed/chair exit alarm, Door open when patient unattended

## 2018-12-19 NOTE — PROGRESS NOTES
Critical Care Daily Progress Note: 12/19/2018    Kiana Deal   Admission Date: 12/17/2018         The patient's chart is reviewed and the patient is discussed with the staff. Patient is a 80 y.o.  female seen and evaluated at the request of Dr. Hari Elliott for the evaluation of acute on chronic respiratory failure. Patient is know to us, she has chronic  ILD/COPD/chronic hypoxic respiratory failure on 10 L NC baseline, on home hospice for those indications, who was brought by daughter from longterm living situation at [facility] with confusion and UTI. Per daughter Cecy Espinoza at bedside, patient has become progressively confused and was diagnosed with UTI, started macrobid for several days. In the ED she is altered, in acute renal failure,lethargic and then she became hypotensive. she was admitted to ICU, on levophed overnight. Now coming off levophed  PEr daugther she is intolerant of BIPAP. She is on optiflow now. She has chronic L effusion, has thoracentesis in the past and is set up for pleurax on 12/26.            Subjective:     Now sleeping but more alert per nursing  Off pressors for almost 24 hours  Still on 15L NC- on 10 L at home    Current Facility-Administered Medications   Medication Dose Route Frequency    vancomycin (VANCOCIN) 1,000 mg in 0.9% sodium chloride (MBP/ADV) 250 mL  1,000 mg IntraVENous Q36H    hydrocortisone Sod Succ (PF) (SOLU-CORTEF) injection 50 mg  50 mg IntraVENous Q8H    lip protectant (BLISTEX) ointment   Topical PRN    albuterol (PROVENTIL VENTOLIN) nebulizer solution 2.5 mg  2.5 mg Nebulization Q6H RT    budesonide (PULMICORT) 500 mcg/2 ml nebulizer suspension  500 mcg Nebulization BID RT    pantoprazole (PROTONIX) 40 mg in sodium chloride 0.9% 10 mL injection  40 mg IntraVENous DAILY    sodium chloride (NS) flush 5-10 mL  5-10 mL IntraVENous PRN    acetaminophen (TYLENOL) tablet 650 mg  650 mg Oral Q6H PRN    ondansetron (ZOFRAN) injection 4 mg  4 mg IntraVENous Q6H PRN    LORazepam (ATIVAN) injection 0.5 mg  0.5 mg IntraVENous Q6H PRN    morphine injection 1 mg  1 mg IntraVENous Q4H PRN    NOREPINephrine (LEVOPHED) 4 mg in 5% dextrose 250 mL infusion  0-16 mcg/min IntraVENous TITRATE    piperacillin-tazobactam (ZOSYN) 4.5 g in 0.9% sodium chloride (MBP/ADV) 100 mL  4.5 g IntraVENous Q12H       Review of Systems    Constitutional:  negative for fever, chills, sweats  Cardiovascular:  negative for chest pain, palpitations, syncope, edema  Gastrointestinal:  negative for dysphagia, reflux, vomiting, diarrhea, abdominal pain, or melena  Neurologic:  negative for focal weakness, numbness, headache      Objective:     Vitals:    12/19/18 0401 12/19/18 0445 12/19/18 0501 12/19/18 0504   BP: 123/43  133/58 133/58   Pulse: (!) 124   (!) 126   Resp: 14   14   Temp: 98.1 °F (36.7 °C)      SpO2: 90%   93%   Weight:  347 lb 7.1 oz (157.6 kg)     Height:           Intake and Output:   12/17 0701 - 12/18 1900  In: 2871.3 [I.V.:2871.3]  Out: 1400 [Urine:1400]  12/18 1901 - 12/19 0700  In: 600 [I.V.:600]  Out: 300 [Urine:300]    Physical Exam:          Constitutional:  the patient is well developed and in no acute distress  EENMT:  Sclera clear, pupils equal, oral mucosa moist  Respiratory: coarse  Cardiovascular:  RRR without M,G,R  Gastrointestinal: soft and non-tender; with positive bowel sounds. Musculoskeletal: warm without cyanosis. There is no lower leg edema.   Skin:  no jaundice or rashes, no wounds   Neurologic: no gross neuro deficits     Psychiatric:  More alert          Recent Labs     12/18/18  0411 12/17/18  1516   WBC 12.9* 12.5*   HGB 9.6* 10.9*   HCT 33.2* 37.9    228     Recent Labs     12/18/18  0411 12/18/18  0003 12/17/18  1600 12/17/18  1516    141  --  138   K 5.5* 5.5*  --  7.0*   * 108*  --  104   CO2 23 23  --  26   * 142*  --  110*   BUN 41* 43*  --  50*   CREA 1.97* 2.10*  --  2.42*   CA 8.8 9.2  --  10.0 TROIQ  --   --  <0.02*  --    ALB 2.4*  --   --  3.0*   TBILI 0.3  --   --  0.3   ALT 15  --   --  19   SGOT 32  --   --  37     Recent Labs     12/17/18  1538   PHI 7.377   PCO2I 42.5   PO2I 79   HCO3I 24.9     Recent Labs     12/18/18  0411 12/18/18  0003 12/17/18  1934   LAC 1.3 1.2 2.3*         Assessment:  (Medical Decision Making)     Hospital Problems  Date Reviewed: 11/15/2018          Codes Class Noted POA    * (Principal) Septic shock ([facility]   off pressors ICD-10-CM: A41.9, R65.21  ICD-9-CM: 038.9, 785.52, 995.92  12/18/2018 Unknown        Acute on chronic respiratory failure with hypoxia (HCC) on 15L NC, 10 L is her baseline  ICD-10-CM: J96.21  ICD-9-CM: 518.84, 799.02  12/18/2018 Unknown        ALYSON (acute kidney injury) ([facility] ICD-10-CM: N17.9  ICD-9-CM: 584.9  12/17/2018 Yes        Chronic respiratory failure with hypoxia (HCC) (Chronic) ICD-10-CM: J96.11  ICD-9-CM: 518.83, 799.02  12/17/2018 Yes        ILD (interstitial lung disease) ([facility] (Chronic) chronic  ICD-10-CM: J84.9  ICD-9-CM: 878  12/17/2018 Yes            Recurrent left pleural effusion (Chronic) recheck cXR but set up for pleurax for 12/26  ICD-10-CM: J90  ICD-9-CM: 511.9  12/17/2018 Yes        Acute encephalopathy ICD-10-CM: G93.40  ICD-9-CM: 348.30  12/17/2018 Yes        UTI (urinary tract infection) ICD-10-CM: N39.0  ICD-9-CM: 599.0  11/18/2018 Yes        Late onset Alzheimer's disease without behavioral disturbance (Chronic) ICD-10-CM: G30.1, F02.80  ICD-9-CM: 331.0, 294.10  11/15/2017 Yes        COPD (chronic obstructive pulmonary disease) ([facility] (Chronic) ICD-10-CM: J44.9  ICD-9-CM: 814  9/1/2015 Yes        HTN (hypertension) (Chronic) ICD-10-CM: I10  ICD-9-CM: 401.9  1/22/2014 Yes              Plan:  (Medical Decision Making)     --wean solucortef- probably Tanja Stinson be stopped tomorrow, but will find out from her daughter tomorrow whether she is on chronic prednisone, not present in the room   -recheck CXR in AM  -she is set up already for pleurax on 12/26  - continue ABX  -wean 02    More than 50% of the time documented was spent in face-to-face contact with the patient and in the care of the patient on the floor/unit where the patient is located.     Shaheed Lopez MD

## 2018-12-19 NOTE — PROGRESS NOTES
Pt. Opens her eyes for a short time. Able to state her birthday but not year she was born. Mouth breather. Mouth dry. Moistened with swabs.

## 2018-12-19 NOTE — PROGRESS NOTES
Hospitalist Progress Note    2018  Admit Date: 2018  2:28 PM   NAME: Alessia Callejas   :  1929   MRN:  [id number]   Attending: Brice Carmichael MD  PCP:  Veronica Do MD    SUBJECTIVE:   From HPI:  \"Ms. Dela is a 79 yo female with PMH of ILD/COPD/chronic hypoxic respiratory failure on 10 L NC baseline, on home hospice for those indications, who was brought by daughter from longterm living situation at [facility] with confusion and UTI. Per daughter Maida Willams at bedside, patient has become progressively confused and was diagnosed with UTI, started macrobid for several days. In the ED she is altered, with ALYSON on hyperkalemia of 7.0 she has received IVF, calcium gluconate/albuterol/D50/ insulin and rocephin. UA negative. CXR shows chronic left base consolidation and effusion. On admission evaluation, she became hypotensive and required admission to ICU.    18:  She is asleep, mouth breathing. Respiratory Therapy and nurse at beside. Caregiver, Mahsa Smith, is also at bedside. Discussed condition and plan with Ms. Mahsa Smith. 18:  Patient is asleep on evaluation, but does awake easily to verbal stimuli. She attempts to speak with me, but cannot understand her speech. On high flow (15L O2 currently). Review of Systems unable to be obtained 2/2 mentation. PHYSICAL EXAM     Visit Vitals  /55   Pulse (!) 122   Temp 98.1 °F (36.7 °C)   Resp 13   Ht 5' 3\" (1.6 m)   Wt 157.6 kg (347 lb 7.1 oz)   SpO2 96%   Breastfeeding?  No   BMI 61.55 kg/m²      Temp (24hrs), Av °F (36.7 °C), Min:97.8 °F (36.6 °C), Max:98.4 °F (36.9 °C)    Oxygen Therapy  O2 Sat (%): 96 % (18)  Pulse via Oximetry: 121 beats per minute (18)  O2 Device: Hi flow nasal cannula (18)  O2 Flow Rate (L/min): 15 l/min (18)  O2 Temperature: 86 °F (30 °C) (18)  FIO2 (%): (.) (18)    Intake/Output Summary (Last 24 hours) at 2018 0756  Last data filed at 12/19/2018 0535  Gross per 24 hour   Intake 1782.33 ml   Output 900 ml   Net 882.33 ml      General: No acute distress    Lungs:  Diminished bilaterally. Heart:  Tachycardic, regular rhythm,  No murmur, rub, or gallop  Abdomen: Soft, Non distended, Non tender, Positive bowel sounds  Extremities: No cyanosis, clubbing or edema  Neurologic:  No focal deficits    ASSESSMENT      Active Hospital Problems    Diagnosis Date Noted    Septic shock ([address] 12/18/2018    Acute on chronic respiratory failure with hypoxia ([address] 12/18/2018    ALYSON (acute kidney injury) ([address] 12/17/2018    Chronic respiratory failure with hypoxia ([address] 12/17/2018    ILD (interstitial lung disease) ([address] 12/17/2018    Hyperkalemia 12/17/2018    Anemia 12/17/2018    Recurrent left pleural effusion 12/17/2018    Aortic stenosis 12/17/2018    Sepsis ([address] 12/17/2018    Acute encephalopathy 12/17/2018    UTI (urinary tract infection) 11/18/2018    Late onset Alzheimer's disease without behavioral disturbance 11/15/2017    COPD (chronic obstructive pulmonary disease) ([address] 09/01/2015    HTN (hypertension) 01/22/2014     Plan:     · Sepsis: Off levophed since yesterday morning. Continue vanc/zosyn and stress dose steroids, BC x 2, urine cx pending. Likely urinary source, but unclear as UA was not overtly remarkable. May have been sterilized by PO abx as outpatient, but unclear how many doses patient received. Lactic acid is now normal with IVFs on admission. · ALYSON : continue IVF, BMP shows some improvement from admission. · Hyperkalemia: repeat improved, stopped lisinopril, lasix, and potassium. Will monitor, but hold off on additional K-lowering meds at this time to prevent precipitous drop as recent K is 5.5. · Chronic hypoxic respiratory failure/COPD/ILD: RT following. BIPAP intolerant per family, pulm consult appreciated, continue duonebs/pulmicort.   · Acute encephalopathy: nonfocal exam, followup neuro checks , NPO, ST consult ordered  · Anemia: chronic, followup CBC     Off levophed x 24 hrs. Able to transfer out of unit today when medical bed is available. DVT Prophylaxis: SCDs    Dispo:  CM following. Was previously on hospice, revoked by daughter on admission yesterday. Has full time caregivers. Was at [facility] prior to admission.     Signed By: May Bingham MD     December 19, 2018

## 2018-12-19 NOTE — PROGRESS NOTES
Dysphagia goals:  LTG: Patient will tolerate least restrictive diet without signs/symptoms of aspiration at discharge for safe swallow function. STG: Patient will participate in po trials (with ST only) to determine least restrictive diet    Speech language pathology: bedside swallow note: Daily Note 1    NAME/AGE/GENDER: Vinod Wilson is a 80 y.o. female  DATE: 12/19/2018  PRIMARY DIAGNOSIS: ALYSON (acute kidney injury) ([facility]  ALYSON (acute kidney injury) ([facility] [address] [facility]      ICD-10: Treatment Diagnosis: oropharyngeal dysphagia R13.12  INTERDISCIPLINARY COLLABORATION: Registered Nurse  PRECAUTIONS/ALLERGIES: Bactroban [mupirocin calcium]; Mupirocin; Sulfa (sulfonamide antibiotics); and Sulfamethoxazole-trimethoprim ASSESSMENT: Patient seen for po trials this PM. RN reports she has been lethargic this AM, but fully awake at time of assessment. Daughter at bedside. Patient presented with ice chips x2. Immediate throat clearing observed after the swallow. Inconsistent double swallow and delayed coughing following nectar thick via tsp. When presented with honey thick liquids, patient with 2-3 swallows per bolus and immediate coughing/watery eyes. Suspicious for pharyngeal residue with thicker liquids. Patient continued to cough for several minutes following presentations. Recommend continue NPO secondary to overt s/s of airway compromise with all liquid consistencies. Educated daughter regarding results and recommendations. Ok for oral swabs for pleasure. Patient continually requesting a \"large glass of ice water\" throughout session. Patient was previously on hospice care, but this was revoked for this hospitalization. Goals of care unclear to clinician at this time. May benefit from discussion to clarify goals of care as patient is continually requesting po intake. ?????? ? ? This section established at most recent assessment??????????  PROBLEM LIST (Impairments causing functional limitations):  1.  Oropharyngeal dysphagia REHABILITATION POTENTIAL FOR STATED GOALS: Fair  PLAN OF CARE:   Patient will benefit from skilled intervention to address the following impairments. RECOMMENDATIONS AND PLANNED INTERVENTIONS (Benefits and precautions of therapy have been discussed with the patient.):  · NPO  MEDICATIONS:  · Non-oral  ASPIRATION PRECAUTIONS:  · Slow rate of intake  · Small bites/sips  · Upright at 90 degrees during meal  COMPENSATORY STRATEGIES/MODIFICATIONS INCLUDING:  · None  OTHER RECOMMENDATIONS (including follow up treatment recommendations): · Family training/education  · Patient education  RECOMMENDED DIET MODIFICATIONS DISCUSSED WITH:  · Nursing  · Family  · Patient  FREQUENCY/DURATION: Will follow up for po trials and determine frequency of care at that time. RECOMMENDED REHABILITATION/EQUIPMENT: (at time of discharge pending progress): Due to the probability of continued deficits (see above) this patient will not likely need continued skilled speech therapy after discharge. SUBJECTIVE:   Alert. Repeatedly requesting water. Daughter at bedside. History of Present Injury/Illness: Ms. Yudith Virgen  has a past medical history of Arthritis, Arthropathy, unspecified, site unspecified, Benign paroxysmal positional vertigo, Cardiac dysrhythmia, unspecified, Chronic obstructive pulmonary disease ([address] Debility, unspecified, Dementia, Facet syndrome ([address] GERD (gastroesophageal reflux disease), GIB (gastrointestinal bleeding), History of peptic ulcer disease, Hypertension, IBS (irritable bowel syndrome), Ill-defined condition, Impaired fasting glucose, Lumbago, Memory loss, Meningioma ([address] Musculoskeletal pain, OA (osteoarthritis) of knee, Palliative care encounter, Rhinorrhea, Scoliosis, Sensorineural hearing loss, Sleep apnea, Spondylolisthesis, TMJ dysfunction, Varicella, Vasomotor rhinitis, and Vitamin D deficiency. .  She also  has a past surgical history that includes hx [place] and bso (1974); hx knee arthroscopy; hx appendectomy; hx other surgical (1998); hx knee replacement (Left, 2007); hx colonoscopy; hx tonsillectomy; hx cataract removal (2003); ULTRASOUND (N/A, 12/5/2018); ULTRASOUND (Left, 11/16/2018); THORACENTESIS (Left, 11/16/2018); ESOPHAGOGASTRODUODENOSCOPY (EGD) (N/A, 1/31/2014); INJECTION (N/A, 1/31/2014); ENDOSCOPIC FOREIGN BODY REMOVAL (N/A, 1/31/2014); ENDOSCOPIC BANDING OR LIGATION (N/A, 1/31/2014); BRONCHOSCOPY (N/A, 1/30/2014); and BRONCHOSCOPY (N/A, 1/26/2014). Present Symptoms: dysphagia    Pain Scale 1: Visual  Pain Intensity 1: 0  Pain Location 1: Back  Pain Intervention(s) 1: Position;Repositioned  Current Medications:   No current facility-administered medications on file prior to encounter. Current Outpatient Medications on File Prior to Encounter   Medication Sig Dispense Refill    erythromycin (ILOTYCIN) ophthalmic ointment 0.5 inch ointment to lower lid of both eyes 4 times daily for 5 days 1 Tube 0    acetaminophen (TYLENOL) 325 mg tablet Take 325 mg by mouth every four (4) hours as needed for Pain.  ferrous sulfate (IRON) 325 mg (65 mg iron) tablet Take 325 mg by mouth Daily (before breakfast).  potassium chloride (KLOR-CON M20) 20 mEq tablet Take  by mouth two (2) times a day.  furosemide (LASIX) 40 mg tablet Take 40 mg by mouth daily.  predniSONE (DELTASONE) 10 mg tablet Take  by mouth daily (with breakfast).  ascorbic acid, vitamin C, (VITAMIN C) 500 mg tablet Take  by mouth.  OXYGEN-AIR DELIVERY SYSTEMS Keep SpO2> 90%      bipap machine kit by Does Not Apply route.  pantoprazole (PROTONIX) 40 mg tablet TAKE 1 TABLET BY MOUTH DAILY 30 Tab 5    budesonide (PULMICORT) 0.5 mg/2 mL nbsp 2 mL by Nebulization route two (2) times a day. 1 Each 1    albuterol (PROVENTIL VENTOLIN) 2.5 mg /3 mL (0.083 %) nebulizer solution 3 mL by Nebulization route every four (4) hours as needed for Wheezing.  (Patient taking differently: 2.5 mg by Nebulization route every four (4) hours.) 24 Each 0    lisinopril (PRINIVIL, ZESTRIL) 20 mg tablet Take 1 Tab by mouth daily. 30 Tab 0    polyethylene glycol (MIRALAX) 17 gram packet Take 1 Packet by mouth daily. 1 Packet 0    Azelastine (ASTEPRO) 0.15 % (205.5 mcg) nasal spray 1 [place] by Both Nostrils route two (2) times a day. 1 Bottle 12    Walker (ULTRA-LIGHT ROLLATOR) misc Use daily 1 Each 0    donepezil (ARICEPT) 5 mg tablet Take 1 Tab by mouth nightly. 90 Tab 3    sertraline (ZOLOFT) 100 mg tablet Take 1 Tab by mouth nightly. (Patient taking differently: Take 50 mg by mouth nightly.) 30 Tab 3    Compression Socks, Medium misc WEAR DAILY 2 Each 12    DISABLED PLACARD (DISABLED PLACARD) DMV Use prn 1 Each 0     Current Dietary Status:  NPO    Social History/Home Situation:    Home Environment: Long term care  # Steps to Enter: 0  One/Two Story Residence: One story  Living Alone: No  Support Systems: Child(shae), Family member(s), Friends \ neighbors  Patient Expects to be Discharged to[de-identified] Unknown  Current DME Used/Available at Home: Adaptive bathing aides, Adaptive dressing aides, Oxygen, portable, Shower chair, Transfer bench, Tub transfer bench, Wheelchair, Other (comment)  OBJECTIVE:   Respiratory Status:  Hi flow nasal cannula  10 l/min  Oral Motor Structure/Speech:  Oral-Motor Structure/Motor Speech  Labial: Decreased rate, Decreased seal  Dentition: Intact  Oral Hygiene: fair  Lingual: Decreased rate, Decreased strength, Incoordinated    Cognitive and Communication Status:  Neurologic State: Alert  Orientation Level: Oriented X4  Cognition: Follows commands  Perception: Appears intact  Perseveration: Perseverates during conversation  Safety/Judgement: Decreased insight into deficits    BEDSIDE SWALLOW EVALUATION  Oral Assessment:  Oral Assessment  Labial: Decreased rate;Decreased seal  Dentition: Intact  Lingual: Decreased rate;Decreased strength; Incoordinated  P.O.  Trials:  Patient Position: Upright in bed    The patient was given  the following:   Consistency Presented: Ice chips; Nectar thick liquid;Honey thick liquid  How Presented: SLP-fed/presented;Spoon    ORAL PHASE:  Bolus Acceptance: No impairment  Bolus Formation/Control: Impaired  Propulsion: Delayed (# of seconds)  Type of Impairment: Delayed       PHARYNGEAL PHASE:  Initiation of Swallow: Delayed (# of seconds)  Laryngeal Elevation: Decreased;Weak  Aspiration Signs/Symptoms: Strong cough;Clear throat  Vocal Quality: No impairment     Effective Modifications: None     Pharyngeal Phase Characteristics: Poor endurance    OTHER OBSERVATIONS:  Rate/bite size: Questionable   Endurance:  Questionable   Comments: Tool Used: Dysphagia Outcome and Severity Scale (CHILANGO)    Score Comments   Normal Diet  [] 7 With no strategies or extra time needed   Functional Swallow  [] 6 May have mild oral or pharyngeal delay       Mild Dysphagia    [] 5 Which may require one diet consistency restricted (those who demonstrate penetration which is entirely cleared on MBS would be included)   Mild-Moderate Dysphagia  [] 4 With 1-2 diet consistencies restricted       Moderate Dysphagia  [] 3 With 2 or more diet consistencies restricted       Moderately Severe Dysphagia  [x] 2 With partial PO strategies (trials with ST only)       Severe Dysphagia  [] 1 With inability to tolerate any PO safely          Score:  Initial: 2 Most Recent: X (Date: -- )   Interpretation of Tool: The Dysphagia Outcome and Severity Scale (CHILANGO) is a simple, easy-to-use, 7-point scale developed to systematically rate the functional severity of dysphagia based on objective assessment and make recommendations for diet level, independence level, and type of nutrition. Score 7 6 5 4 3 2 1   Modifier CH CI CJ CK CL CM CN   ?  Swallowing:     - CURRENT STATUS: CM - 80%-99% impaired, limited or restricted    - GOAL STATUS:  CI - 1%-19% impaired, limited or restricted    - D/C STATUS:  ---------------To be determined---------------  [place] / Plan[place] PART [place] B / Product Type: Medicare /     TREATMENT:    (In addition to Assessment/Re-Assessment sessions the following treatments were rendered)  Assessment/Reassessment only, no treatment provided today  MODALITIES:                                                                    ORAL MOTOR  EXERCISES:                                                                                                                                                                      LARYNGEAL / PHARYNGEAL EXERCISES:                                                                                                                                     __________________________________________________________________________________________________  Safety:   After treatment position/precautions:  · RN notified  · Family at bedside  · Upright in Bed  Treatment Assessment:  Evaluation only. Progression/Medical Necessity:   · Patient is expected to demonstrate progress in swallow strength, swallow timeliness, swallow function, diet tolerance and swallow safety to work toward diet advancement. Compliance with Program/Exercises: Will assess as treatment progresses  Reason for Continuation of Services/Other Comments:  · Patient continues to require skilled intervention due to medical complications. Recommendations/Intent for next treatment session: \"Treatment next visit will focus on po trials to determine ability to tolerate diet\".     Total Treatment Duration:  Time In: 1357  Time Out: [address], [address] CCC-SLP

## 2018-12-19 NOTE — PROGRESS NOTES
Pt. Trying to get out of bed. Confused to where she is and why she is in the hospital.   Demanding her shoes and clothes. Explained to pt. Where she was and why.

## 2018-12-19 NOTE — PROGRESS NOTES
Interdisciplinary team rounds were held 12/19/2018 with the following team members:Care Management, Nursing, Physical Therapy, Physician and Clinical Coordinator. Plan of care discussed. See clinical pathway and/or care plan for interventions and desired outcomes.

## 2018-12-20 NOTE — PROGRESS NOTES
Verbal bedside report received from Tenzin Mauricio, [facility]. Shift assessment completed. Patient is confused with periods of restlessness and agitation, pulls of oxygen, monitor lines, gown, tries to pull self out of bed. Incomprehensible speech at times. Sinus tach on monitor around 120 bpm when resting. 02 sat low 90's on 10 L Hi-Flow NC, mouth breathing, no respiratory distress noted. Lung sounds diminished. Abdomen semi-soft, active bowel sounds. Purewick external catheter in place. SCDs and Prevalon boots on.      Lines:  #22 L Wrist    Drips:  none    Drains:  Alec

## 2018-12-20 NOTE — PROGRESS NOTES
Interdisciplinary team rounds were held 12/20/2018 with the following team members:Care Management, Nursing, Physical Therapy, Physician and Clinical Coordinator and the patient. Plan of care discussed. See clinical pathway and/or care plan for interventions and desired outcomes.

## 2018-12-20 NOTE — PROGRESS NOTES
Daughter Yamilex Record called to check on her mother. Requests to know when speech is coming back and to set up a meeting with Dr. Denver Nunn and her sister tomorrow afternoon.

## 2018-12-20 NOTE — PROGRESS NOTES
Patient arrived to floor from ICU. She is confused and only oriented to self. Sitter at bedside and oriented to call light, room, and staff. Patient is on 10 L NC. Posey in place and working.

## 2018-12-20 NOTE — PROGRESS NOTES
Problem: Falls - Risk of  Goal: *Absence of Falls  Document Herlinda Fall Risk and appropriate interventions in the flowsheet. Outcome: Progressing Towards Goal  Fall Risk Interventions:       Mentation Interventions: Adequate sleep, hydration, pain control, Bed/chair exit alarm, Door open when patient unattended, Evaluate medications/consider consulting pharmacy, Family/sitter at bedside, Increase mobility, More frequent rounding, Reorient patient, Toileting rounds, Update white board(sitter until 2200)    Medication Interventions: Bed/chair exit alarm, Evaluate medications/consider consulting pharmacy    Elimination Interventions: Bed/chair exit alarm, Call light in reach, Toileting schedule/hourly rounds    History of Falls Interventions: Bed/chair exit alarm, Door open when patient unattended, Evaluate medications/consider consulting pharmacy        Problem: Pressure Injury - Risk of  Goal: *Prevention of pressure injury  Document Tony Scale and appropriate interventions in the flowsheet. Outcome: Progressing Towards Goal  Pressure Injury Interventions:  Sensory Interventions: Assess changes in LOC, Assess need for specialty bed, Avoid rigorous massage over bony prominences, Check visual cues for pain, Discuss PT/OT consult with provider, Float heels, Keep linens dry and wrinkle-free, Maintain/enhance activity level, Minimize linen layers, Monitor skin under medical devices, Pad between skin to skin, Pressure redistribution bed/mattress (bed type), Suspension boots, Turn and reposition approx.  every two hours (pillows and wedges if needed), Use 30-degree side-lying position    Moisture Interventions: Absorbent underpads, Apply protective barrier, creams and emollients, Check for incontinence Q2 hours and as needed, Assess need for specialty bed, Internal/External urinary devices, Limit adult briefs, Maintain skin hydration (lotion/cream), Minimize layers, Moisture barrier    Activity Interventions: Increase time out of bed, Pressure redistribution bed/mattress(bed type)    Mobility Interventions: Assess need for specialty bed, Float heels, HOB 30 degrees or less, Pressure redistribution bed/mattress (bed type), Suspension boots, Turn and reposition approx.  every two hours(pillow and wedges)    Nutrition Interventions: Document food/fluid/supplement intake    Friction and Shear Interventions: Apply protective barrier, creams and emollients, Feet elevated on foot rest, Foam dressings/transparent film/skin sealants, HOB 30 degrees or less, Lift sheet, Minimize layers

## 2018-12-20 NOTE — PROGRESS NOTES
Deidra Deal  Admission Date: 12/17/2018             Daily Progress Note: 12/20/2018    The patient's chart is reviewed and the patient is discussed with the staff. Zahraa Mcconnell is an 80yoF with h/o dementia living at the [facility], with COPD, chronic hypoxic respiratory failure on 10lpm at baseline, chronic left effusion (didn't tolerate thoracentesis well in past and it benefited little, planned for pleur-X catheter) and under hospice care who was admitted to [facility] on 12/17 with UTI, confusion, acute renal failure, shock requiring levophed.   Subjective:     O2 sat is 96% on 15lpm and she tolerated 12lpm yesterday    Current Facility-Administered Medications   Medication Dose Route Frequency    potassium chloride 20 mEq in 100 ml IVPB  20 mEq IntraVENous Q2H    furosemide (LASIX) injection 20 mg  20 mg IntraVENous DAILY    hydrocortisone Sod Succ (PF) (SOLU-CORTEF) injection 50 mg  50 mg IntraVENous Q12H    tuberculin injection 5 Units  5 Units IntraDERMal ONCE    saline peripheral flush soln 5 mL  5 mL InterCATHeter PRN    SALINE PERIPHERAL FLUSH Q8H soln 5 mL  5 mL InterCATHeter Q8H    vancomycin (VANCOCIN) 1,000 mg in 0.9% sodium chloride (MBP/ADV) 250 mL  1,000 mg IntraVENous Q36H    lip protectant (BLISTEX) ointment   Topical PRN    albuterol (PROVENTIL VENTOLIN) nebulizer solution 2.5 mg  2.5 mg Nebulization Q6H RT    budesonide (PULMICORT) 500 mcg/2 ml nebulizer suspension  500 mcg Nebulization BID RT    pantoprazole (PROTONIX) 40 mg in sodium chloride 0.9% 10 mL injection  40 mg IntraVENous DAILY    sodium chloride (NS) flush 5-10 mL  5-10 mL IntraVENous PRN    acetaminophen (TYLENOL) tablet 650 mg  650 mg Oral Q6H PRN    ondansetron (ZOFRAN) injection 4 mg  4 mg IntraVENous Q6H PRN    LORazepam (ATIVAN) injection 0.5 mg  0.5 mg IntraVENous Q6H PRN    morphine injection 1 mg  1 mg IntraVENous Q4H PRN    piperacillin-tazobactam (ZOSYN) 4.5 g in 0.9% sodium chloride (MBP/ADV) 100 mL  4.5 g IntraVENous Q12H       Review of Systems  Constitutional: negative for fever, chills, sweats  Cardiovascular: negative for chest pain, palpitations, syncope, edema  Gastrointestinal:  negative for dysphagia, reflux, vomiting, diarrhea, abdominal pain, or melena  Neurologic:  negative for focal weakness, numbness, headache    Objective:     Vitals:    12/20/18 0500 12/20/18 0504 12/20/18 0524 12/20/18 0729   BP: 173/64 173/64     Pulse:  (!) 128     Resp:  19     Temp:       SpO2:  93%  96%   Weight:   156 lb (70.8 kg)    Height:         Intake and Output:   12/18 1901 - 12/20 0700  In: 1050 [I.V.:1050]  Out: 675 [Urine:675]  No intake/output data recorded. Physical Exam:   Constitution:  the patient is well developed and in no acute distress  EENMT:  Sclera clear, pupils equal, oral mucosa moist  Respiratory: CTA anteriorly, decreased L base  Cardiovascular:  RRR, tachy  Gastrointestinal: soft and non-tender; with positive bowel sounds. Musculoskeletal: warm without cyanosis. There is no lower leg edema. Skin:  no jaundice or rashes  Neurologic: no gross neuro deficits     Psychiatric:  alert and oriented x 3    CXR:       LAB  No results for input(s): GLUCPOC in the last 72 hours.     No lab exists for component: [place]   Recent Labs     12/20/18  0359 12/19/18  0821 12/18/18  0411 12/17/18  1516   WBC 11.7* 12.2* 12.9* 12.5*   HGB 9.5* 10.3* 9.6* 10.9*   HCT 33.1* 36.6 33.2* 37.9    208 220 228     Recent Labs     12/20/18  0359 12/19/18  0821 12/18/18  0411  12/17/18  1600 12/17/18  1516   * 150* 144   < >  --  138   K 3.2* 4.3 5.5*   < >  --  7.0*   * 116* 110*   < >  --  104   CO2 23 24 23   < >  --  26   GLU 96 98 149*   < >  --  110*   BUN 38* 36* 41*   < >  --  50*   CREA 1.32* 1.43* 1.97*   < >  --  2.42*   CA 9.4 9.2 8.8   < >  --  10.0   TROIQ  --   --   --   --  <0.02*  --    ALB  --   --  2.4*  --   --  3.0*   TBILI  --   --  0.3  -- --  0.3   ALT  --   --  15  --   --  19   SGOT  --   --  32  --   --  37    < > = values in this interval not displayed.      Recent Labs     12/17/18  1538   PHI 7.377   PCO2I 42.5   PO2I 79   HCO3I 24.9     Recent Labs     12/18/18  0411 12/18/18  0003 12/17/18  1934   LAC 1.3 1.2 2.3*         Assessment:  (Medical Decision Making)     Hospital Problems  Date Reviewed: 11/15/2018          Codes Class Noted POA    * (Principal) Septic shock ([facility] ICD-10-CM: A41.9, R65.21  ICD-9-CM: 038.9, 785.52, 995.92  12/18/2018 Unknown    Off vasopressors    Acute on chronic respiratory failure with hypoxia (HCC) ICD-10-CM: J96.21  ICD-9-CM: 518.84, 799.02  12/18/2018 Unknown    Wean O2 today    ALYSON (acute kidney injury) ([facility] ICD-10-CM: N17.9  ICD-9-CM: 584.9  12/17/2018 Yes    improved    Chronic respiratory failure with hypoxia (HCC) (Chronic) ICD-10-CM: J96.11  ICD-9-CM: 518.83, 799.02  12/17/2018 Yes        ILD (interstitial lung disease) ([facility] (Chronic) ICD-10-CM: J84.9  ICD-9-CM: 345  12/17/2018 Yes        Hyperkalemia ICD-10-CM: E87.5  ICD-9-CM: 276.7  12/17/2018 Yes        Anemia ICD-10-CM: D64.9  ICD-9-CM: 285.9  12/17/2018 Yes        Recurrent left pleural effusion (Chronic) ICD-10-CM: J90  ICD-9-CM: 511.9  12/17/2018 Yes    Scheduled for pleur-x placement    Aortic stenosis (Chronic) ICD-10-CM: I35.0  ICD-9-CM: 424.1  12/17/2018 Yes        Sepsis ([facility] ICD-10-CM: A41.9  ICD-9-CM: 038.9, 995.91  12/17/2018 Yes        Acute encephalopathy ICD-10-CM: G93.40  ICD-9-CM: 348.30  12/17/2018 Yes        UTI (urinary tract infection) ICD-10-CM: N39.0  ICD-9-CM: 599.0  11/18/2018 Yes        Late onset Alzheimer's disease without behavioral disturbance (Chronic) ICD-10-CM: G30.1, F02.80  ICD-9-CM: 331.0, 294.10  11/15/2017 Yes        COPD (chronic obstructive pulmonary disease) (HCC) (Chronic) ICD-10-CM: J44.9  ICD-9-CM: 496  9/1/2015 Yes        HTN (hypertension) (Chronic) ICD-10-CM: I10  ICD-9-CM: 401.9  1/22/2014 Yes Plan:  (Medical Decision Making)     --Day 4 zosyn/vanco.  Narrow to ceftriaxone since hypernatremic and improving overall from infectious standpoint  --Wean oxygen  --continue low dose lasix  --planned for pleur-x as outpatient, after she has had 2 prior thoracentesis, one with 250ml removed on 10/2 and one with 1200ml of fluid removed on 11/16. Cytology had atypical cells on 11/16. No plans for inpatient pleur-x with recent infection and she is scheduled for it within a reasonable time frame on 12/26. Also I'd like the family to reconsider comfort measures as an primary approach.  --agree with palliative care consultation. I believe hospice with a comfort-based approach remains the most beneficial option for this patient. More than 50% of the time documented was spent in face-to-face contact with the patient and in the care of the patient on the floor/unit where the patient is located.     Javad Adan MD

## 2018-12-20 NOTE — CONSULTS
Palliative Care    Patient: Sravani Benjamin MRN: [id number]  SSN: [id number]    YOB: 1929  Age: 80 y.o. Sex: female       Date of Request: 12/20/18  Date of Consult:  12/20/2018  Reason for Consult:  advanced care plan planning/end of life  Requesting Physician: Dr. Amy Gallagheror     Assessment/Plan:     Principal Diagnosis:    Dementia  F03.90     Additional Diagnoses:   · Debility, Unspecified  R53.81  · Dyspnea  R06.00  · Dysphagia  R13.10  · Frailty  R54  · Respiratory Failure, Acute on Chronic  J96.20  · Counseling, Encounter for Medical Advice  Z71.9  · Encounter for Palliative Care  Z51.5    Palliative Performance Scale (PPS):  PPS: 30    Medical Decision Making:   Reviewed and summarized notes from admission to present. Discussed case with appropriate providers: BENY Carroll.  Reviewed laboratory and x-ray data from admission to present. Pt resting in bed, with sitter at bedside. Pt denies pain, N/V, and SOB (on o2 via NC). Pt is alert to self only. She is pleasantly confused. Had phone conversation with daughter Rodolfo Burnette, who is the HCPOA. There are two other children, Opal Moreno, and a third who lives in [facility]. The purpose of my phone call was to understand Yancy's goals of care for the pt. Mitra Chanell has found the events of the last few months so distressing - two  hospitalizations, a STR facility, a return to the [facility], starting hospice, coming off of hospice - that she was unable to focus on current goals, but had to review the events of the last several months with this writer. There have been some bumps in the road. One is that the [facility] apparently did not start for several days an antibiotic that [facility] had ordered for a likely UTI. Another is that the staff at [facility] apparently did not consult the [facility] physician before recommending to Mitra Lua that the pt should be sent to the ER.   After a long conversation, I made three recommendations to Tanya. 1.That she speak to Dr. Morgan Rodriguez tomorrow to understand the pt's pulmonary condition (or RN Alexey Chavez will get the information from Dr. Morgan Rodriguez to pass along to Tanya). 2. That Tanya speak to [facility] tomorrow to understand how to avoid an unwanted hospitalization in the future. 3. To keep in mind that her goal, as she states it, is to keep her mother comfortable and safe during her last months, and that, despite the bumps in the road, she has been successful in doing this. Tanya says that she has had great difficulty working with her sister Tania Dobbins concerning their mother's care, and this has added to Yancy's distress. Dante Fischer is not a HCPOA. Will continue to follow to clarify goals. Will discuss findings with members of the interdisciplinary team.      Thank you for this referral.          .    Subjective:     History obtained from:  Patient, Family, Care Provider and Chart    Chief Complaint: Dementia    History of Present Illness:  From H&P: \"Ms. Deal is a 79 yo female with PMH of ILD/COPD/chronic hypoxic respiratory failure on 10 L NC baseline, on home hospice for those indications, who was brought by daughter from longterm living situation at [facility] with confusion and UTI. Per daughter Serena Girard at bedside, patient has become progressively confused and was diagnosed with UTI, started macrobid for several days. In the ED she is altered, with ALYSON on hyperkalemia of 7.0 she has received IVF, calcium gluconate/albuterol/D50/ insulin and rocephin. UA negative. CXR shows chronic left base consolidation and effusion. During my assessment, she became hypotensive. \"            Advance Directive: No       Code Status:  DNR            Health Care Power of : Yes - Patient states she has a [address], but we do not have it online yet. Dtr Marilia Wallis is the [address] [place] [address].     Past Medical History:   Diagnosis Date    Arthritis     Arthropathy, unspecified, site unspecified     Benign paroxysmal positional vertigo     Cardiac dysrhythmia, unspecified     Chronic obstructive pulmonary disease (HCC)     Debility, unspecified     Dementia     Facet syndrome (HCC)     GERD (gastroesophageal reflux disease)     GIB (gastrointestinal bleeding) 2014    History of peptic ulcer disease 2014    Hypertension     IBS (irritable bowel syndrome)     Ill-defined condition     Impaired fasting glucose     Lumbago     Memory loss     Meningioma (HCC)     removed in [facility] about     Musculoskeletal pain     OA (osteoarthritis) of knee     Palliative care encounter 2014    Rhinorrhea     Scoliosis     Sensorineural hearing loss     Sleep apnea     Spondylolisthesis     Lumbar    TMJ dysfunction     Varicella     Vasomotor rhinitis     Vitamin D deficiency       Past Surgical History:   Procedure Laterality Date    HX APPENDECTOMY      HX CATARACT REMOVAL  2003    X2 WITH LENSE IMPLANT     HX COLONOSCOPY      HX KNEE ARTHROSCOPY      left knee    HX KNEE REPLACEMENT Left 2007    HX OTHER SURGICAL  1998    Meningioma Rt brain    HX JOSE MIGUEL AND BSO  1974    HX TONSILLECTOMY       Family History   Problem Relation Age of Onset    Cancer Mother         Pancreatic    Cancer Father         Ear      Social History     Tobacco Use    Smoking status: Former Smoker     Packs/day: 1.50     Years: 45.00     Pack years: 67.50     Types: Cigarettes     Last attempt to quit: 1991     Years since quittin.9    Smokeless tobacco: Never Used   Substance Use Topics    Alcohol use: Yes     Alcohol/week: 1.8 oz     Types: 1 Glasses of wine, 1 Shots of liquor, 1 Standard drinks or equivalent per week     Comment: OCCASIONAL     Prior to Admission medications    Medication Sig Start Date End Date Taking?  Authorizing Provider   erythromycin (ILOTYCIN) ophthalmic ointment 0.5 inch ointment to lower lid of both eyes 4 times daily for 5 days 11/19/18   Tavon Light MD   acetaminophen (TYLENOL) 325 mg tablet Take 325 mg by mouth every four (4) hours as needed for Pain. Provider, Historical   ferrous sulfate (IRON) 325 mg (65 mg iron) tablet Take 325 mg by mouth Daily (before breakfast). Provider, Historical   potassium chloride (KLOR-CON M20) 20 mEq tablet Take  by mouth two (2) times a day. Provider, Historical   furosemide (LASIX) 40 mg tablet Take 40 mg by mouth daily. Provider, Historical   predniSONE (DELTASONE) 10 mg tablet Take  by mouth daily (with breakfast). Provider, Historical   ascorbic acid, vitamin C, (VITAMIN C) 500 mg tablet Take  by mouth. Provider, Historical   OXYGEN-AIR DELIVERY SYSTEMS Keep SpO2> 90%    Provider, Historical   bipap machine kit by Does Not Apply route. Provider, Historical   pantoprazole (PROTONIX) 40 mg tablet TAKE 1 TABLET BY MOUTH DAILY 10/16/18   Jethro Magana MD   budesonide (PULMICORT) 0.5 mg/2 mL nbsp 2 mL by Nebulization route two (2) times a day. 10/4/18   Prabha Suggs MD   albuterol (PROVENTIL VENTOLIN) 2.5 mg /3 mL (0.083 %) nebulizer solution 3 mL by Nebulization route every four (4) hours as needed for Wheezing. Patient taking differently: 2.5 mg by Nebulization route every four (4) hours. 10/4/18   Prabha Suggs MD   lisinopril (PRINIVIL, ZESTRIL) 20 mg tablet Take 1 Tab by mouth daily. 10/5/18   Prabha Suggs MD   polyethylene glycol [facility]) 17 gram packet Take 1 Packet by mouth daily. 10/5/18   Prabha Suggs MD   Azelastine (ASTEPRO) 0.15 % (205.5 mcg) nasal spray 1 [place] by Both Nostrils route two (2) times a day. 2/13/18   MD Hector Crain (ULTRA-LIGHT ROLLATOR) misc Use daily 11/15/17   Jethro Magana MD   donepezil (ARICEPT) 5 mg tablet Take 1 Tab by mouth nightly. 7/24/17   Jethro Magana MD   sertraline (ZOLOFT) 100 mg tablet Take 1 Tab by mouth nightly.   Patient taking differently: Take 50 mg by mouth nightly. 6/28/17   Darlene Gao MD   Compression Socks, Medium misc WEAR DAILY 8/31/16   Sarah Burdick MD   DISABLED PLACARD (DISABLED PLACARD) DMV Use prn 5/12/16   Darlene Gao MD       Allergies   Allergen Reactions    Bactroban [Mupirocin Calcium] Rash    Mupirocin Other (comments)    Sulfa (Sulfonamide Antibiotics) Unknown (comments)    Sulfamethoxazole-Trimethoprim Other (comments)        Review of Systems:  A comprehensive review of systems was negative except for: Respiratory: Positive for dyspnea, on O2. Behavioral/Psychiatric: Positive for dementia. Objective:     Visit Vitals  /73   Pulse (!) 127   Temp 98.8 °F (37.1 °C)   Resp 13   Ht 5' 3\" (1.6 m)   Wt 156 lb (70.8 kg)   SpO2 93%   Breastfeeding? No   BMI 27.63 kg/m²        Physical Exam:    General:  Cooperative. No acute distress. Eyes:  Conjunctivae/corneas clear    Nose: Nares normal. Septum midline. Neck: Supple, symmetrical, trachea midline, no JVD   Lungs:   Crackles bilaterally, unlabored   Heart:  Regular rate and rhythm, no murmur    Abdomen:   Soft, non-tender, non-distended   Extremities: Normal, atraumatic, no cyanosis or edema   Skin: Skin color, texture, turgor normal. No rash or lesions.    Neurologic: Nonfocal   Psych: Alert and oriented to self only      Assessment:     Hospital Problems  Date Reviewed: 11/15/2018          Codes Class Noted POA    * (Principal) Septic shock ([facility] ICD-10-CM: A41.9, R65.21  ICD-9-CM: 038.9, 785.52, 995.92  12/18/2018 Unknown        Acute on chronic respiratory failure with hypoxia [facility]) ICD-10-CM: J96.21  ICD-9-CM: 518.84, 799.02  12/18/2018 Unknown        ALYSON (acute kidney injury) ([facility] ICD-10-CM: N17.9  ICD-9-CM: 584.9  12/17/2018 Yes        Chronic respiratory failure with hypoxia (HCC) (Chronic) ICD-10-CM: J96.11  ICD-9-CM: 518.83, 799.02  12/17/2018 Yes        ILD (interstitial lung disease) ([facility] (Chronic) ICD-10-CM: J84.9  ICD-9-CM: 515  12/17/2018 Yes Hyperkalemia ICD-10-CM: E87.5  ICD-9-CM: 276.7  12/17/2018 Yes        Anemia ICD-10-CM: D64.9  ICD-9-CM: 285.9  12/17/2018 Yes        Recurrent left pleural effusion (Chronic) ICD-10-CM: J90  ICD-9-CM: 511.9  12/17/2018 Yes        Aortic stenosis (Chronic) ICD-10-CM: I35.0  ICD-9-CM: 424.1  12/17/2018 Yes        Sepsis ([address] ICD-10-CM: A41.9  ICD-9-CM: 038.9, 995.91  12/17/2018 Yes        Acute encephalopathy ICD-10-CM: G93.40  ICD-9-CM: 348.30  12/17/2018 Yes        UTI (urinary tract infection) ICD-10-CM: N39.0  ICD-9-CM: 599.0  11/18/2018 Yes        Late onset Alzheimer's disease without behavioral disturbance (Chronic) ICD-10-CM: G30.1, F02.80  ICD-9-CM: 331.0, 294.10  11/15/2017 Yes        COPD (chronic obstructive pulmonary disease) (HCC) (Chronic) ICD-10-CM: J44.9  ICD-9-CM: 496  9/1/2015 Yes        HTN (hypertension) (Chronic) ICD-10-CM: I10  ICD-9-CM: 401.9  1/22/2014 Yes              Signed By: Tila Forte NP     December 20, 2018

## 2018-12-20 NOTE — PROGRESS NOTES
TRANSFER - OUT REPORT:    Verbal report given to Valente Toussaint  on [employer]  being transferred to [address] [place] [address] for routine progression of care       Report consisted of patients Situation, Background, Assessment and   Recommendations(SBAR). Information from the following report(s) SBAR, Kardex, Intake/Output, MAR, Recent Results, Med Rec Status and Cardiac Rhythm sinus tach. was reviewed with the receiving nurse. Lines:   Peripheral IV 12/17/18 Anterior;Distal;Inferior;Left;Lower Arm (Active)   Site Assessment Clean, dry, & intact 12/19/2018  7:35 PM   Phlebitis Assessment 0 12/19/2018  7:35 PM   Infiltration Assessment 0 12/19/2018  7:35 PM   Dressing Status Clean, dry, & intact 12/19/2018  7:35 PM   Dressing Type Tape;Transparent 12/19/2018  7:35 PM   Hub Color/Line Status Blue; Infusing;Patent; Flushed 12/19/2018  7:35 PM   Alcohol Cap Used No 12/19/2018  7:35 PM       Peripheral IV 12/17/18 Right Antecubital (Active)   Site Assessment Clean;Dry 12/19/2018  8:23 AM   Phlebitis Assessment 0 12/19/2018  8:23 AM   Infiltration Assessment 0 12/19/2018  8:23 AM   Dressing Status Clean, dry, & intact 12/19/2018  8:23 AM   Dressing Type Tape;Transparent 12/19/2018  8:23 AM   Hub Color/Line Status Infusing 12/19/2018  8:23 AM   Alcohol Cap Used No 12/18/2018  8:00 PM        Opportunity for questions and clarification was provided.       Patient transported with:   Monitor  O2 @ 10 liters  Tech

## 2018-12-20 NOTE — PROGRESS NOTES
Hospitalist Progress Note    2018  Admit Date: 2018  2:28 PM   NAME: Carmen Grimm   :  1929   MRN:  [id number]   Attending: Jimmy Pacheco MD  PCP:  Catherine Tellez MD    SUBJECTIVE:   From HPI:  \"Ms. Deal is a 81 yo female with PMH of ILD/COPD/chronic hypoxic respiratory failure on 10 L NC baseline, on home hospice for those indications, who was brought by daughter from longterm living situation at [facility] with confusion and UTI. Per daughter Cecy Espinoza at bedside, patient has become progressively confused and was diagnosed with UTI, started macrobid for several days. In the ED she is altered, with ALYSON on hyperkalemia of 7.0 she has received IVF, calcium gluconate/albuterol/D50/ insulin and rocephin. UA negative. CXR shows chronic left base consolidation and effusion. On admission evaluation, she became hypotensive and required admission to ICU.    18:  She is asleep, mouth breathing. Respiratory Therapy and nurse at beside. Caregiver, Jose Alejandro Wong, is also at bedside. Discussed condition and plan with Ms. Jose Alejandro Wong. 18:  Patient is asleep on evaluation, but does awake easily to verbal stimuli. She attempts to speak with me, but cannot understand her speech. On high flow (15L O2 currently). 18:  Patient is asleep, easily arosable. Confused, difficult to understand speech. Remains on high-flow NC O2. Review of Systems unable to be obtained  mentation. PHYSICAL EXAM     Visit Vitals  /72   Pulse (!) 127   Temp 97.6 °F (36.4 °C)   Resp 15   Ht 5' 3\" (1.6 m)   Wt 70.8 kg (156 lb)   SpO2 95%   Breastfeeding?  No   BMI 27.63 kg/m²      Temp (24hrs), Av.9 °F (36.6 °C), Min:97.5 °F (36.4 °C), Max:98.5 °F (36.9 °C)    Oxygen Therapy  O2 Sat (%): 95 % (18 1145)  Pulse via Oximetry: 127 beats per minute (18 1145)  O2 Device: Hi flow nasal cannula (18 07)  O2 Flow Rate (L/min): 15 l/min (18)  O2 Temperature: 86 °F (30 °C) (12/18/18 0741)  FIO2 (%): (.) (12/18/18 2107)    Intake/Output Summary (Last 24 hours) at 12/20/2018 1242  Last data filed at 12/20/2018 1156  Gross per 24 hour   Intake 100 ml   Output 1275 ml   Net -1175 ml      General: No acute distress    Lungs:  Diminished bilaterally. Heart:  Tachycardic, regular rhythm,  No murmur, rub, or gallop  Abdomen: Soft, Non distended, Non tender, Positive bowel sounds  Extremities: No cyanosis, clubbing or edema  Neurologic:  No focal deficits. Confused. ASSESSMENT      Active Hospital Problems    Diagnosis Date Noted    Septic shock ([address] 12/18/2018    Acute on chronic respiratory failure with hypoxia (HCC) 12/18/2018    ALYSON (acute kidney injury) ([address] 12/17/2018    Chronic respiratory failure with hypoxia ([address] 12/17/2018    ILD (interstitial lung disease) ([address] 12/17/2018    Hyperkalemia 12/17/2018    Anemia 12/17/2018    Recurrent left pleural effusion 12/17/2018    Aortic stenosis 12/17/2018    Sepsis ([address] 12/17/2018    Acute encephalopathy 12/17/2018    UTI (urinary tract infection) 11/18/2018    Late 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 BPs stable. Stress dose steroids to be tapered by Pulm, BC x 2, urine cx negative thus far, transition to IV rocephin. Likely urinary source, but unclear as UA was not overtly remarkable. May have been sterilized by PO abx as outpatient, but unclear how many doses patient received. Lactic acid is now normal with IVFs on admission. · ALYSON : continue IVF, BMP shows improvement from admission, Cr is almost normal.  · Hyperkalemia: resolved, K slightly low this AM.  Replaced IVPB. Will recheck in AM.  Had stopped lisinopril, lasix, and potassium on admission. · Chronic hypoxic respiratory failure/COPD/ILD: RT following.  BIPAP intolerant per family, pulm consult appreciated, continue duonebs/pulmicort. · Acute encephalopathy: nonfocal exam, followup neuro checks , NPO, ST following. Seems confused, but more alert and interactive. · Anemia: chronic, followup CBC       DVT Prophylaxis: SCDs    Dispo:  CM following. Was previously on hospice, revoked by daughter on admission yesterday. Has full time caregivers. Was at [facility] prior to admission. Will consult with Palliative Care, as family has not made decision to go back on Hospice, but also has not decided on [address], but is concerned about patient's NPO status. Poor overall prognosis.     Signed By: Barron Rodriguez MD     December 20, 2018

## 2018-12-20 NOTE — PROGRESS NOTES
Dysphagia goals:  LTG: Patient will tolerate least restrictive diet without signs/symptoms of aspiration at discharge for safe swallow function. STG: Patient will consume pureed textures and thin liquids without overt signs or symptoms of aspiration 95% of the time. Goal updated 12/20/18    Speech language pathology: bedside swallow note: Daily Note 2    NAME/AGE/GENDER: Juliana Lockhart is a 80 y.o. female  DATE: 12/20/2018  PRIMARY DIAGNOSIS: ALYSON (acute kidney injury) ([facility] [address]  ALYSON (acute kidney injury) ([facility] [address]      ICD-10: Treatment Diagnosis: oropharyngeal dysphagia R13.12  INTERDISCIPLINARY COLLABORATION: Registered Nurse  PRECAUTIONS/ALLERGIES: Bactroban [mupirocin calcium]; Mupirocin; Sulfa (sulfonamide antibiotics); and Sulfamethoxazole-trimethoprim ASSESSMENT:Patient fully awake. No family present. She initially did not want to participate in PO trials, but willing to have water. Swallows of water by spoon, cup and straw were timely, clear to cervical auscultation with adequate laryngeal excursion and clear voicing after swallow. Patient with occasional oral holding of applesauce, but would swallow within 20 secs for all trials with no signs or symptoms of aspiration. O2 sats remained constant throughout PO trials. Patient refused trials of mixed consistency and cracker. Recommend initiate pureed diet and thin liquids. Give meds whole in applesauce. Patient should be fully awake and fully upright for all PO. If patient starts coughing, discontinue PO. SLP will follow for diet tolerance and PO trials for potential diet upgrades, depending on goals of care. Discussed with Dr. Lord Gilliland. Family meeting scheduled with Dr. Steff Best tomorrow. Palliative care consulted. ?????? ? ? This section established at most recent assessment??????????  PROBLEM LIST (Impairments causing functional limitations):  1.  Oropharyngeal dysphagia   REHABILITATION POTENTIAL FOR STATED GOALS: Fair  PLAN OF CARE:   Patient will benefit from skilled intervention to address the following impairments. RECOMMENDATIONS AND PLANNED INTERVENTIONS (Benefits and precautions of therapy have been discussed with the patient.):  · PO:  Pureed  · Liquids:  regular thin  MEDICATIONS:  · Whole in puree  ASPIRATION PRECAUTIONS:  · Slow rate of intake  · Small bites/sips  · Upright at 90 degrees during meal  OTHER RECOMMENDATIONS (including follow up treatment recommendations): · Family training/education  · Patient education  RECOMMENDED DIET MODIFICATIONS DISCUSSED WITH:  · Nursing  · MD  · Patient  FREQUENCY/DURATION: 1-2 times for diet tolerance and potential diet upgrade. RECOMMENDED REHABILITATION/EQUIPMENT: (at time of discharge pending progress): Due to the probability of continued deficits (see above) this patient will not likely need continued skilled speech therapy after discharge. SUBJECTIVE:   Alert and cooperative. But confused. No family present. History of Present Injury/Illness: Ms. Rossana Cho  has a past medical history of Arthritis, Arthropathy, unspecified, site unspecified, Benign paroxysmal positional vertigo, Cardiac dysrhythmia, unspecified, Chronic obstructive pulmonary disease ([address] Debility, unspecified, Dementia, Facet syndrome ([address] GERD (gastroesophageal reflux disease), GIB (gastrointestinal bleeding), History of peptic ulcer disease, Hypertension, IBS (irritable bowel syndrome), Ill-defined condition, Impaired fasting glucose, Lumbago, Memory loss, Meningioma ([address] Musculoskeletal pain, OA (osteoarthritis) of knee, Palliative care encounter, Rhinorrhea, Scoliosis, Sensorineural hearing loss, Sleep apnea, Spondylolisthesis, TMJ dysfunction, Varicella, Vasomotor rhinitis, and Vitamin D deficiency. .  She also  has a past surgical history that includes hx [place] and bso (1974); hx knee arthroscopy; hx appendectomy; hx other surgical (1998); hx knee replacement (Left, 2007); hx colonoscopy; hx tonsillectomy; hx cataract removal (2003); ULTRASOUND (N/A, 12/5/2018); ULTRASOUND (Left, 11/16/2018); THORACENTESIS (Left, 11/16/2018); ESOPHAGOGASTRODUODENOSCOPY (EGD) (N/A, 1/31/2014); INJECTION (N/A, 1/31/2014); ENDOSCOPIC FOREIGN BODY REMOVAL (N/A, 1/31/2014); ENDOSCOPIC BANDING OR LIGATION (N/A, 1/31/2014); BRONCHOSCOPY (N/A, 1/30/2014); and BRONCHOSCOPY (N/A, 1/26/2014). Present Symptoms: dysphagia    Pain Scale 1: Visual  Pain Intensity 1: 0  Pain Intervention(s) 1: Medication (see MAR)  Current Medications:   No current facility-administered medications on file prior to encounter. Current Outpatient Medications on File Prior to Encounter   Medication Sig Dispense Refill    erythromycin (ILOTYCIN) ophthalmic ointment 0.5 inch ointment to lower lid of both eyes 4 times daily for 5 days 1 Tube 0    acetaminophen (TYLENOL) 325 mg tablet Take 325 mg by mouth every four (4) hours as needed for Pain.  ferrous sulfate (IRON) 325 mg (65 mg iron) tablet Take 325 mg by mouth Daily (before breakfast).  potassium chloride (KLOR-CON M20) 20 mEq tablet Take  by mouth two (2) times a day.  furosemide (LASIX) 40 mg tablet Take 40 mg by mouth daily.  predniSONE (DELTASONE) 10 mg tablet Take  by mouth daily (with breakfast).  ascorbic acid, vitamin C, (VITAMIN C) 500 mg tablet Take  by mouth.  OXYGEN-AIR DELIVERY SYSTEMS Keep SpO2> 90%      bipap machine kit by Does Not Apply route.  pantoprazole (PROTONIX) 40 mg tablet TAKE 1 TABLET BY MOUTH DAILY 30 Tab 5    budesonide (PULMICORT) 0.5 mg/2 mL nbsp 2 mL by Nebulization route two (2) times a day. 1 Each 1    albuterol (PROVENTIL VENTOLIN) 2.5 mg /3 mL (0.083 %) nebulizer solution 3 mL by Nebulization route every four (4) hours as needed for Wheezing. (Patient taking differently: 2.5 mg by Nebulization route every four (4) hours.) 24 Each 0    lisinopril (PRINIVIL, ZESTRIL) 20 mg tablet Take 1 Tab by mouth daily.  30 Tab 0    polyethylene glycol (MIRALAX) 17 gram packet Take 1 Packet by mouth daily. 1 Packet 0    Azelastine (ASTEPRO) 0.15 % (205.5 mcg) nasal spray 1 [place] by Both Nostrils route two (2) times a day. 1 Bottle 12    Walker (ULTRA-LIGHT ROLLATOR) misc Use daily 1 Each 0    donepezil (ARICEPT) 5 mg tablet Take 1 Tab by mouth nightly. 90 Tab 3    sertraline (ZOLOFT) 100 mg tablet Take 1 Tab by mouth nightly. (Patient taking differently: Take 50 mg by mouth nightly.) 30 Tab 3    Compression Socks, Medium misc WEAR DAILY 2 Each 12    DISABLED PLACARD (DISABLED PLACARD) DMV Use prn 1 Each 0     Current Dietary Status:  NPO    Social History/Home Situation:    Home Environment: Long term care  # Steps to Enter: 0  One/Two Story Residence: One story  Living Alone: No  Support Systems: Child(shae), Family member(s), Friends \ neighbors  Patient Expects to be Discharged to[de-identified] Unknown  Current DME Used/Available at Home: Adaptive bathing aides, Adaptive dressing aides, Oxygen, portable, Shower chair, Transfer bench, Tub transfer bench, Wheelchair, Other (comment)  OBJECTIVE:   Respiratory Status:  Hi flow nasal cannula  15 l/min  Oral Motor Structure/Speech:  Oral-Motor Structure/Motor Speech  Labial: No impairment  Dentition: Intact  Oral Hygiene: adequate  Lingual: No impairment    Cognitive and Communication Status:  Neurologic State: Alert;Confused  Orientation Level: Oriented to person  Cognition: Decreased attention/concentration  Perception: Appears intact  Perseveration: Perseverates during conversation  Safety/Judgement: Awareness of environment    BEDSIDE SWALLOW EVALUATION  Oral Assessment:  Oral Assessment  Labial: No impairment  Dentition: Intact  Oral Hygiene: adequate  Lingual: No impairment  P.O. Trials:  Patient Position: upright in bed    The patient was given  the following:   Consistency Presented: Thin liquid;Puree  How Presented: SLP-fed/presented;Cup/sip;Straw;Spoon; Successive swallows    ORAL PHASE:  Bolus Acceptance: No impairment  Bolus Formation/Control: Impaired  Propulsion: Delayed (# of seconds)  Type of Impairment: Delayed  Oral Residue: None    PHARYNGEAL PHASE:  Initiation of Swallow: Delayed (# of seconds)  Laryngeal Elevation: Functional  Aspiration Signs/Symptoms: None  Vocal Quality: No impairment           Pharyngeal Phase Characteristics: No impairment, issues, or problems     OTHER OBSERVATIONS:  Rate/bite size: Questionable   Endurance:  Questionable   Comments: Tool Used: Dysphagia Outcome and Severity Scale (CHILANGO)    Score Comments   Normal Diet  [] 7 With no strategies or extra time needed   Functional Swallow  [] 6 May have mild oral or pharyngeal delay       Mild Dysphagia    [] 5 Which may require one diet consistency restricted (those who demonstrate penetration which is entirely cleared on MBS would be included)   Mild-Moderate Dysphagia  [] 4 With 1-2 diet consistencies restricted       Moderate Dysphagia  [] 3 With 2 or more diet consistencies restricted       Moderately Severe Dysphagia  [x] 2 With partial PO strategies (trials with ST only)       Severe Dysphagia  [] 1 With inability to tolerate any PO safely          Score:  Initial: 2 Most Recent: X (Date: -- )   Interpretation of Tool: The Dysphagia Outcome and Severity Scale (CHILANGO) is a simple, easy-to-use, 7-point scale developed to systematically rate the functional severity of dysphagia based on objective assessment and make recommendations for diet level, independence level, and type of nutrition. Score 7 6 5 4 3 2 1   Modifier CH CI CJ CK CL CM CN   ?  Swallowing:     - CURRENT STATUS: CM - 80%-99% impaired, limited or restricted    - GOAL STATUS:  CI - 1%-19% impaired, limited or restricted    - D/C STATUS:  ---------------To be determined---------------  [place] / [place] / Product Type: Medicare /     TREATMENT:    (In addition to Assessment/Re-Assessment sessions the following treatments were rendered)  Dysphagia Activities: Activities/Procedures listed utilized to improve progress in swallow strength, swallow timeliness, swallow function, diet tolerance and swallow safety. Required minimal cueing to improve swallow safety, work toward diet advancement and decrease aspiration risk.  ___________________________________________________________________________________________  Safety:   After treatment position/precautions:  · RN notified  · MD notified  · Upright in Bed  Treatment Assessment:  Patient tolerated PO trials without medical complications  Progression/Medical Necessity:   · Patient is expected to demonstrate progress in swallow strength, swallow timeliness, swallow function, diet tolerance and swallow safety to work toward diet advancement. Compliance with Program/Exercises: Will assess as treatment progresses  Reason for Continuation of Services/Other Comments:  · Patient continues to require skilled intervention due to recommendation for modified diet consistency. Recommendations/Intent for next treatment session: \"Treatment next visit will focus on diet tolerance\".     Total Treatment Duration:  Time In: 1325  Time Out: Alejandra Purcell, [place], CCC-SLP

## 2018-12-21 NOTE — HOSPICE
Long discussion with patient's daughter and also Susana Benjamin who lives in [facility]. Weekend liaison will call in the morning to set up an E/P.  Thank you for this referral.

## 2018-12-21 NOTE — PROGRESS NOTES
Extensive conversations with Cari Montanez and Srini Garcia, daughters, took more than an hour. We agreed that patient remains fairly ill and unstable for pleur-x catheter planned next week. Family is not convinced that the pleur-x is aligned with their goals given her overall condition currently also. They would like to proceed with palliative thoracentesis and reschedule pleur-x for later if possible. Fluid will be sent for cytology, flow cytometry.   Diaz Salazar MD

## 2018-12-21 NOTE — PROGRESS NOTES
Shift assessment completed. Pt is alert with confusion. No acute distress noted at this time. Lungs sounds diminished. On High Minh oxygen. Boots to BLE. Family at bedside now. Pt and family are encouraged to call for assistance. Call light within reach.

## 2018-12-21 NOTE — PROGRESS NOTES
Hospitalist Progress Note    2018  Admit Date: 2018  2:28 PM   NAME: Ermias Davidson   :  1929   MRN:  [id number]   Attending: London Godoy MD  PCP:  Muna Ruano MD    SUBJECTIVE:   From HPI:  \"Ms. Deal is a 79 yo female with PMH of ILD/COPD/chronic hypoxic respiratory failure on 10 L NC baseline, on home hospice for those indications, who was brought by daughter from longterm living situation at [facility] with confusion and UTI. Per daughter Winter Valdovinos at bedside, patient has become progressively confused and was diagnosed with UTI, started macrobid for several days. In the ED she is altered, with ALYSON on hyperkalemia of 7.0 she has received IVF, calcium gluconate/albuterol/D50/ insulin and rocephin. UA negative. CXR shows chronic left base consolidation and effusion. On admission evaluation, she became hypotensive and required admission to ICU.    18:  She is asleep, mouth breathing. Respiratory Therapy and nurse at beside. Caregiver, Vlad Corea, is also at bedside. Discussed condition and plan with Ms. Vlad Corea. 18:  Patient is asleep on evaluation, but does awake easily to verbal stimuli. She attempts to speak with me, but cannot understand her speech. On high flow (15L O2 currently). 18:  Patient is asleep, easily arosable. Confused, difficult to understand speech. Remains on high-flow NC O2.    18:  Daughter and caregiver at bedside. Patient is not currently confused. Would like to try pureed dinner. Denies current pain. Review of Systems unable to be obtained  mentation. PHYSICAL EXAM     Visit Vitals  /54 (BP Patient Position: At rest)   Pulse (!) 135   Temp 98.2 °F (36.8 °C)   Resp 18   Ht 5' 3\" (1.6 m)   Wt 70.8 kg (156 lb)   SpO2 91%   Breastfeeding?  No   BMI 27.63 kg/m²      Temp (24hrs), Av.9 °F (36.6 °C), Min:97.2 °F (36.2 °C), Max:98.3 °F (36.8 °C)    Oxygen Therapy  O2 Sat (%): 91 % (18 1328)  Pulse via Oximetry: 132 beats per minute (12/21/18 1328)  O2 Device: Hi flow nasal cannula (12/21/18 1328)  O2 Flow Rate (L/min): 10 l/min (12/21/18 1328)  O2 Temperature: 86 °F (30 °C) (12/18/18 0741)  FIO2 (%): (.) (12/18/18 2107)    Intake/Output Summary (Last 24 hours) at 12/21/2018 1632  Last data filed at 12/21/2018 1106  Gross per 24 hour   Intake 120 ml   Output 350 ml   Net -230 ml      General: No acute distress    Lungs:  Diminished bilaterally. Heart:  Tachycardic, regular rhythm,  No murmur, rub, or gallop  Abdomen: Soft, Non distended, Non tender, Positive bowel sounds  Extremities: No cyanosis, clubbing or edema  Neurologic:  No focal deficits. Confused. ASSESSMENT      Active Hospital Problems    Diagnosis Date Noted    Septic shock ([address] 12/18/2018    Acute on chronic respiratory failure with hypoxia (HCC) 12/18/2018    ALYSON (acute kidney injury) ([address] 12/17/2018    Chronic respiratory failure with hypoxia ([address] 12/17/2018    ILD (interstitial lung disease) ([address] 12/17/2018    Hyperkalemia 12/17/2018    Anemia 12/17/2018    Recurrent left pleural effusion 12/17/2018    Aortic stenosis 12/17/2018    Sepsis ([address] 12/17/2018    Acute encephalopathy 12/17/2018    UTI (urinary tract infection) 11/18/2018    Late onset Alzheimer's disease without behavioral disturbance 11/15/2017    COPD (chronic obstructive pulmonary disease) ([address] 09/01/2015    HTN (hypertension) 01/22/2014     Plan:     · Sepsis: Resolved. BPs stable. Stress dose steroids to be tapered by Pulm, BC x 2, urine cx negative thus far, transition to IV rocephin. Likely urinary source, but unclear as UA was not overtly remarkable. May have been sterilized by PO abx as outpatient, but unclear how many doses patient received. Lactic acid is now normal with IVFs on admission. · Pleural Effusion:  Dr. Bam Thomas performed bedside therapeutic thoracentesis this AM.  Pt tolerated this well.   · ALYSON : continue IVF, BMP shows improvement from admission, Cr is almost normal.  · Hyperkalemia: resolved. Will recheck in AM.  Had stopped lisinopril, lasix, and potassium on admission. · Chronic hypoxic respiratory failure/COPD/ILD: RT following. BIPAP intolerant per family, pulm consult appreciated, continue duonebs/pulmicort. · Acute encephalopathy: nonfocal exam, improving daily  · Anemia: chronic, followup CBC       DVT Prophylaxis: SCDs    Dispo:  CM following. Was previously on hospice, revoked by daughter on admission. Has full time caregivers. Was at [facility] prior to admission. Will consult with Palliative Care and Hospice today as patient's daughter Tommy reports that she does not wish to continue with [facility] and is interested in [facility]. Poor overall prognosis.     Signed By: Lavern Donovan MD     December 21, 2018

## 2018-12-21 NOTE — PROGRESS NOTES
Received phone mauri from dtr and [employer] at 0800 this morning. Maine Epps had heard from her sister Teresa Aisha that the pt had been \"put into palliative care\", and wondered if this was similar to being put into hospice. Clarified that pt had been seen by palliative care, just as she is being seen by other specialties. Maine Epps then went on to describe other worries. We focused on two goals for the patient. One is that Maine Epps will find out from the pt's nurse whether or not pulmonary plans to place a PleurX before the pt leaves the hospitalization. The other is that Maine Epps will call [facility] to tell them that she wants her mother back on hospice care, that she needs their assistance in ensuring that there is not another unwanted hospitalization, and that she does not want her mother placed in a memory care unit. Palliative care services are no longer needed at this time. Thank you for the opportunity to participate in this patients care. Please consult again if further needs arise.

## 2018-12-21 NOTE — PROGRESS NOTES
Assessment completed and documented. Lung sounds diminished on left side and right base. Dyspnea at rest that increases with talking. Heart sounds regular with elevated HR in the 130's. Bowel sounds hypoactive. Abdomen soft. Trace edema noted to upper extremities. Patient has no complaints of pain. Alert with some drowsiness. Able to verbalize her name and that she is at [facility]. Currently resting in bed with family at bedside. Will continue to monitor with hourly rounding.

## 2018-12-21 NOTE — INTERVAL H&P NOTE
H&P Update:  Dago Rivers was seen and examined. History and physical has been reviewed. The patient has been examined.  There have been no significant clinical changes since the completion of the originally dated History and Physical.    Signed By: Alicia Lamar MD     December 21, 2018 10:04 AM

## 2018-12-21 NOTE — PROGRESS NOTES
Larissa Deal  Admission Date: 12/17/2018             Daily Progress Note: 12/21/2018    The patient's chart is reviewed and the patient is discussed with the staff. Ki Claudio is an 80yoF with h/o dementia living at the [facility], with COPD, chronic hypoxic respiratory failure on 10lpm at baseline, chronic left effusion (didn't tolerate thoracentesis well in past and it benefited little, planned for pleur-X catheter) and under hospice care who was admitted to [facility] on 12/17 with UTI, confusion, acute renal failure, shock requiring levophed. Subjective:     O2 sat is 93% on 10lpm.  Remains delirious.     Current Facility-Administered Medications   Medication Dose Route Frequency    furosemide (LASIX) injection 20 mg  20 mg IntraVENous DAILY    hydrocortisone Sod Succ (PF) (SOLU-CORTEF) injection 25 mg  25 mg IntraVENous Q12H    cefTRIAXone (ROCEPHIN) 2 g in 0.9% sodium chloride (MBP/ADV) 50 mL  2 g IntraVENous Q24H    saline peripheral flush soln 5 mL  5 mL InterCATHeter PRN    SALINE PERIPHERAL FLUSH Q8H soln 5 mL  5 mL InterCATHeter Q8H    lip protectant (BLISTEX) ointment   Topical PRN    albuterol (PROVENTIL VENTOLIN) nebulizer solution 2.5 mg  2.5 mg Nebulization Q6H RT    budesonide (PULMICORT) 500 mcg/2 ml nebulizer suspension  500 mcg Nebulization BID RT    pantoprazole (PROTONIX) 40 mg in sodium chloride 0.9% 10 mL injection  40 mg IntraVENous DAILY    sodium chloride (NS) flush 5-10 mL  5-10 mL IntraVENous PRN    acetaminophen (TYLENOL) tablet 650 mg  650 mg Oral Q6H PRN    ondansetron (ZOFRAN) injection 4 mg  4 mg IntraVENous Q6H PRN    LORazepam (ATIVAN) injection 0.5 mg  0.5 mg IntraVENous Q6H PRN    morphine injection 1 mg  1 mg IntraVENous Q4H PRN       Review of Systems  Constitutional: negative for fever, chills, sweats  Cardiovascular: negative for chest pain, palpitations, syncope, edema  Gastrointestinal:  negative for dysphagia, reflux, vomiting, diarrhea, abdominal pain, or melena  Neurologic:  negative for focal weakness, numbness, headache    Objective:     Vitals:    12/21/18 0231 12/21/18 0337 12/21/18 0757 12/21/18 0830   BP:  181/88  180/76   Pulse:  (!) 129  (!) 133   Resp:  18  18   Temp:  98.3 °F (36.8 °C)  98.2 °F (36.8 °C)   SpO2: 95% 90% 90% 91%   Weight:       Height:         Intake and Output:   12/19 1901 - 12/21 0700  In: 100 [I.V.:100]  Out: 925 [Urine:925]  No intake/output data recorded. Physical Exam:   Constitution:  the patient is well developed and in no acute distress  EENMT:  Sclera clear, pupils equal, oral mucosa moist  Respiratory: coarse in bases  Cardiovascular:  RRR, tachy  Gastrointestinal: soft and non-tender; with positive bowel sounds. Musculoskeletal: warm without cyanosis. There is no lower leg edema. Skin:  no jaundice or rashes  Neurologic: no gross neuro deficits     Psychiatric:  alert and oriented x 3    CXR:       LAB  No results for input(s): GLUCPOC in the last 72 hours. No lab exists for component: [place]   Recent Labs     12/21/18  0614 12/20/18  0359 12/19/18  0821   WBC 12.3* 11.7* 12.2*   HGB 10.4* 9.5* 10.3*   HCT 36.6 33.1* 36.6    222 208     Recent Labs     12/21/18  0614 12/20/18  0359 12/19/18  0821   * 155* 150*   K 3.6 3.2* 4.3   * 119* 116*   CO2 23 23 24   GLU 90 96 98   BUN 38* 38* 36*   CREA 1.27* 1.32* 1.43*   CA 9.7 9.4 9.2     No results for input(s): PH, PCO2, PO2, HCO3, PHI, PCO2I, PO2I, HCO3I in the last 72 hours. No results for input(s): LCAD, LAC in the last 72 hours.       Assessment:  (Medical Decision Making)     Hospital Problems  Date Reviewed: 11/15/2018          Codes Class Noted POA    * (Principal) Septic shock ([address] ICD-10-CM: A41.9, R65.21  ICD-9-CM: 038.9, 785.52, 995.92  12/18/2018 Unknown    Off vasopressors    Acute on chronic respiratory failure with hypoxia (HCC) ICD-10-CM: J96.21  ICD-9-CM: 518.84, 799.02  12/18/2018 Unknown Wean O2 today    ALYSON (acute kidney injury) ([facility] ICD-10-CM: N17.9  ICD-9-CM: 584.9  12/17/2018 Yes    improved    Chronic respiratory failure with hypoxia (HCC) (Chronic) ICD-10-CM: J96.11  ICD-9-CM: 518.83, 799.02  12/17/2018 Yes        ILD (interstitial lung disease) ([facility] (Chronic) ICD-10-CM: J84.9  ICD-9-CM: 914  12/17/2018 Yes        Hyperkalemia ICD-10-CM: E87.5  ICD-9-CM: 276.7  12/17/2018 Yes        Anemia ICD-10-CM: D64.9  ICD-9-CM: 285.9  12/17/2018 Yes        Recurrent left pleural effusion (Chronic) ICD-10-CM: J90  ICD-9-CM: 511.9  12/17/2018 Yes    Scheduled for pleur-x placement    Aortic stenosis (Chronic) ICD-10-CM: I35.0  ICD-9-CM: 424.1  12/17/2018 Yes        Sepsis ([facility] ICD-10-CM: A41.9  ICD-9-CM: 038.9, 995.91  12/17/2018 Yes        Acute encephalopathy ICD-10-CM: G93.40  ICD-9-CM: 348.30  12/17/2018 Yes        UTI (urinary tract infection) ICD-10-CM: N39.0  ICD-9-CM: 599.0  11/18/2018 Yes        Late onset Alzheimer'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 --Wean oxygen  --continue low dose lasix  --spoke with Sierra Guzman and Franchesca Elizondo for over one hour and plan to cancel pleurX and perform inpatient thoracentesis. We will be available as needed over the weekend and holiday. More than 50% of the time documented was spent in face-to-face contact with the patient and in the care of the patient on the floor/unit where the patient is located.     Messi Og MD

## 2018-12-21 NOTE — PROCEDURES
THORACENTESIS   12/21/18      Indication/Preprocedure diagnosis: LEFT Pleural effusion  Volume of fluid withdrawn: 1400ml  Postprocedural Diagnosis:  Pleural effusion    After obtaining informed consent the patient was placed sitting up on the side of the bed and using ultrasound guidance the pleural fluid was located on the LEFT side  and marked. The diaphragm and atelectatic lung were clearly visualized. The patient's skin was cleaned with ChloraPrep. Using sterile technique the skin was anesthetized with 10mL of 1% Xylocaine and a stab wound made and a catheter inserted into the pleural space with 1400 cc of serosanguinous-appearing fluid was removed. The patient tolerated the procedure well. The pleural fluid is sent for diagnostic studies (see orders). Ultrasound revealed no evidence of pneumothorax. There where no complications and negligible blood loss.      Lei Duenas MD

## 2018-12-21 NOTE — PROGRESS NOTES
Pt resting in bed and is alert and oriented to self. She denies pain and is on 10 L NC. RR even and unlabored. purwick in place. Call light in reach and pt instructed to call for assistance if needed. Will monitor. Sitter at bedside.

## 2018-12-22 NOTE — DISCHARGE SUMMARY
Hospitalist Discharge Summary     Patient ID:  Jana Cali  [id number]  17 y.o.  6/1/1929  Admit date: 12/17/2018  2:28 PM  Discharge date and time: 12/22/2018  Attending: Antonia Boles MD  PCP:  Rosas Saldivar MD  Treatment Team: Attending Provider: Antonia Boles MD; Consulting Provider: Toma Gipson MD; Utilization Review: Carlitos Mccormack RN    Principal Diagnosis Septic shock [facility])   Principal Problem:    Septic shock ([address] (12/18/2018)    Active Problems:    HTN (hypertension) (1/22/2014)      COPD (chronic obstructive pulmonary disease) ([address] (9/1/2015)      Late onset Alzheimer's disease without behavioral disturbance (11/15/2017)      UTI (urinary tract infection) (11/18/2018)      ALYSON (acute kidney injury) ([address] (12/17/2018)      Chronic respiratory failure with hypoxia ([address] (12/17/2018)      ILD (interstitial lung disease) ([address] (12/17/2018)      Hyperkalemia (12/17/2018)      Anemia (12/17/2018)      Recurrent left pleural effusion (12/17/2018)      Aortic stenosis (12/17/2018)      Sepsis ([address] (12/17/2018)      Acute encephalopathy (12/17/2018)      Acute on chronic respiratory failure with hypoxia ([address] (12/18/2018)             Hospital Course:  Please refer to the admission H&P for details of presentation. Patient was an 80yoF with a PMH significant for ILD/COPD/chronic hypoxic respiratory failure on 10L NC at baseline on [facility], who presented to the ER with confusion and UTI. Patient developed evidence of septic shock and required ICU admission with initiation of levophed and IV abx. Patient's daughter initially revoked hospice so that the patient could be admitted to the hospital for IV abx and ICU admission, but continued her known DNR status. Family or caregiver remained at bedside throughout her stay. She was titrated off levophed and maintained appropriate BPs. She was then transferred out of the ICU.   Initially during her hospitalization when she was in the ICU, she was very somnolent and difficult to arouse a times, but with medical treatment, her responsiveness improved. She did have some confusion, mainly at night as her medical condition appears to improve. Unfortunately, she was not initially safe for PO intake, but ST followed and eventually advanced her diet to pureed. She was maintained on IV abx throughout her stay. On 12/21, she underwent therapeutic thoracentesis per family request, as she had been getting scheduled taps as outpatient. She tolerated this procedure well and a large amount of serosanguinous fluid was removed. On the day of her demise, her mentation seemed to be improving. I initially evaluated the patient around 1330 with 2 of her daughters at bedside Hannah Pillai and Adam Meena). She was alert and able to answer my questions appropriately. She told me that she was not having any pain at the time and she thanked me for examining her. Her daughter Adam Robledo encouraged her to nap. I did have a brief meeting with both daughters outside the patient's room after my exam, per Yancy's request, as they were considering [facility] with plans for Hospice to make further evaluation this afternoon. I was alerted by patient's RT who had been following her through her stay, that the patient was having agonal breathing. This was approximately 1.5 hours after my evaluation. I requested the patient's nurse to obtain vital signs. She was hypotensive and mottled on my bedside evaluation at 1450. Patient's daughter Michael Tao was talking to Ms. Deal and holding her hand, but the patient was non-responsive. Patient's daughter began calling family to alert them of her rapidly worsening condition. I asked the patient's daughter if there was anything that she wished for us to do, and she began crying and kissed her mom's hand. Nursing staff and RT remained at bedside for support.     Patient's time of death was 65 and she was pronounced by Dr. Herminia Corbett at 1600. I will be certifying attending physician. Significant Diagnostic Studies:       Labs: Results:       Chemistry Recent Labs     12/21/18  0614 12/20/18  0359   GLU 90 96   * 155*   K 3.6 3.2*   * 119*   CO2 23 23   BUN 38* 38*   CREA 1.27* 1.32*   CA 9.7 9.4   AGAP 16 13      CBC w/Diff Recent Labs     12/21/18  0614 12/20/18  0359   WBC 12.3* 11.7*   RBC 3.96* 3.61*   HGB 10.4* 9.5*   HCT 36.6 33.1*    222      Cardiac Enzymes No results for input(s): CPK, CKND1, PHILLIP in the last 72 hours. No lab exists for component: CKRMB, TROIP   Coagulation No results for input(s): PTP, INR, APTT in the last 72 hours. No lab exists for component: INREXT    Lipid Panel Lab Results   Component Value Date/Time    Cholesterol, total 160 06/15/2018 10:19 AM    HDL Cholesterol 52 06/15/2018 10:19 AM    LDL, calculated 87 06/15/2018 10:19 AM    VLDL, calculated 21 06/15/2018 10:19 AM    Triglyceride 107 06/15/2018 10:19 AM      BNP No results for input(s): BNPP in the last 72 hours. Liver Enzymes No results for input(s): TP, ALB, TBIL, AP, SGOT, GPT in the last 72 hours. No lab exists for component: DBIL   Thyroid Studies Lab Results   Component Value Date/Time    TSH 2.900 06/15/2018 10:19 AM            Discharge Exam:  Visit Vitals  BP (!) 68/48 (BP Patient Position: At rest)   Pulse (!) 128   Temp 96.9 °F (36.1 °C)   Resp 22   Ht 5' 3\" (1.6 m)   Wt 71.2 kg (157 lb)   SpO2 91%   Breastfeeding? No   BMI 27.81 kg/m²     Patient had ceased respirations for >1 minute. No auscultated HR or palpable pulses. No pain response. No corneal reflex. My sincerest condolences on the loss of Ms. Deal.   May her memory be eternal.      Time spent to discharge patient 35 minutes  Signed:  Meño Leo MD  12/22/2018  5:37 PM

## 2018-12-22 NOTE — PROGRESS NOTES
Shift assessment complete. Patient disoriented X 4. Respirations present. Patient on 10 L via NC. Breath sounds diminished. Heart sounds regular. Purwick in place and draining. Patient needs met. No distress noted. Staff from [place] [facility] at bedside. Bed low and locked. Call light within reach. Will continue to monitor hourly during shift.

## 2018-12-22 NOTE — PROGRESS NOTES
Called by staff for end of life/ death  Patient was very peaceful and this was comforting to daughters who were present  Prayer provided  Shared stories about patient and how special she was to the family  Staff was extremely compassionate during this critical time    Diaz Conway, staff [address], [address]  /   [email or web address]

## 2018-12-22 NOTE — PROGRESS NOTES
More family members at bedside. Breathing has subsided for several minutes. No pulse now detected. No distress noted throughout process.  at bedside.

## 2018-12-22 NOTE — PROGRESS NOTES
Called to room by daughter at bedside. Pt is lethargic, resp 20, abdominal. RT placing non rebreather BP 68/48  With sat 50% Pulse rate 126. Dr Delia Hwang made aware.

## 2018-12-24 LAB
FLOW CYTOMETRY, FBTC1: NORMAL
SPECIMEN SOURCE: NORMAL
TEST ORDERED:: NORMAL

## 2019-07-15 NOTE — ED NOTES
Problem: NORMAL   Goal: Experiences normal transition  Description  INTERVENTIONS:  - Assess and monitor vital signs and lab values.   - Encourage skin-to-skin with caregiver for thermoregulation  - Assess signs, symptoms and risk factors for hypog Pt daughter Talia Sales [phone]

## 2021-06-01 NOTE — PROGRESS NOTES
ED Provider Note    CHIEF COMPLAINT  Chief Complaint   Patient presents with   • Flu Like Symptoms     x 3 days. body aches, eyes burning, HA.        HPI  Eli Camarillo is a 30 y.o. female who presents to the emergency department through triage for flulike symptoms.  Patient describes now day 4 of headache, body aches, abdominal pain and nausea.  A couple episodes of vomiting.  Dry cough without shortness of breath or wheezing.  No chest pain, palpitations or syncope.  Chills without documented fever.  Decreased appetite.    Patient has not been vaccinated for Covid.  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    REVIEW OF SYSTEMS  See HPI for further details. All other systems are negative.     PAST MEDICAL HISTORY   Denies    SOCIAL HISTORY  Social History     Tobacco Use   • Smoking status: Never Smoker   • Smokeless tobacco: Never Used   Substance and Sexual Activity   • Alcohol use: Never   • Drug use: Never   • Sexual activity: Not on file       SURGICAL HISTORY  patient denies any surgical history    CURRENT MEDICATIONS  Home Medications    **Home medications have not yet been reviewed for this encounter**         ALLERGIES  No Known Allergies    PHYSICAL EXAM  VITAL SIGNS: /80   Pulse (!) 109   Temp 37.8 °C (100 °F) (Oral)   Resp 20   Wt 76.8 kg (169 lb 5 oz)   SpO2 94%   Pulse ox interpretation: I interpret this pulse ox as normal.  Constitutional: Alert in no apparent distress.  HENT: Normocephalic, atraumatic. Bilateral external ears normal, Nose normal.  Dry  mucous membranes.    Eyes: Pupils are equal and reactive, Conjunctiva normal.   Neck: Normal range of motion, Supple.  No meningeal irritation.  Lymphatic: No lymphadenopathy noted.   Cardiovascular: Regula tachycardic otherwise rate and rhythm, no murmurs. Distal pulses intact.  No peripheral edema.  Thorax & Lungs: Normal breath sounds.  No wheezing/rales/ronchi. No increased work of breathing, clipped  Problem: Falls - Risk of 
Goal: *Absence of Falls Document [place] Kappa Fall Risk and appropriate interventions in the flowsheet. Outcome: Progressing Towards Goal 
Fall Risk Interventions: 
Mobility Interventions: Bed/chair exit alarm Mentation Interventions: Bed/chair exit alarm Medication Interventions: Bed/chair exit alarm Elimination Interventions: Call light in reach History of Falls Interventions: Bed/chair exit alarm Problem: Pressure Injury - Risk of 
Goal: *Prevention of pressure injury Document Tony Scale and appropriate interventions in the flowsheet. Outcome: Progressing Towards Goal 
Pressure Injury Interventions: 
Sensory Interventions: Pressure redistribution bed/mattress (bed type) Moisture Interventions: Apply protective barrier, creams and emollients, Absorbent underpads Activity Interventions: Pressure redistribution bed/mattress(bed type) Mobility Interventions: Pressure redistribution bed/mattress (bed type) Nutrition Interventions: Offer support with meals,snacks and hydration Friction and Shear Interventions: Apply protective barrier, creams and emollients speech or retractions.   Abdomen: Soft, non-distended.  Mild generalized discomfort, greatest in the epigastric region without rebound, guarding or peritonitis.  Skin: Warm, Dry, No erythema, No rash.   Musculoskeletal: Good range of motion in all major joints.   Neurologic: Alert and orient x4.  Speech clear and cohesive.  Moves her extremity spontaneously.  Psychiatric: Affect normal, Judgment normal, Mood normal.       DIAGNOSTIC STUDIES / PROCEDURES    LABS  Results for orders placed or performed during the hospital encounter of 21   SARS-CoV-2 PCR (24 hour In-House): Collect NP swab in VTM    Specimen: Respirate   Result Value Ref Range    SARS-CoV-2 Source NP Swab    Blood Culture,Hold   Result Value Ref Range    Blood Culture Hold Collected    EKG   Result Value Ref Range    Report       [facility] Emergency Dept.    Test Date:  2021  Pt Name:    TIM JEFFERSON         Department: ER  MRN:        [id number]                      Room:  Gender:     F                            Technician: [id number]  :        1991                   Requested By:ER TRIAGE PROTOCOL  Order #:    [id number]                    Reading MD: KENYON ANDUJAR DO    Measurements  Intervals                                Axis  Rate:       115                          P:          51  NM:         132                          QRS:        -11  QRSD:       78                           T:          14  QT:         300  QTc:        415    Interpretive Statements  SINUS TACHYCARDIA  No previous ECG available for comparison  Electronically Signed On 2021 5:38:28 PDT by KENYON ANDUJAR DO         COURSE & MEDICAL DECISION MAKING  Nursing notes and vital signs were reviewed. (See chart for details)  The patients records were reviewed, history was obtained from the patient;     ED evaluation most suggestive of viral illness, Covid is pending.  No clinical evidence of meningitis or pneumonia.  Abdominal exam  is benign.  Vital signs trended towards normal after IV fluid bolus (IV was established, patient clinically dehydrated), Toradol and Tylenol.  Tolerating oral fluids after Zofran.  No clinical evidence for sepsis.    Patient is stable for discharge at this time, anticipatory guidance provided, OTC medications as needed for symptomatic relief, close follow-up is encouraged, and strict ED return instructions have been detailed. Patient is agreeable to the disposition and plan.    Patient's blood pressure was elevated in the emergency department, and has been referred to primary care for close monitoring.      FINAL IMPRESSION  (B34.9) Viral syndrome      Electronically signed by: Yvrose Asif D.O., 6/1/2021 5:28 AM      This dictation was created using voice recognition software. The accuracy of the dictation is limited to the abilities of the software. I expect there may be some errors of grammar and possibly content. The nursing notes were reviewed and certain aspects of this information were incorporated into this note.

## 2023-12-05 NOTE — PROGRESS NOTES
Hospitalist paged x 2 for low K and increased Na on AM labs. No return call. 4 = No assist / stand by assistance

## (undated) DEVICE — PREP CHLORAPREP 10.5 ML ORG --

## (undated) DEVICE — SHEET, T, LAPAROTOMY, STERILE: Brand: MEDLINE

## (undated) DEVICE — 48" PROBE COVER W/GEL, ULTRASOUND, STERILE: Brand: SITE-RITE

## (undated) DEVICE — AMD ANTIMICROBIAL GAUZE SPONGES,12 PLY USP TYPE VII, 0.2% POLYHEXAMETHYLENE BIGUANIDE HCI (PHMB): Brand: CURITY

## (undated) DEVICE — KIT THORCENT 8FR L5IN POLYUR W/ 18/22/25GA NDL 3 W STPCOCK

## (undated) DEVICE — CANNULA NSL ORAL AD FOR CAPNOFLEX CO2 O2 AIRLFE

## (undated) DEVICE — STERILE POLYISOPRENE POWDER-FREE SURGICAL GLOVES: Brand: PROTEXIS

## (undated) DEVICE — (D)BRUSH SCRB 3% ULTRDX NL CLN -- DISC BY MFR USE ITEM 181213

## (undated) DEVICE — GEL MEDC ULTRASOUND 5L -- REPLACED BY 326862

## (undated) DEVICE — TOWEL SURG W16XL26IN BLU NONFENESTRATED DLX ST 2 PER PK

## (undated) DEVICE — KIT,BARRIER,BON SECOURS-ST. FRANCIS: Brand: MEDLINE